# Patient Record
Sex: MALE | Race: WHITE | NOT HISPANIC OR LATINO | Employment: OTHER | URBAN - METROPOLITAN AREA
[De-identification: names, ages, dates, MRNs, and addresses within clinical notes are randomized per-mention and may not be internally consistent; named-entity substitution may affect disease eponyms.]

---

## 2017-01-05 DIAGNOSIS — D75.1 ERYTHROCYTOSIS: Primary | ICD-10-CM

## 2017-01-09 ENCOUNTER — LAB (OUTPATIENT)
Dept: LAB | Facility: HOSPITAL | Age: 72
End: 2017-01-09

## 2017-01-09 ENCOUNTER — OFFICE VISIT (OUTPATIENT)
Dept: ONCOLOGY | Facility: CLINIC | Age: 72
End: 2017-01-09

## 2017-01-09 ENCOUNTER — APPOINTMENT (OUTPATIENT)
Dept: ONCOLOGY | Facility: HOSPITAL | Age: 72
End: 2017-01-09

## 2017-01-09 VITALS
BODY MASS INDEX: 24.61 KG/M2 | HEART RATE: 57 BPM | TEMPERATURE: 97.9 F | WEIGHT: 162.4 LBS | HEIGHT: 68 IN | OXYGEN SATURATION: 99 % | DIASTOLIC BLOOD PRESSURE: 78 MMHG | RESPIRATION RATE: 16 BRPM | SYSTOLIC BLOOD PRESSURE: 118 MMHG

## 2017-01-09 DIAGNOSIS — D75.1 ERYTHROCYTOSIS: Primary | ICD-10-CM

## 2017-01-09 DIAGNOSIS — D75.1 ERYTHROCYTOSIS: ICD-10-CM

## 2017-01-09 DIAGNOSIS — C64.1 CANCER OF RIGHT KIDNEY (HCC): ICD-10-CM

## 2017-01-09 LAB
BASOPHILS # BLD AUTO: 0.07 10*3/MM3 (ref 0–0.1)
BASOPHILS NFR BLD AUTO: 0.8 % (ref 0–1.1)
DEPRECATED RDW RBC AUTO: 45.4 FL (ref 37–49)
EOSINOPHIL # BLD AUTO: 0.29 10*3/MM3 (ref 0–0.36)
EOSINOPHIL NFR BLD AUTO: 3.2 % (ref 1–5)
ERYTHROCYTE [DISTWIDTH] IN BLOOD BY AUTOMATED COUNT: 13.8 % (ref 11.7–14.5)
HCT VFR BLD AUTO: 48.2 % (ref 40–49)
HGB BLD-MCNC: 16 G/DL (ref 13.5–16.5)
IMM GRANULOCYTES # BLD: 0.04 10*3/MM3 (ref 0–0.03)
IMM GRANULOCYTES NFR BLD: 0.4 % (ref 0–0.5)
LYMPHOCYTES # BLD AUTO: 1.57 10*3/MM3 (ref 1–3.5)
LYMPHOCYTES NFR BLD AUTO: 17.3 % (ref 20–49)
MCH RBC QN AUTO: 29.9 PG (ref 27–33)
MCHC RBC AUTO-ENTMCNC: 33.2 G/DL (ref 32–35)
MCV RBC AUTO: 89.9 FL (ref 83–97)
MONOCYTES # BLD AUTO: 0.96 10*3/MM3 (ref 0.25–0.8)
MONOCYTES NFR BLD AUTO: 10.6 % (ref 4–12)
NEUTROPHILS # BLD AUTO: 6.15 10*3/MM3 (ref 1.5–7)
NEUTROPHILS NFR BLD AUTO: 67.7 % (ref 39–75)
NRBC BLD MANUAL-RTO: 0 /100 WBC (ref 0–0)
PLATELET # BLD AUTO: 224 10*3/MM3 (ref 150–375)
PMV BLD AUTO: 9.3 FL (ref 8.9–12.1)
RBC # BLD AUTO: 5.36 10*6/MM3 (ref 4.3–5.5)
WBC NRBC COR # BLD: 9.08 10*3/MM3 (ref 4–10)

## 2017-01-09 PROCEDURE — 36416 COLLJ CAPILLARY BLOOD SPEC: CPT | Performed by: INTERNAL MEDICINE

## 2017-01-09 PROCEDURE — 85025 COMPLETE CBC W/AUTO DIFF WBC: CPT | Performed by: INTERNAL MEDICINE

## 2017-01-09 PROCEDURE — 99213 OFFICE O/P EST LOW 20 MIN: CPT | Performed by: INTERNAL MEDICINE

## 2017-01-09 NOTE — PROGRESS NOTES
REASONS FOR FOLLOWUP:       1. Myeloproliferative disorder with polycythemia vera, MAI-2 mutation positive requiring periodic phlebotomies whenever hematocrit is above 47.      2. His  tory of stage I clear cell carcinoma of the right kidney status post partial nephrectomy, nephron sparing surgery by Dr. Ganesh Lopez with no issues or consequences.  The patient has already an appointment to repeat CT scan of the abdomen in March 2016.                3. Patient has history of polymyalgia rheumatica.  He IS OFF 5 mg of prednisone a day.            HISTORY OF PRESENT ILLNESS:  The patient returns today to the office for followup.       He is here today with no specific complaints of anything in particular. He has no clinical bleeding, no thrombosis, normal appetite, stable weight, normal bowel function, normal urination and completely controlled polymyalgia rheumatica after stopping prednisone. He has been very effective in regard physical activity, playing tennis and having no limitations in life quality.     He is due to have another CT scan of the abdomen in April of 2017 by Dr. Ganesh Lopez in regard his nephron-sparing nephrectomy.              His polymyalgia rheumatica is quiet.           PAST MEDICAL HISTORY:    1.;  Hypertension.    2.;   History of polyps in the colon.  Colonoscopy in 2010 documented a tubulovillous adenoma that was removed completely.  Further colonoscopy in 2014 was unremarkable.     3.; History of prostate biopsy in the past that showed features consistent with prostate cancer  .  He has not required any specific therapy for this and he has been monitored through his urologist.    4.; History of multiple hernia repairs.     5.; History of polymyalgia rheumatica that was diagnosed 3 years by Dr. Karan Garrett.  He has been on a tapering dose   of prednisone, even though he admits that his polymyalgia was atypical because his sedimentation was never elevated.  He has been  seen by other rheumatologists in the past as well and has been told he has Raynaud’  s disease.  He has never had diagnosis of rheumatoid arthritis, lupus or something of this nature.     6.; Partial thyroidectomy in 1983 for a malignant tumor of the thyroid gland that never required any other intervention.      7.; He used to have radiation treatment for tonsillar inflammation and infection when   he was a child and this led him to develop thyroid cancer.       HEMATOLOGIC/ONCOLOGIC HISTORY:  History of previous dates can be found in a separate document.         On 02/22/2016, he had no symptoms or signs that would indicate any kidney cancer recurrence.  Given   the mild achiness in his muscles, we went ahead and checked a CPK and an aldolase and also checked a sedimentation rate and vitamin D level.  TSH was also performed.  We asked him to remain on his dose of prednisone of 5 mg a day.  We asked him to initia  te a program of physical activity including going to the gym.  He did not need phlebotomy on this occasion, given hematocrit of 45.  His white count and platelet count remain stable.  He had no palpable splenomegaly.         MEDICATIONS:  The current medication list was reviewed with the patient and updated in the EMR this date per the Medical Assistant.  Medication dosages and frequencies were confirmed to be accurate.        It is important to mention the patient does not use any androgen or androgen replacement medication or anabolic steroids or any kind.        ALLERGIES:     1.  STATINS.    2. DIPHENHYDRAMINE.         SOCIAL HISTORY:  The patient is .  He lives with his wife in [place].  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  He plays violin in   an orchestra and also he is a very avid .  He does not smoke but he had a lot of second-hand smoke through his life.  He drinks 1-3 glasses of wine a week.  He has no use of any recreational drugs.           FAMILY HISTORY:  His father  of alcoh  ol and smoking disorders including heart attack.  His mother is 89 and in good health.  He has no brothers.  He has 1 adopted sister who is in good health with probable rheumatoid arthritis.  His wife is in good health.  He has 2 children, 1 who is in poo  r health due to drug abuse and the other son is in good health. He had another son who  as a consequence of Tylenol intoxication.   No family history of hematological disorder or myeloproliferative disorder.         REVIEW OF SYSTEMS:   General: no fever, chills, fatigue, weight changes, or lack of appetite.  Eyes: no epiphora, xerophthalmia,conjunctivitis, pain, glaucoma, blurred vision, blindness, secretion, photophobia, proptosis, diplopia.  Ears: no otorrhea, tinnitus, otorrhagia, deafness, pain, vertigo.  Nose: no rhinorrhea, epistaxis, alteration in perception of odors, sinuses pressure.  Mouth: no alteration in gums or teeth,  ulcers, no difficulty with mastication or deglut ion, no odynophagia.  Neck: no masses or pain, no thyroid alterations, no pain in muscles or arteries, no carotid odynia, no crepitation.  Respiratory: no cough, sputum production, dyspnea, trepopnea, pleuritic pain, hemoptysis.  Heart: no syncope, irregularity, palpitations, angina, orthopnea, paroxysmal nocturnal dyspnea.  Vascular Venous: no tenderness,edema, palpable cords, postphlebitic syndrome, skin changes or ulcerations.  Vascular Arterial: no distal ischemia, claudication, gangrene, neuropathic ischemic pain, skin ulcers, paleness or cyanosis.  GI: no dysphagia, odynophagia, no regurgitation, no heartburn,no indigestion,no nausea,no vomiting,no hematemesis ,no melena,no jaundice,no distention, no obstipation,no enterorrhagia,no proctalgia,no anal  lesions, nochanges in bowel habits.  : no frequency, hesitancy, hematuria, discharge, pain.  Musculoskeletal: no muscle or tendon pain or inflammation, joint pain, edema,  "functional limitation, fasciculations, mass.  Neurologic: no headache, seizures, alterations on Craneal nerves, no motor or senssory deficit, normal coordination, no alteration in memory, orientation, calculation,writting, verbal or written language.  Skin: no rashes, pruritus or localized lesions.  Psychiatric: no anxiety, depression, agitation, delusions, proper insight.        VITAL SIGNS:   Vitals:    01/09/17 1453   BP: 118/78   Pulse: 57   Resp: 16   Temp: 97.9 °F (36.6 °C)   TempSrc: Oral   SpO2: 99%   Weight: 162 lb 6.4 oz (73.7 kg)   Height: 68.11\" (173 cm)            PHYSICAL EXAMINATION:   GENERAL:  Well-developed, well-nourished  Patient  in no acute distress.   SKIN:  Warm, dry without rashes, purpura or petechiae.  HEENT:  Pupils were equal and reactive to light and accomodation, conjunctivas non injected, no pterigion, normal extraocular movements, normal visual acuity.   Mouth mucosa was moist, no exudates in oropharynx, normal gum line, normal roof of the mouth and pillars, normal papillations of the tongue.Ear canals were normal, as well tympanic membranes, normal hearing acuity.No pain in mastoid area or erythema.  NECK:  Supple with good range of motion; no thyromegaly or masses, no JVD or bruits, no cervical adenopathies.No carotid arteries pain, no carotid abnormal pulsation or arterial dance.  LYMPHATICS:  No cervical, supraclavicular, axillary, epitrochlear or inguinal adenopathy.  CHEST:  Normal excursion of both nelson thoraces, normal voice fremitus, no subcutaneous emphysema, normal axillas, no rashes or acanthosis nigricans. Lungs clear to percussion and auscultation, normal breath sounds bilaterally, no wheezing, crackles or ronchi, no stridor, no rubs.  CARDIAC AND VASCULAR: PMI not displaced,no thrills, normal rate and regular rhythm, without murmurs, rubs or S3 or S4 right or left sided gallops. Normal femoral, popliteal, pedis, brachial and carotid pulses.  ABDOMEN:  Soft, nontender " with no organomegaly or masses, no ascites, no collateral circulation,no distention,no Aniket sign, no abdominal pain, no inguinal hernias,no umbilical hernias, no abdominal bruits. Normal bowel sounds.  GENITAL: Not  Performed.  EXTREMITIES  AND SPINE:  No clubbing, cyanosis or edema, no deformities or pain .No kyphosis, scoliosis, deformities or pain in spine, ribs or pelvic bone.  NEUROLOGICAL:  Patient was awake, alert, oriented to time, person and place  LABORATORY DATA:    As per flow sheet, HCT 48      ASSESSMENT/PLAN:      1.    This patient has history of polycythemia vera, MAI-2 mutation positive, who requires periodic phlebotomy; hematocrit fine today, no requiring phlebotomy.  Stable white count, not requiring intervention.  Stable normal platele  t count; no palpable splenomegaly.        2.The patient has history of  Stage I right kidney cancer status post partial nephron sparing nephrectomy.  He has no symptoms related to this.  CT scan in March of2016 failed to document any local recurrence or systemic failure and the patient will remain in observation from this point of view.     3.The patient has been diagnosed by PSA 10.2 with prostate cancer.  No biopsy was obtained.  The previous PSA a year ago was 8.4.  He had an MRI scan of the prostate done at [facility] that according to the patient report was given to him and the information provided by Dr. Ganesh Lopez stated that he will die with the disease but he will not die from the disease.  I    RECOMMENDATIONS:  I will review him back in 4 months for a possible phlebotomy and continue to monitor his hematocrit. Otherwise, no other medications from my point of view besides the advice to take 2000 units a day of vitamin D and be sure that he has a diet rich in selenium and lycopene.

## 2017-05-01 ENCOUNTER — LAB (OUTPATIENT)
Dept: LAB | Facility: HOSPITAL | Age: 72
End: 2017-05-01

## 2017-05-01 ENCOUNTER — INFUSION (OUTPATIENT)
Dept: ONCOLOGY | Facility: HOSPITAL | Age: 72
End: 2017-05-01

## 2017-05-01 ENCOUNTER — OFFICE VISIT (OUTPATIENT)
Dept: ONCOLOGY | Facility: CLINIC | Age: 72
End: 2017-05-01

## 2017-05-01 VITALS
BODY MASS INDEX: 24.34 KG/M2 | HEART RATE: 58 BPM | SYSTOLIC BLOOD PRESSURE: 110 MMHG | RESPIRATION RATE: 12 BRPM | WEIGHT: 160.6 LBS | DIASTOLIC BLOOD PRESSURE: 66 MMHG | OXYGEN SATURATION: 99 % | HEIGHT: 68 IN | TEMPERATURE: 97.5 F

## 2017-05-01 DIAGNOSIS — C64.1 CANCER OF RIGHT KIDNEY (HCC): Primary | ICD-10-CM

## 2017-05-01 DIAGNOSIS — C64.1 CANCER OF RIGHT KIDNEY (HCC): ICD-10-CM

## 2017-05-01 DIAGNOSIS — D75.1 ERYTHROCYTOSIS: Primary | ICD-10-CM

## 2017-05-01 DIAGNOSIS — D75.1 ERYTHROCYTOSIS: ICD-10-CM

## 2017-05-01 LAB
BASOPHILS # BLD AUTO: 0.08 10*3/MM3 (ref 0–0.1)
BASOPHILS NFR BLD AUTO: 0.9 % (ref 0–1.1)
DEPRECATED RDW RBC AUTO: 46.9 FL (ref 37–49)
EOSINOPHIL # BLD AUTO: 0.42 10*3/MM3 (ref 0–0.36)
EOSINOPHIL NFR BLD AUTO: 4.8 % (ref 1–5)
ERYTHROCYTE [DISTWIDTH] IN BLOOD BY AUTOMATED COUNT: 14.1 % (ref 11.7–14.5)
HCT VFR BLD AUTO: 51.7 % (ref 40–49)
HGB BLD-MCNC: 17.8 G/DL (ref 13.5–16.5)
IMM GRANULOCYTES # BLD: 0.05 10*3/MM3 (ref 0–0.03)
IMM GRANULOCYTES NFR BLD: 0.6 % (ref 0–0.5)
LYMPHOCYTES # BLD AUTO: 1.64 10*3/MM3 (ref 1–3.5)
LYMPHOCYTES NFR BLD AUTO: 18.9 % (ref 20–49)
MCH RBC QN AUTO: 31.2 PG (ref 27–33)
MCHC RBC AUTO-ENTMCNC: 34.4 G/DL (ref 32–35)
MCV RBC AUTO: 90.5 FL (ref 83–97)
MONOCYTES # BLD AUTO: 0.85 10*3/MM3 (ref 0.25–0.8)
MONOCYTES NFR BLD AUTO: 9.8 % (ref 4–12)
NEUTROPHILS # BLD AUTO: 5.65 10*3/MM3 (ref 1.5–7)
NEUTROPHILS NFR BLD AUTO: 65 % (ref 39–75)
NRBC BLD MANUAL-RTO: 0 /100 WBC (ref 0–0)
PLATELET # BLD AUTO: 210 10*3/MM3 (ref 150–375)
PMV BLD AUTO: 9.9 FL (ref 8.9–12.1)
RBC # BLD AUTO: 5.71 10*6/MM3 (ref 4.3–5.5)
WBC NRBC COR # BLD: 8.69 10*3/MM3 (ref 4–10)

## 2017-05-01 PROCEDURE — 99213 OFFICE O/P EST LOW 20 MIN: CPT | Performed by: INTERNAL MEDICINE

## 2017-05-01 PROCEDURE — 36416 COLLJ CAPILLARY BLOOD SPEC: CPT | Performed by: INTERNAL MEDICINE

## 2017-05-01 PROCEDURE — 85025 COMPLETE CBC W/AUTO DIFF WBC: CPT | Performed by: INTERNAL MEDICINE

## 2017-05-01 PROCEDURE — 99195 PHLEBOTOMY: CPT | Performed by: INTERNAL MEDICINE

## 2017-05-01 RX ORDER — SODIUM CHLORIDE 9 MG/ML
250 INJECTION, SOLUTION INTRAVENOUS ONCE
Status: CANCELLED | OUTPATIENT
Start: 2017-05-01

## 2017-06-26 ENCOUNTER — LAB (OUTPATIENT)
Dept: LAB | Facility: HOSPITAL | Age: 72
End: 2017-06-26

## 2017-06-26 ENCOUNTER — INFUSION (OUTPATIENT)
Dept: ONCOLOGY | Facility: HOSPITAL | Age: 72
End: 2017-06-26

## 2017-06-26 VITALS
TEMPERATURE: 97.5 F | HEART RATE: 56 BPM | DIASTOLIC BLOOD PRESSURE: 80 MMHG | WEIGHT: 162.6 LBS | SYSTOLIC BLOOD PRESSURE: 132 MMHG | BODY MASS INDEX: 24.64 KG/M2

## 2017-06-26 DIAGNOSIS — D75.1 ERYTHROCYTOSIS: ICD-10-CM

## 2017-06-26 DIAGNOSIS — D75.1 ERYTHROCYTOSIS: Primary | ICD-10-CM

## 2017-06-26 LAB
BASOPHILS # BLD AUTO: 0.09 10*3/MM3 (ref 0–0.1)
BASOPHILS NFR BLD AUTO: 1 % (ref 0–1.1)
DEPRECATED RDW RBC AUTO: 45.7 FL (ref 37–49)
EOSINOPHIL # BLD AUTO: 0.45 10*3/MM3 (ref 0–0.36)
EOSINOPHIL NFR BLD AUTO: 4.9 % (ref 1–5)
ERYTHROCYTE [DISTWIDTH] IN BLOOD BY AUTOMATED COUNT: 13.5 % (ref 11.7–14.5)
HCT VFR BLD AUTO: 49.8 % (ref 40–49)
HGB BLD-MCNC: 16.7 G/DL (ref 13.5–16.5)
IMM GRANULOCYTES # BLD: 0.09 10*3/MM3 (ref 0–0.03)
IMM GRANULOCYTES NFR BLD: 1 % (ref 0–0.5)
LYMPHOCYTES # BLD AUTO: 1.92 10*3/MM3 (ref 1–3.5)
LYMPHOCYTES NFR BLD AUTO: 21 % (ref 20–49)
MCH RBC QN AUTO: 30.8 PG (ref 27–33)
MCHC RBC AUTO-ENTMCNC: 33.5 G/DL (ref 32–35)
MCV RBC AUTO: 91.9 FL (ref 83–97)
MONOCYTES # BLD AUTO: 0.85 10*3/MM3 (ref 0.25–0.8)
MONOCYTES NFR BLD AUTO: 9.3 % (ref 4–12)
NEUTROPHILS # BLD AUTO: 5.73 10*3/MM3 (ref 1.5–7)
NEUTROPHILS NFR BLD AUTO: 62.8 % (ref 39–75)
NRBC BLD MANUAL-RTO: 0 /100 WBC (ref 0–0)
PLATELET # BLD AUTO: 208 10*3/MM3 (ref 150–375)
PMV BLD AUTO: 9.4 FL (ref 8.9–12.1)
RBC # BLD AUTO: 5.42 10*6/MM3 (ref 4.3–5.5)
WBC NRBC COR # BLD: 9.13 10*3/MM3 (ref 4–10)

## 2017-06-26 PROCEDURE — 36416 COLLJ CAPILLARY BLOOD SPEC: CPT | Performed by: INTERNAL MEDICINE

## 2017-06-26 PROCEDURE — 85025 COMPLETE CBC W/AUTO DIFF WBC: CPT | Performed by: INTERNAL MEDICINE

## 2017-06-26 PROCEDURE — 99195 PHLEBOTOMY: CPT | Performed by: INTERNAL MEDICINE

## 2017-06-26 RX ORDER — SODIUM CHLORIDE 9 MG/ML
250 INJECTION, SOLUTION INTRAVENOUS ONCE
Status: CANCELLED | OUTPATIENT
Start: 2017-06-26

## 2017-08-21 ENCOUNTER — LAB (OUTPATIENT)
Dept: LAB | Facility: HOSPITAL | Age: 72
End: 2017-08-21

## 2017-08-21 ENCOUNTER — INFUSION (OUTPATIENT)
Dept: ONCOLOGY | Facility: HOSPITAL | Age: 72
End: 2017-08-21

## 2017-08-21 ENCOUNTER — OFFICE VISIT (OUTPATIENT)
Dept: ONCOLOGY | Facility: CLINIC | Age: 72
End: 2017-08-21

## 2017-08-21 VITALS
TEMPERATURE: 97.7 F | RESPIRATION RATE: 16 BRPM | HEIGHT: 68 IN | HEART RATE: 68 BPM | DIASTOLIC BLOOD PRESSURE: 70 MMHG | SYSTOLIC BLOOD PRESSURE: 102 MMHG | BODY MASS INDEX: 25.4 KG/M2 | WEIGHT: 167.6 LBS

## 2017-08-21 DIAGNOSIS — C64.1 CANCER OF RIGHT KIDNEY (HCC): ICD-10-CM

## 2017-08-21 DIAGNOSIS — D75.1 ERYTHROCYTOSIS: Primary | ICD-10-CM

## 2017-08-21 DIAGNOSIS — D75.1 ERYTHROCYTOSIS: ICD-10-CM

## 2017-08-21 DIAGNOSIS — M35.3 PMR (POLYMYALGIA RHEUMATICA) (HCC): ICD-10-CM

## 2017-08-21 LAB
BASOPHILS # BLD AUTO: 0.12 10*3/MM3 (ref 0–0.1)
BASOPHILS NFR BLD AUTO: 1.1 % (ref 0–1.1)
DEPRECATED RDW RBC AUTO: 44.2 FL (ref 37–49)
EOSINOPHIL # BLD AUTO: 0.53 10*3/MM3 (ref 0–0.36)
EOSINOPHIL NFR BLD AUTO: 4.9 % (ref 1–5)
ERYTHROCYTE [DISTWIDTH] IN BLOOD BY AUTOMATED COUNT: 13.2 % (ref 11.7–14.5)
HCT VFR BLD AUTO: 49.9 % (ref 40–49)
HGB BLD-MCNC: 16.6 G/DL (ref 13.5–16.5)
IMM GRANULOCYTES # BLD: 0.08 10*3/MM3 (ref 0–0.03)
IMM GRANULOCYTES NFR BLD: 0.7 % (ref 0–0.5)
LYMPHOCYTES # BLD AUTO: 2.34 10*3/MM3 (ref 1–3.5)
LYMPHOCYTES NFR BLD AUTO: 21.7 % (ref 20–49)
MCH RBC QN AUTO: 30.2 PG (ref 27–33)
MCHC RBC AUTO-ENTMCNC: 33.3 G/DL (ref 32–35)
MCV RBC AUTO: 90.7 FL (ref 83–97)
MONOCYTES # BLD AUTO: 1 10*3/MM3 (ref 0.25–0.8)
MONOCYTES NFR BLD AUTO: 9.3 % (ref 4–12)
NEUTROPHILS # BLD AUTO: 6.73 10*3/MM3 (ref 1.5–7)
NEUTROPHILS NFR BLD AUTO: 62.3 % (ref 39–75)
NRBC BLD MANUAL-RTO: 0 /100 WBC (ref 0–0)
PLATELET # BLD AUTO: 213 10*3/MM3 (ref 150–375)
PMV BLD AUTO: 9.2 FL (ref 8.9–12.1)
RBC # BLD AUTO: 5.5 10*6/MM3 (ref 4.3–5.5)
WBC NRBC COR # BLD: 10.8 10*3/MM3 (ref 4–10)

## 2017-08-21 PROCEDURE — 99214 OFFICE O/P EST MOD 30 MIN: CPT | Performed by: INTERNAL MEDICINE

## 2017-08-21 PROCEDURE — 36415 COLL VENOUS BLD VENIPUNCTURE: CPT | Performed by: INTERNAL MEDICINE

## 2017-08-21 PROCEDURE — 99195 PHLEBOTOMY: CPT | Performed by: INTERNAL MEDICINE

## 2017-08-21 PROCEDURE — 85025 COMPLETE CBC W/AUTO DIFF WBC: CPT | Performed by: INTERNAL MEDICINE

## 2017-08-21 RX ORDER — SODIUM CHLORIDE 9 MG/ML
250 INJECTION, SOLUTION INTRAVENOUS ONCE
Status: CANCELLED | OUTPATIENT
Start: 2017-08-21

## 2017-08-21 RX ORDER — HYDROXYUREA 500 MG/1
500 CAPSULE ORAL DAILY
Qty: 30 CAPSULE | Refills: 6 | Status: SHIPPED | OUTPATIENT
Start: 2017-08-21 | End: 2018-01-22

## 2017-09-06 ENCOUNTER — INFUSION (OUTPATIENT)
Dept: ONCOLOGY | Facility: HOSPITAL | Age: 72
End: 2017-09-06

## 2017-09-06 ENCOUNTER — LAB (OUTPATIENT)
Dept: LAB | Facility: HOSPITAL | Age: 72
End: 2017-09-06

## 2017-09-06 VITALS
HEART RATE: 55 BPM | BODY MASS INDEX: 25.7 KG/M2 | TEMPERATURE: 98 F | SYSTOLIC BLOOD PRESSURE: 126 MMHG | WEIGHT: 169.6 LBS | DIASTOLIC BLOOD PRESSURE: 81 MMHG

## 2017-09-06 DIAGNOSIS — D75.1 ERYTHROCYTOSIS: ICD-10-CM

## 2017-09-06 LAB
BASOPHILS # BLD AUTO: 0.06 10*3/MM3 (ref 0–0.1)
BASOPHILS NFR BLD AUTO: 0.7 % (ref 0–1.1)
DEPRECATED RDW RBC AUTO: 45.1 FL (ref 37–49)
EOSINOPHIL # BLD AUTO: 0.36 10*3/MM3 (ref 0–0.36)
EOSINOPHIL NFR BLD AUTO: 4.4 % (ref 1–5)
ERYTHROCYTE [DISTWIDTH] IN BLOOD BY AUTOMATED COUNT: 13.9 % (ref 11.7–14.5)
HCT VFR BLD AUTO: 44.1 % (ref 40–49)
HGB BLD-MCNC: 14.9 G/DL (ref 13.5–16.5)
IMM GRANULOCYTES # BLD: 0.05 10*3/MM3 (ref 0–0.03)
IMM GRANULOCYTES NFR BLD: 0.6 % (ref 0–0.5)
LYMPHOCYTES # BLD AUTO: 1.96 10*3/MM3 (ref 1–3.5)
LYMPHOCYTES NFR BLD AUTO: 24.2 % (ref 20–49)
MCH RBC QN AUTO: 30.7 PG (ref 27–33)
MCHC RBC AUTO-ENTMCNC: 33.8 G/DL (ref 32–35)
MCV RBC AUTO: 90.9 FL (ref 83–97)
MONOCYTES # BLD AUTO: 0.71 10*3/MM3 (ref 0.25–0.8)
MONOCYTES NFR BLD AUTO: 8.8 % (ref 4–12)
NEUTROPHILS # BLD AUTO: 4.97 10*3/MM3 (ref 1.5–7)
NEUTROPHILS NFR BLD AUTO: 61.3 % (ref 39–75)
NRBC BLD MANUAL-RTO: 0 /100 WBC (ref 0–0)
PLATELET # BLD AUTO: 200 10*3/MM3 (ref 150–375)
PMV BLD AUTO: 8.9 FL (ref 8.9–12.1)
RBC # BLD AUTO: 4.85 10*6/MM3 (ref 4.3–5.5)
WBC NRBC COR # BLD: 8.11 10*3/MM3 (ref 4–10)

## 2017-09-06 PROCEDURE — 36416 COLLJ CAPILLARY BLOOD SPEC: CPT | Performed by: INTERNAL MEDICINE

## 2017-09-06 PROCEDURE — 85025 COMPLETE CBC W/AUTO DIFF WBC: CPT | Performed by: INTERNAL MEDICINE

## 2017-09-08 ENCOUNTER — HOSPITAL ENCOUNTER (OUTPATIENT)
Dept: GENERAL RADIOLOGY | Facility: HOSPITAL | Age: 72
Discharge: HOME | End: 2017-09-08
Attending: INTERNAL MEDICINE | Admitting: INTERNAL MEDICINE

## 2017-09-08 ENCOUNTER — TRANSCRIBE ORDERS (OUTPATIENT)
Dept: ADMINISTRATIVE | Facility: HOSPITAL | Age: 72
End: 2017-09-08

## 2017-09-08 DIAGNOSIS — M25.511 RIGHT SHOULDER PAIN, UNSPECIFIED CHRONICITY: ICD-10-CM

## 2017-09-08 DIAGNOSIS — M25.511 RIGHT SHOULDER PAIN, UNSPECIFIED CHRONICITY: Primary | ICD-10-CM

## 2017-09-08 PROCEDURE — 73030 X-RAY EXAM OF SHOULDER: CPT

## 2017-09-20 ENCOUNTER — LAB (OUTPATIENT)
Dept: LAB | Facility: HOSPITAL | Age: 72
End: 2017-09-20

## 2017-09-20 ENCOUNTER — INFUSION (OUTPATIENT)
Dept: ONCOLOGY | Facility: HOSPITAL | Age: 72
End: 2017-09-20

## 2017-09-20 VITALS
SYSTOLIC BLOOD PRESSURE: 135 MMHG | WEIGHT: 169.8 LBS | BODY MASS INDEX: 25.73 KG/M2 | TEMPERATURE: 98.7 F | HEART RATE: 61 BPM | DIASTOLIC BLOOD PRESSURE: 82 MMHG

## 2017-09-20 DIAGNOSIS — D75.1 ERYTHROCYTOSIS: Primary | ICD-10-CM

## 2017-09-20 DIAGNOSIS — D75.1 ERYTHROCYTOSIS: ICD-10-CM

## 2017-09-20 LAB
BASOPHILS # BLD AUTO: 0.1 10*3/MM3 (ref 0–0.1)
BASOPHILS NFR BLD AUTO: 1.2 % (ref 0–1.1)
DEPRECATED RDW RBC AUTO: 50.4 FL (ref 37–49)
EOSINOPHIL # BLD AUTO: 0.46 10*3/MM3 (ref 0–0.36)
EOSINOPHIL NFR BLD AUTO: 5.7 % (ref 1–5)
ERYTHROCYTE [DISTWIDTH] IN BLOOD BY AUTOMATED COUNT: 15.3 % (ref 11.7–14.5)
HCT VFR BLD AUTO: 47.9 % (ref 40–49)
HGB BLD-MCNC: 16.2 G/DL (ref 13.5–16.5)
IMM GRANULOCYTES # BLD: 0.13 10*3/MM3 (ref 0–0.03)
IMM GRANULOCYTES NFR BLD: 1.6 % (ref 0–0.5)
LYMPHOCYTES # BLD AUTO: 2.38 10*3/MM3 (ref 1–3.5)
LYMPHOCYTES NFR BLD AUTO: 29.4 % (ref 20–49)
MCH RBC QN AUTO: 31 PG (ref 27–33)
MCHC RBC AUTO-ENTMCNC: 33.8 G/DL (ref 32–35)
MCV RBC AUTO: 91.6 FL (ref 83–97)
MONOCYTES # BLD AUTO: 0.83 10*3/MM3 (ref 0.25–0.8)
MONOCYTES NFR BLD AUTO: 10.2 % (ref 4–12)
NEUTROPHILS # BLD AUTO: 4.2 10*3/MM3 (ref 1.5–7)
NEUTROPHILS NFR BLD AUTO: 51.9 % (ref 39–75)
NRBC BLD MANUAL-RTO: 0 /100 WBC (ref 0–0)
PLATELET # BLD AUTO: 155 10*3/MM3 (ref 150–375)
PMV BLD AUTO: 9.1 FL (ref 8.9–12.1)
RBC # BLD AUTO: 5.23 10*6/MM3 (ref 4.3–5.5)
WBC NRBC COR # BLD: 8.1 10*3/MM3 (ref 4–10)

## 2017-09-20 PROCEDURE — 36416 COLLJ CAPILLARY BLOOD SPEC: CPT | Performed by: INTERNAL MEDICINE

## 2017-09-20 PROCEDURE — 99195 PHLEBOTOMY: CPT | Performed by: INTERNAL MEDICINE

## 2017-09-20 PROCEDURE — 85025 COMPLETE CBC W/AUTO DIFF WBC: CPT | Performed by: INTERNAL MEDICINE

## 2017-09-20 RX ORDER — SODIUM CHLORIDE 9 MG/ML
250 INJECTION, SOLUTION INTRAVENOUS ONCE
Status: CANCELLED | OUTPATIENT
Start: 2017-09-20

## 2017-09-20 RX ORDER — SODIUM CHLORIDE 9 MG/ML
250 INJECTION, SOLUTION INTRAVENOUS ONCE
Status: DISCONTINUED | OUTPATIENT
Start: 2017-09-20 | End: 2017-09-20 | Stop reason: HOSPADM

## 2017-10-02 DIAGNOSIS — C61 PROSTATE CANCER (HCC): ICD-10-CM

## 2017-10-02 DIAGNOSIS — D75.1 ERYTHROCYTOSIS: Primary | ICD-10-CM

## 2017-10-04 ENCOUNTER — INFUSION (OUTPATIENT)
Dept: ONCOLOGY | Facility: HOSPITAL | Age: 72
End: 2017-10-04

## 2017-10-04 ENCOUNTER — LAB (OUTPATIENT)
Dept: LAB | Facility: HOSPITAL | Age: 72
End: 2017-10-04

## 2017-10-04 DIAGNOSIS — D75.1 ERYTHROCYTOSIS: ICD-10-CM

## 2017-10-04 LAB
BASOPHILS # BLD AUTO: 0.09 10*3/MM3 (ref 0–0.1)
BASOPHILS NFR BLD AUTO: 1.2 % (ref 0–1.1)
DEPRECATED RDW RBC AUTO: 54.2 FL (ref 37–49)
EOSINOPHIL # BLD AUTO: 0.45 10*3/MM3 (ref 0–0.36)
EOSINOPHIL NFR BLD AUTO: 6.1 % (ref 1–5)
ERYTHROCYTE [DISTWIDTH] IN BLOOD BY AUTOMATED COUNT: 16.2 % (ref 11.7–14.5)
HCT VFR BLD AUTO: 42.9 % (ref 40–49)
HGB BLD-MCNC: 14.3 G/DL (ref 13.5–16.5)
IMM GRANULOCYTES # BLD: 0.03 10*3/MM3 (ref 0–0.03)
IMM GRANULOCYTES NFR BLD: 0.4 % (ref 0–0.5)
LYMPHOCYTES # BLD AUTO: 1.94 10*3/MM3 (ref 1–3.5)
LYMPHOCYTES NFR BLD AUTO: 26.3 % (ref 20–49)
MCH RBC QN AUTO: 30.8 PG (ref 27–33)
MCHC RBC AUTO-ENTMCNC: 33.3 G/DL (ref 32–35)
MCV RBC AUTO: 92.3 FL (ref 83–97)
MONOCYTES # BLD AUTO: 0.9 10*3/MM3 (ref 0.25–0.8)
MONOCYTES NFR BLD AUTO: 12.2 % (ref 4–12)
NEUTROPHILS # BLD AUTO: 3.97 10*3/MM3 (ref 1.5–7)
NEUTROPHILS NFR BLD AUTO: 53.8 % (ref 39–75)
NRBC BLD MANUAL-RTO: 0 /100 WBC (ref 0–0)
PLATELET # BLD AUTO: 187 10*3/MM3 (ref 150–375)
PMV BLD AUTO: 9.2 FL (ref 8.9–12.1)
RBC # BLD AUTO: 4.65 10*6/MM3 (ref 4.3–5.5)
WBC NRBC COR # BLD: 7.38 10*3/MM3 (ref 4–10)

## 2017-10-04 PROCEDURE — 36416 COLLJ CAPILLARY BLOOD SPEC: CPT | Performed by: INTERNAL MEDICINE

## 2017-10-04 PROCEDURE — 85025 COMPLETE CBC W/AUTO DIFF WBC: CPT | Performed by: INTERNAL MEDICINE

## 2017-10-13 ENCOUNTER — APPOINTMENT (OUTPATIENT)
Dept: ONCOLOGY | Facility: HOSPITAL | Age: 72
End: 2017-10-13

## 2017-10-13 ENCOUNTER — LAB (OUTPATIENT)
Dept: LAB | Facility: HOSPITAL | Age: 72
End: 2017-10-13

## 2017-10-13 ENCOUNTER — OFFICE VISIT (OUTPATIENT)
Dept: ONCOLOGY | Facility: CLINIC | Age: 72
End: 2017-10-13

## 2017-10-13 VITALS
WEIGHT: 170.4 LBS | HEART RATE: 56 BPM | SYSTOLIC BLOOD PRESSURE: 132 MMHG | BODY MASS INDEX: 25.82 KG/M2 | HEIGHT: 68 IN | DIASTOLIC BLOOD PRESSURE: 84 MMHG | TEMPERATURE: 97.7 F | OXYGEN SATURATION: 100 % | RESPIRATION RATE: 12 BRPM

## 2017-10-13 DIAGNOSIS — C64.1 CANCER OF RIGHT KIDNEY (HCC): Primary | ICD-10-CM

## 2017-10-13 DIAGNOSIS — C61 PROSTATE CANCER (HCC): ICD-10-CM

## 2017-10-13 DIAGNOSIS — D75.1 ERYTHROCYTOSIS: ICD-10-CM

## 2017-10-13 LAB
BASOPHILS # BLD AUTO: 0.08 10*3/MM3 (ref 0–0.1)
BASOPHILS NFR BLD AUTO: 1.2 % (ref 0–1.1)
DEPRECATED RDW RBC AUTO: 54.1 FL (ref 37–49)
EOSINOPHIL # BLD AUTO: 0.37 10*3/MM3 (ref 0–0.36)
EOSINOPHIL NFR BLD AUTO: 5.4 % (ref 1–5)
ERYTHROCYTE [DISTWIDTH] IN BLOOD BY AUTOMATED COUNT: 16.2 % (ref 11.7–14.5)
HCT VFR BLD AUTO: 41.7 % (ref 40–49)
HGB BLD-MCNC: 14.3 G/DL (ref 13.5–16.5)
IMM GRANULOCYTES # BLD: 0.02 10*3/MM3 (ref 0–0.03)
IMM GRANULOCYTES NFR BLD: 0.3 % (ref 0–0.5)
LYMPHOCYTES # BLD AUTO: 1.83 10*3/MM3 (ref 1–3.5)
LYMPHOCYTES NFR BLD AUTO: 26.8 % (ref 20–49)
MCH RBC QN AUTO: 31.4 PG (ref 27–33)
MCHC RBC AUTO-ENTMCNC: 34.3 G/DL (ref 32–35)
MCV RBC AUTO: 91.6 FL (ref 83–97)
MONOCYTES # BLD AUTO: 0.68 10*3/MM3 (ref 0.25–0.8)
MONOCYTES NFR BLD AUTO: 9.9 % (ref 4–12)
NEUTROPHILS # BLD AUTO: 3.86 10*3/MM3 (ref 1.5–7)
NEUTROPHILS NFR BLD AUTO: 56.4 % (ref 39–75)
NRBC BLD MANUAL-RTO: 0 /100 WBC (ref 0–0)
PLATELET # BLD AUTO: 128 10*3/MM3 (ref 150–375)
PMV BLD AUTO: 9.4 FL (ref 8.9–12.1)
RBC # BLD AUTO: 4.55 10*6/MM3 (ref 4.3–5.5)
WBC NRBC COR # BLD: 6.84 10*3/MM3 (ref 4–10)

## 2017-10-13 PROCEDURE — 85025 COMPLETE CBC W/AUTO DIFF WBC: CPT | Performed by: INTERNAL MEDICINE

## 2017-10-13 PROCEDURE — 36415 COLL VENOUS BLD VENIPUNCTURE: CPT | Performed by: INTERNAL MEDICINE

## 2017-10-13 PROCEDURE — 99213 OFFICE O/P EST LOW 20 MIN: CPT | Performed by: INTERNAL MEDICINE

## 2017-10-13 NOTE — PROGRESS NOTES
REASONS FOR FOLLOWUP:       1. Myeloproliferative disorder with polycythemia vera, MAI-2 mutation positive requiring periodic phlebotomies whenever hematocrit is above 47.      2. His  tory of stage I clear cell carcinoma of the right kidney status post partial nephrectomy, nephron sparing surgery by Dr. Ganesh Lopez with no issues or consequences.  The patient has already an appointment to repeat CT scan of the abdomen in March 2016.                3. Patient has history of polymyalgia rheumatica.  He IS OFF 5 mg of prednisone a day.            HISTORY OF PRESENT ILLNESS:  The patient returns today to the office for followup.       He is here today with no specific complaints of anything in particular. He has no clinical bleeding, no thrombosis, normal appetite, stable weight, normal bowel function, normal urination. He has been very effective in regard physical activity, playing tennis and having no limitations in life quality.       He has had significant functional limitations, especially for range of motion in the right shoulder with significant stiffness and discomfort.  The rest of the joints, elbows, hands, wrists, knees, back and ankles, are not having any difficulty.  He denies any headache or visual changes, no jaw claudication, no swelling of the upper extremities.  He has been taking some leftover prednisone that he has had and he takes 1 here and 1 there in a disorganized fashion because Aleve does not produce a lot of relief for his pain.  Upon prison of Dr. Garrett, his former rheumatologist, I think the patient needs to be seen by rheumatology in regard to his history of polymyalgia rheumatica. He saw her Dr. Iesha Miramontes, Rheumatologist, , she thinks pt does not have PMR.Local injection r shoulder given.                       PAST MEDICAL HISTORY:    1.;  Hypertension.    2.;   History of polyps in the colon.  Colonoscopy in 2010 documented a tubulovillous adenoma that was  removed completely.  Further colonoscopy in 2014 was unremarkable.     3.; History of prostate biopsy in the past that showed features consistent with prostate cancer  .  He has not required any specific therapy for this and he has been monitored through his urologist.    4.; History of multiple hernia repairs.     5.; History of polymyalgia rheumatica that was diagnosed 3 years by Dr. Karan Garrett.  He has been on a tapering dose   of prednisone, even though he admits that his polymyalgia was atypical because his sedimentation was never elevated.  He has been seen by other rheumatologists in the past as well and has been told he has Raynaud’  s disease.  He has never had diagnosis of rheumatoid arthritis, lupus or something of this nature.     6.; Partial thyroidectomy in 1983 for a malignant tumor of the thyroid gland that never required any other intervention.      7.; He used to have radiation treatment for tonsillar inflammation and infection when   he was a child and this led him to develop thyroid cancer.       HEMATOLOGIC/ONCOLOGIC HISTORY:  History of previous dates can be found in a separate document.         On 02/22/2016, he had no symptoms or signs that would indicate any kidney cancer recurrence.  Given   the mild achiness in his muscles, we went ahead and checked a CPK and an aldolase and also checked a sedimentation rate and vitamin D level.  TSH was also performed.  We asked him to remain on his dose of prednisone of 5 mg a day.  We asked him to initia  te a program of physical activity including going to the gym.  He did not need phlebotomy on this occasion, given hematocrit of 45.  His white count and platelet count remain stable.  He had no palpable splenomegaly.         MEDICATIONS:  The current medication list was reviewed with the patient and updated in the EMR this date per the Medical Assistant.  Medication dosages and frequencies were confirmed to be accurate.        It is important to  mention the patient does not use any androgen or androgen replacement medication or anabolic steroids or any kind.        ALLERGIES:     1.  STATINS.    2. DIPHENHYDRAMINE.         SOCIAL HISTORY:  The patient is .  He lives with his wife in [place].  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  He plays violin in   an orchestra and also he is a very avid .  He does not smoke but he had a lot of second-hand smoke through his life.  He drinks 1-3 glasses of wine a week.  He has no use of any recreational drugs.          FAMILY HISTORY:  His father  of alcoh  ol and smoking disorders including heart attack.  His mother is 89 and in good health.  He has no brothers.  He has 1 adopted sister who is in good health with probable rheumatoid arthritis.  His wife is in good health.  He has 2 children, 1 who is in poo  r health due to drug abuse and the other son is in good health. He had another son who  as a consequence of Tylenol intoxication.   No family history of hematological disorder or myeloproliferative disorder.         REVIEW OF SYSTEMS:   General: no fever, chills, fatigue, weight changes, or lack of appetite.  Eyes: no epiphora, xerophthalmia,conjunctivitis, pain, glaucoma, blurred vision, blindness, secretion, photophobia, proptosis, diplopia.  Ears: no otorrhea, tinnitus, otorrhagia, deafness, pain, vertigo.  Nose: no rhinorrhea, epistaxis, alteration in perception of odors, sinuses pressure.  Mouth: no alteration in gums or teeth,  ulcers, no difficulty with mastication or deglut ion, no odynophagia.  Neck: no masses or pain, no thyroid alterations, no pain in muscles or arteries, no carotid odynia, no crepitation.  Respiratory: no cough, sputum production, dyspnea, trepopnea, pleuritic pain, hemoptysis.  Heart: no syncope, irregularity, palpitations, angina, orthopnea, paroxysmal nocturnal dyspnea.  Vascular Venous: no tenderness,edema, palpable  "cords, postphlebitic syndrome, skin changes or ulcerations.  Vascular Arterial: no distal ischemia, claudication, gangrene, neuropathic ischemic pain, skin ulcers, paleness or cyanosis.  GI: no dysphagia, odynophagia, no regurgitation, no heartburn,no indigestion,no nausea,no vomiting,no hematemesis ,no melena,no jaundice,no distention, no obstipation,no enterorrhagia,no proctalgia,no anal  lesions, nochanges in bowel habits.  : no frequency, hesitancy, hematuria, discharge, pain.  Musculoskeletal: stated muscle or tendon pain or inflammation, joint pain, edema, functional limitation, fasciculations, mass.  Neurologic: no headache, seizures, alterations on Craneal nerves, no motor or senssory deficit, normal coordination, no alteration in memory, orientation, calculation,writting, verbal or written language.  Skin: no rashes, pruritus or localized lesions.  Psychiatric: no anxiety, depression, agitation, delusions, proper insight.        VITAL SIGNS:   Vitals:    10/13/17 0745   BP: 132/84   Pulse: 56   Resp: 12   Temp: 97.7 °F (36.5 °C)   TempSrc: Oral   SpO2: 100%   Weight: 170 lb 6.4 oz (77.3 kg)   Height: 68.11\" (173 cm)            PHYSICAL EXAMINATION:   GENERAL:  Well-developed, well-nourished  Patient  in no acute distress.   SKIN:  Warm, dry without rashes, purpura or petechiae.  HEENT:  Pupils were equal and reactive to light and accomodation, conjunctivas non injected, no pterigion, normal extraocular movements, normal visual acuity.   Mouth mucosa was moist, no exudates in oropharynx, normal gum line, normal roof of the mouth and pillars, normal papillations of the tongue.Ear canals were normal, as well tympanic membranes, normal hearing acuity.No pain in mastoid area or erythema.  NECK:  Supple with good range of motion; no thyromegaly or masses, no JVD or bruits, no cervical adenopathies.No carotid arteries pain, no carotid abnormal pulsation or arterial dance.  LYMPHATICS:  No cervical, " supraclavicular, axillary, epitrochlear or inguinal adenopathy.  CHEST:  Normal excursion of both nelson thoraces, normal voice fremitus, no subcutaneous emphysema, normal axillas, no rashes or acanthosis nigricans. Lungs clear to percussion and auscultation, normal breath sounds bilaterally, no wheezing, crackles or ronchi, no stridor, no rubs.  CARDIAC AND VASCULAR: PMI not displaced,no thrills, normal rate and regular rhythm, without murmurs, rubs or S3 or S4 right or left sided gallops. Normal femoral, popliteal, pedis, brachial and carotid pulses.  ABDOMEN:  Soft, nontender with no organomegaly or masses, no ascites, no collateral circulation,no distention,no Shoshone sign, no abdominal pain, no inguinal hernias,no umbilical hernias, no abdominal bruits. Normal bowel sounds.  GENITAL: Not  Performed.  EXTREMITIES  AND SPINE:  No clubbing, cyanosis or edema, no deformities or pain .No kyphosis, scoliosis, deformities or pain in spine, ribs or pelvic bone.  NEUROLOGICAL:  Patient was awake, alert, oriented to time, person and place  LABORATORY DATA:  Component      Latest Ref Rng & Units 9/20/2017 10/4/2017 10/13/2017           7:35 AM  7:41 AM  7:29 AM   WBC      4.00 - 10.00 10*3/mm3 8.10 7.38 6.84   RBC      4.30 - 5.50 10*6/mm3 5.23 4.65 4.55   Hemoglobin      13.5 - 16.5 g/dL 16.2 14.3 14.3   Hematocrit      40.0 - 49.0 % 47.9 42.9 41.7   MCV      83.0 - 97.0 fL 91.6 92.3 91.6   MCH      27.0 - 33.0 pg 31.0 30.8 31.4   MCHC      32.0 - 35.0 g/dL 33.8 33.3 34.3   RDW      11.7 - 14.5 % 15.3 (H) 16.2 (H) 16.2 (H)   RDW-SD      37.0 - 49.0 fl 50.4 (H) 54.2 (H) 54.1 (H)   MPV      8.9 - 12.1 fL 9.1 9.2 9.4   Platelets      150 - 375 10*3/mm3 155 187 128 (L)               ASSESSMENT/PLAN:         This patient has history of polycythemia vera, MAI-2 mutation positive, who requires periodic phlebotomy; hematocrit high today at 41,  No requiring phlebotomy. On the other hand the patient has developed mild  thrombocytopenia in absence of any clinical bleeding. The only medicine to blame this for is Hydrea. The hematocrit is excellent today as stated again, and I do believe that we have to adjust the Hydrea dose to 500 mg every other day, continue monitoring his blood counts every couple of weeks and see if the balance between decreased hematocrit and proper platelet count happens to be at a dose of the Hydrea modification.     If the platelet count further declines, I think he will further adjust the Hydrea to maybe 2 pills a week.     He will come back to the office every couple of weeks for blood counts. I will see him back in 3 months.     Again he was seen by Dr. Iesha Miramontes. She does not think that the patient has polymyalgia rheumatica and an injection was given to him in the right shoulder. Otherwise, no other intervention from my point of view.

## 2017-11-01 ENCOUNTER — INFUSION (OUTPATIENT)
Dept: ONCOLOGY | Facility: HOSPITAL | Age: 72
End: 2017-11-01

## 2017-11-01 ENCOUNTER — LAB (OUTPATIENT)
Dept: LAB | Facility: HOSPITAL | Age: 72
End: 2017-11-01

## 2017-11-01 VITALS
HEART RATE: 68 BPM | BODY MASS INDEX: 25.86 KG/M2 | TEMPERATURE: 98.1 F | DIASTOLIC BLOOD PRESSURE: 81 MMHG | WEIGHT: 170.6 LBS | SYSTOLIC BLOOD PRESSURE: 134 MMHG

## 2017-11-01 DIAGNOSIS — D75.1 ERYTHROCYTOSIS: ICD-10-CM

## 2017-11-01 LAB
BASOPHILS # BLD AUTO: 0.13 10*3/MM3 (ref 0–0.1)
BASOPHILS NFR BLD AUTO: 1.3 % (ref 0–1.1)
DEPRECATED RDW RBC AUTO: 53.9 FL (ref 37–49)
EOSINOPHIL # BLD AUTO: 0.44 10*3/MM3 (ref 0–0.36)
EOSINOPHIL NFR BLD AUTO: 4.4 % (ref 1–5)
ERYTHROCYTE [DISTWIDTH] IN BLOOD BY AUTOMATED COUNT: 15.8 % (ref 11.7–14.5)
HCT VFR BLD AUTO: 44.6 % (ref 40–49)
HGB BLD-MCNC: 14.8 G/DL (ref 13.5–16.5)
IMM GRANULOCYTES # BLD: 0.06 10*3/MM3 (ref 0–0.03)
IMM GRANULOCYTES NFR BLD: 0.6 % (ref 0–0.5)
LYMPHOCYTES # BLD AUTO: 2.81 10*3/MM3 (ref 1–3.5)
LYMPHOCYTES NFR BLD AUTO: 27.9 % (ref 20–49)
MCH RBC QN AUTO: 30.9 PG (ref 27–33)
MCHC RBC AUTO-ENTMCNC: 33.2 G/DL (ref 32–35)
MCV RBC AUTO: 93.1 FL (ref 83–97)
MONOCYTES # BLD AUTO: 1 10*3/MM3 (ref 0.25–0.8)
MONOCYTES NFR BLD AUTO: 9.9 % (ref 4–12)
NEUTROPHILS # BLD AUTO: 5.62 10*3/MM3 (ref 1.5–7)
NEUTROPHILS NFR BLD AUTO: 55.9 % (ref 39–75)
NRBC BLD MANUAL-RTO: 0 /100 WBC (ref 0–0)
PLATELET # BLD AUTO: 215 10*3/MM3 (ref 150–375)
PMV BLD AUTO: 9.3 FL (ref 8.9–12.1)
RBC # BLD AUTO: 4.79 10*6/MM3 (ref 4.3–5.5)
WBC NRBC COR # BLD: 10.06 10*3/MM3 (ref 4–10)

## 2017-11-01 PROCEDURE — 85025 COMPLETE CBC W/AUTO DIFF WBC: CPT | Performed by: INTERNAL MEDICINE

## 2017-11-01 PROCEDURE — 36416 COLLJ CAPILLARY BLOOD SPEC: CPT | Performed by: INTERNAL MEDICINE

## 2017-11-01 NOTE — PROGRESS NOTES
Phlebo needed only for hct.> 45. hct today 44.6. Labs results given to pt and made aware that phlebo is not necessary.

## 2017-11-13 ENCOUNTER — INFUSION (OUTPATIENT)
Dept: ONCOLOGY | Facility: HOSPITAL | Age: 72
End: 2017-11-13

## 2017-11-13 ENCOUNTER — LAB (OUTPATIENT)
Dept: LAB | Facility: HOSPITAL | Age: 72
End: 2017-11-13

## 2017-11-13 VITALS
WEIGHT: 174.2 LBS | TEMPERATURE: 98.4 F | HEART RATE: 64 BPM | BODY MASS INDEX: 26.4 KG/M2 | SYSTOLIC BLOOD PRESSURE: 143 MMHG | DIASTOLIC BLOOD PRESSURE: 85 MMHG

## 2017-11-13 DIAGNOSIS — D75.1 ERYTHROCYTOSIS: ICD-10-CM

## 2017-11-13 DIAGNOSIS — D75.1 ERYTHROCYTOSIS: Primary | ICD-10-CM

## 2017-11-13 LAB
BASOPHILS # BLD AUTO: 0.09 10*3/MM3 (ref 0–0.1)
BASOPHILS NFR BLD AUTO: 1.1 % (ref 0–1.1)
DEPRECATED RDW RBC AUTO: 53.3 FL (ref 37–49)
EOSINOPHIL # BLD AUTO: 0.52 10*3/MM3 (ref 0–0.36)
EOSINOPHIL NFR BLD AUTO: 6.4 % (ref 1–5)
ERYTHROCYTE [DISTWIDTH] IN BLOOD BY AUTOMATED COUNT: 15.4 % (ref 11.7–14.5)
HCT VFR BLD AUTO: 45.9 % (ref 40–49)
HGB BLD-MCNC: 15.2 G/DL (ref 13.5–16.5)
IMM GRANULOCYTES # BLD: 0.06 10*3/MM3 (ref 0–0.03)
IMM GRANULOCYTES NFR BLD: 0.7 % (ref 0–0.5)
LYMPHOCYTES # BLD AUTO: 1.9 10*3/MM3 (ref 1–3.5)
LYMPHOCYTES NFR BLD AUTO: 23.3 % (ref 20–49)
MCH RBC QN AUTO: 30.9 PG (ref 27–33)
MCHC RBC AUTO-ENTMCNC: 33.1 G/DL (ref 32–35)
MCV RBC AUTO: 93.3 FL (ref 83–97)
MONOCYTES # BLD AUTO: 0.86 10*3/MM3 (ref 0.25–0.8)
MONOCYTES NFR BLD AUTO: 10.5 % (ref 4–12)
NEUTROPHILS # BLD AUTO: 4.74 10*3/MM3 (ref 1.5–7)
NEUTROPHILS NFR BLD AUTO: 58 % (ref 39–75)
NRBC BLD MANUAL-RTO: 0.2 /100 WBC (ref 0–0)
PLATELET # BLD AUTO: 168 10*3/MM3 (ref 150–375)
PMV BLD AUTO: 9.2 FL (ref 8.9–12.1)
RBC # BLD AUTO: 4.92 10*6/MM3 (ref 4.3–5.5)
WBC NRBC COR # BLD: 8.17 10*3/MM3 (ref 4–10)

## 2017-11-13 PROCEDURE — 85025 COMPLETE CBC W/AUTO DIFF WBC: CPT | Performed by: INTERNAL MEDICINE

## 2017-11-13 PROCEDURE — 36416 COLLJ CAPILLARY BLOOD SPEC: CPT | Performed by: INTERNAL MEDICINE

## 2017-11-13 RX ORDER — SODIUM CHLORIDE 9 MG/ML
250 INJECTION, SOLUTION INTRAVENOUS ONCE
Status: CANCELLED | OUTPATIENT
Start: 2017-11-13

## 2017-11-13 RX ORDER — SODIUM CHLORIDE 9 MG/ML
250 INJECTION, SOLUTION INTRAVENOUS ONCE
Status: DISCONTINUED | OUTPATIENT
Start: 2017-11-13 | End: 2017-11-13 | Stop reason: HOSPADM

## 2017-11-27 ENCOUNTER — INFUSION (OUTPATIENT)
Dept: ONCOLOGY | Facility: HOSPITAL | Age: 72
End: 2017-11-27

## 2017-11-27 ENCOUNTER — LAB (OUTPATIENT)
Dept: LAB | Facility: HOSPITAL | Age: 72
End: 2017-11-27

## 2017-11-27 VITALS
DIASTOLIC BLOOD PRESSURE: 87 MMHG | BODY MASS INDEX: 25.64 KG/M2 | HEART RATE: 81 BPM | SYSTOLIC BLOOD PRESSURE: 145 MMHG | WEIGHT: 169.2 LBS

## 2017-11-27 DIAGNOSIS — D75.1 ERYTHROCYTOSIS: ICD-10-CM

## 2017-11-27 LAB
BASOPHILS # BLD AUTO: 0.11 10*3/MM3 (ref 0–0.1)
BASOPHILS NFR BLD AUTO: 1.4 % (ref 0–1.1)
DEPRECATED RDW RBC AUTO: 52.2 FL (ref 37–49)
EOSINOPHIL # BLD AUTO: 0.46 10*3/MM3 (ref 0–0.36)
EOSINOPHIL NFR BLD AUTO: 5.9 % (ref 1–5)
ERYTHROCYTE [DISTWIDTH] IN BLOOD BY AUTOMATED COUNT: 15.2 % (ref 11.7–14.5)
HCT VFR BLD AUTO: 47.1 % (ref 40–49)
HGB BLD-MCNC: 15.9 G/DL (ref 13.5–16.5)
IMM GRANULOCYTES # BLD: 0.04 10*3/MM3 (ref 0–0.03)
IMM GRANULOCYTES NFR BLD: 0.5 % (ref 0–0.5)
LYMPHOCYTES # BLD AUTO: 1.91 10*3/MM3 (ref 1–3.5)
LYMPHOCYTES NFR BLD AUTO: 24.3 % (ref 20–49)
MCH RBC QN AUTO: 31.5 PG (ref 27–33)
MCHC RBC AUTO-ENTMCNC: 33.8 G/DL (ref 32–35)
MCV RBC AUTO: 93.3 FL (ref 83–97)
MONOCYTES # BLD AUTO: 0.95 10*3/MM3 (ref 0.25–0.8)
MONOCYTES NFR BLD AUTO: 12.1 % (ref 4–12)
NEUTROPHILS # BLD AUTO: 4.38 10*3/MM3 (ref 1.5–7)
NEUTROPHILS NFR BLD AUTO: 55.8 % (ref 39–75)
NRBC BLD MANUAL-RTO: 0 /100 WBC (ref 0–0)
PLATELET # BLD AUTO: 202 10*3/MM3 (ref 150–375)
PMV BLD AUTO: 9.7 FL (ref 8.9–12.1)
RBC # BLD AUTO: 5.05 10*6/MM3 (ref 4.3–5.5)
WBC NRBC COR # BLD: 7.85 10*3/MM3 (ref 4–10)

## 2017-11-27 PROCEDURE — 85025 COMPLETE CBC W/AUTO DIFF WBC: CPT | Performed by: INTERNAL MEDICINE

## 2017-11-27 PROCEDURE — 36415 COLL VENOUS BLD VENIPUNCTURE: CPT | Performed by: INTERNAL MEDICINE

## 2017-11-27 NOTE — PROGRESS NOTES
CBC reviewed with pt.  Per Dr Damon recent note pt to have phlebo for hct greater than 47.  Today pt is 47.1, he elected to not have phlebotomy today.  He reports he is taking hydrea 3 days on 1 day off, he states he will increase to 4 days on 1 day off.  Copy of labs given to pt and instructed to call for any problems.  He v/u.

## 2017-12-11 ENCOUNTER — LAB (OUTPATIENT)
Dept: LAB | Facility: HOSPITAL | Age: 72
End: 2017-12-11

## 2017-12-11 ENCOUNTER — INFUSION (OUTPATIENT)
Dept: ONCOLOGY | Facility: HOSPITAL | Age: 72
End: 2017-12-11

## 2017-12-11 VITALS
WEIGHT: 170.8 LBS | BODY MASS INDEX: 25.89 KG/M2 | HEART RATE: 81 BPM | TEMPERATURE: 98.2 F | DIASTOLIC BLOOD PRESSURE: 78 MMHG | SYSTOLIC BLOOD PRESSURE: 119 MMHG

## 2017-12-11 DIAGNOSIS — D75.1 ERYTHROCYTOSIS: ICD-10-CM

## 2017-12-11 LAB
BASOPHILS # BLD AUTO: 0.09 10*3/MM3 (ref 0–0.1)
BASOPHILS NFR BLD AUTO: 1.1 % (ref 0–1.1)
DEPRECATED RDW RBC AUTO: 52.1 FL (ref 37–49)
EOSINOPHIL # BLD AUTO: 0.5 10*3/MM3 (ref 0–0.36)
EOSINOPHIL NFR BLD AUTO: 6 % (ref 1–5)
ERYTHROCYTE [DISTWIDTH] IN BLOOD BY AUTOMATED COUNT: 14.9 % (ref 11.7–14.5)
HCT VFR BLD AUTO: 45.4 % (ref 40–49)
HGB BLD-MCNC: 15 G/DL (ref 13.5–16.5)
IMM GRANULOCYTES # BLD: 0.05 10*3/MM3 (ref 0–0.03)
IMM GRANULOCYTES NFR BLD: 0.6 % (ref 0–0.5)
LYMPHOCYTES # BLD AUTO: 1.95 10*3/MM3 (ref 1–3.5)
LYMPHOCYTES NFR BLD AUTO: 23.5 % (ref 20–49)
MCH RBC QN AUTO: 31.6 PG (ref 27–33)
MCHC RBC AUTO-ENTMCNC: 33 G/DL (ref 32–35)
MCV RBC AUTO: 95.6 FL (ref 83–97)
MONOCYTES # BLD AUTO: 1 10*3/MM3 (ref 0.25–0.8)
MONOCYTES NFR BLD AUTO: 12.1 % (ref 4–12)
NEUTROPHILS # BLD AUTO: 4.7 10*3/MM3 (ref 1.5–7)
NEUTROPHILS NFR BLD AUTO: 56.7 % (ref 39–75)
NRBC BLD MANUAL-RTO: 0 /100 WBC (ref 0–0)
PLATELET # BLD AUTO: 176 10*3/MM3 (ref 150–375)
PMV BLD AUTO: 9.4 FL (ref 8.9–12.1)
RBC # BLD AUTO: 4.75 10*6/MM3 (ref 4.3–5.5)
WBC NRBC COR # BLD: 8.29 10*3/MM3 (ref 4–10)

## 2017-12-11 PROCEDURE — 85025 COMPLETE CBC W/AUTO DIFF WBC: CPT | Performed by: INTERNAL MEDICINE

## 2017-12-11 PROCEDURE — 36416 COLLJ CAPILLARY BLOOD SPEC: CPT | Performed by: INTERNAL MEDICINE

## 2017-12-27 ENCOUNTER — INFUSION (OUTPATIENT)
Dept: ONCOLOGY | Facility: HOSPITAL | Age: 72
End: 2017-12-27

## 2017-12-27 ENCOUNTER — LAB (OUTPATIENT)
Dept: LAB | Facility: HOSPITAL | Age: 72
End: 2017-12-27

## 2017-12-27 VITALS
WEIGHT: 169.6 LBS | DIASTOLIC BLOOD PRESSURE: 84 MMHG | SYSTOLIC BLOOD PRESSURE: 136 MMHG | BODY MASS INDEX: 25.7 KG/M2 | TEMPERATURE: 98.6 F | HEART RATE: 76 BPM

## 2017-12-27 DIAGNOSIS — D75.1 ERYTHROCYTOSIS: ICD-10-CM

## 2017-12-27 LAB
BASOPHILS # BLD AUTO: 0.09 10*3/MM3 (ref 0–0.1)
BASOPHILS NFR BLD AUTO: 1.1 % (ref 0–1.1)
DEPRECATED RDW RBC AUTO: 49.7 FL (ref 37–49)
EOSINOPHIL # BLD AUTO: 0.54 10*3/MM3 (ref 0–0.36)
EOSINOPHIL NFR BLD AUTO: 6.8 % (ref 1–5)
ERYTHROCYTE [DISTWIDTH] IN BLOOD BY AUTOMATED COUNT: 14.3 % (ref 11.7–14.5)
HCT VFR BLD AUTO: 46.8 % (ref 40–49)
HGB BLD-MCNC: 15.5 G/DL (ref 13.5–16.5)
IMM GRANULOCYTES # BLD: 0.03 10*3/MM3 (ref 0–0.03)
IMM GRANULOCYTES NFR BLD: 0.4 % (ref 0–0.5)
LYMPHOCYTES # BLD AUTO: 1.9 10*3/MM3 (ref 1–3.5)
LYMPHOCYTES NFR BLD AUTO: 23.9 % (ref 20–49)
MCH RBC QN AUTO: 31.4 PG (ref 27–33)
MCHC RBC AUTO-ENTMCNC: 33.1 G/DL (ref 32–35)
MCV RBC AUTO: 94.7 FL (ref 83–97)
MONOCYTES # BLD AUTO: 0.86 10*3/MM3 (ref 0.25–0.8)
MONOCYTES NFR BLD AUTO: 10.8 % (ref 4–12)
NEUTROPHILS # BLD AUTO: 4.54 10*3/MM3 (ref 1.5–7)
NEUTROPHILS NFR BLD AUTO: 57 % (ref 39–75)
NRBC BLD MANUAL-RTO: 0 /100 WBC (ref 0–0)
PLATELET # BLD AUTO: 184 10*3/MM3 (ref 150–375)
PMV BLD AUTO: 9.7 FL (ref 8.9–12.1)
RBC # BLD AUTO: 4.94 10*6/MM3 (ref 4.3–5.5)
WBC NRBC COR # BLD: 7.96 10*3/MM3 (ref 4–10)

## 2017-12-27 PROCEDURE — 85025 COMPLETE CBC W/AUTO DIFF WBC: CPT | Performed by: INTERNAL MEDICINE

## 2017-12-27 PROCEDURE — 36416 COLLJ CAPILLARY BLOOD SPEC: CPT | Performed by: INTERNAL MEDICINE

## 2018-01-08 ENCOUNTER — APPOINTMENT (OUTPATIENT)
Dept: ONCOLOGY | Facility: HOSPITAL | Age: 73
End: 2018-01-08

## 2018-01-08 ENCOUNTER — APPOINTMENT (OUTPATIENT)
Dept: LAB | Facility: HOSPITAL | Age: 73
End: 2018-01-08

## 2018-01-22 ENCOUNTER — INFUSION (OUTPATIENT)
Dept: ONCOLOGY | Facility: HOSPITAL | Age: 73
End: 2018-01-22

## 2018-01-22 ENCOUNTER — LAB (OUTPATIENT)
Dept: LAB | Facility: HOSPITAL | Age: 73
End: 2018-01-22

## 2018-01-22 ENCOUNTER — OFFICE VISIT (OUTPATIENT)
Dept: ONCOLOGY | Facility: CLINIC | Age: 73
End: 2018-01-22

## 2018-01-22 VITALS
OXYGEN SATURATION: 98 % | WEIGHT: 168.2 LBS | BODY MASS INDEX: 25.49 KG/M2 | HEART RATE: 84 BPM | SYSTOLIC BLOOD PRESSURE: 138 MMHG | DIASTOLIC BLOOD PRESSURE: 84 MMHG | RESPIRATION RATE: 16 BRPM | TEMPERATURE: 97.7 F | HEIGHT: 68 IN

## 2018-01-22 VITALS — SYSTOLIC BLOOD PRESSURE: 109 MMHG | DIASTOLIC BLOOD PRESSURE: 75 MMHG

## 2018-01-22 DIAGNOSIS — D75.1 ERYTHROCYTOSIS: Primary | ICD-10-CM

## 2018-01-22 DIAGNOSIS — D75.1 ERYTHROCYTOSIS: ICD-10-CM

## 2018-01-22 DIAGNOSIS — C64.1 CANCER OF RIGHT KIDNEY (HCC): ICD-10-CM

## 2018-01-22 LAB
BASOPHILS # BLD AUTO: 0.13 10*3/MM3 (ref 0–0.1)
BASOPHILS NFR BLD AUTO: 1.5 % (ref 0–1.1)
DEPRECATED RDW RBC AUTO: 45.6 FL (ref 37–49)
EOSINOPHIL # BLD AUTO: 0.54 10*3/MM3 (ref 0–0.36)
EOSINOPHIL NFR BLD AUTO: 6.3 % (ref 1–5)
ERYTHROCYTE [DISTWIDTH] IN BLOOD BY AUTOMATED COUNT: 13.2 % (ref 11.7–14.5)
HCT VFR BLD AUTO: 48.6 % (ref 40–49)
HGB BLD-MCNC: 16.3 G/DL (ref 13.5–16.5)
IMM GRANULOCYTES # BLD: 0.09 10*3/MM3 (ref 0–0.03)
IMM GRANULOCYTES NFR BLD: 1 % (ref 0–0.5)
LYMPHOCYTES # BLD AUTO: 2.05 10*3/MM3 (ref 1–3.5)
LYMPHOCYTES NFR BLD AUTO: 23.8 % (ref 20–49)
MCH RBC QN AUTO: 31.5 PG (ref 27–33)
MCHC RBC AUTO-ENTMCNC: 33.5 G/DL (ref 32–35)
MCV RBC AUTO: 94 FL (ref 83–97)
MONOCYTES # BLD AUTO: 0.92 10*3/MM3 (ref 0.25–0.8)
MONOCYTES NFR BLD AUTO: 10.7 % (ref 4–12)
NEUTROPHILS # BLD AUTO: 4.89 10*3/MM3 (ref 1.5–7)
NEUTROPHILS NFR BLD AUTO: 56.7 % (ref 39–75)
NRBC BLD MANUAL-RTO: 0 /100 WBC (ref 0–0)
PLATELET # BLD AUTO: 240 10*3/MM3 (ref 150–375)
PMV BLD AUTO: 9.1 FL (ref 8.9–12.1)
RBC # BLD AUTO: 5.17 10*6/MM3 (ref 4.3–5.5)
WBC NRBC COR # BLD: 8.62 10*3/MM3 (ref 4–10)

## 2018-01-22 PROCEDURE — 99195 PHLEBOTOMY: CPT | Performed by: INTERNAL MEDICINE

## 2018-01-22 PROCEDURE — 99214 OFFICE O/P EST MOD 30 MIN: CPT | Performed by: INTERNAL MEDICINE

## 2018-01-22 PROCEDURE — 36415 COLL VENOUS BLD VENIPUNCTURE: CPT | Performed by: INTERNAL MEDICINE

## 2018-01-22 PROCEDURE — 85025 COMPLETE CBC W/AUTO DIFF WBC: CPT | Performed by: INTERNAL MEDICINE

## 2018-01-22 RX ORDER — SODIUM CHLORIDE 9 MG/ML
250 INJECTION, SOLUTION INTRAVENOUS ONCE
Status: CANCELLED | OUTPATIENT
Start: 2018-01-22

## 2018-01-22 RX ORDER — SODIUM CHLORIDE 9 MG/ML
250 INJECTION, SOLUTION INTRAVENOUS ONCE
Status: DISCONTINUED | OUTPATIENT
Start: 2018-01-22 | End: 2018-01-22 | Stop reason: HOSPADM

## 2018-01-22 NOTE — PROGRESS NOTES
REASONS FOR FOLLOWUP:       1. Myeloproliferative disorder with polycythemia vera, MAI-2 mutation positive requiring periodic phlebotomies whenever hematocrit is above 47.      2. His  tory of stage I clear cell carcinoma of the right kidney status post partial nephrectomy, nephron sparing surgery by Dr. Ganesh Lopez with no issues or consequences.  The patient has already an appointment to repeat CT scan of the abdomen in March 2016.                3. Patient has history of polymyalgia rheumatica.  He IS OFF 5 mg of prednisone a day.            HISTORY OF PRESENT ILLNESS:  The patient returns today to the office for followup.   He complains that during the last 4-5 weeks he has had intermittent episodes of lesions in the tips of his fingers especially on the right hand.  When these lesions appear they are very painful, red, punctiform and they disappear in a question of a few days.  He has had this before, 6 years ago, he presumed that it was related to aspirin but I doubt that is the case.  One lesion also appeared on one of his toes.  He has not had any Raynaud's, he has not had any lesions in his conjunctivas and he has not had any visual deficit.  He denies any headaches.  Because of all these lesions evolving he discontinued the Hydrea and today his hematocrit is 48.  He otherwise has not had any fevers, chills, cough, pruritus, peripheral adenopathy, rashes in the skin or alternations otherwise on his body.  His bowel function and urination are normal, his right shoulder has further improved and he has been able to play tennis and playing the violin with no difficulties.  He has not had any neurological symptomatology.  He has not taken any pictures of these lesions on his telephone and I have asked him if he develops any new ones please do such.  Please review below.                           PAST MEDICAL HISTORY:    1.;  Hypertension.    2.;   History of polyps in the colon.  Colonoscopy in  2010 documented a tubulovillous adenoma that was removed completely.  Further colonoscopy in 2014 was unremarkable.     3.; History of prostate biopsy in the past that showed features consistent with prostate cancer  .  He has not required any specific therapy for this and he has been monitored through his urologist.    4.; History of multiple hernia repairs.     5.; History of polymyalgia rheumatica that was diagnosed 3 years by Dr. Karan Garrett.  He has been on a tapering dose   of prednisone, even though he admits that his polymyalgia was atypical because his sedimentation was never elevated.  He has been seen by other rheumatologists in the past as well and has been told he has Raynaud’  s disease.  He has never had diagnosis of rheumatoid arthritis, lupus or something of this nature.     6.; Partial thyroidectomy in 1983 for a malignant tumor of the thyroid gland that never required any other intervention.      7.; He used to have radiation treatment for tonsillar inflammation and infection when   he was a child and this led him to develop thyroid cancer.       HEMATOLOGIC/ONCOLOGIC HISTORY:  History of previous dates can be found in a separate document.         On 02/22/2016, he had no symptoms or signs that would indicate any kidney cancer recurrence.  Given   the mild achiness in his muscles, we went ahead and checked a CPK and an aldolase and also checked a sedimentation rate and vitamin D level.  TSH was also performed.  We asked him to remain on his dose of prednisone of 5 mg a day.  We asked him to initia  te a program of physical activity including going to the gym.  He did not need phlebotomy on this occasion, given hematocrit of 45.  His white count and platelet count remain stable.  He had no palpable splenomegaly.         MEDICATIONS:  The current medication list was reviewed with the patient and updated in the EMR this date per the Medical Assistant.  Medication dosages and frequencies were  confirmed to be accurate.        It is important to mention the patient does not use any androgen or androgen replacement medication or anabolic steroids or any kind.        ALLERGIES:     1.  STATINS.    2. DIPHENHYDRAMINE.         SOCIAL HISTORY:  The patient is .  He lives with his wife in [place].  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  He plays violin in   an orchestra and also he is a very avid .  He does not smoke but he had a lot of second-hand smoke through his life.  He drinks 1-3 glasses of wine a week.  He has no use of any recreational drugs.          FAMILY HISTORY:  His father  of alcoh  ol and smoking disorders including heart attack.  His mother is 89 and in good health.  He has no brothers.  He has 1 adopted sister who is in good health with probable rheumatoid arthritis.  His wife is in good health.  He has 2 children, 1 who is in [facility] health due to drug abuse and the other son is in good health. He had another son who  as a consequence of Tylenol intoxication.   No family history of hematological disorder or myeloproliferative disorder.         REVIEW OF SYSTEMS:   General: no fever, chills, fatigue, weight changes, or lack of appetite.  Eyes: no epiphora, xerophthalmia,conjunctivitis, pain, glaucoma, blurred vision, blindness, secretion, photophobia, proptosis, diplopia.  Ears: no otorrhea, tinnitus, otorrhagia, deafness, pain, vertigo.  Nose: no rhinorrhea, epistaxis, alteration in perception of odors, sinuses pressure.  Mouth: no alteration in gums or teeth,  ulcers, no difficulty with mastication or deglut ion, no odynophagia.  Neck: no masses or pain, no thyroid alterations, no pain in muscles or arteries, no carotid odynia, no crepitation.  Respiratory: no cough, sputum production, dyspnea, trepopnea, pleuritic pain, hemoptysis.  Heart: no syncope, irregularity, palpitations, angina, orthopnea, paroxysmal nocturnal  "dyspnea.  Vascular Venous: no tenderness,edema, palpable cords, postphlebitic syndrome, skin changes or ulcerations.  Vascular Arterial: no distal ischemia, claudication, gangrene, neuropathic ischemic pain, skin ulcers, paleness or cyanosis.  GI: no dysphagia, odynophagia, no regurgitation, no heartburn,no indigestion,no nausea,no vomiting,no hematemesis ,no melena,no jaundice,no distention, no obstipation,no enterorrhagia,no proctalgia,no anal  lesions, nochanges in bowel habits.  : no frequency, hesitancy, hematuria, discharge, pain.  Musculoskeletal: stated muscle or tendon pain or inflammation, joint pain, edema, functional limitation, fasciculations, mass.  Neurologic: no headache, seizures, alterations on Craneal nerves, no motor or senssory deficit, normal coordination, no alteration in memory, orientation, calculation,writting, verbal or written language.  Skin: no rashes, pruritus ,STATED localized lesions IN FINGERS  Psychiatric: no anxiety, depression, agitation, delusions, proper insight.        VITAL SIGNS:   Vitals:    01/22/18 0752   BP: 138/84   Pulse: 84   Resp: 16   Temp: 97.7 °F (36.5 °C)   TempSrc: Oral   SpO2: 98%   Weight: 76.3 kg (168 lb 3.2 oz)   Height: 173 cm (68.11\")            PHYSICAL EXAMINATION:   GENERAL:  Well-developed, well-nourished  Patient  in no acute distress.   SKIN:  Warm, dry without rashes, purpura or petechiae.PAINFUL RED LESION IN DIGITS BOTH HANDS ISCHEMIC IN NATURE.  HEENT:  Pupils were equal and reactive to light and accomodation, conjunctivas non injected, no pterigion, normal extraocular movements, normal visual acuity. CONJUNTIVAS AND FUNDOSCOPIC EXAM WERE NORMAL.  Mouth mucosa was moist, no exudates in oropharynx, normal gum line, normal roof of the mouth and pillars, normal papillations of the tongue.Ear canals were normal, as well tympanic membranes, normal hearing acuity.No pain in mastoid area or erythema.  NECK:  Supple with good range of motion; no " thyromegaly or masses, no JVD or bruits, no cervical adenopathies.No carotid arteries pain, no carotid abnormal pulsation or arterial dance.  LYMPHATICS:  No cervical, supraclavicular, axillary, epitrochlear or inguinal adenopathy.  CHEST:  Normal excursion of both nelson thoraces, normal voice fremitus, no subcutaneous emphysema, normal axillas, no rashes or acanthosis nigricans. Lungs clear to percussion and auscultation, normal breath sounds bilaterally, no wheezing, crackles or ronchi, no stridor, no rubs.  CARDIAC AND VASCULAR: PMI not displaced,no thrills, normal rate and regular rhythm, without murmurs, rubs or S3 or S4 right or left sided gallops. Normal femoral, popliteal, pedis, brachial and carotid pulses.  ABDOMEN:  Soft, nontender with no organomegaly or masses, no ascites, no collateral circulation,no distention,no [place] sign, no abdominal pain, no inguinal hernias,no umbilical hernias, no abdominal bruits. Normal bowel sounds.  GENITAL: Not  Performed.  EXTREMITIES  AND SPINE:  No clubbing, cyanosis or edema, no deformities or pain .No kyphosis, scoliosis, deformities or pain in spine, ribs or pelvic bone.  NEUROLOGICAL:  Patient was awake, alert, oriented to time, person and place  LABORATORY DATA:  Component      Latest Ref Rng & Units 11/27/2017 12/11/2017 12/27/2017 1/22/2018           8:06 AM  7:58 AM  7:45 AM  7:44 AM   WBC      4.00 - 10.00 10*3/mm3 7.85 8.29 7.96 8.62   RBC      4.30 - 5.50 10*6/mm3 5.05 4.75 4.94 5.17   Hemoglobin      13.5 - 16.5 g/dL 15.9 15.0 15.5 16.3   Hematocrit      40.0 - 49.0 % 47.1 45.4 46.8 48.6   MCV      83.0 - 97.0 fL 93.3 95.6 94.7 94.0   MCH      27.0 - 33.0 pg 31.5 31.6 31.4 31.5   MCHC      32.0 - 35.0 g/dL 33.8 33.0 33.1 33.5   RDW      11.7 - 14.5 % 15.2 (H) 14.9 (H) 14.3 13.2   RDW-SD      37.0 - 49.0 fl 52.2 (H) 52.1 (H) 49.7 (H) 45.6   MPV      8.9 - 12.1 fL 9.7 9.4 9.7 9.1   Platelets      150 - 375 10*3/mm3 202 176 184 240   Neutrophil %      39.0 -  75.0 % 55.8 56.7 57.0 56.7   Lymphocyte %      20.0 - 49.0 % 24.3 23.5 23.9 23.8   Monocyte %      4.0 - 12.0 % 12.1 (H) 12.1 (H) 10.8 10.7   Eosinophil %      1.0 - 5.0 % 5.9 (H) 6.0 (H) 6.8 (H) 6.3 (H)   Basophil %      0.0 - 1.1 % 1.4 (H) 1.1 1.1 1.5 (H)   Immature Grans %      0.0 - 0.5 % 0.5 0.6 (H) 0.4 1.0 (H)           ASSESSMENT/PLAN:    1.  Patient has history of polycythemia vera MAI-2 mutation positive.  I do believe that the lesions the patient is experiencing in the tips of his fingers are almost similar to Osler nodules and in absence of anything that would suggest marantic endocarditis or infectious endocarditis.  The most likely etiology of this is vascular events related to polycythemia vera.  Today his hematocrit is 48.  The patient is aware that any hematocrit above 45 will favor vascular events.  Unfortunately, he discontinued Hydrea because it was making him very fatigued and he does not want to take this medicine anymore.  In the past he thought these lesions were related to aspirin but I doubt that is the case and if any aspirin is important on him at this point it is now.  For this reason I have advised him to initiate a tablet of baby aspirin 81 mg a day and if he has any further lesions in the fingers to notify me and take pictures of them.  I wanted to review him back in a month.  Obviously he needs to have a phlebotomy today to drop his hematocrit.    2.  In regard to Hydrea, he does not want to take the medicine anymore because it makes him very fatigued and it will be okay hold off on this medicine but I doubt the Hydrea had anything to do with what goes on in regard to his fingers.    3.  Patient has nothing to suggest cardiac arrhythmia to suggest an embolic phenomenon.  He is going to be seen by cardiology by the end of the month because now he has developed a right bundle branch block that in most of the cases does not have very significant meaning in regard to heart disease.  He  has had extensive health testing recently including lipid profile, DEXA scan, carotid artery Doppler's and abdominal aorta Doppler, all of them being negative or normal.    4.  Therefore, I do believe that the patient needs to proceed with the aspirin as stated, review him back in a month, have phlebotomy today.  He is aware of all this.  He will follow up with cardiology.  He is due for his CT scan of the abdomen for followup in regard to his kidney tumor at some point in April.  In regard to his prostate cancer this is not an issue.  His PSA has remained normal and he will followup with his urologist in this regard at some point again in the spring.

## 2018-01-30 ENCOUNTER — OFFICE VISIT (OUTPATIENT)
Dept: CARDIOLOGY | Facility: CLINIC | Age: 73
End: 2018-01-30

## 2018-01-30 VITALS
WEIGHT: 169 LBS | HEIGHT: 68 IN | DIASTOLIC BLOOD PRESSURE: 66 MMHG | SYSTOLIC BLOOD PRESSURE: 124 MMHG | HEART RATE: 62 BPM | BODY MASS INDEX: 25.61 KG/M2

## 2018-01-30 DIAGNOSIS — C64.1 CANCER OF RIGHT KIDNEY (HCC): ICD-10-CM

## 2018-01-30 DIAGNOSIS — R94.31 ABNORMAL ELECTROCARDIOGRAM: ICD-10-CM

## 2018-01-30 DIAGNOSIS — M35.3 PMR (POLYMYALGIA RHEUMATICA) (HCC): ICD-10-CM

## 2018-01-30 DIAGNOSIS — I45.10 RBBB: Primary | ICD-10-CM

## 2018-01-30 DIAGNOSIS — E03.8 OTHER SPECIFIED HYPOTHYROIDISM: ICD-10-CM

## 2018-01-30 DIAGNOSIS — D75.1 ERYTHROCYTOSIS: ICD-10-CM

## 2018-01-30 DIAGNOSIS — R42 EPISODIC LIGHTHEADEDNESS: ICD-10-CM

## 2018-01-30 DIAGNOSIS — I10 PRIMARY HYPERTENSION: ICD-10-CM

## 2018-01-30 PROCEDURE — 99204 OFFICE O/P NEW MOD 45 MIN: CPT | Performed by: INTERNAL MEDICINE

## 2018-01-30 PROCEDURE — 93000 ELECTROCARDIOGRAM COMPLETE: CPT | Performed by: INTERNAL MEDICINE

## 2018-01-30 NOTE — PROGRESS NOTES
Subjective:     Encounter Date:01/30/2018      Patient ID: Scott Murphy is a 72 y.o. male.    Chief Complaint:  History of Present Illness    This is a 72-year-old with a history of polycythemia vera, polymyalgia rheumatica, history of renal cell carcinoma status post partial nephrectomy, hypertension, hypothyroidism, who presents for evaluation of an abnormal EKG.      The patient underwent his yearly Medicare physical with Dr. Delgado at which time he had a routine EKG performed.  This showed a right bundle branch block that was felt to be new.  Based on this finding he was sent here for further evaluation.  He was seen in the past by Dr. Jarrett over 10 years ago for complaints of chest pain.  He reports that he underwent an echocardiogram, stress test, and a cardiac catheterization at that time which were all reportedly unremarkable (records are not available from that workup).  He has normal cardiac workup was discovered that acid refluxes was possible for his symptoms.  The patient reports that he is normally very active.  He plays tennis about 2 or 3 times a week.  Recently for a period of time he had backed off complaint because of a right shoulder injury.  This injury has recently improved and he's been able to get back to playing tennis the last couple weeks.  He denies any dyspnea on exercise and the ordinary.  He denies any chest pain, palpitations, PND or orthopnea, or lower extremity edema.  He does report that this past Sunday he was at [employer] shopping when he developed brief episodes of lightheadedness.  He does not feel that he was given a pass out but was afraid of the symptoms continued that he wasn't.  Each episode of lightheadedness lasted a few seconds at a time.  He had 2 episodes while at [employer] and then another after he got home.  After he got home he laid down and rested and the symptoms resolved.  In the past he's been treated with Hydrea for his polycythemia but  he has stopped this because of associated symptoms of significant fatigue.  Recently when he followed up with Dr. Chino is recommended that he started aspirin 81 mg daily for his diagnosis since he was off the Hydrea.  He reports in the past that he's had issues with his fingertips turning white and developing dark spots or lesions on his fingertips on aspirin therapy in the past.  He reports that last week on a cold day he walked outside with his hands and his pockets and noticed that his finger and fingertips turned white.  Soon after that episode he ended up stopping his aspirin.      Review of Systems   Constitution: Negative for weakness and malaise/fatigue.   HENT: Negative for hearing loss, hoarse voice, nosebleeds and sore throat.    Eyes: Negative for pain.   Cardiovascular: Negative for chest pain, claudication, cyanosis, dyspnea on exertion, irregular heartbeat, leg swelling, near-syncope, orthopnea, palpitations, paroxysmal nocturnal dyspnea and syncope.   Respiratory: Negative for shortness of breath and snoring.    Endocrine: Negative for cold intolerance, heat intolerance, polydipsia, polyphagia and polyuria.   Skin: Negative for itching and rash.   Musculoskeletal: Negative for arthritis, falls, joint pain, joint swelling, muscle cramps, muscle weakness and myalgias.   Gastrointestinal: Negative for constipation, diarrhea, dysphagia, heartburn, hematemesis, hematochezia, melena, nausea and vomiting.   Genitourinary: Negative for frequency, hematuria and hesitancy.   Neurological: Positive for light-headedness. Negative for excessive daytime sleepiness, dizziness, headaches and numbness.   Psychiatric/Behavioral: Negative for depression. The patient is not nervous/anxious.      Past Medical History:   Diagnosis Date   • Acquired hypothyroidism    • Anxiety    • Arthritis    • Disease of thyroid gland    • H/O Muscle pain     Right shoulder and neck area   • H/O Radiation treatment     For tonsillar  "inflammation and infection when he was a child and this led him to develop thyroid cancer.   • History of colon polyps    • Hypertension    • Hyperthyroidism    • Kidney carcinoma     H/O stage I clear cell carcinoma of the right kidney, status post partial nephrectomy, nephron-sparing surgery by Dr. Ganesh Lopez   • Left shoulder pain    • Myeloproliferative disorder     With polycythemia vera, JAK2 mutation positive requiring periodic phlebotomies (hematocrit above 47).   • Polymyalgia rheumatica    • Prostate cancer    • Shoulder injury, left, sequela      Past Surgical History:   Procedure Laterality Date   • COLONOSCOPY  2010    Documented a tubulovillous adenom that was removed completely. Colonoscopy in 2014 was unremarkable.   • HERNIA REPAIR      H/O multiple hernia repairs including right inguinal hernia repair in 1981, 2003, and double hernia repair in 2013   • KIDNEY SURGERY     • PROSTATE BIOPSY      Showed features consistent with prostate cancer   • SHOULDER SURGERY  1995    Shoulder repair   • THYROIDECTOMY, PARTIAL  1983    For malignant tumor     Family History   Problem Relation Age of Onset   • No Known Problems Mother    • Heart attack Father    • Heart disease Father    • Hypertension Father    • Diabetes Father    • Tuberculosis Maternal Grandmother    • No Known Problems Son    • Drug abuse Son      Social History   Substance Use Topics   • Smoking status: Never Smoker   • Smokeless tobacco: Never Used   • Alcohol use Yes      Comment: 1-3 glasses of wine a week           ECG 12 Lead  Date/Time: 1/30/2018 3:48 PM  Performed by: BALWINDER VICKERS  Authorized by: BALWINDER VICKERS   Comparison: compared with previous ECG   Similar to previous ECG  Rhythm: sinus rhythm  Conduction: right bundle branch block               Objective:         Visit Vitals   • /66   • Pulse 62   • Ht 172.7 cm (68\")   • Wt 76.7 kg (169 lb)   • BMI 25.7 kg/m2          Physical Exam   Constitutional: He is oriented " to person, place, and time. He appears well-developed and well-nourished.   HENT:   Head: Normocephalic and atraumatic.   Eyes: Conjunctivae, EOM and lids are normal. Pupils are equal, round, and reactive to light.   Neck: Normal range of motion and full passive range of motion without pain. Neck supple. No JVD present. Carotid bruit is not present.   Cardiovascular: Normal rate, regular rhythm, S1 normal and S2 normal.  Exam reveals no gallop.    No murmur heard.  Pulses:       Radial pulses are 2+ on the right side, and 2+ on the left side.   No bilateral lower extremity edema   Pulmonary/Chest: Effort normal and breath sounds normal.   Abdominal: Soft. Normal appearance.   Lymphadenopathy:     He has no cervical adenopathy.   Neurological: He is alert and oriented to person, place, and time.   Skin: Skin is warm, dry and intact.   Psychiatric: He has a normal mood and affect.       Lab Review:       Assessment:          Diagnosis Plan   1. RBBB  Adult Transthoracic Echo Complete W/ Cont if Necessary Per Protocol   2. Episodic lightheadedness  Adult Transthoracic Echo Complete W/ Cont if Necessary Per Protocol   3. Abnormal electrocardiogram   Adult Transthoracic Echo Complete W/ Cont if Necessary Per Protocol   4. Erythrocytosis     5. PMR (polymyalgia rheumatica)     6. Cancer of right kidney     7. Other specified hypothyroidism     8. Primary hypertension            Plan:       1.  Right bundle branch block.  His EKG today continues to show right bundle branch block which is stable in appearance compared to his EKG from Dr. Delgado office in December.  Additionally I have a copy of an EKG from 1/2014 that shows a similar appearing right bundle branch block.  Patient does not have anything on exam or by history that is suggestive of any cardiac or pulmonary issues that would lead to his right bundle branch block.  This is likely a normal variant but to be sure he hasn't developed any structural heart disease  with them set up for an echocardiogram.    2.  Lightheadedness.  He had episodes earlier this week but no further episodes since then.  Again we'll start with an echocardiogram to evaluate his LV function and for any valvular heart disease.  If this looks okay then I would just monitor his symptoms for the time being.  Unless he has recurrent symptoms I don't think a monitor would be useful at this point.  3.  Polycythemia vera.  Management as per Dr. Damon.  Encouraged patient to discuss aspirin therapy for Dr. Damon.  4.  Polymyalgia rheumatica    Will call and discuss the results of the echocardiogram and determine further workup, management, and follow-up based on its results.

## 2018-02-02 ENCOUNTER — HOSPITAL ENCOUNTER (OUTPATIENT)
Dept: CARDIOLOGY | Facility: HOSPITAL | Age: 73
Discharge: HOME | End: 2018-02-02
Attending: INTERNAL MEDICINE | Admitting: INTERNAL MEDICINE

## 2018-02-02 LAB
BH CV ECHO MEAS - ACS: 1.9 CM
BH CV ECHO MEAS - AO MAX PG (FULL): 2.3 MMHG
BH CV ECHO MEAS - AO MAX PG: 5.4 MMHG
BH CV ECHO MEAS - AO MEAN PG (FULL): 0.99 MMHG
BH CV ECHO MEAS - AO MEAN PG: 2.7 MMHG
BH CV ECHO MEAS - AO ROOT AREA (BSA CORRECTED): 2
BH CV ECHO MEAS - AO ROOT AREA: 11.6 CM^2
BH CV ECHO MEAS - AO ROOT DIAM: 3.8 CM
BH CV ECHO MEAS - AO V2 MAX: 116.3 CM/SEC
BH CV ECHO MEAS - AO V2 MEAN: 76.7 CM/SEC
BH CV ECHO MEAS - AO V2 VTI: 26.5 CM
BH CV ECHO MEAS - AVA(I,A): 2.9 CM^2
BH CV ECHO MEAS - AVA(I,D): 2.9 CM^2
BH CV ECHO MEAS - AVA(V,A): 3.1 CM^2
BH CV ECHO MEAS - AVA(V,D): 3.1 CM^2
BH CV ECHO MEAS - BSA(HAYCOCK): 1.9 M^2
BH CV ECHO MEAS - BSA: 1.9 M^2
BH CV ECHO MEAS - BZI_BMI: 25.1 KILOGRAMS/M^2
BH CV ECHO MEAS - BZI_METRIC_HEIGHT: 172.7 CM
BH CV ECHO MEAS - BZI_METRIC_WEIGHT: 74.8 KG
BH CV ECHO MEAS - CONTRAST EF (2CH): 57.9 ML/M^2
BH CV ECHO MEAS - CONTRAST EF 4CH: 65.6 ML/M^2
BH CV ECHO MEAS - EDV(MOD-SP2): 76 ML
BH CV ECHO MEAS - EDV(MOD-SP4): 61 ML
BH CV ECHO MEAS - EDV(TEICH): 140 ML
BH CV ECHO MEAS - EF(CUBED): 82.7 %
BH CV ECHO MEAS - EF(MOD-SP2): 57.9 %
BH CV ECHO MEAS - EF(MOD-SP4): 65.6 %
BH CV ECHO MEAS - EF(TEICH): 75.1 %
BH CV ECHO MEAS - ESV(MOD-SP2): 32 ML
BH CV ECHO MEAS - ESV(MOD-SP4): 21 ML
BH CV ECHO MEAS - ESV(TEICH): 34.9 ML
BH CV ECHO MEAS - FS: 44.3 %
BH CV ECHO MEAS - IVS/LVPW: 0.93
BH CV ECHO MEAS - IVSD: 1 CM
BH CV ECHO MEAS - LAT PEAK E' VEL: 10 CM/SEC
BH CV ECHO MEAS - LV DIASTOLIC VOL/BSA (35-75): 32.4 ML/M^2
BH CV ECHO MEAS - LV MASS(C)D: 215.5 GRAMS
BH CV ECHO MEAS - LV MASS(C)DI: 114.4 GRAMS/M^2
BH CV ECHO MEAS - LV MAX PG: 3.1 MMHG
BH CV ECHO MEAS - LV MEAN PG: 1.8 MMHG
BH CV ECHO MEAS - LV SYSTOLIC VOL/BSA (12-30): 11.1 ML/M^2
BH CV ECHO MEAS - LV V1 MAX: 88.3 CM/SEC
BH CV ECHO MEAS - LV V1 MEAN: 60.8 CM/SEC
BH CV ECHO MEAS - LV V1 VTI: 18.9 CM
BH CV ECHO MEAS - LVIDD: 5.4 CM
BH CV ECHO MEAS - LVIDS: 3 CM
BH CV ECHO MEAS - LVLD AP2: 7.1 CM
BH CV ECHO MEAS - LVLD AP4: 7.1 CM
BH CV ECHO MEAS - LVLS AP2: 5.8 CM
BH CV ECHO MEAS - LVLS AP4: 6.1 CM
BH CV ECHO MEAS - LVOT AREA (M): 4.2 CM^2
BH CV ECHO MEAS - LVOT AREA: 4.1 CM^2
BH CV ECHO MEAS - LVOT DIAM: 2.3 CM
BH CV ECHO MEAS - LVPWD: 1.1 CM
BH CV ECHO MEAS - MED PEAK E' VEL: 5 CM/SEC
BH CV ECHO MEAS - MV A DUR: 0.13 SEC
BH CV ECHO MEAS - MV A MAX VEL: 68.4 CM/SEC
BH CV ECHO MEAS - MV DEC SLOPE: 237.7 CM/SEC^2
BH CV ECHO MEAS - MV DEC TIME: 0.23 SEC
BH CV ECHO MEAS - MV E MAX VEL: 49.5 CM/SEC
BH CV ECHO MEAS - MV E/A: 0.72
BH CV ECHO MEAS - MV MAX PG: 1.7 MMHG
BH CV ECHO MEAS - MV MEAN PG: 0.81 MMHG
BH CV ECHO MEAS - MV P1/2T MAX VEL: 51.4 CM/SEC
BH CV ECHO MEAS - MV P1/2T: 63.3 MSEC
BH CV ECHO MEAS - MV V2 MAX: 64.4 CM/SEC
BH CV ECHO MEAS - MV V2 MEAN: 42.5 CM/SEC
BH CV ECHO MEAS - MV V2 VTI: 28.9 CM
BH CV ECHO MEAS - MVA P1/2T LCG: 4.3 CM^2
BH CV ECHO MEAS - MVA(P1/2T): 3.5 CM^2
BH CV ECHO MEAS - MVA(VTI): 2.7 CM^2
BH CV ECHO MEAS - PA MAX PG (FULL): 1.9 MMHG
BH CV ECHO MEAS - PA MAX PG: 3.1 MMHG
BH CV ECHO MEAS - PA V2 MAX: 88.4 CM/SEC
BH CV ECHO MEAS - PULM A REVS DUR: 0.13 SEC
BH CV ECHO MEAS - PULM A REVS VEL: 34 CM/SEC
BH CV ECHO MEAS - PULM DIAS VEL: 41.2 CM/SEC
BH CV ECHO MEAS - PULM S/D: 1.6
BH CV ECHO MEAS - PULM SYS VEL: 66 CM/SEC
BH CV ECHO MEAS - PVA(V,A): 2.6 CM^2
BH CV ECHO MEAS - PVA(V,D): 2.6 CM^2
BH CV ECHO MEAS - QP/QS: 0.65
BH CV ECHO MEAS - RAP SYSTOLE: 3 MMHG
BH CV ECHO MEAS - RV MAX PG: 1.2 MMHG
BH CV ECHO MEAS - RV MEAN PG: 0.73 MMHG
BH CV ECHO MEAS - RV V1 MAX: 54.8 CM/SEC
BH CV ECHO MEAS - RV V1 MEAN: 39.3 CM/SEC
BH CV ECHO MEAS - RV V1 VTI: 12 CM
BH CV ECHO MEAS - RVOT AREA: 4.1 CM^2
BH CV ECHO MEAS - RVOT DIAM: 2.3 CM
BH CV ECHO MEAS - RVSP: 30 MMHG
BH CV ECHO MEAS - SI(AO): 162.4 ML/M^2
BH CV ECHO MEAS - SI(CUBED): 68.3 ML/M^2
BH CV ECHO MEAS - SI(LVOT): 40.7 ML/M^2
BH CV ECHO MEAS - SI(MOD-SP2): 23.4 ML/M^2
BH CV ECHO MEAS - SI(MOD-SP4): 21.2 ML/M^2
BH CV ECHO MEAS - SI(TEICH): 55.8 ML/M^2
BH CV ECHO MEAS - SUP REN AO DIAM: 1.6 CM
BH CV ECHO MEAS - SV(AO): 305.8 ML
BH CV ECHO MEAS - SV(CUBED): 128.6 ML
BH CV ECHO MEAS - SV(LVOT): 76.7 ML
BH CV ECHO MEAS - SV(MOD-SP2): 44 ML
BH CV ECHO MEAS - SV(MOD-SP4): 40 ML
BH CV ECHO MEAS - SV(RVOT): 49.6 ML
BH CV ECHO MEAS - SV(TEICH): 105.1 ML
BH CV ECHO MEAS - TAPSE (>1.6): 1.8 CM2
BH CV ECHO MEAS - TR MAX VEL: 260.6 CM/SEC
BH CV XLRA - RV BASE: 2.8 CM
BH CV XLRA - TDI S': 14 CM/SEC
E/E' RATIO: 7
LEFT ATRIUM VOLUME INDEX: 22 ML/M2
MAXIMAL PREDICTED HEART RATE: 149 BPM
SINUS: 4.5 CM
STJ: 4.5 CM
STRESS TARGET HR: 127 BPM

## 2018-02-02 PROCEDURE — 93306 TTE W/DOPPLER COMPLETE: CPT | Performed by: INTERNAL MEDICINE

## 2018-02-02 PROCEDURE — 93306 TTE W/DOPPLER COMPLETE: CPT

## 2018-02-09 ENCOUNTER — TELEPHONE (OUTPATIENT)
Dept: CARDIOLOGY | Facility: CLINIC | Age: 73
End: 2018-02-09

## 2018-02-09 NOTE — TELEPHONE ENCOUNTER
I informed the patient of his normal echocardiogram.  He was actually in the emergency room after being sent by an urgent care center for supposedly hypotension.  The patient states that his blood pressure was 98 systolic when he was in the urgent care center, and they wanted him to go straight to the ER.  He also has a sinus infection.  I'm not going to make any changes to his medical regimen, further follow-up will be determined by Dr. Roberts.

## 2018-02-26 ENCOUNTER — LAB (OUTPATIENT)
Dept: LAB | Facility: HOSPITAL | Age: 73
End: 2018-02-26

## 2018-02-26 ENCOUNTER — APPOINTMENT (OUTPATIENT)
Dept: ONCOLOGY | Facility: HOSPITAL | Age: 73
End: 2018-02-26

## 2018-02-26 ENCOUNTER — OFFICE VISIT (OUTPATIENT)
Dept: ONCOLOGY | Facility: CLINIC | Age: 73
End: 2018-02-26

## 2018-02-26 VITALS
BODY MASS INDEX: 25.22 KG/M2 | HEART RATE: 92 BPM | SYSTOLIC BLOOD PRESSURE: 120 MMHG | OXYGEN SATURATION: 100 % | TEMPERATURE: 97.9 F | HEIGHT: 68 IN | WEIGHT: 166.4 LBS | RESPIRATION RATE: 16 BRPM | DIASTOLIC BLOOD PRESSURE: 82 MMHG

## 2018-02-26 DIAGNOSIS — C64.1 CANCER OF RIGHT KIDNEY (HCC): Primary | ICD-10-CM

## 2018-02-26 DIAGNOSIS — I75.013: ICD-10-CM

## 2018-02-26 DIAGNOSIS — D75.1 ERYTHROCYTOSIS: ICD-10-CM

## 2018-02-26 DIAGNOSIS — I73.00 RAYNAUD'S DISEASE WITHOUT GANGRENE: ICD-10-CM

## 2018-02-26 LAB
ALBUMIN SERPL-MCNC: 4 G/DL (ref 3.5–5.2)
ALBUMIN/GLOB SERPL: 1.5 G/DL (ref 1.1–2.4)
ALP SERPL-CCNC: 67 U/L (ref 38–116)
ALT SERPL W P-5'-P-CCNC: 14 U/L (ref 0–41)
ANION GAP SERPL CALCULATED.3IONS-SCNC: 10.8 MMOL/L
AST SERPL-CCNC: 16 U/L (ref 0–40)
BASOPHILS # BLD AUTO: 0.1 10*3/MM3 (ref 0–0.1)
BASOPHILS NFR BLD AUTO: 1.1 % (ref 0–1.1)
BILIRUB SERPL-MCNC: 1.3 MG/DL (ref 0.1–1.2)
BUN BLD-MCNC: 19 MG/DL (ref 6–20)
BUN/CREAT SERPL: 15.8 (ref 7.3–30)
CALCIUM SPEC-SCNC: 9.4 MG/DL (ref 8.5–10.2)
CHLORIDE SERPL-SCNC: 105 MMOL/L (ref 98–107)
CO2 SERPL-SCNC: 28.2 MMOL/L (ref 22–29)
CREAT BLD-MCNC: 1.2 MG/DL (ref 0.7–1.3)
CRP SERPL-MCNC: 0.05 MG/DL (ref 0–0.5)
DEPRECATED RDW RBC AUTO: 46.3 FL (ref 37–49)
EOSINOPHIL # BLD AUTO: 0.29 10*3/MM3 (ref 0–0.36)
EOSINOPHIL NFR BLD AUTO: 3.3 % (ref 1–5)
ERYTHROCYTE [DISTWIDTH] IN BLOOD BY AUTOMATED COUNT: 13.5 % (ref 11.7–14.5)
ERYTHROCYTE [SEDIMENTATION RATE] IN BLOOD: 3 MM/HR (ref 0–20)
FIBRINOGEN PPP-MCNC: 326 MG/DL (ref 219–464)
GFR SERPL CREATININE-BSD FRML MDRD: 60 ML/MIN/1.73
GLOBULIN UR ELPH-MCNC: 2.6 GM/DL (ref 1.8–3.5)
GLUCOSE BLD-MCNC: 86 MG/DL (ref 74–124)
HCT VFR BLD AUTO: 46.4 % (ref 40–49)
HGB BLD-MCNC: 15 G/DL (ref 13.5–16.5)
IMM GRANULOCYTES # BLD: 0.05 10*3/MM3 (ref 0–0.03)
IMM GRANULOCYTES NFR BLD: 0.6 % (ref 0–0.5)
LDH SERPL-CCNC: 145 U/L (ref 99–259)
LYMPHOCYTES # BLD AUTO: 2 10*3/MM3 (ref 1–3.5)
LYMPHOCYTES NFR BLD AUTO: 22.7 % (ref 20–49)
MCH RBC QN AUTO: 30.4 PG (ref 27–33)
MCHC RBC AUTO-ENTMCNC: 32.3 G/DL (ref 32–35)
MCV RBC AUTO: 94.1 FL (ref 83–97)
MONOCYTES # BLD AUTO: 1.01 10*3/MM3 (ref 0.25–0.8)
MONOCYTES NFR BLD AUTO: 11.5 % (ref 4–12)
NEUTROPHILS # BLD AUTO: 5.36 10*3/MM3 (ref 1.5–7)
NEUTROPHILS NFR BLD AUTO: 60.8 % (ref 39–75)
NRBC BLD MANUAL-RTO: 0 /100 WBC (ref 0–0)
PLATELET # BLD AUTO: 337 10*3/MM3 (ref 150–375)
PMV BLD AUTO: 9 FL (ref 8.9–12.1)
POTASSIUM BLD-SCNC: 4.1 MMOL/L (ref 3.5–4.7)
PROT SERPL-MCNC: 6.6 G/DL (ref 6.3–8)
RBC # BLD AUTO: 4.93 10*6/MM3 (ref 4.3–5.5)
SODIUM BLD-SCNC: 144 MMOL/L (ref 134–145)
WBC NRBC COR # BLD: 8.81 10*3/MM3 (ref 4–10)

## 2018-02-26 PROCEDURE — 83615 LACTATE (LD) (LDH) ENZYME: CPT | Performed by: INTERNAL MEDICINE

## 2018-02-26 PROCEDURE — 85652 RBC SED RATE AUTOMATED: CPT | Performed by: INTERNAL MEDICINE

## 2018-02-26 PROCEDURE — 99214 OFFICE O/P EST MOD 30 MIN: CPT | Performed by: INTERNAL MEDICINE

## 2018-02-26 PROCEDURE — 85384 FIBRINOGEN ACTIVITY: CPT | Performed by: INTERNAL MEDICINE

## 2018-02-26 PROCEDURE — 86140 C-REACTIVE PROTEIN: CPT | Performed by: INTERNAL MEDICINE

## 2018-02-26 PROCEDURE — 85025 COMPLETE CBC W/AUTO DIFF WBC: CPT | Performed by: INTERNAL MEDICINE

## 2018-02-26 PROCEDURE — 80053 COMPREHEN METABOLIC PANEL: CPT

## 2018-02-26 PROCEDURE — 36415 COLL VENOUS BLD VENIPUNCTURE: CPT | Performed by: INTERNAL MEDICINE

## 2018-02-26 RX ORDER — DOXYCYCLINE HYCLATE 100 MG
100 TABLET ORAL 2 TIMES DAILY
Qty: 14 TABLET | Refills: 0 | Status: SHIPPED | OUTPATIENT
Start: 2018-02-26 | End: 2018-03-05

## 2018-02-26 RX ORDER — BENZONATATE 100 MG/1
100 CAPSULE ORAL 3 TIMES DAILY PRN
Qty: 30 CAPSULE | Refills: 0 | Status: SHIPPED | OUTPATIENT
Start: 2018-02-26 | End: 2018-03-29

## 2018-02-26 NOTE — PROGRESS NOTES
REASONS FOR FOLLOWUP:       1. Myeloproliferative disorder with polycythemia vera, MAI-2 mutation positive requiring periodic phlebotomies whenever hematocrit is above 47.      2. His  tory of stage I clear cell carcinoma of the right kidney status post partial nephrectomy, nephron sparing surgery by Dr. Ganesh Lopez with no issues or consequences.  The patient has already an appointment to repeat CT scan of the abdomen in March 2016.                3. Patient has history of polymyalgia rheumatica.  He IS OFF 5 mg of prednisone a day.            HISTORY OF PRESENT ILLNESS:  This patient is here today for review stating that during the previous visit given the changes in the vasculature of his digits with painful lesions that suggested areas of ischemia we advised him to take aspirin. Two days later on a day that was extremely cold the patient's right hand became very pale in most of the fingers and blotchy and actually I have reviewed the picture that the patient took that day and it was very dramatic. Besides this the patient had numbness in the fingers and it took at least 4-6 hours for symptoms to improve. He says that this is not the 1st time that this has happened and further requesting information the patient says that he was told a long time ago that he could have a component of Raynaud's disease. In any event this event has not happened, he discontinued the aspirin and again has not happened again. He has not had any alterations in his toes and he has had now cough and sputum production of purulent material now for almost 4 weeks. He had an urgent visit to the emergency room and a chest x-ray documented hilar adenopathy with calcification, an area of abnormality in the right base that could be consistent with pneumonia or scar tissue formation. The patient has not had any antibiotic therapy. 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 He denies any fevers or chills but he has fatigue. His appetite has remained normal. He has not had any nausea or vomiting. Bowel function and urination remain normal. He has not had any skeletal pain. He has not had any other skin lesions. He has not had any other changes in the vasculature.                            PAST MEDICAL HISTORY:    1.;  Hypertension.    2.;   History of polyps in the colon.  Colonoscopy in 2010 documented a tubulovillous adenoma that was removed completely.  Further colonoscopy in 2014 was unremarkable.     3.; History of prostate biopsy in the past that showed features consistent with prostate cancer  .  He has not required any specific therapy for this and he has been monitored through his urologist.    4.; History of multiple hernia repairs.     5.; History of polymyalgia rheumatica that was diagnosed 3 years by Dr. Karan Garrett.  He has been on a tapering dose   of prednisone, even though he admits that his polymyalgia was atypical because his sedimentation was never elevated.  He has been seen by other rheumatologists in the past as well and has been told he has Raynaud’  s disease.  He has never had diagnosis of rheumatoid arthritis, lupus or something of this nature.     6.; Partial thyroidectomy in 1983 for a malignant tumor of the thyroid gland that never required any other intervention.      7.; He used to have radiation treatment for tonsillar inflammation and infection when   he was a child and this led him to develop thyroid cancer.       HEMATOLOGIC/ONCOLOGIC HISTORY:  History of previous dates can be found in a separate document.         On 02/22/2016, he had no symptoms or signs that would indicate any kidney cancer recurrence.  Given   the mild achiness in his muscles, we went ahead and checked a CPK and an aldolase and  also checked a sedimentation rate and vitamin D level.  TSH was also performed.  We asked him to remain on his dose of prednisone of 5 mg a day.  We asked him to initia  te a program of physical activity including going to the gym.  He did not need phlebotomy on this occasion, given hematocrit of 45.  His white count and platelet count remain stable.  He had no palpable splenomegaly.         MEDICATIONS:  The current medication list was reviewed with the patient and updated in the EMR this date per the Medical Assistant.  Medication dosages and frequencies were confirmed to be accurate.        It is important to mention the patient does not use any androgen or androgen replacement medication or anabolic steroids or any kind.        ALLERGIES:     1.  STATINS.    2. DIPHENHYDRAMINE.         SOCIAL HISTORY:  The patient is .  He lives with his wife in [place].  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  He plays violin in   an orchestra and also he is a very avid .  He does not smoke but he had a lot of second-hand smoke through his life.  He drinks 1-3 glasses of wine a week.  He has no use of any recreational drugs.          FAMILY HISTORY:  His father  of alcoh  ol and smoking disorders including heart attack.  His mother is 89 and in good health.  He has no brothers.  He has 1 adopted sister who is in good health with probable rheumatoid arthritis.  His wife is in good health.  He has 2 children, 1 who is in poo  r health due to drug abuse and the other son is in good health. He had another son who  as a consequence of Tylenol intoxication.   No family history of hematological disorder or myeloproliferative disorder.         REVIEW OF SYSTEMS:   General: no fever, chills,stated  fatigue, weight changes, or lack of appetite.  Eyes: no epiphora, xerophthalmia,conjunctivitis, pain, glaucoma, blurred vision, blindness, secretion, photophobia, proptosis,  "diplopia.  Ears: no otorrhea, tinnitus, otorrhagia, deafness, pain, vertigo.  Nose: no rhinorrhea, epistaxis, alteration in perception of odors, sinuses pressure.  Mouth: no alteration in gums or teeth,  ulcers, no difficulty with mastication or deglut ion, no odynophagia.  Neck: no masses or pain, no thyroid alterations, no pain in muscles or arteries, no carotid odynia, no crepitation.  Respiratory: PERSISITENT cough, PURULENTsputum production,NO  dyspnea, trepopnea, pleuritic pain, hemoptysis.  Heart: no syncope, irregularity, palpitations, angina, orthopnea, paroxysmal nocturnal dyspnea.  Vascular Venous: no tenderness,edema, palpable cords, postphlebitic syndrome, skin changes or ulcerations.  Vascular Arterial: STATED distal ischemia, NO claudication, gangrene, neuropathic ischemic pain, skin ulcers, STATEDpaleness AND cyanosis R HAND, PICTURES SEEN: VERY DRAMATIC  GI: no dysphagia, odynophagia, no regurgitation, no heartburn,no indigestion,no nausea,no vomiting,no hematemesis ,no melena,no jaundice,no distention, no obstipation,no enterorrhagia,no proctalgia,no anal  lesions, nochanges in bowel habits.  : no frequency, hesitancy, hematuria, discharge, pain.  Musculoskeletal: no muscle or tendon pain or inflammation, joint pain, edema, functional limitation, fasciculations, mass.  Neurologic: no headache, seizures, alterations on Craneal nerves, no motor or senssory deficit, normal coordination, no alteration in memory, orientation, calculation,writting, verbal or written language.  Skin: no rashes, pruritus or localized lesions.  Psychiatric: no anxiety, depression, agitation, delusions, proper insight.         VITAL SIGNS:   Vitals:    02/26/18 0911   BP: 120/82   Pulse: 92   Resp: 16   Temp: 97.9 °F (36.6 °C)   TempSrc: Oral   SpO2: 100%   Weight: 75.5 kg (166 lb 6.4 oz)   Height: 173 cm (68.11\")            PHYSICAL EXAMINATION:   GENERAL:  Well-developed, well-nourished  Patient  in no acute distress. "   SKIN:  Warm, dry without rashes, purpura or petechiae.  HEENT:  Pupils were equal and reactive to light and accomodation, conjunctivas non injected, no pterigion, normal extraocular movements, normal visual acuity.   Mouth mucosa was moist, no exudates in oropharynx, normal gum line, normal roof of the mouth and pillars, normal papillations of the tongue  NECK:  Supple with good range of motion; no thyromegaly or masses, no JVD or bruits, no cervical adenopathies.No carotid arteries pain, no carotid abnormal pulsation or arterial dance.  LYMPHATICS:  No cervical, supraclavicular, axillary, epitrochlear or inguinal adenopathy.  CHEST:  Normal excursion of both nelson thoraces, normal voice fremitus, no subcutaneous emphysema, normal axillas, no rashes or acanthosis nigricans. Lungs clear to percussion and auscultation, normal breath sounds bilaterally, no wheezing, crackles or ronchi, no stridor, no rubs.  CARDIAC AND VASCULAR: PMI not displaced,no thrills, normal rate and regular rhythm, without murmurs, rubs or S3 or S4 right or left sided gallops. Normal femoral, popliteal, pedis, brachial and carotid pulses.  ABDOMEN:  Soft, nontender with no organomegaly or masses, no ascites, no collateral circulation,no distention,no Aniket sign, no abdominal pain, no inguinal hernias,no umbilical hernias, no abdominal bruits. Normal bowel sounds.  GENITAL: Not  Performed.  EXTREMITIES  AND SPINE:  No clubbing, cyanosis or edema, no deformities or pain .No kyphosis, scoliosis, deformities or pain in spine, ribs or pelvic bone.JOHNATHAN TEST: ABNORMAL CAPILLARY REFILL R HAND, COLD HANDS SLOW CAPILLARY REFILL, SYMMETRIC BOTH UPPER EXTREMITIES BRACHIAL WRIST PULSES AND SIMILAR BP READINGS BOTH UPPER EXTREMITIES.NO BRUITS IN NECK, CHEST AXILLAS OR BRACHIAL AREAS.COLD WATER IMMERSION: PALENESS R HAND AT 35 SECS.  NEUROLOGICAL:  Patient was awake, alert, oriented to time, person and place  LABORATORY DATA:Auto Differential   Order:  [id number] - Part of Panel Order [id number]   Status:  Final result   Visible to patient:  No (Not Released) Dx:  Erythrocytosis      Ref Range & Units 9:02 AM     WBC 4.00 - 10.00 10*3/mm3 8.81   RBC 4.30 - 5.50 10*6/mm3 4.93   Hemoglobin 13.5 - 16.5 g/dL 15.0   Hematocrit 40.0 - 49.0 % 46.4   MCV 83.0 - 97.0 fL 94.1   MCH 27.0 - 33.0 pg 30.4   MCHC 32.0 - 35.0 g/dL 32.3   RDW 11.7 - 14.5 % 13.5   RDW-SD 37.0 - 49.0 fl 46.3   MPV 8.9 - 12.1 fL 9.0   Platelets 150 - 375 10*3/mm3 337   Neutrophil % 39.0 - 75.0 % 60.8   Lymphocyte % 20.0 - 49.0 % 22.7   Monocyte % 4.0 - 12.0 % 11.5   Eosinophil % 1.0 - 5.0 % 3.3   Basophil % 0.0 - 1.1 % 1.1   Immature Grans % 0.0 - 0.5 % 0.6 (H)   Neutrophils, Absolute 1.50 - 7.00 10*3/mm3 5.36   Lymphocytes, Absolute 1.00 - 3.50 10*3/mm3 2.00   Monocytes, Absolute 0.25 - 0.80 10*3/mm3 1.01 (H)   Eosinophils, Absolute 0.00 - 0.36 10*3/mm3 0.29           FACILITY:  [facility]  UNIT/AGE/GENDER: J.ED  ER      AGE:72 Y          SEX:M  PATIENT NAME/:  FOX NERI    1945  UNIT NUMBER:  [id number]  ACCOUNT NUMBER:  [id number]  ACCESSION NUMBER:  [id number]    EXAMINATION: Chest, 2 views, PA and lateral.     DATE: 2018    COMPARISON: None available    INDICATION: cough.        FINDINGS:  Area of linear atelectasis versus scarring at the left lung base. There is a nodular opacity at the right lung base, which is indeterminate. No consolidation to suggest pneumonia. No appreciable pneumothorax. Probable calcified mediastinal   lymph nodes. Heart size is normal. Thoracic aorta is normal in caliber.    IMPRESSION:   1. Right lower lobe nodular opacity, possibly reflecting a calcified granuloma. If no prior imaging is available to document stability, consider further characterization with CT.  2. Linear atelectasis versus scarring at the left lung base.    Dictated by: Lizett Villa M.D.    Images and Report reviewed and interpreted by: Lizett  ISABELLE Villa.    <PS><Electronically signed by: Lizett Villa M.D.>  02/09/2018 3851        ASSESSMENT/PLAN:   1. Polycythemia vera JAK2 mutation positive. This patient has stopped the Hydrea and he stopped the aspirin because these medicines were producing problems. Actually 2 days after he started aspirin a month ago he developed very significant right hand ischemia. The photography that he took of his hand is very dramatic and this phenomenon lasted for almost 6 hours. Therefore this leads me to believe that in the background of previous history of Raynaud's disease there is indeed a very significant vascular component in this patient and for this reason I do believe that we need to do further testing. He is not receiving any Hydrea or aspirin and at this point I am going to advise him to hold off on this. His hematocrit is 46 today and he knows that eventually he needs to have a phlebotomy to keep hematocrit below 45.  2. The patient has digital ischemia as stated above in the physical examination and suggests to me that he has a Raynaud's syndrome or he has some very significant vasculature deficiency especially in the right upper extremity. Blood pressure readings, pulse feelings and JOHNATHAN test show abnormalities consistent with the digital ischemia especially in the right hand. He has cold hands. He requires further testing for this with arterial dopplers and stressors.   3. The patient needs to have further blood work for the above. This will include an CHRISSY test, a lupus anticoagulant, anticardiolipin antibody, C-reactive protein, cryoglobulins as well cold agglutinins as well as cryofibrinogen. A serum protein electrophoresis will be done as well. A sedimentation rate will be done as well.  4. I want to review the patient back in a week.   5. In regard to his respiratory symptomatology and with the findings in the chest x-ray from [facility] that he had something that suggested an infiltrate in  the right base and calcified hilar nodes I am going to go ahead and proceed with therapy with doxycycline 100 mg twice a day for 7 days. He will take Tessalon for cough 3 times a day on prn basis.     I want to proceed with a CT scan of the chest for further review the anatomy of the chest, hilar lymph nodes and the infiltration that he has in the right base.    With all of these issues in mind he is ready to proceed.     I personally reviewed with the patient the report of the chest x-ray that he had done at [facility]. Pictures of these are not visible on the computer in Epic

## 2018-02-27 LAB
ALBUMIN SERPL-MCNC: 3.9 G/DL (ref 2.9–4.4)
ALBUMIN/GLOB SERPL: 1.5 {RATIO} (ref 0.7–1.7)
ALPHA1 GLOB FLD ELPH-MCNC: 0.2 G/DL (ref 0–0.4)
ALPHA2 GLOB SERPL ELPH-MCNC: 0.7 G/DL (ref 0.4–1)
B-GLOBULIN SERPL ELPH-MCNC: 1 G/DL (ref 0.7–1.3)
CARDIOLIPIN IGG SER IA-ACNC: <9 GPL U/ML (ref 0–14)
CARDIOLIPIN IGM SER IA-ACNC: 18 MPL U/ML (ref 0–12)
CENTROMERE B AB SER-ACNC: 2.4 AI (ref 0–0.9)
CHROMATIN AB SERPL-ACNC: <0.2 AI (ref 0–0.9)
DSDNA AB SER-ACNC: 2 IU/ML (ref 0–9)
ENA JO1 AB SER-ACNC: <0.2 AI (ref 0–0.9)
ENA RNP AB SER-ACNC: <0.2 AI (ref 0–0.9)
ENA SCL70 AB SER-ACNC: <0.2 AI (ref 0–0.9)
ENA SM AB SER-ACNC: <0.2 AI (ref 0–0.9)
ENA SS-A AB SER-ACNC: <0.2 AI (ref 0–0.9)
ENA SS-B AB SER-ACNC: <0.2 AI (ref 0–0.9)
GAMMA GLOB SERPL ELPH-MCNC: 0.9 G/DL (ref 0.4–1.8)
GLOBULIN SER CALC-MCNC: 2.7 G/DL (ref 2.2–3.9)
IGA SERPL-MCNC: 81 MG/DL (ref 61–437)
IGG SERPL-MCNC: 731 MG/DL (ref 700–1600)
IGM SERPL-MCNC: 131 MG/DL (ref 15–143)
INTERPRETATION SERPL IEP-IMP: NORMAL
KAPPA LC SERPL-MCNC: 9.2 MG/L (ref 3.3–19.4)
KAPPA LC/LAMBDA SER: 0.81 {RATIO} (ref 0.26–1.65)
LA NT PLATELET PPP: 33.4 SEC (ref 0–51.9)
LAMBDA LC FREE SERPL-MCNC: 11.4 MG/L (ref 5.7–26.3)
LUPUS ANTICOAGULANT REFLEX: NORMAL
Lab: ABNORMAL
Lab: NORMAL
M-SPIKE: NORMAL G/DL
PROT SERPL-MCNC: 6.6 G/DL (ref 6–8.5)
SCREEN DRVVT: 37.5 SEC (ref 0–47)

## 2018-02-28 ENCOUNTER — HOSPITAL ENCOUNTER (OUTPATIENT)
Dept: CT IMAGING | Facility: HOSPITAL | Age: 73
Discharge: HOME | End: 2018-02-28
Attending: INTERNAL MEDICINE | Admitting: INTERNAL MEDICINE

## 2018-02-28 ENCOUNTER — LAB (OUTPATIENT)
Dept: LAB | Facility: HOSPITAL | Age: 73
End: 2018-02-28

## 2018-02-28 ENCOUNTER — HOSPITAL ENCOUNTER (OUTPATIENT)
Dept: CARDIOLOGY | Facility: HOSPITAL | Age: 73
Discharge: HOME | End: 2018-02-28
Attending: INTERNAL MEDICINE

## 2018-02-28 DIAGNOSIS — I75.013: ICD-10-CM

## 2018-02-28 DIAGNOSIS — D75.1 ERYTHROCYTOSIS: ICD-10-CM

## 2018-02-28 LAB
B2 GLYCOPROT1 IGA SER-ACNC: <9 GPI IGA UNITS (ref 0–25)
B2 GLYCOPROT1 IGG SER-ACNC: <9 GPI IGG UNITS (ref 0–20)
B2 GLYCOPROT1 IGM SER-ACNC: <9 GPI IGM UNITS (ref 0–32)
BASOPHILS # BLD AUTO: 0.08 10*3/MM3 (ref 0–0.1)
BASOPHILS NFR BLD AUTO: 0.9 % (ref 0–1.1)
BH CV UPPER ARTERIAL LEFT 2ND DIGIT SYS MAX: 115 MMHG
BH CV UPPER ARTERIAL LEFT FBI 2ND DIGIT RATIO: 0.91
BH CV UPPER ARTERIAL LEFT WBI RATIO: 1.2
BH CV UPPER ARTERIAL RIGHT 2ND DIGIT SYS MAX: 119 MMHG
BH CV UPPER ARTERIAL RIGHT 3RD DIGIT SYS MAX: 111 MMHG
BH CV UPPER ARTERIAL RIGHT 4TH DIGIT SYS MAX: 105 MMHG
BH CV UPPER ARTERIAL RIGHT 5TH DIGIT SYS MAX: 120 MMHG
BH CV UPPER ARTERIAL RIGHT FBI 2ND DIGIT RATIO: 0.94
BH CV UPPER ARTERIAL RIGHT FBI 3RD DIGIT RATIO: 0.87
BH CV UPPER ARTERIAL RIGHT FBI 4TH DIGIT RATIO: 0.83
BH CV UPPER ARTERIAL RIGHT FBI 5TH DIGIT RATIO: 0.94
BH CV UPPER ARTERIAL RIGHT WBI RATIO: 1.35
CA TITR SERPL AGGL: NEGATIVE {TITER}
CREAT BLDA-MCNC: 1.1 MG/DL (ref 0.6–1.3)
DEPRECATED RDW RBC AUTO: 46.5 FL (ref 37–49)
EOSINOPHIL # BLD AUTO: 0.37 10*3/MM3 (ref 0–0.36)
EOSINOPHIL NFR BLD AUTO: 4.1 % (ref 1–5)
ERYTHROCYTE [DISTWIDTH] IN BLOOD BY AUTOMATED COUNT: 13.4 % (ref 11.7–14.5)
HCT VFR BLD AUTO: 45.9 % (ref 40–49)
HGB BLD-MCNC: 14.6 G/DL (ref 13.5–16.5)
IMM GRANULOCYTES # BLD: 0.04 10*3/MM3 (ref 0–0.03)
IMM GRANULOCYTES NFR BLD: 0.4 % (ref 0–0.5)
LYMPHOCYTES # BLD AUTO: 2 10*3/MM3 (ref 1–3.5)
LYMPHOCYTES NFR BLD AUTO: 22.1 % (ref 20–49)
MCH RBC QN AUTO: 29.9 PG (ref 27–33)
MCHC RBC AUTO-ENTMCNC: 31.8 G/DL (ref 32–35)
MCV RBC AUTO: 94.1 FL (ref 83–97)
MONOCYTES # BLD AUTO: 0.99 10*3/MM3 (ref 0.25–0.8)
MONOCYTES NFR BLD AUTO: 10.9 % (ref 4–12)
NEUTROPHILS # BLD AUTO: 5.58 10*3/MM3 (ref 1.5–7)
NEUTROPHILS NFR BLD AUTO: 61.6 % (ref 39–75)
NRBC BLD MANUAL-RTO: 0 /100 WBC (ref 0–0)
PLATELET # BLD AUTO: 290 10*3/MM3 (ref 150–375)
PMV BLD AUTO: 9 FL (ref 8.9–12.1)
RBC # BLD AUTO: 4.88 10*6/MM3 (ref 4.3–5.5)
UPPER ARTERIAL LEFT ARM BRACHIAL SYS MAX: 127 MMHG
UPPER ARTERIAL LEFT ARM RADIAL SYS MAX: 153 MMHG
UPPER ARTERIAL LEFT ARM ULNAR SYS MAX: 150 MMHG
UPPER ARTERIAL RIGHT ARM BRACHIAL SYS MAX: 116 MMHG
UPPER ARTERIAL RIGHT ARM RADIAL SYS MAX: 172 MMHG
UPPER ARTERIAL RIGHT ARM ULNAR SYS MAX: 168 MMHG
WBC NRBC COR # BLD: 9.06 10*3/MM3 (ref 4–10)

## 2018-02-28 PROCEDURE — 85025 COMPLETE CBC W/AUTO DIFF WBC: CPT

## 2018-02-28 PROCEDURE — 93923 UPR/LXTR ART STDY 3+ LVLS: CPT

## 2018-02-28 PROCEDURE — 36415 COLL VENOUS BLD VENIPUNCTURE: CPT | Performed by: INTERNAL MEDICINE

## 2018-02-28 PROCEDURE — 82565 ASSAY OF CREATININE: CPT

## 2018-02-28 PROCEDURE — 0 IOPAMIDOL 61 % SOLUTION: Performed by: INTERNAL MEDICINE

## 2018-02-28 PROCEDURE — 71260 CT THORAX DX C+: CPT

## 2018-02-28 RX ADMIN — IOPAMIDOL 85 ML: 612 INJECTION, SOLUTION INTRAVENOUS at 16:11

## 2018-03-05 ENCOUNTER — LAB (OUTPATIENT)
Dept: LAB | Facility: HOSPITAL | Age: 73
End: 2018-03-05

## 2018-03-05 ENCOUNTER — TELEPHONE (OUTPATIENT)
Dept: ONCOLOGY | Facility: HOSPITAL | Age: 73
End: 2018-03-05

## 2018-03-05 ENCOUNTER — OFFICE VISIT (OUTPATIENT)
Dept: ONCOLOGY | Facility: CLINIC | Age: 73
End: 2018-03-05

## 2018-03-05 VITALS
OXYGEN SATURATION: 99 % | WEIGHT: 168.6 LBS | SYSTOLIC BLOOD PRESSURE: 122 MMHG | TEMPERATURE: 97.7 F | RESPIRATION RATE: 12 BRPM | DIASTOLIC BLOOD PRESSURE: 76 MMHG | HEIGHT: 68 IN | BODY MASS INDEX: 25.55 KG/M2 | HEART RATE: 85 BPM

## 2018-03-05 DIAGNOSIS — D75.1 ERYTHROCYTOSIS: ICD-10-CM

## 2018-03-05 DIAGNOSIS — D75.1 ERYTHROCYTOSIS: Primary | ICD-10-CM

## 2018-03-05 DIAGNOSIS — Z79.01 ANTICOAGULATION MONITORING, INR RANGE 2-3: ICD-10-CM

## 2018-03-05 DIAGNOSIS — Z71.89 ENCOUNTER FOR ANTICOAGULATION DISCUSSION AND COUNSELING: ICD-10-CM

## 2018-03-05 DIAGNOSIS — C64.1 CANCER OF RIGHT KIDNEY (HCC): Primary | ICD-10-CM

## 2018-03-05 DIAGNOSIS — I73.00 RAYNAUD'S DISEASE WITHOUT GANGRENE: ICD-10-CM

## 2018-03-05 LAB
BASOPHILS # BLD AUTO: 0.09 10*3/MM3 (ref 0–0.1)
BASOPHILS NFR BLD AUTO: 1 % (ref 0–1.1)
CRYOFIB PLAS QL: NORMAL
DEPRECATED RDW RBC AUTO: 45.1 FL (ref 37–49)
EOSINOPHIL # BLD AUTO: 0.65 10*3/MM3 (ref 0–0.36)
EOSINOPHIL NFR BLD AUTO: 7.4 % (ref 1–5)
ERYTHROCYTE [DISTWIDTH] IN BLOOD BY AUTOMATED COUNT: 13.3 % (ref 11.7–14.5)
HCT VFR BLD AUTO: 46.9 % (ref 40–49)
HGB BLD-MCNC: 15.3 G/DL (ref 13.5–16.5)
IMM GRANULOCYTES # BLD: 0.1 10*3/MM3 (ref 0–0.03)
IMM GRANULOCYTES NFR BLD: 1.1 % (ref 0–0.5)
LYMPHOCYTES # BLD AUTO: 2.17 10*3/MM3 (ref 1–3.5)
LYMPHOCYTES NFR BLD AUTO: 24.6 % (ref 20–49)
MCH RBC QN AUTO: 29.8 PG (ref 27–33)
MCHC RBC AUTO-ENTMCNC: 32.6 G/DL (ref 32–35)
MCV RBC AUTO: 91.4 FL (ref 83–97)
MONOCYTES # BLD AUTO: 1.04 10*3/MM3 (ref 0.25–0.8)
MONOCYTES NFR BLD AUTO: 11.8 % (ref 4–12)
NEUTROPHILS # BLD AUTO: 4.76 10*3/MM3 (ref 1.5–7)
NEUTROPHILS NFR BLD AUTO: 54.1 % (ref 39–75)
NRBC BLD MANUAL-RTO: 0 /100 WBC (ref 0–0)
PLATELET # BLD AUTO: 228 10*3/MM3 (ref 150–375)
PMV BLD AUTO: 9.6 FL (ref 8.9–12.1)
RBC # BLD AUTO: 5.13 10*6/MM3 (ref 4.3–5.5)
WBC NRBC COR # BLD: 8.81 10*3/MM3 (ref 4–10)

## 2018-03-05 PROCEDURE — 85025 COMPLETE CBC W/AUTO DIFF WBC: CPT | Performed by: INTERNAL MEDICINE

## 2018-03-05 PROCEDURE — 36415 COLL VENOUS BLD VENIPUNCTURE: CPT | Performed by: INTERNAL MEDICINE

## 2018-03-05 PROCEDURE — 99214 OFFICE O/P EST MOD 30 MIN: CPT | Performed by: INTERNAL MEDICINE

## 2018-03-05 NOTE — PROGRESS NOTES
REASONS FOR FOLLOWUP:       1. Myeloproliferative disorder with polycythemia vera, MAI-2 mutation positive requiring periodic phlebotomies whenever hematocrit is above 45.      2. His  tory of stage I clear cell carcinoma of the right kidney status post partial nephrectomy, nephron sparing surgery by Dr. Ganesh Lopez with no issues or consequences.  The patient has already an appointment to repeat CT scan of the abdomen in March 2016.                3. Patient has history of polymyalgia rheumatica.  He IS OFF 5 mg of prednisone a day.            HISTORY OF PRESENT ILLNESS: This patient is here today to followup after he had extensive assessment during the previous visit a few days ago with respiratory symptomatology and also a clinical picture that suggested Raynaud's.  In the interim, the patient has undergone cardiac assessment with Doppler studies of the arterial system of the upper extremities and a multitude of laboratory testing that will be described below.  The good news is that most of his lower respiratory tract symptomatology is very much resolved, having minimal cough, no sputum production, no shortness of breath and no pleuritic pain.  He still has very significant nasal discharge of clear material with no pressure in the sinuses, no epistaxis and occasional sneezing.  He has been allergic to cats in the past.  He has cats in the house and he does not have any other exposure to possible allergens at this time.  The patient denies any fevers or chills.  He has not had any other episodes of Raynaud's since the previous visit.  Otherwise, his appetite is good, his weight is stable, his bowel function and urination are normal and he has no other new symptomatology.  No joint pain or bone pain and no neurological symptomatology.  No fevers, chills or sweats.                            PAST MEDICAL HISTORY:    1.;  Hypertension.    2.;   History of polyps in the colon.  Colonoscopy in 2010  documented a tubulovillous adenoma that was removed completely.  Further colonoscopy in 2014 was unremarkable.     3.; History of prostate biopsy in the past that showed features consistent with prostate cancer  .  He has not required any specific therapy for this and he has been monitored through his urologist.    4.; History of multiple hernia repairs.     5.; History of polymyalgia rheumatica that was diagnosed 3 years by Dr. Karan Garrett.  He has been on a tapering dose   of prednisone, even though he admits that his polymyalgia was atypical because his sedimentation was never elevated.  He has been seen by other rheumatologists in the past as well and has been told he has Raynaud’  s disease.  He has never had diagnosis of rheumatoid arthritis, lupus or something of this nature.     6.; Partial thyroidectomy in 1983 for a malignant tumor of the thyroid gland that never required any other intervention.      7.; He used to have radiation treatment for tonsillar inflammation and infection when   he was a child and this led him to develop thyroid cancer.       HEMATOLOGIC/ONCOLOGIC HISTORY:  History of previous dates can be found in a separate document.         On 02/22/2016, he had no symptoms or signs that would indicate any kidney cancer recurrence.  Given   the mild achiness in his muscles, we went ahead and checked a CPK and an aldolase and also checked a sedimentation rate and vitamin D level.  TSH was also performed.  We asked him to remain on his dose of prednisone of 5 mg a day.  We asked him to initia  te a program of physical activity including going to the gym.  He did not need phlebotomy on this occasion, given hematocrit of 45.  His white count and platelet count remain stable.  He had no palpable splenomegaly.         MEDICATIONS:  The current medication list was reviewed with the patient and updated in the EMR this date per the Medical Assistant.  Medication dosages and frequencies were confirmed  to be accurate.        It is important to mention the patient does not use any androgen or androgen replacement medication or anabolic steroids or any kind.        ALLERGIES:     1.  STATINS.    2. DIPHENHYDRAMINE.         SOCIAL HISTORY:  The patient is .  He lives with his wife in [place].  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  He plays violin in   an orchestra and also he is a very avid .  He does not smoke but he had a lot of second-hand smoke through his life.  He drinks 1-3 glasses of wine a week.  He has no use of any recreational drugs.          FAMILY HISTORY:  His father  of alcoh  ol and smoking disorders including heart attack.  His mother is 89 and in good health.  He has no brothers.  He has 1 adopted sister who is in good health with probable rheumatoid arthritis.  His wife is in good health.  He has 2 children, 1 who is in [facility] health due to drug abuse and the other son is in good health. He had another son who  as a consequence of Tylenol intoxication.   No family history of hematological disorder or myeloproliferative disorder.         REVIEW OF SYSTEMS:     General: no fever, chills, fatigue, weight changes, or lack of appetite.  Eyes: no epiphora, xerophthalmia,conjunctivitis, pain, glaucoma, blurred vision, blindness, secretion, photophobia, proptosis, diplopia.  Ears: no otorrhea, tinnitus, otorrhagia, deafness, pain, vertigo.  Nose: clear rhinorrhea, no epistaxis, alteration in perception of odors, sinuses pressure.  Mouth: no alteration in gums or teeth,  ulcers, no difficulty with mastication or deglut ion, no odynophagia.  Neck: no masses or pain, no thyroid alterations, no pain in muscles or arteries, no carotid odynia, no crepitation.  Respiratory: minimal cough, no sputum production, dyspnea, trepopnea, pleuritic pain, hemoptysis.  Heart: no syncope, irregularity, palpitations, angina, orthopnea, paroxysmal nocturnal  "dyspnea.  Vascular Venous: no tenderness,edema, palpable cords, postphlebitic syndrome, skin changes or ulcerations.  Vascular Arterial: no distal ischemia, claudication, gangrene, neuropathic ischemic pain, skin ulcers, paleness or cyanosis.  GI: no dysphagia, odynophagia, no regurgitation, no heartburn,no indigestion,no nausea,no vomiting,no hematemesis ,no melena,no jaundice,no distention, no obstipation,no enterorrhagia,no proctalgia,no anal  lesions, nochanges in bowel habits.  : no frequency, hesitancy, hematuria, discharge, pain.  Musculoskeletal: no muscle or tendon pain or inflammation, joint pain, edema, functional limitation, fasciculations, mass.  Neurologic: no headache, seizures, alterations on Craneal nerves, no motor or senssory deficit, normal coordination, no alteration in memory, orientation, calculation,writting, verbal or written language.  Skin: no rashes, pruritus or localized lesions.  Psychiatric: no anxiety, depression, agitation, delusions, proper insight.       VITAL SIGNS:   Vitals:    03/05/18 0804   BP: 122/76   Pulse: 85   Resp: 12   Temp: 97.7 °F (36.5 °C)   TempSrc: Oral   SpO2: 99%   Weight: 76.5 kg (168 lb 9.6 oz)   Height: 173 cm (68.11\")            PHYSICAL EXAMINATION:   GENERAL:  Well-developed, well-nourished  Patient  in no acute distress.   SKIN:  Warm, dry without rashes, purpura or petechiae.  HEENT:  Pupils were equal and reactive to light and accomodation, conjunctivas non injected, no pterigion, normal extraocular movements, normal visual acuity.   Mouth mucosa was moist, no exudates in oropharynx, normal gum line, normal roof of the mouth and pillars, normal papillations of the tongue.Ear canals were normal, as well tympanic membranes, normal hearing acuity.No pain in mastoid area or erythema.  NECK:  Supple with good range of motion; no thyromegaly or masses, no JVD or bruits, no cervical adenopathies.No carotid arteries pain, no carotid abnormal pulsation or " arterial dance.  LYMPHATICS:  No cervical, supraclavicular, axillary, epitrochlear or inguinal adenopathy.  CHEST:  Normal excursion of both nelson thoraces, normal voice fremitus, no subcutaneous emphysema, normal axillas, no rashes or acanthosis nigricans. Lungs clear to percussion and auscultation, normal breath sounds bilaterally, no wheezing, crackles or ronchi, no stridor, no rubs.  CARDIAC AND VASCULAR: PMI not displaced,no thrills, normal rate and regular rhythm, without murmurs, rubs or S3 or S4 right or left sided gallops. Normal femoral, popliteal, pedis, brachial and carotid pulses.  ABDOMEN:  Soft, nontender with no organomegaly or masses, no ascites, no collateral circulation,no distention,no Orlando sign, no abdominal pain, no inguinal hernias,no umbilical hernias, no abdominal bruits. Normal bowel sounds.  GENITAL: Not  Performed.  EXTREMITIES  AND SPINE:  No clubbing, cyanosis or edema, no deformities or pain .No kyphosis, scoliosis, deformities or pain in spine, ribs or pelvic bone.  NEUROLOGICAL:  Patient was awake, alert, oriented to time, person and place,normal gait and coordination    LABORATORY DATA:CBC Auto Differential   Order: [id number] - Part of Panel Order [id number]   Status:  Final result   Visible to patient:  No (Not Released) Dx:  Erythrocytosis      Ref Range & Units 7:47 AM     WBC 4.00 - 10.00 10*3/mm3 8.81   RBC 4.30 - 5.50 10*6/mm3 5.13   Hemoglobin 13.5 - 16.5 g/dL 15.3   Hematocrit 40.0 - 49.0 % 46.9   MCV 83.0 - 97.0 fL 91.4   MCH 27.0 - 33.0 pg 29.8   MCHC 32.0 - 35.0 g/dL 32.6   RDW 11.7 - 14.5 % 13.3   RDW-SD 37.0 - 49.0 fl 45.1   MPV 8.9 - 12.1 fL 9.6   Platelets 150 - 375 10*3/mm3 228   Neutrophil % 39.0 - 75.0 % 54.1   Lymphocyte % 20.0 - 49.0 % 24.6   Monocyte % 4.0 - 12.0 % 11.8   Eosinophil % 1.0 - 5.0 % 7.4 (H)   Basophil % 0.0 - 1.1 % 1.0   Immature Grans % 0.0 - 0.5 % 1.1 (H)   Neutrophils, Absolute 1.50 - 7.00 10*3/mm3 4.76   Lymphocytes, Absolute 1.00 - 3.50  10*3/mm3 2.17                 Interpretation Summary echocardiogram  · Calculated right ventricular systolic pressure from tricuspid regurgitation is 30 mmHg.  · Left ventricular diastolic dysfunction (grade I) consistent with impaired relaxation.  · Mild mitral valve regurgitation is present  · Mild tricuspid valve regurgitation is present.  · Calculated EF = 65.6%. Estimated EF was in agreement with the calculated EF. Normal left ventricular cavity size and wall thickness noted. All left ventricular wall segments contract normally. Septal wall motion is normal.  · Left ventricular diastolic dysfunction is noted (grade I) consistent with impaired relaxation.  · The aortic valve is abnormal in structure. The valve exhibits sclerosis.  · Mild mitral valve regurgitation is present.  · Mild tricuspid valve regurgitation is present. Estimated right ventricular systolic pressure from tricuspid regurgitation is normal (<35 mmHg).  · Moderate dilation of the sinuses of Valsalva is present. No dilation of the proximal aorta is present. The ascending aorta was not well visualized and not assessed. Mild dilation of the ascending aorta is present. Mild dilation of the aortic arch is present.       CT CHEST WITH CONTRAST-      CLINICAL: Follow-up pneumonia.      COMPARISON: None.      CT CHEST FINDINGS: No consolidation within either lung. No pleural  effusion. There is a calcified benign granuloma within the right lower  lobe with small associated calcified benign right hilar lymph nodes. No  suspicious pulmonary mass.      Demonstrated along the costophrenic sulci of each lower lobe there is  symmetric vague opacity which probably represents dependent atelectasis.      There is mild cardiac enlargement. No pericardial effusion. Esophagus is  satisfactory in course and caliber. There are calcified benign  mediastinal lymph nodes. Limited images through the upper abdomen are  unremarkable.      CONCLUSION: There appears to be  subtle dependent atelectasis in each  lower lobe along the costophrenic sulci, no consolidation. Old  granulomatous disease right lower lobe.      Radiation dose reduction techniques were utilized, including automated  exposure control and exposure modulation based on body size.      This report was finalized on 3/2/2018 9:44 AM by Dr. Earl Abel MD.    Anticardiolipin Antibody, IgG / M, Qn   Order: [id number]   Status:  Final result   Visible to patient:  No (Not Released) Dx:  Atheroembolism of both upper extremit...      Ref Range & Units 7d ago     Anticardiolipin IgG 0 - 14 GPL U/mL <9   Comments:                           Negative:              <15                             Indeterminate:     15 - 20                             Low-Med Positive: >20 - 80                             High Positive:         >80   Anticardiolipin IgM 0 - 12 MPL U/mL 18 (H)   Comments:                           Negative:              <13                             Indeterminate:     13 - 20                             Low-Med Positive: >20 - 80                             High Positive:         >80   Resulting Agency  [facility]   Narrative   Performed at:  [address] [place] [address]  [id number]  : Mode Paz PhD, Phone:  [phone]      Specimen Collected: 02/26/18 10:29 AM Last Resulted: 02/27/18               Lupus Anticoagulant Reflex   Order: [id number]   Status:  Final result   Visible to patient:  No (Not Released) Dx:  Atheroembolism of both upper extremit...      Ref Range & Units 7d ago     PTT Lupus Anticoagulant 0.0 - 51.9 sec 33.4   Dilute Viper Venom Time 0.0 - 47.0 sec 37.5   Lupus Anticoagulant Reflex  Comment:   Comments: No lupus anticoagulant was detected.   Resulting Agency  [facility]   Narrative   Performed at:  [address] [place] [address]  [id number]  : Og Stephen MD, Phone:  [phone]      Specimen Collected: 02/26/18  10:29 AM Last Resulted: 02/27/18  4:23 PM                         CHRISSY Comprehensive Panel   Order: [id number]   Status:  Final result   Visible to patient:  No (Not Released) Dx:  Atheroembolism of both upper extremit...      Ref Range & Units 7d ago     Anti-DNA (DS) Ab Qn 0 - 9 IU/mL 2   Comments:                                    Negative      <5                                      Equivocal  5 - 9                                      Positive      >9   RNP Antibodies 0.0 - 0.9 AI <0.2   Mixon Antibodies 0.0 - 0.9 AI <0.2   Antiscleroderma-70 Antibodies 0.0 - 0.9 AI <0.2   SENG SSA (RO) Ab 0.0 - 0.9 AI <0.2   SENG SSB (LA) Ab 0.0 - 0.9 AI <0.2   Antichromatin Antibodies 0.0 - 0.9 AI <0.2   CHINYERE-1 IgG 0.0 - 0.9 AI <0.2   Anti-Centromere B Antibodies 0.0 - 0.9 AI 2.4 (H)   See below:  Comment   Comments: Autoantibody                       Disease Association           C-reactive Protein   Order: [id number]   Status:  Final result   Visible to patient:  No (Not Released) Dx:  Atheroembolism of both upper extremit...      Ref Range & Units 7d ago     C-Reactive Protein 0.00 - 0.50 mg/dL 0.05   Resulting Agency  [facility]      Specimen Collected: 02/26/18 10:12 AM Last Resulted: 02/26/18 12:35 PM              Cold Agglutinin Titer   Order: [id number]   Status:  Final result   Visible to patient:  No (Not Released) Dx:  Atheroembolism of both upper extremit...      Ref Range & Units 7d ago     Cold Agglutinin Titer Neg <1:32 Negative   Resulting Agency  [facility]   Narrative   Performed at:   - [facility]  [id number]  : Og Stephen MD, Phone:  [phone]      Specimen Collected: 02/26/18 10:29 AM Last Resulted: 02/28/18 11:14 AM              Sedimentation Rate   Order: [id number]   Status:  Final result   Visible to patient:  No (Not Released) Dx:  Erythrocytosis      Ref Range & Units 7d ago     Sed Rate 0 - 20 mm/hr 3   Resulting Agency   [facility]      Specimen  Collected: 02/26/18  9:02 AM Last Resulted: 02/26/18               Interpretation Summary arterial doppler  · Normal arterial pressures on the right. Normal digital pressures noted on the right. A positive with immersion result for Raynaud's disease was documented for the right.  · Normal arterial pressures on the left. Normal digital pressures noted on the left. A positive with immersion result for Raynaud's disease was documented for the left.       Study Findings   Right-Sided Findings:   • Right Brachial: The pulse is detected w/ doppler.   • Right Radial: The pulse is detected w/ doppler.   • Right Ulnar: The pulse is detected w/ doppler.       Left-Sided Findings:  • Left Brachial: The pulse is detected w/ doppler.   • Left Radial: The pulse is detected w/ doppler.   • Left Ulnar: The pulse is detected w/ doppler.       Patient c/o right hand being symptomatic, not left and not affecting 1st digit.   Study Impression   Normal arterial pressures on the right. Normal digital pressures noted on the right. A positive with immersion result for Raynaud's disease was documented for the right.    Normal arterial pressures on the left. Normal digital pressures noted on the left. A positive with immersion result for Raynaud's disease was documented          ASSESSMENT/PLAN:   1.  Patient has polycythemia vera, MAI-2 mutation positive.  He has been hovering around a hematocrit of 46 or 47 and I have suggested to him that he needs to have a hematocrit below 45 as the new predicament of the [employer] procures for patients like him. The reason is because this hematocrit of 45 or less will decrease the chance for vascular events.  The patient visited today, but he will return at some point this week to pursue the phlebotomy.  I am going to schedule him to have another phlebotomy and visit with me in 1 month and see how he is doing.  Again, the goal is to keep the hematocrit below 45.  His white count and  platelet count remain stable.    2.  Patient has had an episode of Raynaud's syndrome recently.  I have done a lot of assessment in this regard including Doppler studies of the arterial system and upper extremities showing normal vasculature, but no question that cold-water test triggered the Raynaud's.  This goes along with what I saw when I saw him at the visit a few days ago.  Also, I have documented that the patient has an anticardiolipin antibody IgM positive at 18, anti-glycoprotein antibodies are negative, sedimentation rate is normal at 3 mm/hr, C-reactive protein is 0.05 and fibrinogen level is normal.  Cryofibrinogen is still pending.  A serum protein electrophoresis disclosed no evidence of monoclonal protein and the patient also had an CHRISSY profile that produces a positive anticentromere antibody.  This could be consistent in the long run or lead into the diagnosis of scleroderma.  The patient has no symptoms related to this whatsoever.    3.  Patient also has been seen by cardiology and an echocardiogram was done showing minimal diastolic dysfunction of the left ventricle, minimal sclerosis of the aortic valve and tricuspid regurgitation.    4.  Patient also had a CT scan of the chest that I personally review and as stated above shows no evidence of pneumonia and minimal dependency atelectasis in both basis.  No obvious tumors from metastases, mediastinal adenopathy, pericardial effusions or pleural effusions.    5.  Therefore in summary, related to this, I do believe that the patient has Raynaud's syndrome, I do believe that he has probably anticardiolipin antibody positivity that makes him more prone to develop thrombophilia.  With this in mind and with the anticentromere antibody I do believe that the patient needs to be anticoagulated.  For this reason, I have advised him to proceed with Xarelto at a dose of 15 mg p.o. daily.  I made him aware that he needs to takes the medicine always at the same  time and avoid trauma while taking this medicine.  I want to review him back in a month to see how he is doing.  The patient probably needs to be anticoagulated for life.    6.  I made him an appointment to see his rheumatologist in order to review all the information available above and in order to put it altogether given his questionable previous history of polymyalgia rheumatica.    7.  Patient is willing to proceed.

## 2018-03-05 NOTE — TELEPHONE ENCOUNTER
Patient saw Dr. Damon this am and he started him on xarelto. Patient is not going to get it filled due to cost. He wants Dr. Damon to order a medication that does not cost as much. Discussed with Dr. aDmon. He wants patient to start xarelto and will discuss with him on Thursday when he is here for [facility]. Patient stated he is not going to start until Thursday after he discusses with Dr. Damon.

## 2018-03-08 ENCOUNTER — INFUSION (OUTPATIENT)
Dept: ONCOLOGY | Facility: HOSPITAL | Age: 73
End: 2018-03-08

## 2018-03-08 ENCOUNTER — APPOINTMENT (OUTPATIENT)
Dept: ONCOLOGY | Facility: HOSPITAL | Age: 73
End: 2018-03-08

## 2018-03-08 VITALS
SYSTOLIC BLOOD PRESSURE: 113 MMHG | TEMPERATURE: 98.9 F | BODY MASS INDEX: 25.89 KG/M2 | DIASTOLIC BLOOD PRESSURE: 79 MMHG | HEART RATE: 88 BPM | WEIGHT: 170.8 LBS

## 2018-03-08 DIAGNOSIS — D75.1 ERYTHROCYTOSIS: Primary | ICD-10-CM

## 2018-03-08 PROCEDURE — 99195 PHLEBOTOMY: CPT | Performed by: INTERNAL MEDICINE

## 2018-03-08 RX ORDER — SODIUM CHLORIDE 9 MG/ML
250 INJECTION, SOLUTION INTRAVENOUS ONCE
Status: DISCONTINUED | OUTPATIENT
Start: 2018-03-08 | End: 2018-03-08 | Stop reason: HOSPADM

## 2018-03-08 RX ORDER — SODIUM CHLORIDE 9 MG/ML
250 INJECTION, SOLUTION INTRAVENOUS ONCE
Status: CANCELLED | OUTPATIENT
Start: 2018-03-08

## 2018-03-27 DIAGNOSIS — C64.1 CANCER OF RIGHT KIDNEY (HCC): Primary | ICD-10-CM

## 2018-03-29 ENCOUNTER — LAB (OUTPATIENT)
Dept: LAB | Facility: HOSPITAL | Age: 73
End: 2018-03-29

## 2018-03-29 ENCOUNTER — OFFICE VISIT (OUTPATIENT)
Dept: ONCOLOGY | Facility: CLINIC | Age: 73
End: 2018-03-29

## 2018-03-29 ENCOUNTER — APPOINTMENT (OUTPATIENT)
Dept: ONCOLOGY | Facility: HOSPITAL | Age: 73
End: 2018-03-29

## 2018-03-29 VITALS
BODY MASS INDEX: 25.73 KG/M2 | RESPIRATION RATE: 16 BRPM | WEIGHT: 169.8 LBS | SYSTOLIC BLOOD PRESSURE: 118 MMHG | OXYGEN SATURATION: 98 % | HEIGHT: 68 IN | TEMPERATURE: 98 F | HEART RATE: 82 BPM | DIASTOLIC BLOOD PRESSURE: 78 MMHG

## 2018-03-29 DIAGNOSIS — I73.00 RAYNAUD'S DISEASE WITHOUT GANGRENE: ICD-10-CM

## 2018-03-29 DIAGNOSIS — C64.1 CANCER OF RIGHT KIDNEY (HCC): Primary | ICD-10-CM

## 2018-03-29 DIAGNOSIS — D75.1 ERYTHROCYTOSIS: ICD-10-CM

## 2018-03-29 DIAGNOSIS — C64.1 CANCER OF RIGHT KIDNEY (HCC): ICD-10-CM

## 2018-03-29 LAB
BASOPHILS # BLD AUTO: 0.09 10*3/MM3 (ref 0–0.1)
BASOPHILS NFR BLD AUTO: 0.9 % (ref 0–1.1)
DEPRECATED RDW RBC AUTO: 43.2 FL (ref 37–49)
EOSINOPHIL # BLD AUTO: 0.37 10*3/MM3 (ref 0–0.36)
EOSINOPHIL NFR BLD AUTO: 3.6 % (ref 1–5)
ERYTHROCYTE [DISTWIDTH] IN BLOOD BY AUTOMATED COUNT: 13.2 % (ref 11.7–14.5)
HCT VFR BLD AUTO: 41.4 % (ref 40–49)
HGB BLD-MCNC: 13.6 G/DL (ref 13.5–16.5)
IMM GRANULOCYTES # BLD: 0.04 10*3/MM3 (ref 0–0.03)
IMM GRANULOCYTES NFR BLD: 0.4 % (ref 0–0.5)
LYMPHOCYTES # BLD AUTO: 1.56 10*3/MM3 (ref 1–3.5)
LYMPHOCYTES NFR BLD AUTO: 15.1 % (ref 20–49)
MCH RBC QN AUTO: 29.4 PG (ref 27–33)
MCHC RBC AUTO-ENTMCNC: 32.9 G/DL (ref 32–35)
MCV RBC AUTO: 89.4 FL (ref 83–97)
MONOCYTES # BLD AUTO: 0.96 10*3/MM3 (ref 0.25–0.8)
MONOCYTES NFR BLD AUTO: 9.3 % (ref 4–12)
NEUTROPHILS # BLD AUTO: 7.34 10*3/MM3 (ref 1.5–7)
NEUTROPHILS NFR BLD AUTO: 70.7 % (ref 39–75)
NRBC BLD MANUAL-RTO: 0 /100 WBC (ref 0–0)
PLATELET # BLD AUTO: 262 10*3/MM3 (ref 150–375)
PMV BLD AUTO: 9.3 FL (ref 8.9–12.1)
RBC # BLD AUTO: 4.63 10*6/MM3 (ref 4.3–5.5)
WBC NRBC COR # BLD: 10.36 10*3/MM3 (ref 4–10)

## 2018-03-29 PROCEDURE — 36415 COLL VENOUS BLD VENIPUNCTURE: CPT | Performed by: INTERNAL MEDICINE

## 2018-03-29 PROCEDURE — 99214 OFFICE O/P EST MOD 30 MIN: CPT | Performed by: INTERNAL MEDICINE

## 2018-03-29 PROCEDURE — 85025 COMPLETE CBC W/AUTO DIFF WBC: CPT | Performed by: INTERNAL MEDICINE

## 2018-03-29 NOTE — PROGRESS NOTES
REASONS FOR FOLLOWUP:       1. Myeloproliferative disorder with polycythemia vera, MAI-2 mutation positive requiring periodic phlebotomies whenever hematocrit is above 45.      2. His  tory of stage I clear cell carcinoma of the right kidney status post partial nephrectomy, nephron sparing surgery by Dr. Ganesh Lopez with no issues or consequences.  The patient has already an appointment to repeat CT scan of the abdomen in March 2016.                3. Patient has history of polymyalgia rheumatica.  He IS OFF 5 mg of prednisone a day.            HISTORY OF PRESENT ILLNESS: This patient is here today after I have reviewed all his clinical issues recently when he developed very significant ischemia in the left upper extremity and we concluded that he had a typical case of Raynaud. In the interim, given his serological abnormalities, I have referred him back to see the rheumatologist in order to define if there is any other rheumatological condition that could be associated with what we see in the laboratory assessment. It was suggested that given his CHRISSY pattern that he could have something that suggested scleroderma. Obviously on clinical grounds there was nothing to suggest this diagnosis. In the meantime, the patient has discontinued all the medicines that he was taking for cold and he is no longer coughing. His energy level is coming back. His appetite is better and he has no GI upset. He also decided against any anticoagulation with Xarelto after the previous visit and he has not taken any. He has not had any other episodes of Raynaud. He has had normal bowel activity, normal urination. He had phlebotomy a few days ago and obviously, for this reason his hematocrit has normalized below 45. He has not had any other digital ischemia or vascular event. He feels substantially better.                           PAST MEDICAL HISTORY:    1.;  Hypertension.    2.;   History of polyps in the colon.   Colonoscopy in 2010 documented a tubulovillous adenoma that was removed completely.  Further colonoscopy in 2014 was unremarkable.     3.; History of prostate biopsy in the past that showed features consistent with prostate cancer  .  He has not required any specific therapy for this and he has been monitored through his urologist.    4.; History of multiple hernia repairs.     5.; History of polymyalgia rheumatica that was diagnosed 3 years by Dr. Karan Garrett.  He has been on a tapering dose   of prednisone, even though he admits that his polymyalgia was atypical because his sedimentation was never elevated.  He has been seen by other rheumatologists in the past as well and has been told he has Raynaud’  s disease.  He has never had diagnosis of rheumatoid arthritis, lupus or something of this nature.     6.; Partial thyroidectomy in 1983 for a malignant tumor of the thyroid gland that never required any other intervention.      7.; He used to have radiation treatment for tonsillar inflammation and infection when   he was a child and this led him to develop thyroid cancer.       HEMATOLOGIC/ONCOLOGIC HISTORY:  History of previous dates can be found in a separate document.         On 02/22/2016, he had no symptoms or signs that would indicate any kidney cancer recurrence.  Given   the mild achiness in his muscles, we went ahead and checked a CPK and an aldolase and also checked a sedimentation rate and vitamin D level.  TSH was also performed.  We asked him to remain on his dose of prednisone of 5 mg a day.  We asked him to initia  te a program of physical activity including going to the gym.  He did not need phlebotomy on this occasion, given hematocrit of 45.  His white count and platelet count remain stable.  He had no palpable splenomegaly.         MEDICATIONS:  The current medication list was reviewed with the patient and updated in the EMR this date per the Medical Assistant.  Medication dosages and  frequencies were confirmed to be accurate.        It is important to mention the patient does not use any androgen or androgen replacement medication or anabolic steroids or any kind.        ALLERGIES:     1.  STATINS.    2. DIPHENHYDRAMINE.         SOCIAL HISTORY:  The patient is .  He lives with his wife in [place].  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  He plays violin in   an orchestra and also he is a very avid .  He does not smoke but he had a lot of second-hand smoke through his life.  He drinks 1-3 glasses of wine a week.  He has no use of any recreational drugs.          FAMILY HISTORY:  His father  of alcoh  ol and smoking disorders including heart attack.  His mother is 89 and in good health.  He has no brothers.  He has 1 adopted sister who is in good health with probable rheumatoid arthritis.  His wife is in good health.  He has 2 children, 1 who is in o   health due to drug abuse and the other son is in good health. He had another son who  as a consequence of Tylenol intoxication.   No family history of hematological disorder or myeloproliferative disorder.         REVIEW OF SYSTEMS:     General: no fever, chills,LESSER fatigue,NO weight changes, or lack of appetite.  Eyes: no epiphora, xerophthalmia,conjunctivitis, pain, glaucoma, blurred vision, blindness, secretion, photophobia, proptosis, diplopia.  Ears: no otorrhea, tinnitus, otorrhagia, deafness, pain, vertigo.  Nose: clear rhinorrhea, no epistaxis, alteration in perception of odors, sinuses pressure.  Mouth: no alteration in gums or teeth,  ulcers, no difficulty with mastication or deglut ion, no odynophagia.  Neck: no masses or pain, no thyroid alterations, no pain in muscles or arteries, no carotid odynia, no crepitation.  Respiratory: minimal cough, no sputum production, dyspnea, trepopnea, pleuritic pain, hemoptysis.  Heart: no syncope, irregularity, palpitations,  "angina, orthopnea, paroxysmal nocturnal dyspnea.  Vascular Venous: no tenderness,edema, palpable cords, postphlebitic syndrome, skin changes or ulcerations.  Vascular Arterial: no distal ischemia, claudication, gangrene, neuropathic ischemic pain, skin ulcers, paleness or cyanosis.  GI: no dysphagia, odynophagia, no regurgitation, no heartburn,no indigestion,no nausea,no vomiting,no hematemesis ,no melena,no jaundice,no distention, no obstipation,no enterorrhagia,no proctalgia,no anal  lesions, nochanges in bowel habits.  : no frequency, hesitancy, hematuria, discharge, pain.  Musculoskeletal: no muscle or tendon pain or inflammation, joint pain, edema, functional limitation, fasciculations, mass.  Neurologic: no headache, seizures, alterations on Craneal nerves, no motor or senssory deficit, normal coordination, no alteration in memory, orientation, calculation,writting, verbal or written language.  Skin: no rashes, pruritus or localized lesions.  Psychiatric: no anxiety, depression, agitation, delusions, proper insight.       VITAL SIGNS:   Vitals:    03/29/18 1234   BP: 118/78   Pulse: 82   Resp: 16   Temp: 98 °F (36.7 °C)   TempSrc: Oral   SpO2: 98%   Weight: 77 kg (169 lb 12.8 oz)   Height: 173 cm (68.11\")            PHYSICAL EXAMINATION:   GENERAL:  Well-developed, well-nourished  Patient  in no acute distress.   SKIN:  Warm, dry without rashes, purpura or petechiae.  HEENT:  Pupils were equal and reactive to light and accomodation, conjunctivas non injected, no pterigion, normal extraocular movements, normal visual acuity.   Mouth mucosa was moist, no exudates in oropharynx, normal gum line, normal roof of the mouth and pillars, normal papillations of the tongueNECK:  Supple with good range of motion; no thyromegaly or masses, no JVD or bruits, no cervical adenopathies.No carotid arteries pain, no carotid abnormal pulsation or arterial dance.  LYMPHATICS:  No cervical, supraclavicular, axillary, " epitrochlear or inguinal adenopathy.  CHEST:  Normal excursion of both nelson thoraces, normal voice fremitus, no subcutaneous emphysema, normal axillas, no rashes or acanthosis nigricans. Lungs clear to percussion and auscultation, normal breath sounds bilaterally, no wheezing, crackles or ronchi, no stridor, no rubs.  CARDIAC AND VASCULAR: normal rate and regular rhythm, without murmurs, rubs or S3 or S4 right or left sided gallops. Normal femoral, popliteal, pedis, brachial and carotid pulses.  ABDOMEN:  Soft, nontender with no organomegaly or masses, no ascites, no collateral circulation,no distention,no Jesup sign, no abdominal pain, no inguinal hernias,no umbilical hernias, no abdominal bruits. Normal bowel sounds.  GENITAL: Not  Performed.  EXTREMITIES  AND SPINE:  No clubbing, cyanosis or edema, no deformities or pain .No kyphosis, scoliosis, deformities or pain in spine, ribs or pelvic bone.  NEUROLOGICAL:  Patient was awake, alert, oriented to time, person and place,normal gait and coordination    LABORATORY DATA:CBC Auto Differential   Order: [id number] - Part of Panel Order [id number]   Status:  Final result   Visible to patient:  No (Not Released) Dx:  Erythrocytosis    Ref Range & Units 12:14   WBC 4.00 - 10.00 10*3/mm3 10.36     RBC 4.30 - 5.50 10*6/mm3 4.63    Hemoglobin 13.5 - 16.5 g/dL 13.6    Hematocrit 40.0 - 49.0 % 41.4    MCV 83.0 - 97.0 fL 89.4    MCH 27.0 - 33.0 pg 29.4    MCHC 32.0 - 35.0 g/dL 32.9    RDW 11.7 - 14.5 % 13.2    RDW-SD 37.0 - 49.0 fl 43.2    MPV 8.9 - 12.1 fL 9.3    Platelets 150 - 375 10*3/mm3 262    Neutrophil % 39.0 - 75.0 % 70.7    Lymphocyte % 20.0 - 49.0 % 15.1     Monocyte % 4.0 - 12.0 % 9.3    Eosinophil % 1.0 - 5.0 % 3.6    Basophil % 0.0 - 1.1 % 0.             ASSESSMENT/PLAN:   1. This patient has polycythemia vera, JAK2 mutation positive, and has taken in the past Hydrea. He discontinued this medicine because of feeling afraid of side effects even though I never  noticed anything in particular related to this medicine. He had required now a phlebotomy a few days ago and today his hematocrit is 41. His white count is 12,000 and his platelet count is appropriate at 262,000. Obviously, under these premises and given the fact that his hematocrit has improved, he will have less possibility for developing any other vascular event.   2. The patient has had a recent episode of Raynaud in the left hand; not a new process. Happened a long time ago. Vascular studies failed to document any occlusive process and further thrombophilia labs documented a minimal anticardiolipin antibody positivity, IgM with negative cryofibrinogen, negative cryoglobulin and normal CRP and serological analysis that could be suggestive of maybe scleroderma. It happens to be that the patient has no clinical characteristics of scleroderma whatsoever. For this reason, I doubt that this is the diagnosis. In any event, the patient has been seen by Rheumatology and blood work has been done. He is pending to review this with her in a few days.   3. The patient has decided against any form of anticoagulation like I suggested with a low-dose Xarelto. He feels afraid that this is going to modify his life in a negative way including the risk of bleeding. He agrees today to take though a baby aspirin 81 mg a day. That is fine with me.   4. The patient has history of stage I kidney cancer, status post nephron-sparing nephrectomy. No issues at this time from this point of view. Nothing to suggest recurrence and this is not associated with the other clinical picture that the patient has as far as I can tell.     RECOMMENDATIONS:   1. After all these discussions today, I have recommended him the following: He will need to have a CBC every 6 weeks with phlebotomy if hematocrit is above 45.   2. He will be reviewed by physician in 3 months.   3. It is okay for him to take a baby aspirin 81 mg a day.   4. I expect that his  cough and the rest of the respiratory issues that he had will be resolving as they are right now without doing anything and actually stopping most of the medicines that he was taking.     Otherwise, no other intervention from my point of view.

## 2018-05-14 ENCOUNTER — LAB (OUTPATIENT)
Dept: LAB | Facility: HOSPITAL | Age: 73
End: 2018-05-14

## 2018-05-14 ENCOUNTER — INFUSION (OUTPATIENT)
Dept: ONCOLOGY | Facility: HOSPITAL | Age: 73
End: 2018-05-14

## 2018-05-14 VITALS
WEIGHT: 171 LBS | DIASTOLIC BLOOD PRESSURE: 81 MMHG | SYSTOLIC BLOOD PRESSURE: 124 MMHG | BODY MASS INDEX: 25.92 KG/M2 | HEART RATE: 55 BPM

## 2018-05-14 DIAGNOSIS — I73.00 RAYNAUD'S DISEASE WITHOUT GANGRENE: ICD-10-CM

## 2018-05-14 DIAGNOSIS — D75.1 ERYTHROCYTOSIS: ICD-10-CM

## 2018-05-14 LAB
BASOPHILS # BLD AUTO: 0.1 10*3/MM3 (ref 0–0.1)
BASOPHILS NFR BLD AUTO: 1.4 % (ref 0–1.1)
DEPRECATED RDW RBC AUTO: 43.9 FL (ref 37–49)
EOSINOPHIL # BLD AUTO: 0.64 10*3/MM3 (ref 0–0.36)
EOSINOPHIL NFR BLD AUTO: 8.8 % (ref 1–5)
ERYTHROCYTE [DISTWIDTH] IN BLOOD BY AUTOMATED COUNT: 14.2 % (ref 11.7–14.5)
HCT VFR BLD AUTO: 44 % (ref 40–49)
HGB BLD-MCNC: 13.9 G/DL (ref 13.5–16.5)
IMM GRANULOCYTES # BLD: 0.05 10*3/MM3 (ref 0–0.03)
IMM GRANULOCYTES NFR BLD: 0.7 % (ref 0–0.5)
LYMPHOCYTES # BLD AUTO: 1.85 10*3/MM3 (ref 1–3.5)
LYMPHOCYTES NFR BLD AUTO: 25.4 % (ref 20–49)
MCH RBC QN AUTO: 27 PG (ref 27–33)
MCHC RBC AUTO-ENTMCNC: 31.6 G/DL (ref 32–35)
MCV RBC AUTO: 85.4 FL (ref 83–97)
MONOCYTES # BLD AUTO: 0.93 10*3/MM3 (ref 0.25–0.8)
MONOCYTES NFR BLD AUTO: 12.8 % (ref 4–12)
NEUTROPHILS # BLD AUTO: 3.71 10*3/MM3 (ref 1.5–7)
NEUTROPHILS NFR BLD AUTO: 50.9 % (ref 39–75)
NRBC BLD MANUAL-RTO: 0 /100 WBC (ref 0–0)
PLATELET # BLD AUTO: 248 10*3/MM3 (ref 150–375)
PMV BLD AUTO: 9.4 FL (ref 8.9–12.1)
RBC # BLD AUTO: 5.15 10*6/MM3 (ref 4.3–5.5)
WBC NRBC COR # BLD: 7.28 10*3/MM3 (ref 4–10)

## 2018-05-14 PROCEDURE — 36415 COLL VENOUS BLD VENIPUNCTURE: CPT | Performed by: INTERNAL MEDICINE

## 2018-05-14 PROCEDURE — 85025 COMPLETE CBC W/AUTO DIFF WBC: CPT

## 2018-05-14 NOTE — PROGRESS NOTES
phlebo held today for hct 44%, per office note from Dr Damon, phlebo to be completed for hct >45%.  Labs printed and provided to pt

## 2018-06-22 ENCOUNTER — OFFICE VISIT (OUTPATIENT)
Dept: ONCOLOGY | Facility: CLINIC | Age: 73
End: 2018-06-22

## 2018-06-22 ENCOUNTER — LAB (OUTPATIENT)
Dept: LAB | Facility: HOSPITAL | Age: 73
End: 2018-06-22

## 2018-06-22 ENCOUNTER — APPOINTMENT (OUTPATIENT)
Dept: ONCOLOGY | Facility: HOSPITAL | Age: 73
End: 2018-06-22

## 2018-06-22 VITALS
HEART RATE: 64 BPM | WEIGHT: 170 LBS | RESPIRATION RATE: 16 BRPM | HEIGHT: 68 IN | OXYGEN SATURATION: 98 % | BODY MASS INDEX: 25.76 KG/M2 | SYSTOLIC BLOOD PRESSURE: 112 MMHG | DIASTOLIC BLOOD PRESSURE: 70 MMHG | TEMPERATURE: 97.9 F

## 2018-06-22 DIAGNOSIS — I73.00 RAYNAUD'S DISEASE WITHOUT GANGRENE: ICD-10-CM

## 2018-06-22 DIAGNOSIS — D75.1 ERYTHROCYTOSIS: ICD-10-CM

## 2018-06-22 DIAGNOSIS — C64.1 CANCER OF RIGHT KIDNEY (HCC): Primary | ICD-10-CM

## 2018-06-22 LAB
BASOPHILS # BLD AUTO: 0.09 10*3/MM3 (ref 0–0.1)
BASOPHILS NFR BLD AUTO: 1.2 % (ref 0–1.1)
DEPRECATED RDW RBC AUTO: 45.2 FL (ref 37–49)
EOSINOPHIL # BLD AUTO: 0.4 10*3/MM3 (ref 0–0.36)
EOSINOPHIL NFR BLD AUTO: 5.3 % (ref 1–5)
ERYTHROCYTE [DISTWIDTH] IN BLOOD BY AUTOMATED COUNT: 15 % (ref 11.7–14.5)
HCT VFR BLD AUTO: 46.4 % (ref 40–49)
HGB BLD-MCNC: 15 G/DL (ref 13.5–16.5)
IMM GRANULOCYTES # BLD: 0.04 10*3/MM3 (ref 0–0.03)
IMM GRANULOCYTES NFR BLD: 0.5 % (ref 0–0.5)
LYMPHOCYTES # BLD AUTO: 1.51 10*3/MM3 (ref 1–3.5)
LYMPHOCYTES NFR BLD AUTO: 20 % (ref 20–49)
MCH RBC QN AUTO: 26.9 PG (ref 27–33)
MCHC RBC AUTO-ENTMCNC: 32.3 G/DL (ref 32–35)
MCV RBC AUTO: 83.2 FL (ref 83–97)
MONOCYTES # BLD AUTO: 0.68 10*3/MM3 (ref 0.25–0.8)
MONOCYTES NFR BLD AUTO: 9 % (ref 4–12)
NEUTROPHILS # BLD AUTO: 4.82 10*3/MM3 (ref 1.5–7)
NEUTROPHILS NFR BLD AUTO: 64 % (ref 39–75)
NRBC BLD MANUAL-RTO: 0 /100 WBC (ref 0–0)
PLATELET # BLD AUTO: 178 10*3/MM3 (ref 150–375)
PMV BLD AUTO: 10.3 FL (ref 8.9–12.1)
RBC # BLD AUTO: 5.58 10*6/MM3 (ref 4.3–5.5)
WBC NRBC COR # BLD: 7.54 10*3/MM3 (ref 4–10)

## 2018-06-22 PROCEDURE — 85025 COMPLETE CBC W/AUTO DIFF WBC: CPT

## 2018-06-22 PROCEDURE — 99213 OFFICE O/P EST LOW 20 MIN: CPT | Performed by: INTERNAL MEDICINE

## 2018-06-22 PROCEDURE — 36415 COLL VENOUS BLD VENIPUNCTURE: CPT | Performed by: INTERNAL MEDICINE

## 2018-06-22 RX ORDER — ZOLPIDEM TARTRATE 10 MG/1
5-10 TABLET ORAL
COMMUNITY
Start: 2018-06-20 | End: 2018-07-20

## 2018-06-22 RX ORDER — ALPRAZOLAM 0.5 MG/1
0.5 TABLET ORAL
COMMUNITY
Start: 2018-06-20 | End: 2019-06-13

## 2018-07-31 RX ORDER — SODIUM CHLORIDE 9 MG/ML
250 INJECTION, SOLUTION INTRAVENOUS ONCE
Status: CANCELLED | OUTPATIENT
Start: 2018-08-01

## 2018-08-01 ENCOUNTER — LAB (OUTPATIENT)
Dept: LAB | Facility: HOSPITAL | Age: 73
End: 2018-08-01

## 2018-08-01 ENCOUNTER — INFUSION (OUTPATIENT)
Dept: ONCOLOGY | Facility: HOSPITAL | Age: 73
End: 2018-08-01

## 2018-08-01 VITALS
BODY MASS INDEX: 25.92 KG/M2 | DIASTOLIC BLOOD PRESSURE: 88 MMHG | SYSTOLIC BLOOD PRESSURE: 125 MMHG | HEART RATE: 71 BPM | WEIGHT: 171 LBS

## 2018-08-01 DIAGNOSIS — C64.1 CANCER OF RIGHT KIDNEY (HCC): ICD-10-CM

## 2018-08-01 DIAGNOSIS — D75.1 ERYTHROCYTOSIS: Primary | ICD-10-CM

## 2018-08-01 DIAGNOSIS — I73.00 RAYNAUD'S DISEASE WITHOUT GANGRENE: ICD-10-CM

## 2018-08-01 DIAGNOSIS — D75.1 ERYTHROCYTOSIS: ICD-10-CM

## 2018-08-01 LAB
BASOPHILS # BLD AUTO: 0.1 10*3/MM3 (ref 0–0.1)
BASOPHILS NFR BLD AUTO: 1.3 % (ref 0–1.1)
DEPRECATED RDW RBC AUTO: 49.8 FL (ref 37–49)
EOSINOPHIL # BLD AUTO: 0.45 10*3/MM3 (ref 0–0.36)
EOSINOPHIL NFR BLD AUTO: 5.7 % (ref 1–5)
ERYTHROCYTE [DISTWIDTH] IN BLOOD BY AUTOMATED COUNT: 16.8 % (ref 11.7–14.5)
HCT VFR BLD AUTO: 49.7 % (ref 40–49)
HGB BLD-MCNC: 16 G/DL (ref 13.5–16.5)
IMM GRANULOCYTES # BLD: 0.04 10*3/MM3 (ref 0–0.03)
IMM GRANULOCYTES NFR BLD: 0.5 % (ref 0–0.5)
LYMPHOCYTES # BLD AUTO: 1.88 10*3/MM3 (ref 1–3.5)
LYMPHOCYTES NFR BLD AUTO: 23.7 % (ref 20–49)
MCH RBC QN AUTO: 26.9 PG (ref 27–33)
MCHC RBC AUTO-ENTMCNC: 32.2 G/DL (ref 32–35)
MCV RBC AUTO: 83.7 FL (ref 83–97)
MONOCYTES # BLD AUTO: 0.83 10*3/MM3 (ref 0.25–0.8)
MONOCYTES NFR BLD AUTO: 10.5 % (ref 4–12)
NEUTROPHILS # BLD AUTO: 4.64 10*3/MM3 (ref 1.5–7)
NEUTROPHILS NFR BLD AUTO: 58.3 % (ref 39–75)
NRBC BLD MANUAL-RTO: 0 /100 WBC (ref 0–0)
PLATELET # BLD AUTO: 211 10*3/MM3 (ref 150–375)
PMV BLD AUTO: 9.3 FL (ref 8.9–12.1)
RBC # BLD AUTO: 5.94 10*6/MM3 (ref 4.3–5.5)
WBC NRBC COR # BLD: 7.94 10*3/MM3 (ref 4–10)

## 2018-08-01 PROCEDURE — 36416 COLLJ CAPILLARY BLOOD SPEC: CPT | Performed by: INTERNAL MEDICINE

## 2018-08-01 PROCEDURE — 99195 PHLEBOTOMY: CPT | Performed by: INTERNAL MEDICINE

## 2018-08-01 PROCEDURE — 85025 COMPLETE CBC W/AUTO DIFF WBC: CPT | Performed by: INTERNAL MEDICINE

## 2018-08-01 RX ORDER — SODIUM CHLORIDE 9 MG/ML
250 INJECTION, SOLUTION INTRAVENOUS ONCE
Status: CANCELLED | OUTPATIENT
Start: 2018-08-01

## 2018-08-01 RX ORDER — SODIUM CHLORIDE 9 MG/ML
250 INJECTION, SOLUTION INTRAVENOUS ONCE
Status: DISCONTINUED | OUTPATIENT
Start: 2018-08-01 | End: 2018-08-01 | Stop reason: HOSPADM

## 2018-09-12 ENCOUNTER — APPOINTMENT (OUTPATIENT)
Dept: ONCOLOGY | Facility: HOSPITAL | Age: 73
End: 2018-09-12

## 2018-09-12 ENCOUNTER — OFFICE VISIT (OUTPATIENT)
Dept: ONCOLOGY | Facility: CLINIC | Age: 73
End: 2018-09-12

## 2018-09-12 ENCOUNTER — LAB (OUTPATIENT)
Dept: LAB | Facility: HOSPITAL | Age: 73
End: 2018-09-12

## 2018-09-12 VITALS
RESPIRATION RATE: 14 BRPM | HEIGHT: 68 IN | WEIGHT: 172.2 LBS | SYSTOLIC BLOOD PRESSURE: 110 MMHG | DIASTOLIC BLOOD PRESSURE: 70 MMHG | TEMPERATURE: 98.3 F | HEART RATE: 54 BPM | BODY MASS INDEX: 26.1 KG/M2 | OXYGEN SATURATION: 98 %

## 2018-09-12 DIAGNOSIS — C61 PROSTATE CANCER (HCC): ICD-10-CM

## 2018-09-12 DIAGNOSIS — I73.00 RAYNAUD'S DISEASE WITHOUT GANGRENE: ICD-10-CM

## 2018-09-12 DIAGNOSIS — C64.1 CANCER OF RIGHT KIDNEY (HCC): ICD-10-CM

## 2018-09-12 DIAGNOSIS — D75.1 ERYTHROCYTOSIS: ICD-10-CM

## 2018-09-12 DIAGNOSIS — C64.1 CANCER OF RIGHT KIDNEY (HCC): Primary | ICD-10-CM

## 2018-09-12 LAB
BASOPHILS # BLD AUTO: 0.1 10*3/MM3 (ref 0–0.1)
BASOPHILS NFR BLD AUTO: 1.1 % (ref 0–1.1)
DEPRECATED RDW RBC AUTO: 47 FL (ref 37–49)
EOSINOPHIL # BLD AUTO: 0.58 10*3/MM3 (ref 0–0.36)
EOSINOPHIL NFR BLD AUTO: 6.2 % (ref 1–5)
ERYTHROCYTE [DISTWIDTH] IN BLOOD BY AUTOMATED COUNT: 15.2 % (ref 11.7–14.5)
HCT VFR BLD AUTO: 46.4 % (ref 40–49)
HGB BLD-MCNC: 15.1 G/DL (ref 13.5–16.5)
IMM GRANULOCYTES # BLD: 0.02 10*3/MM3 (ref 0–0.03)
IMM GRANULOCYTES NFR BLD: 0.2 % (ref 0–0.5)
LYMPHOCYTES # BLD AUTO: 1.88 10*3/MM3 (ref 1–3.5)
LYMPHOCYTES NFR BLD AUTO: 20 % (ref 20–49)
MCH RBC QN AUTO: 27.7 PG (ref 27–33)
MCHC RBC AUTO-ENTMCNC: 32.5 G/DL (ref 32–35)
MCV RBC AUTO: 85.1 FL (ref 83–97)
MONOCYTES # BLD AUTO: 1.08 10*3/MM3 (ref 0.25–0.8)
MONOCYTES NFR BLD AUTO: 11.5 % (ref 4–12)
NEUTROPHILS # BLD AUTO: 5.72 10*3/MM3 (ref 1.5–7)
NEUTROPHILS NFR BLD AUTO: 61 % (ref 39–75)
NRBC BLD MANUAL-RTO: 0 /100 WBC (ref 0–0)
PLATELET # BLD AUTO: 214 10*3/MM3 (ref 150–375)
PMV BLD AUTO: 9.5 FL (ref 8.9–12.1)
RBC # BLD AUTO: 5.45 10*6/MM3 (ref 4.3–5.5)
WBC NRBC COR # BLD: 9.38 10*3/MM3 (ref 4–10)

## 2018-09-12 PROCEDURE — 99213 OFFICE O/P EST LOW 20 MIN: CPT | Performed by: INTERNAL MEDICINE

## 2018-09-12 PROCEDURE — 85025 COMPLETE CBC W/AUTO DIFF WBC: CPT | Performed by: INTERNAL MEDICINE

## 2018-09-12 PROCEDURE — 36415 COLL VENOUS BLD VENIPUNCTURE: CPT | Performed by: INTERNAL MEDICINE

## 2018-09-12 NOTE — PROGRESS NOTES
REASONS FOR FOLLOWUP:       1. Myeloproliferative disorder with polycythemia vera, MAI-2 mutation positive requiring periodic phlebotomies whenever hematocrit is above 45.      2. His  tory of stage I clear cell carcinoma of the right kidney status post partial nephrectomy, nephron sparing surgery by Dr. Ganesh Lopez with no issues or consequences.  The patient has already an appointment to repeat CT scan of the abdomen in March 2016.                3. Patient has history of polymyalgia rheumatica.  He IS OFF 5 mg of prednisone a day.            HISTORY OF PRESENT ILLNESS:This patient returns today to the office for followup in regard to multiple issues. He is here today with no complaints of anything in particular besides discomfort in the right knee associated with osteoarthritis. No recent trauma or local inflammation.  His appetite is excellent and his weight is stable.  He has not required phlebotomy for erythrocytosis for quite amount of time.  He was seen by his urologist in April in regard to his prostate cancer issues and his kidney cancer issue with no new issues related to each one of these problems. He has not had any new vascular events. He has not had any other new problems and his Raynaud's is not active at this time.                                 PAST MEDICAL HISTORY:    1.;  Hypertension.    2.;   History of polyps in the colon.  Colonoscopy in 2010 documented a tubulovillous adenoma that was removed completely.  Further colonoscopy in 2014 was unremarkable.     3.; History of prostate biopsy in the past that showed features consistent with prostate cancer  .  He has not required any specific therapy for this and he has been monitored through his urologist.    4.; History of multiple hernia repairs.     5.; History of polymyalgia rheumatica that was diagnosed 3 years by Dr. Karan Garrett.  He has been on a tapering dose   of prednisone, even though he admits that his polymyalgia  was atypical because his sedimentation was never elevated.  He has been seen by other rheumatologists in the past as well and has been told he has Raynaud’  s disease.  He has never had diagnosis of rheumatoid arthritis, lupus or something of this nature.     6.; Partial thyroidectomy in 1983 for a malignant tumor of the thyroid gland that never required any other intervention.      7.; He used to have radiation treatment for tonsillar inflammation and infection when   he was a child and this led him to develop thyroid cancer.       HEMATOLOGIC/ONCOLOGIC HISTORY:  History of previous dates can be found in a separate document.         On 02/22/2016, he had no symptoms or signs that would indicate any kidney cancer recurrence.  Given   the mild achiness in his muscles, we went ahead and checked a CPK and an aldolase and also checked a sedimentation rate and vitamin D level.  TSH was also performed.  We asked him to remain on his dose of prednisone of 5 mg a day.  We asked him to initia  te a program of physical activity including going to the gym.  He did not need phlebotomy on this occasion, given hematocrit of 45.  His white count and platelet count remain stable.  He had no palpable splenomegaly.         MEDICATIONS:  The current medication list was reviewed with the patient and updated in the EMR this date per the Medical Assistant.  Medication dosages and frequencies were confirmed to be accurate.        It is important to mention the patient does not use any androgen or androgen replacement medication or anabolic steroids or any kind.        ALLERGIES:     1.  STATINS.    2. DIPHENHYDRAMINE.         SOCIAL HISTORY:  The patient is .  He lives with his wife in [place].  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  He plays violin in   an orchestra and also he is a very avid .  He does not smoke but he had a lot of second-hand smoke through his life.  He drinks  1-3 glasses of wine a week.  He has no use of any recreational drugs.          FAMILY HISTORY:  His father  of alcoh  ol and smoking disorders including heart attack.  His mother is 89 and in good health.  He has no brothers.  He has 1 adopted sister who is in good health with probable rheumatoid arthritis.  His wife is in good health.  He has 2 children, 1 who is in poo  r health due to drug abuse and the other son is in good health. He had another son who  as a consequence of Tylenol intoxication.   No family history of hematological disorder or myeloproliferative disorder.         REVIEW OF SYSTEMS:       General: no fever, no chills, no fatigue,no weight changes, no lack of appetite.  Eyes: no epiphora, xerophthalmia,conjunctivitis, pain, glaucoma, blurred vision, blindness, secretion, photophobia, proptosis, diplopia.  Ears: no otorrhea, tinnitus, otorrhagia, deafness, pain, vertigo.  Nose: no rhinorrhea, no epistaxis, no alteration in perception of odors, no sinuses pressure.  Mouth: no alteration in gums or teeth,  No ulcers, no difficulty with mastication or deglut ion, no odynophagia.  Neck: no masses or pain, no thyroid alterations, no pain in muscles or arteries, no carotid odynia, no crepitation.  Respiratory: no cough, no sputum production,no dyspnea,no trepopnea, no pleuritic pain,no hemoptysis.  Heart: no syncope, no irregularity, no palpitations, no angina,no orthopnea,no paroxysmal nocturnal dyspnea.  Vascular Venous: no tenderness,no edema,no palpable cords,no postphlebitic syndrome, no skin changes no ulcerations.  Vascular Arterial: no distal ischemia, noclaudication, no gangrene, no neuropathic ischemic pain, no skin ulcers, no paleness no cyanosis.  GI: no dysphagia, no odynophagia, no regurgitation, no heartburn,no indigestion,no nausea,no vomiting,no hematemesis ,no melena,no jaundice,no distention, no obstipation,no enterorrhagia,no proctalgia,no anal  lesions, no changes in bowel  "habits.  : no frequency, no hesitancy, no hematuria, no discharge,no  pain.  Musculoskeletal: no muscle or tendon pain or inflammation,no  joint pain, no edema, no functional limitation,no fasciculations, no mass.  Neurologic: no headache, no seizures, noalterations on Craneal nerves, no motor deficit, no sensory deficit, normal coordination, no alteration in memory,normal orientation, calculation,normal writting, verbal and written language.  Skin: no rashes,no pruritus no localized lesions.  Psychiatric: no anxiety, no depression,no agitation, no delusions, proper insight.          VITAL SIGNS:   Vitals:    09/12/18 1144   BP: 110/70   Pulse: 54   Resp: 14   Temp: 98.3 °F (36.8 °C)   SpO2: 98%   Weight: 78.1 kg (172 lb 3.2 oz)   Height: 173 cm (68.11\")            PHYSICAL EXAMINATION:       GENERAL:  Well-developed, well-nourished  Patient  in no acute distress.   SKIN:  Warm, dry ,NO rashes,NO purpura ,NO petechiae.  HEENT:  Pupils were equal and reactive to light and accomodation, conjunctivas non injected, no pterigion, normal extraocular movements, normal visual acuity.   Mouth mucosa was moist, no exudates in oropharynx, normal gum line, normal roof of the mouth and pillars, normal papillations of the tongue.  NECK:  Supple with good range of motion; no thyromegaly or masses, no JVD or bruits, no cervical adenopathies.No carotid arteries pain, no carotid abnormal pulsation , NO arterial dance.  LYMPHATICS:  No cervical, NO supraclavicular, NO axillary,NO epitrochlear , NO inguinal adenopathy.  CHEST:  Normal excursion of both nelson thoraces, normal voice fremitus, no subcutaneous emphysema, normal axillas, no rashes or acanthosis nigricans. Lungs clear to percussion and auscultation, normal breath sounds bilaterally, no wheezing,NO crackles NO ronchi, NO stridor, NO rubs.  CARDIAC AND VASCULAR:  normal rate and regular rhythm, without murmurs,NO rubs NO S3 NO S4 right or left . Normal femoral, popliteal, " pedis, brachial and carotid pulses.  ABDOMEN:  Soft, nontender with no organomegaly or masses, no ascites, no collateral circulation,no distention,no Aniket sign, no abdominal pain, no inguinal hernias,no umbilical hernia, no abdominal bruits. Normal bowel sounds.  GENITAL: Not  Performed.  EXTREMITIES  AND SPINE:  No clubbing, cyanosis or edema, no deformities or pain .No kyphosis, scoliosis, deformities or pain in spine, ribs or pelvic bone.  NEUROLOGICAL:  Patient was awake, alert, oriented to time, person and place.        LABORATORY DATA:Differential   Order: [id number] - Part of Panel Order [id number]   Status:  Final result   Visible to patient:  No (Not Released) Dx:  Erythrocytosis; Cancer of right kidne...    Ref Range & Units 11:32   WBC 4.00 - 10.00 10*3/mm3 9.38    RBC 4.30 - 5.50 10*6/mm3 5.45    Hemoglobin 13.5 - 16.5 g/dL 15.1    Hematocrit 40.0 - 49.0 % 46.4    MCV 83.0 - 97.0 fL 85.1    MCH 27.0 - 33.0 pg 27.7    MCHC 32.0 - 35.0 g/dL 32.5    RDW 11.7 - 14.5 % 15.2     RDW-SD 37.0 - 49.0 fl 47.0    MPV 8.9 - 12.1 fL 9.5    Platelets 150 - 375 10*3/mm3 214    Neutrophil % 39.0 - 75.0 % 61.0    Lymphocyte % 20.0 - 49.0 % 20.0                        ASSESSMENT/PLAN:   1. This patient has polycythemia vera, JAK2 mutation positive, and has taken in the past Hydrea. He discontinued this medicine because of feeling afraid of side effects even though I never noticed anything in particular related to this medicine  Patient’s creatinine has remained stable and he has not required a phlebotomy in quite amount of time.  His white count and platelet count remain normal and white count differential remains unchanged.  2.  Patient has history of Raynaud's disease.  He has not had any recent vascular events, he is not anticoagulated and remains on a baby aspirin.  3.  Patient has history of kidney cancer stage I disease and prostate cancer with stable PSA and followup by the urologist. The above issues remain very  quiet with no other issues related to this.     RECOMMENDATIONS:  I have advised him to return to the office in 6 weeks for a CBC, phlebotomy and hematocrit and phlebotomy if hematocrit is above 50.  Otherwise I will review him back in 3 months.     The patient does not want to pursue any therapy with Hydrea.  He is afraid of the side effects in the long run. He has not had any new vascular events. He has not had any clinical bleeding.

## 2018-10-24 ENCOUNTER — INFUSION (OUTPATIENT)
Dept: ONCOLOGY | Facility: HOSPITAL | Age: 73
End: 2018-10-24

## 2018-10-24 ENCOUNTER — LAB (OUTPATIENT)
Dept: LAB | Facility: HOSPITAL | Age: 73
End: 2018-10-24

## 2018-10-24 VITALS
BODY MASS INDEX: 26.28 KG/M2 | SYSTOLIC BLOOD PRESSURE: 138 MMHG | DIASTOLIC BLOOD PRESSURE: 77 MMHG | HEART RATE: 62 BPM | WEIGHT: 173.4 LBS

## 2018-10-24 DIAGNOSIS — C64.1 CANCER OF RIGHT KIDNEY (HCC): ICD-10-CM

## 2018-10-24 DIAGNOSIS — D75.1 ERYTHROCYTOSIS: ICD-10-CM

## 2018-10-24 DIAGNOSIS — I73.00 RAYNAUD'S DISEASE WITHOUT GANGRENE: ICD-10-CM

## 2018-10-24 DIAGNOSIS — C61 PROSTATE CANCER (HCC): ICD-10-CM

## 2018-10-24 LAB
BASOPHILS # BLD AUTO: 0.1 10*3/MM3 (ref 0–0.1)
BASOPHILS NFR BLD AUTO: 1.2 % (ref 0–1.1)
DEPRECATED RDW RBC AUTO: 46.1 FL (ref 37–49)
EOSINOPHIL # BLD AUTO: 0.54 10*3/MM3 (ref 0–0.36)
EOSINOPHIL NFR BLD AUTO: 6.3 % (ref 1–5)
ERYTHROCYTE [DISTWIDTH] IN BLOOD BY AUTOMATED COUNT: 14.5 % (ref 11.7–14.5)
HCT VFR BLD AUTO: 47.7 % (ref 40–49)
HGB BLD-MCNC: 15.6 G/DL (ref 13.5–16.5)
IMM GRANULOCYTES # BLD: 0.06 10*3/MM3 (ref 0–0.03)
IMM GRANULOCYTES NFR BLD: 0.7 % (ref 0–0.5)
LYMPHOCYTES # BLD AUTO: 1.86 10*3/MM3 (ref 1–3.5)
LYMPHOCYTES NFR BLD AUTO: 21.5 % (ref 20–49)
MCH RBC QN AUTO: 28.5 PG (ref 27–33)
MCHC RBC AUTO-ENTMCNC: 32.7 G/DL (ref 32–35)
MCV RBC AUTO: 87.2 FL (ref 83–97)
MONOCYTES # BLD AUTO: 0.88 10*3/MM3 (ref 0.25–0.8)
MONOCYTES NFR BLD AUTO: 10.2 % (ref 4–12)
NEUTROPHILS # BLD AUTO: 5.2 10*3/MM3 (ref 1.5–7)
NEUTROPHILS NFR BLD AUTO: 60.1 % (ref 39–75)
NRBC BLD MANUAL-RTO: 0 /100 WBC (ref 0–0)
PLATELET # BLD AUTO: 203 10*3/MM3 (ref 150–375)
PMV BLD AUTO: 9.4 FL (ref 8.9–12.1)
RBC # BLD AUTO: 5.47 10*6/MM3 (ref 4.3–5.5)
WBC NRBC COR # BLD: 8.64 10*3/MM3 (ref 4–10)

## 2018-10-24 PROCEDURE — 85025 COMPLETE CBC W/AUTO DIFF WBC: CPT | Performed by: INTERNAL MEDICINE

## 2018-10-24 PROCEDURE — 36416 COLLJ CAPILLARY BLOOD SPEC: CPT | Performed by: INTERNAL MEDICINE

## 2018-10-24 NOTE — PROGRESS NOTES
Cbc reviewed with pt. All counts stable. No c/o.     Lab Results   Component Value Date    WBC 8.64 10/24/2018    HGB 15.6 10/24/2018    HCT 47.7 10/24/2018    MCV 87.2 10/24/2018     10/24/2018

## 2018-12-05 ENCOUNTER — INFUSION (OUTPATIENT)
Dept: ONCOLOGY | Facility: HOSPITAL | Age: 73
End: 2018-12-05

## 2018-12-05 ENCOUNTER — LAB (OUTPATIENT)
Dept: LAB | Facility: HOSPITAL | Age: 73
End: 2018-12-05

## 2018-12-05 ENCOUNTER — OFFICE VISIT (OUTPATIENT)
Dept: ONCOLOGY | Facility: CLINIC | Age: 73
End: 2018-12-05

## 2018-12-05 VITALS
TEMPERATURE: 97.7 F | HEIGHT: 68 IN | RESPIRATION RATE: 14 BRPM | SYSTOLIC BLOOD PRESSURE: 137 MMHG | HEART RATE: 69 BPM | WEIGHT: 171.3 LBS | DIASTOLIC BLOOD PRESSURE: 87 MMHG | BODY MASS INDEX: 25.96 KG/M2 | OXYGEN SATURATION: 99 %

## 2018-12-05 VITALS — SYSTOLIC BLOOD PRESSURE: 120 MMHG | DIASTOLIC BLOOD PRESSURE: 82 MMHG | HEART RATE: 84 BPM

## 2018-12-05 DIAGNOSIS — D75.1 ERYTHROCYTOSIS: ICD-10-CM

## 2018-12-05 DIAGNOSIS — I73.00 RAYNAUD'S DISEASE WITHOUT GANGRENE: ICD-10-CM

## 2018-12-05 DIAGNOSIS — C64.1 CANCER OF RIGHT KIDNEY (HCC): ICD-10-CM

## 2018-12-05 DIAGNOSIS — C61 PROSTATE CANCER (HCC): ICD-10-CM

## 2018-12-05 DIAGNOSIS — C64.1 CANCER OF RIGHT KIDNEY (HCC): Primary | ICD-10-CM

## 2018-12-05 DIAGNOSIS — D47.1 MYELOPROLIFERATIVE DISORDER (HCC): ICD-10-CM

## 2018-12-05 DIAGNOSIS — D75.1 ERYTHROCYTOSIS: Primary | ICD-10-CM

## 2018-12-05 LAB
BASOPHILS # BLD AUTO: 0.12 10*3/MM3 (ref 0–0.1)
BASOPHILS NFR BLD AUTO: 1.3 % (ref 0–1.1)
DEPRECATED RDW RBC AUTO: 45.9 FL (ref 37–49)
EOSINOPHIL # BLD AUTO: 0.64 10*3/MM3 (ref 0–0.36)
EOSINOPHIL NFR BLD AUTO: 6.9 % (ref 1–5)
ERYTHROCYTE [DISTWIDTH] IN BLOOD BY AUTOMATED COUNT: 14.6 % (ref 11.7–14.5)
HCT VFR BLD AUTO: 49.9 % (ref 40–49)
HGB BLD-MCNC: 16.2 G/DL (ref 13.5–16.5)
IMM GRANULOCYTES # BLD: 0.05 10*3/MM3 (ref 0–0.03)
IMM GRANULOCYTES NFR BLD: 0.5 % (ref 0–0.5)
LYMPHOCYTES # BLD AUTO: 2.02 10*3/MM3 (ref 1–3.5)
LYMPHOCYTES NFR BLD AUTO: 21.9 % (ref 20–49)
MCH RBC QN AUTO: 28 PG (ref 27–33)
MCHC RBC AUTO-ENTMCNC: 32.5 G/DL (ref 32–35)
MCV RBC AUTO: 86.2 FL (ref 83–97)
MONOCYTES # BLD AUTO: 0.87 10*3/MM3 (ref 0.25–0.8)
MONOCYTES NFR BLD AUTO: 9.4 % (ref 4–12)
NEUTROPHILS # BLD AUTO: 5.54 10*3/MM3 (ref 1.5–7)
NEUTROPHILS NFR BLD AUTO: 60 % (ref 39–75)
NRBC BLD MANUAL-RTO: 0 /100 WBC (ref 0–0)
PLATELET # BLD AUTO: 218 10*3/MM3 (ref 150–375)
PMV BLD AUTO: 9.9 FL (ref 8.9–12.1)
RBC # BLD AUTO: 5.79 10*6/MM3 (ref 4.3–5.5)
WBC NRBC COR # BLD: 9.24 10*3/MM3 (ref 4–10)

## 2018-12-05 PROCEDURE — 36416 COLLJ CAPILLARY BLOOD SPEC: CPT | Performed by: INTERNAL MEDICINE

## 2018-12-05 PROCEDURE — 99214 OFFICE O/P EST MOD 30 MIN: CPT | Performed by: INTERNAL MEDICINE

## 2018-12-05 PROCEDURE — 99195 PHLEBOTOMY: CPT | Performed by: INTERNAL MEDICINE

## 2018-12-05 PROCEDURE — 85025 COMPLETE CBC W/AUTO DIFF WBC: CPT | Performed by: INTERNAL MEDICINE

## 2018-12-05 RX ORDER — SODIUM CHLORIDE 9 MG/ML
250 INJECTION, SOLUTION INTRAVENOUS ONCE
Status: CANCELLED | OUTPATIENT
Start: 2018-12-05

## 2018-12-05 NOTE — PROGRESS NOTES
REASONS FOR FOLLOWUP:       1. Myeloproliferative disorder with polycythemia vera, MAI-2 mutation positive requiring periodic phlebotomies whenever hematocrit is above 45.      2. His  tory of stage I clear cell carcinoma of the right kidney status post partial nephrectomy, nephron sparing surgery by Dr. Ganesh Lopez with no issues or consequences.  The patient has already an appointment to repeat CT scan of the abdomen in March 2016.                3. Patient has history of polymyalgia rheumatica.  He IS OFF 5 mg of prednisone a day.            HISTORY OF PRESENT ILLNESS:This patient returns today to the office stating that he has had more and more difficulty with his propulsion and he is going to undergo surgery for this by his urologist in the next week or so.  He is having difficulty urinating because the area is so tight and fibrotic he will require intervention.  He has had multiple episodes of balanoposthitis during the last 3 months.  Other than that, he has not had any new neurological symptomatology.  His appetite and bowel activity are normal, no clinical bleeding, no thrombosis.  He has had pain in the right knee with discomfort posteriorly.  No swelling in the right lower extremity.  Minimal crepitation of the knee upon movement with no local inflammation.  He has not had any trauma.                                   PAST MEDICAL HISTORY:    1.;  Hypertension.    2.;   History of polyps in the colon.  Colonoscopy in 2010 documented a tubulovillous adenoma that was removed completely.  Further colonoscopy in 2014 was unremarkable.     3.; History of prostate biopsy in the past that showed features consistent with prostate cancer  .  He has not required any specific therapy for this and he has been monitored through his urologist.    4.; History of multiple hernia repairs.     5.; History of polymyalgia rheumatica that was diagnosed 3 years by Dr. Karan Garrett.  He has been on a  tapering dose   of prednisone, even though he admits that his polymyalgia was atypical because his sedimentation was never elevated.  He has been seen by other rheumatologists in the past as well and has been told he has Raynaud’  s disease.  He has never had diagnosis of rheumatoid arthritis, lupus or something of this nature.     6.; Partial thyroidectomy in 1983 for a malignant tumor of the thyroid gland that never required any other intervention.      7.; He used to have radiation treatment for tonsillar inflammation and infection when   he was a child and this led him to develop thyroid cancer.       HEMATOLOGIC/ONCOLOGIC HISTORY:  History of previous dates can be found in a separate document.         On 02/22/2016, he had no symptoms or signs that would indicate any kidney cancer recurrence.  Given   the mild achiness in his muscles, we went ahead and checked a CPK and an aldolase and also checked a sedimentation rate and vitamin D level.  TSH was also performed.  We asked him to remain on his dose of prednisone of 5 mg a day.  We asked him to initia  te a program of physical activity including going to the gym.  He did not need phlebotomy on this occasion, given hematocrit of 45.  His white count and platelet count remain stable.  He had no palpable splenomegaly.         MEDICATIONS:  The current medication list was reviewed with the patient and updated in the EMR this date per the Medical Assistant.  Medication dosages and frequencies were confirmed to be accurate.        It is important to mention the patient does not use any androgen or androgen replacement medication or anabolic steroids or any kind.        ALLERGIES:     1.  STATINS.    2. DIPHENHYDRAMINE.         SOCIAL HISTORY:  The patient is .  He lives with his wife in [place].  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  He plays violin in   an orchestra and also he is a very avid .  He does  not smoke but he had a lot of second-hand smoke through his life.  He drinks 1-3 glasses of wine a week.  He has no use of any recreational drugs.          FAMILY HISTORY:  His father  of alcoh  ol and smoking disorders including heart attack.  His mother is 89 and in good health.  He has no brothers.  He has 1 adopted sister who is in good health with probable rheumatoid arthritis.  His wife is in good health.  He has 2 children, 1 who is in poo  r health due to drug abuse and the other son is in good health. He had another son who  as a consequence of Tylenol intoxication.   No family history of hematological disorder or myeloproliferative disorder.         REVIEW OF SYSTEMS:           General: no fever, no chills, no fatigue,no weight changes, no lack of appetite.  Eyes: no epiphora, xerophthalmia,conjunctivitis, pain, glaucoma, blurred vision, blindness, secretion, photophobia, proptosis, diplopia.  Ears: no otorrhea, tinnitus, otorrhagia, deafness, pain, vertigo.  Nose: no rhinorrhea, no epistaxis, no alteration in perception of odors, no sinuses pressure.  Mouth: no alteration in gums or teeth,  No ulcers, no difficulty with mastication or deglut ion, no odynophagia.  Neck: no masses or pain, no thyroid alterations, no pain in muscles or arteries, no carotid odynia, no crepitation.  Respiratory: no cough, no sputum production,no dyspnea,no trepopnea, no pleuritic pain,no hemoptysis.  Heart: no syncope, no irregularity, no palpitations, no angina,no orthopnea,no paroxysmal nocturnal dyspnea.  Vascular Venous: no tenderness,no edema,no palpable cords,no postphlebitic syndrome, no skin changes no ulcerations.  Vascular Arterial: no distal ischemia, noclaudication, no gangrene, no neuropathic ischemic pain, no skin ulcers, no paleness no cyanosis.  GI: no dysphagia, no odynophagia, no regurgitation, no heartburn,no indigestion,no nausea,no vomiting,no hematemesis ,no melena,no jaundice,no distention, no  "obstipation,no enterorrhagia,no proctalgia,no anal  lesions, no changes in bowel habits.  : no frequency, no hesitancy, no hematuria, no discharge,no  Pain.balanopostitis in many occasions  Musculoskeletal: stated muscle or tendon pain or inflammation,no  joint pain, no edema, no functional limitation,no fasciculations, no mass.  Neurologic: no headache, no seizures, noalterations on Craneal nerves, no motor deficit, no sensory deficit, normal coordination, no alteration in memory,normal orientation, calculation,normal writting, verbal and written language.  Skin: no rashes,no pruritus no localized lesions.  Psychiatric: no anxiety, no depression,no agitation, no delusions, proper insight.            VITAL SIGNS:   Vitals:    12/05/18 0759   BP: 137/87   Pulse: 69   Resp: 14   Temp: 97.7 °F (36.5 °C)   SpO2: 99%   Weight: 77.7 kg (171 lb 4.8 oz)   Height: 173 cm (68.11\")            PHYSICAL EXAMINATION:           GENERAL:  Well-developed, well-nourished  Patient  in no acute distress.   SKIN:  Warm, dry ,NO rashes,NO purpura ,NO petechiae.  HEENT:  Pupils were equal and reactive to light and accomodation, conjunctivas non injected, no pterigion, normal extraocular movements, normal visual acuity.   Mouth mucosa was moist, no exudates in oropharynx, normal gum line, normal roof of the mouth and pillars, normal papillations of the tongue.  NECK:  Supple with good range of motion; no thyromegaly or masses, no JVD or bruits, no cervical adenopathies.No carotid arteries pain, no carotid abnormal pulsation , NO arterial dance.  LYMPHATICS:  No cervical, NO supraclavicular, NO axillary,NO epitrochlear , NO inguinal adenopathy.  CHEST:  Normal excursion of both nelson thoraces, normal voice fremitus, no subcutaneous emphysema, normal axillas, no rashes or acanthosis nigricans. Lungs clear to percussion and auscultation, normal breath sounds bilaterally, no wheezing,NO crackles NO ronchi, NO stridor, NO rubs.  CARDIAC AND " VASCULAR:  normal rate and regular rhythm, without murmurs,NO rubs NO S3 NO S4 right or left . Normal femoral, popliteal, pedis, brachial and carotid pulses.  ABDOMEN:  Soft, nontender with no organomegaly or masses, no ascites, no collateral circulation,no distention,no Aniket sign, no abdominal pain, no inguinal hernias,no umbilical hernia, no abdominal bruits. Normal bowel sounds.  GENITAL: Not  Performed.  EXTREMITIES  AND SPINE:  No clubbing, cyanosis or edema, no deformities or pain .No kyphosis, scoliosis, deformities or pain in spine, ribs or pelvic bone.baker cyst r popliteal fossa, crepitation movement r knee no inflamation  NEUROLOGICAL:  Patient was awake, alert, oriented to time, person and place.          LABORATORY DATA:Auto Differential   Order: [id number] - Part of Panel Order [id number]   Status:  Final result   Visible to patient:  No (Not Released) Dx:  Erythrocytosis; Prostate cancer (CMS/...    Ref Range & Units 07:49   WBC 4.00 - 10.00 10*3/mm3 9.24    RBC 4.30 - 5.50 10*6/mm3 5.79 Abnormally high     Hemoglobin 13.5 - 16.5 g/dL 16.2    Hematocrit 40.0 - 49.0 % 49.9 Abnormally high     MCV 83.0 - 97.0 fL 86.2    MCH 27.0 - 33.0 pg 28.0    MCHC 32.0 - 35.0 g/dL 32.5    RDW 11.7 - 14.5 % 14.6 Abnormally high     RDW-SD 37.0 - 49.0 fl 45.9    MPV 8.9 - 12.1 fL 9.9    Platelets 150 - 375 10*3/mm3 218    Neutrophil % 39.0 - 75.0 % 60.0    Lymphocyte % 20.0 - 49.0 % 21.9    Monocyte % 4.0 - 12.0 % 9.4    Eosinophil % 1.0 - 5.0 % 6.9 Abnormally high     Basophil % 0.0 - 1.1 % 1.3 Abnormally high     Immature Grans % 0.0 - 0.5 % 0.5    Neutrophils, Absolute 1.50 - 7.00 10*3/mm3 5.54    Lymphocytes, Absolute 1.00 - 3.50 10*3/mm3 2.02    Monocytes, Absolute 0.25 - 0.80 10*3/mm3 0.87 Abnormally high                               ASSESSMENT/PLAN:   1. This patient has polycythemia vera, JAK2 mutation positive, and has taken in the past Hydrea. He discontinued this medicine because of feeling afraid of side  effects even though I never noticed anything in particular related to this medicine.Today, the patient’s hematocrit is 50 and the patient will require phlebotomy.  He has not had any episodes of bleeding or thrombosis, does not want to take any aspirin and is again with anti-inflammatory diet with purple foods in abundance and actually he feels terrific from the point of view of joint pain and general sense of wellbeing.  2.  Patient raised the question about circumcision.  If anything else needs to be done this is going to happen next week and from my point of view I have no difficulties with this.  He should be perfectly fine. I asked him to have ice handy to minimize swelling, inflammation and pain.   3.  Baker’s cyst in right popliteal fossa and this could part of the pain.  He probably has some component of osteoarthritis and if he continues having a problem he will require being seen by Orthopedic Medicine.  He has no local inflammation.    I will review him back in 3 months with a CBC.  He will proceed with a phlebotomy today.  Otherwise, no other intervention.

## 2018-12-13 ENCOUNTER — APPOINTMENT (OUTPATIENT)
Dept: PREADMISSION TESTING | Facility: HOSPITAL | Age: 73
End: 2018-12-13

## 2018-12-13 VITALS
HEIGHT: 68 IN | HEART RATE: 70 BPM | BODY MASS INDEX: 25.61 KG/M2 | WEIGHT: 169 LBS | SYSTOLIC BLOOD PRESSURE: 96 MMHG | OXYGEN SATURATION: 100 % | TEMPERATURE: 96.9 F | RESPIRATION RATE: 20 BRPM | DIASTOLIC BLOOD PRESSURE: 70 MMHG

## 2018-12-13 LAB
ANION GAP SERPL CALCULATED.3IONS-SCNC: 12.6 MMOL/L
BUN BLD-MCNC: 18 MG/DL (ref 8–23)
BUN/CREAT SERPL: 14.1 (ref 7–25)
CALCIUM SPEC-SCNC: 9.3 MG/DL (ref 8.6–10.5)
CHLORIDE SERPL-SCNC: 105 MMOL/L (ref 98–107)
CO2 SERPL-SCNC: 24.4 MMOL/L (ref 22–29)
CREAT BLD-MCNC: 1.28 MG/DL (ref 0.76–1.27)
DEPRECATED RDW RBC AUTO: 48 FL (ref 37–54)
ERYTHROCYTE [DISTWIDTH] IN BLOOD BY AUTOMATED COUNT: 15 % (ref 11.5–14.5)
GFR SERPL CREATININE-BSD FRML MDRD: 55 ML/MIN/1.73
GLUCOSE BLD-MCNC: 85 MG/DL (ref 65–99)
HCT VFR BLD AUTO: 45 % (ref 40.4–52.2)
HGB BLD-MCNC: 14.8 G/DL (ref 13.7–17.6)
MCH RBC QN AUTO: 28.6 PG (ref 27–32.7)
MCHC RBC AUTO-ENTMCNC: 32.9 G/DL (ref 32.6–36.4)
MCV RBC AUTO: 86.9 FL (ref 79.8–96.2)
PLATELET # BLD AUTO: 219 10*3/MM3 (ref 140–500)
PMV BLD AUTO: 9.8 FL (ref 6–12)
POTASSIUM BLD-SCNC: 4.7 MMOL/L (ref 3.5–5.2)
RBC # BLD AUTO: 5.18 10*6/MM3 (ref 4.6–6)
SODIUM BLD-SCNC: 142 MMOL/L (ref 136–145)
WBC NRBC COR # BLD: 7.46 10*3/MM3 (ref 4.5–10.7)

## 2018-12-13 PROCEDURE — 93010 ELECTROCARDIOGRAM REPORT: CPT | Performed by: INTERNAL MEDICINE

## 2018-12-13 PROCEDURE — 36415 COLL VENOUS BLD VENIPUNCTURE: CPT

## 2018-12-13 PROCEDURE — 80048 BASIC METABOLIC PNL TOTAL CA: CPT | Performed by: UROLOGY

## 2018-12-13 PROCEDURE — 93005 ELECTROCARDIOGRAM TRACING: CPT

## 2018-12-13 PROCEDURE — 85027 COMPLETE CBC AUTOMATED: CPT | Performed by: UROLOGY

## 2018-12-13 RX ORDER — LOSARTAN POTASSIUM 50 MG/1
50 TABLET ORAL DAILY
COMMUNITY
End: 2019-02-28

## 2018-12-13 NOTE — DISCHARGE INSTRUCTIONS
Take the following medications the morning of surgery with a small sip of water:        General Instructions:  • Do not eat solid food after midnight the night before surgery.  • You may drink clear liquids day of surgery but must stop at least one hour before your hospital arrival time.  • It is beneficial for you to have a clear drink that contains carbohydrates the day of surgery.  We suggest a 12 to 20 ounce bottle of Gatorade or Powerade for non-diabetic patients or a 12 to 20 ounce bottle of G2 or Powerade Zero for diabetic patients. (Pediatric patients, are not advised to drink a 12 to 20 ounce carbohydrate drink)    Clear liquids are liquids you can see through.  Nothing red in color.     Plain water                               Sports drinks  Sodas                                   Gelatin (Jell-O)  Fruit juices without pulp such as white grape juice and apple juice  Popsicles that contain no fruit or yogurt  Tea or coffee (no cream or milk added)  Gatorade / Powerade  G2 / Powerade Zero    • Infants may have breast milk up to four hours before surgery.  • Infants drinking formula may drink formula up to six hours before surgery.   • Patients who avoid smoking, chewing tobacco and alcohol for 4 weeks prior to surgery have a reduced risk of post-operative complications.  Quit smoking as many days before surgery as you can.  • Do not smoke, use chewing tobacco or drink alcohol the day of surgery.   • If applicable bring your C-PAP/ BI-PAP machine.  • Bring any papers given to you in the doctor’s office.  • Wear clean comfortable clothes and socks.  • Do not wear contact lenses or make-up.  Bring a case for your glasses.   • Bring crutches or walker if applicable.  • Remove all piercings.  Leave jewelry and any other valuables at home.  • Hair extensions with metal clips must be removed prior to surgery.  • The Pre-Admission Testing nurse will instruct you to bring medications if unable to obtain an accurate  list in Pre-Admission Testing.        If you were given a blood bank ID arm band remember to bring it with you the day of surgery.    Preventing a Surgical Site Infection:  • For 2 to 3 days before surgery, avoid shaving with a razor because the razor can irritate skin and make it easier to develop an infection.    • Any areas of open skin can increase the risk of a post-operative wound infection by allowing bacteria to enter and travel throughout the body.  Notify your surgeon if you have any skin wounds / rashes even if it is not near the expected surgical site.  The area will need assessed to determine if surgery should be delayed until it is healed.  • The night prior to surgery sleep in a clean bed with clean clothing.  Do not allow pets to sleep with you.  • Shower on the morning of surgery using a fresh bar of anti-bacterial soap (such as Dial) and clean washcloth.  Dry with a clean towel and dress in clean clothing.  • Ask your surgeon if you will be receiving antibiotics prior to surgery.  • Make sure you, your family, and all healthcare providers clean their hands with soap and water or an alcohol based hand  before caring for you or your wound.    Day of surgery:  Upon arrival, a Pre-op nurse and Anesthesiologist will review your health history, obtain vital signs, and answer questions you may have.  The only belongings needed at this time will be your home medications and if applicable your C-PAP/BI-PAP machine.  If you are staying overnight your family can leave the rest of your belongings in the car and bring them to your room later.  A Pre-op nurse will start an IV and you may receive medication in preparation for surgery, including something to help you relax.  Your family will be able to see you in the Pre-op area.  While you are in surgery your family should notify the waiting room  if they leave the waiting room area and provide a contact phone number.    Please be aware that  surgery does come with discomfort.  We want to make every effort to control your discomfort so please discuss any uncontrolled symptoms with your nurse.   Your doctor will most likely have prescribed pain medications.      If you are going home after surgery you will receive individualized written care instructions before being discharged.  A responsible adult must drive you to and from the hospital on the day of your surgery and stay with you for 24 hours.    If you are staying overnight following surgery, you will be transported to your hospital room following the recovery period.  [facility] has all private rooms.    You have received a list of surgical assistants for your reference.  If you have any questions please call Pre-Admission Testing at [phone].  Deductibles and co-payments are collected on the day of service. Please be prepared to pay the required co-pay, deductible or deposit on the day of service as defined by your plan.

## 2018-12-17 ENCOUNTER — ANESTHESIA (OUTPATIENT)
Dept: PERIOP | Facility: HOSPITAL | Age: 73
End: 2018-12-17

## 2018-12-17 ENCOUNTER — HOSPITAL ENCOUNTER (OUTPATIENT)
Facility: HOSPITAL | Age: 73
Setting detail: HOSPITAL OUTPATIENT SURGERY
Discharge: HOME | End: 2018-12-17
Attending: UROLOGY | Admitting: UROLOGY

## 2018-12-17 ENCOUNTER — ANESTHESIA EVENT (OUTPATIENT)
Dept: PERIOP | Facility: HOSPITAL | Age: 73
End: 2018-12-17

## 2018-12-17 VITALS
OXYGEN SATURATION: 99 % | TEMPERATURE: 98 F | DIASTOLIC BLOOD PRESSURE: 76 MMHG | SYSTOLIC BLOOD PRESSURE: 140 MMHG | HEART RATE: 59 BPM | RESPIRATION RATE: 18 BRPM

## 2018-12-17 PROCEDURE — 25010000002 ONDANSETRON PER 1 MG: Performed by: ANESTHESIOLOGY

## 2018-12-17 PROCEDURE — 25010000002 MIDAZOLAM PER 1 MG: Performed by: ANESTHESIOLOGY

## 2018-12-17 PROCEDURE — 25010000002 PROPOFOL 10 MG/ML EMULSION: Performed by: ANESTHESIOLOGY

## 2018-12-17 PROCEDURE — 25010000002 DEXAMETHASONE PER 1 MG: Performed by: ANESTHESIOLOGY

## 2018-12-17 RX ORDER — MIDAZOLAM HYDROCHLORIDE 1 MG/ML
1 INJECTION INTRAMUSCULAR; INTRAVENOUS
Status: DISCONTINUED | OUTPATIENT
Start: 2018-12-17 | End: 2018-12-17 | Stop reason: HOSPADM

## 2018-12-17 RX ORDER — DEXAMETHASONE SODIUM PHOSPHATE 10 MG/ML
INJECTION INTRAMUSCULAR; INTRAVENOUS AS NEEDED
Status: DISCONTINUED | OUTPATIENT
Start: 2018-12-17 | End: 2018-12-17 | Stop reason: SURG

## 2018-12-17 RX ORDER — PROMETHAZINE HYDROCHLORIDE 25 MG/1
25 SUPPOSITORY RECTAL ONCE AS NEEDED
Status: DISCONTINUED | OUTPATIENT
Start: 2018-12-17 | End: 2018-12-17 | Stop reason: HOSPADM

## 2018-12-17 RX ORDER — FAMOTIDINE 10 MG/ML
20 INJECTION, SOLUTION INTRAVENOUS ONCE
Status: COMPLETED | OUTPATIENT
Start: 2018-12-17 | End: 2018-12-17

## 2018-12-17 RX ORDER — FLUMAZENIL 0.1 MG/ML
0.2 INJECTION INTRAVENOUS AS NEEDED
Status: DISCONTINUED | OUTPATIENT
Start: 2018-12-17 | End: 2018-12-17 | Stop reason: HOSPADM

## 2018-12-17 RX ORDER — EPHEDRINE SULFATE 50 MG/ML
5 INJECTION, SOLUTION INTRAVENOUS ONCE AS NEEDED
Status: DISCONTINUED | OUTPATIENT
Start: 2018-12-17 | End: 2018-12-17 | Stop reason: HOSPADM

## 2018-12-17 RX ORDER — LIDOCAINE HYDROCHLORIDE 10 MG/ML
INJECTION, SOLUTION INFILTRATION; PERINEURAL AS NEEDED
Status: DISCONTINUED | OUTPATIENT
Start: 2018-12-17 | End: 2018-12-17 | Stop reason: HOSPADM

## 2018-12-17 RX ORDER — PROMETHAZINE HYDROCHLORIDE 25 MG/ML
12.5 INJECTION, SOLUTION INTRAMUSCULAR; INTRAVENOUS ONCE AS NEEDED
Status: DISCONTINUED | OUTPATIENT
Start: 2018-12-17 | End: 2018-12-17 | Stop reason: HOSPADM

## 2018-12-17 RX ORDER — HYDROCODONE BITARTRATE AND ACETAMINOPHEN 7.5; 325 MG/1; MG/1
1 TABLET ORAL ONCE AS NEEDED
Status: DISCONTINUED | OUTPATIENT
Start: 2018-12-17 | End: 2018-12-17 | Stop reason: HOSPADM

## 2018-12-17 RX ORDER — MAGNESIUM HYDROXIDE 1200 MG/15ML
LIQUID ORAL AS NEEDED
Status: DISCONTINUED | OUTPATIENT
Start: 2018-12-17 | End: 2018-12-17 | Stop reason: HOSPADM

## 2018-12-17 RX ORDER — ONDANSETRON 2 MG/ML
4 INJECTION INTRAMUSCULAR; INTRAVENOUS ONCE AS NEEDED
Status: DISCONTINUED | OUTPATIENT
Start: 2018-12-17 | End: 2018-12-17 | Stop reason: HOSPADM

## 2018-12-17 RX ORDER — HYDRALAZINE HYDROCHLORIDE 20 MG/ML
5 INJECTION INTRAMUSCULAR; INTRAVENOUS
Status: DISCONTINUED | OUTPATIENT
Start: 2018-12-17 | End: 2018-12-17 | Stop reason: HOSPADM

## 2018-12-17 RX ORDER — SODIUM CHLORIDE, SODIUM LACTATE, POTASSIUM CHLORIDE, CALCIUM CHLORIDE 600; 310; 30; 20 MG/100ML; MG/100ML; MG/100ML; MG/100ML
9 INJECTION, SOLUTION INTRAVENOUS CONTINUOUS
Status: DISCONTINUED | OUTPATIENT
Start: 2018-12-17 | End: 2018-12-17 | Stop reason: HOSPADM

## 2018-12-17 RX ORDER — BUPIVACAINE HYDROCHLORIDE 2.5 MG/ML
INJECTION, SOLUTION INFILTRATION; PERINEURAL AS NEEDED
Status: DISCONTINUED | OUTPATIENT
Start: 2018-12-17 | End: 2018-12-17 | Stop reason: HOSPADM

## 2018-12-17 RX ORDER — SODIUM CHLORIDE 0.9 % (FLUSH) 0.9 %
1-10 SYRINGE (ML) INJECTION AS NEEDED
Status: DISCONTINUED | OUTPATIENT
Start: 2018-12-17 | End: 2018-12-17 | Stop reason: HOSPADM

## 2018-12-17 RX ORDER — FENTANYL CITRATE 50 UG/ML
50 INJECTION, SOLUTION INTRAMUSCULAR; INTRAVENOUS
Status: DISCONTINUED | OUTPATIENT
Start: 2018-12-17 | End: 2018-12-17 | Stop reason: HOSPADM

## 2018-12-17 RX ORDER — PROMETHAZINE HYDROCHLORIDE 25 MG/1
25 TABLET ORAL ONCE AS NEEDED
Status: DISCONTINUED | OUTPATIENT
Start: 2018-12-17 | End: 2018-12-17 | Stop reason: HOSPADM

## 2018-12-17 RX ORDER — HYDROMORPHONE HYDROCHLORIDE 1 MG/ML
0.5 INJECTION, SOLUTION INTRAMUSCULAR; INTRAVENOUS; SUBCUTANEOUS
Status: DISCONTINUED | OUTPATIENT
Start: 2018-12-17 | End: 2018-12-17 | Stop reason: HOSPADM

## 2018-12-17 RX ORDER — ONDANSETRON 2 MG/ML
INJECTION INTRAMUSCULAR; INTRAVENOUS AS NEEDED
Status: DISCONTINUED | OUTPATIENT
Start: 2018-12-17 | End: 2018-12-17 | Stop reason: SURG

## 2018-12-17 RX ORDER — NALOXONE HCL 0.4 MG/ML
0.2 VIAL (ML) INJECTION AS NEEDED
Status: DISCONTINUED | OUTPATIENT
Start: 2018-12-17 | End: 2018-12-17 | Stop reason: HOSPADM

## 2018-12-17 RX ORDER — MIDAZOLAM HYDROCHLORIDE 1 MG/ML
2 INJECTION INTRAMUSCULAR; INTRAVENOUS
Status: DISCONTINUED | OUTPATIENT
Start: 2018-12-17 | End: 2018-12-17 | Stop reason: HOSPADM

## 2018-12-17 RX ORDER — DIPHENHYDRAMINE HCL 25 MG
25 CAPSULE ORAL
Status: DISCONTINUED | OUTPATIENT
Start: 2018-12-17 | End: 2018-12-17 | Stop reason: HOSPADM

## 2018-12-17 RX ORDER — DIPHENHYDRAMINE HYDROCHLORIDE 50 MG/ML
12.5 INJECTION INTRAMUSCULAR; INTRAVENOUS
Status: DISCONTINUED | OUTPATIENT
Start: 2018-12-17 | End: 2018-12-17 | Stop reason: HOSPADM

## 2018-12-17 RX ORDER — ONDANSETRON 4 MG/1
4 TABLET, FILM COATED ORAL ONCE AS NEEDED
Status: DISCONTINUED | OUTPATIENT
Start: 2018-12-17 | End: 2018-12-17 | Stop reason: HOSPADM

## 2018-12-17 RX ORDER — LIDOCAINE HYDROCHLORIDE 10 MG/ML
0.5 INJECTION, SOLUTION EPIDURAL; INFILTRATION; INTRACAUDAL; PERINEURAL ONCE AS NEEDED
Status: DISCONTINUED | OUTPATIENT
Start: 2018-12-17 | End: 2018-12-17 | Stop reason: HOSPADM

## 2018-12-17 RX ORDER — ACETAMINOPHEN 325 MG/1
650 TABLET ORAL ONCE AS NEEDED
Status: DISCONTINUED | OUTPATIENT
Start: 2018-12-17 | End: 2018-12-17 | Stop reason: HOSPADM

## 2018-12-17 RX ORDER — PROPOFOL 10 MG/ML
VIAL (ML) INTRAVENOUS AS NEEDED
Status: DISCONTINUED | OUTPATIENT
Start: 2018-12-17 | End: 2018-12-17 | Stop reason: SURG

## 2018-12-17 RX ORDER — LIDOCAINE HYDROCHLORIDE 20 MG/ML
INJECTION, SOLUTION INFILTRATION; PERINEURAL AS NEEDED
Status: DISCONTINUED | OUTPATIENT
Start: 2018-12-17 | End: 2018-12-17 | Stop reason: SURG

## 2018-12-17 RX ORDER — HYDROCODONE BITARTRATE AND ACETAMINOPHEN 7.5; 325 MG/1; MG/1
1 TABLET ORAL ONCE AS NEEDED
Status: COMPLETED | OUTPATIENT
Start: 2018-12-17 | End: 2018-12-17

## 2018-12-17 RX ORDER — OXYCODONE AND ACETAMINOPHEN 7.5; 325 MG/1; MG/1
1 TABLET ORAL ONCE AS NEEDED
Status: DISCONTINUED | OUTPATIENT
Start: 2018-12-17 | End: 2018-12-17 | Stop reason: HOSPADM

## 2018-12-17 RX ORDER — LABETALOL HYDROCHLORIDE 5 MG/ML
5 INJECTION, SOLUTION INTRAVENOUS
Status: DISCONTINUED | OUTPATIENT
Start: 2018-12-17 | End: 2018-12-17 | Stop reason: HOSPADM

## 2018-12-17 RX ADMIN — MIDAZOLAM HYDROCHLORIDE 2 MG: 2 INJECTION, SOLUTION INTRAMUSCULAR; INTRAVENOUS at 15:16

## 2018-12-17 RX ADMIN — LIDOCAINE HYDROCHLORIDE 100 MG: 20 INJECTION, SOLUTION INFILTRATION; PERINEURAL at 16:31

## 2018-12-17 RX ADMIN — HYDROCODONE BITARTRATE AND ACETAMINOPHEN 1 TABLET: 7.5; 325 TABLET ORAL at 18:05

## 2018-12-17 RX ADMIN — PROPOFOL 200 MG: 10 INJECTION, EMULSION INTRAVENOUS at 16:31

## 2018-12-17 RX ADMIN — ONDANSETRON 4 MG: 2 INJECTION INTRAMUSCULAR; INTRAVENOUS at 16:31

## 2018-12-17 RX ADMIN — SODIUM CHLORIDE, POTASSIUM CHLORIDE, SODIUM LACTATE AND CALCIUM CHLORIDE: 600; 310; 30; 20 INJECTION, SOLUTION INTRAVENOUS at 16:31

## 2018-12-17 RX ADMIN — SODIUM CHLORIDE, POTASSIUM CHLORIDE, SODIUM LACTATE AND CALCIUM CHLORIDE 9 ML/HR: 600; 310; 30; 20 INJECTION, SOLUTION INTRAVENOUS at 15:16

## 2018-12-17 RX ADMIN — FAMOTIDINE 20 MG: 10 INJECTION, SOLUTION INTRAVENOUS at 15:16

## 2018-12-17 RX ADMIN — DEXAMETHASONE SODIUM PHOSPHATE 6 MG: 10 INJECTION INTRAMUSCULAR; INTRAVENOUS at 16:31

## 2018-12-17 NOTE — ANESTHESIA PREPROCEDURE EVALUATION
Anesthesia Evaluation     Patient summary reviewed and Nursing notes reviewed   NPO Solid Status: > 8 hours  NPO Liquid Status: > 8 hours           Airway   Mallampati: II  TM distance: >3 FB  Neck ROM: full  no difficulty expected  Dental - normal exam     Pulmonary - normal exam   Cardiovascular - normal exam    (+) hypertension, dysrhythmias, PVD,       Neuro/Psych  (+) psychiatric history Anxiety,     GI/Hepatic/Renal/Endo    (+)   hypothyroidism, hyperthyroidism    Musculoskeletal     Abdominal  - normal exam   Substance History      OB/GYN          Other   (+) blood dyscrasia, arthritis   history of cancer                    Anesthesia Plan    ASA 3     general     Anesthetic plan, all risks, benefits, and alternatives have been provided, discussed and informed consent has been obtained with: patient.

## 2018-12-17 NOTE — ANESTHESIA PROCEDURE NOTES
ANESTHESIA INTUBATION  Urgency: elective    Airway not difficult    General Information and Staff    Patient location during procedure: OR  Anesthesiologist: Eladio Pinedo MD    Indications and Patient Condition  Indications for airway management: airway protection    Preoxygenated: yes  Mask difficulty assessment: 1 - vent by mask    Final Airway Details  Final airway type: supraglottic airway      Successful airway: classic  Size 5    Number of attempts at approach: 1

## 2018-12-17 NOTE — OP NOTE
Preoperative diagnosis recurrent refractory painful cracking of foreskin inflammation or graft postoperative diagnosis same    Operative procedure circumcision    Anesthesia general plus quarter percent plain Marcaine 2% lidocaine mixture penile block    Surgeon John    Procedure note 73-year-old above-mentioned findings options discussed with him to undergo the above-mentioned procedure timeout was taken no antibiotics were given after adequate general anesthesia the latex free environment his prepped and draped sterile fashion genitalia infiltration of quarter percent plain Marcaine and the 2% lidocaine were given at the base his penis a very tight frenulum was released and a small areas of bleeding were cauterized    Redundant foreskin was excised the circumcoronal manner with a 1 cm margin bleeders were controlled by pinpoint Bovie cautery once stable Arches were reapproximated with 4-0 chromic application of bacitracin ointment and Vaseline gauze cleaning Covan to the dressed the wounds patient procedure well findings discussed with family members and friends at the patient's approval    He'll be discharged continuation of his preoperative medications diet with progressive activities and dressing changes as outlined on the instruction is given today dressings will be applied the until for what 1 week with restricted activity for 1 month as mentioned in the discharge Norco and Zofran on discharge follow-up my office in about 2-3 weeks with phone numbers given to both the patient and to my answering service and follow-up line

## 2018-12-17 NOTE — BRIEF OP NOTE
CIRCUMCISION  Progress Note    Scott Murphy  12/17/2018    Pre-op Diagnosis:   posthitis pain cracking        Post-Op Diagnosis Codes:  same    Procedure/CPT® Codes:      Procedure(s):  CIRCUMCISION    Surgeon(s):  Gallo Lopez MD    Anesthesia: General    Staff:   Circulator: Ekaterina Mina RN  Scrub Person: Scarlet Stevenson    Estimated Blood Loss: none    Urine Voided: * No values recorded between 12/17/2018  4:25 PM and 12/17/2018  5:01 PM *    Specimens:                None      Drains:      Findings:     Complications:       Gallo Lopez MD     Date: 12/17/2018  Time: 5:04 PM

## 2018-12-17 NOTE — ANESTHESIA POSTPROCEDURE EVALUATION
Patient: Scott Murphy    Procedure Summary     Date:  12/17/18 Room / Location:  [facility] OR [address] [place] [address] MAIN OR    Anesthesia Start:  1627 Anesthesia Stop:  1715    Procedure:  CIRCUMCISION (N/A Penis) Diagnosis:      Surgeon:  Gallo Lopez MD Provider:  Eladio Pinedo MD    Anesthesia Type:  general ASA Status:  3          Anesthesia Type: general  Last vitals  BP   134/84 (12/17/18 1749)   Temp   36.7 °C (98 °F) (12/17/18 1730)   Pulse   57 (12/17/18 1749)   Resp   18 (12/17/18 1749)     SpO2   99 % (12/17/18 1749)     Post Anesthesia Care and Evaluation    Patient location during evaluation: bedside  Patient participation: complete - patient participated  Level of consciousness: sleepy but conscious  Pain score: 0  Pain management: adequate  Airway patency: patent  Anesthetic complications: No anesthetic complications    Cardiovascular status: acceptable  Respiratory status: acceptable  Hydration status: acceptable    Comments: /84 (BP Location: Right arm, Patient Position: Lying)   Pulse 57   Temp 36.7 °C (98 °F) (Oral)   Resp 18   SpO2 99%

## 2019-02-28 ENCOUNTER — OFFICE VISIT (OUTPATIENT)
Dept: ONCOLOGY | Facility: CLINIC | Age: 74
End: 2019-02-28

## 2019-02-28 ENCOUNTER — APPOINTMENT (OUTPATIENT)
Dept: ONCOLOGY | Facility: HOSPITAL | Age: 74
End: 2019-02-28

## 2019-02-28 ENCOUNTER — LAB (OUTPATIENT)
Dept: LAB | Facility: HOSPITAL | Age: 74
End: 2019-02-28

## 2019-02-28 VITALS
TEMPERATURE: 97.5 F | WEIGHT: 164.7 LBS | DIASTOLIC BLOOD PRESSURE: 89 MMHG | BODY MASS INDEX: 24.96 KG/M2 | RESPIRATION RATE: 12 BRPM | OXYGEN SATURATION: 99 % | SYSTOLIC BLOOD PRESSURE: 135 MMHG | HEIGHT: 68 IN | HEART RATE: 81 BPM

## 2019-02-28 DIAGNOSIS — D75.1 ERYTHROCYTOSIS: ICD-10-CM

## 2019-02-28 DIAGNOSIS — C64.1 CANCER OF RIGHT KIDNEY (HCC): Primary | ICD-10-CM

## 2019-02-28 DIAGNOSIS — D47.1 MYELOPROLIFERATIVE DISORDER (HCC): ICD-10-CM

## 2019-02-28 LAB
BASOPHILS # BLD AUTO: 0.06 10*3/MM3 (ref 0–0.2)
BASOPHILS NFR BLD AUTO: 0.7 % (ref 0–1.5)
DEPRECATED RDW RBC AUTO: 45.7 FL (ref 37–54)
EOSINOPHIL # BLD AUTO: 0.29 10*3/MM3 (ref 0–0.4)
EOSINOPHIL NFR BLD AUTO: 3.3 % (ref 0.3–6.2)
ERYTHROCYTE [DISTWIDTH] IN BLOOD BY AUTOMATED COUNT: 14.4 % (ref 12.3–15.4)
HCT VFR BLD AUTO: 48.1 % (ref 37.5–51)
HGB BLD-MCNC: 15.7 G/DL (ref 13–17.7)
IMM GRANULOCYTES # BLD AUTO: 0.02 10*3/MM3 (ref 0–0.05)
IMM GRANULOCYTES NFR BLD AUTO: 0.2 % (ref 0–0.5)
LYMPHOCYTES # BLD AUTO: 1.98 10*3/MM3 (ref 0.7–3.1)
LYMPHOCYTES NFR BLD AUTO: 22.5 % (ref 19.6–45.3)
MCH RBC QN AUTO: 28.5 PG (ref 26.6–33)
MCHC RBC AUTO-ENTMCNC: 32.6 G/DL (ref 31.5–35.7)
MCV RBC AUTO: 87.5 FL (ref 79–97)
MONOCYTES # BLD AUTO: 1.02 10*3/MM3 (ref 0.1–0.9)
MONOCYTES NFR BLD AUTO: 11.6 % (ref 5–12)
NEUTROPHILS # BLD AUTO: 5.44 10*3/MM3 (ref 1.4–7)
NEUTROPHILS NFR BLD AUTO: 61.7 % (ref 42.7–76)
NRBC BLD AUTO-RTO: 0 /100 WBC (ref 0–0)
PLATELET # BLD AUTO: 227 10*3/MM3 (ref 140–450)
PMV BLD AUTO: 9.5 FL (ref 6–12)
RBC # BLD AUTO: 5.5 10*6/MM3 (ref 4.14–5.8)
WBC NRBC COR # BLD: 8.81 10*3/MM3 (ref 3.4–10.8)

## 2019-02-28 PROCEDURE — 99214 OFFICE O/P EST MOD 30 MIN: CPT | Performed by: INTERNAL MEDICINE

## 2019-02-28 PROCEDURE — 85025 COMPLETE CBC W/AUTO DIFF WBC: CPT | Performed by: INTERNAL MEDICINE

## 2019-02-28 PROCEDURE — 36415 COLL VENOUS BLD VENIPUNCTURE: CPT | Performed by: INTERNAL MEDICINE

## 2019-02-28 RX ORDER — LISINOPRIL 5 MG/1
5 TABLET ORAL DAILY
Qty: 30 TABLET | Refills: 6 | OUTPATIENT
Start: 2019-02-28 | End: 2019-06-13

## 2019-02-28 NOTE — PROGRESS NOTES
REASONS FOR FOLLOWUP:       1. Myeloproliferative disorder with polycythemia vera, MAI-2 mutation positive requiring periodic phlebotomies whenever hematocrit is above 45.      2. His  tory of stage I clear cell carcinoma of the right kidney status post partial nephrectomy, nephron sparing surgery by Dr. Ganesh Lopez with no issues or consequences.  The patient has already an appointment to repeat CT scan of the abdomen in March 2016.                3. Patient has history of polymyalgia rheumatica.  He IS OFF 5 mg of prednisone a day.            HISTORY OF PRESENT ILLNESSThis patient returns today to the office for followup. In the interim he has had a premolar extracted because of decay. This produced significant difficulty with pain and discomfort that is finally now healing. He also had circumcision. This triggered tremendous degree of pain for obvious reasons and he is finally healing after 2 months from the surgery. The patient otherwise has not had any infections. His bowel activity and urination are normal. No cardiovascular issues, no respiratory issues. No acute vascular events on any level. He is very worried about taking his blood pressure medication, about the concept of possible malignancy associated with it and he asked the question in regard to changing the medicine for something simpler that has a good track record.                                        PAST MEDICAL HISTORY:    1.;  Hypertension.    2.;   History of polyps in the colon.  Colonoscopy in 2010 documented a tubulovillous adenoma that was removed completely.  Further colonoscopy in 2014 was unremarkable.     3.; History of prostate biopsy in the past that showed features consistent with prostate cancer  .  He has not required any specific therapy for this and he has been monitored through his urologist.    4.; History of multiple hernia repairs.     5.; History of polymyalgia rheumatica that was diagnosed 3 years by   Karan Garrett.  He has been on a tapering dose   of prednisone, even though he admits that his polymyalgia was atypical because his sedimentation was never elevated.  He has been seen by other rheumatologists in the past as well and has been told he has Raynaud’  s disease.  He has never had diagnosis of rheumatoid arthritis, lupus or something of this nature.     6.; Partial thyroidectomy in 1983 for a malignant tumor of the thyroid gland that never required any other intervention.      7.; He used to have radiation treatment for tonsillar inflammation and infection when   he was a child and this led him to develop thyroid cancer.       HEMATOLOGIC/ONCOLOGIC HISTORY:  History of previous dates can be found in a separate document.         On 02/22/2016, he had no symptoms or signs that would indicate any kidney cancer recurrence.  Given   the mild achiness in his muscles, we went ahead and checked a CPK and an aldolase and also checked a sedimentation rate and vitamin D level.  TSH was also performed.  We asked him to remain on his dose of prednisone of 5 mg a day.  We asked him to initia  te a program of physical activity including going to the gym.  He did not need phlebotomy on this occasion, given hematocrit of 45.  His white count and platelet count remain stable.  He had no palpable splenomegaly.         MEDICATIONS:  The current medication list was reviewed with the patient and updated in the EMR this date per the Medical Assistant.  Medication dosages and frequencies were confirmed to be accurate.        It is important to mention the patient does not use any androgen or androgen replacement medication or anabolic steroids or any kind.        ALLERGIES:     1.  STATINS.    2. DIPHENHYDRAMINE.         SOCIAL HISTORY:  The patient is .  He lives with his wife in [place].  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  He plays violin in   an orchestra and also he is a  very avid .  He does not smoke but he had a lot of second-hand smoke through his life.  He drinks 1-3 glasses of wine a week.  He has no use of any recreational drugs.          FAMILY HISTORY:  His father  of alcoh  ol and smoking disorders including heart attack.  His mother is 89 and in good health.  He has no brothers.  He has 1 adopted sister who is in good health with probable rheumatoid arthritis.  His wife is in good health.  He has 2 children, 1 who is in poo  r health due to drug abuse and the other son is in good health. He had another son who  as a consequence of Tylenol intoxication.   No family history of hematological disorder or myeloproliferative disorder.         REVIEW OF SYSTEMS:             General: no fever, no chills, no fatigue,no weight changes, no lack of appetite.  Eyes: no epiphora, xerophthalmia,conjunctivitis, pain, glaucoma, blurred vision, blindness, secretion, photophobia, proptosis, diplopia.  Ears: no otorrhea, tinnitus, otorrhagia, deafness, pain, vertigo.  Nose: no rhinorrhea, no epistaxis, no alteration in perception of odors, no sinuses pressure.  Mouth: no alteration in gums or teeth,  No ulcers, no difficulty with mastication or deglut ion, no odynophagia.  Neck: no masses or pain, no thyroid alterations, no pain in muscles or arteries, no carotid odynia, no crepitation.  Respiratory: no cough, no sputum production,no dyspnea,no trepopnea, no pleuritic pain,no hemoptysis.  Heart: no syncope, no irregularity, no palpitations, no angina,no orthopnea,no paroxysmal nocturnal dyspnea.  Vascular Venous: no tenderness,no edema,no palpable cords,no postphlebitic syndrome, no skin changes no ulcerations.  Vascular Arterial: no distal ischemia, noclaudication, no gangrene, no neuropathic ischemic pain, no skin ulcers, no paleness no cyanosis.  GI: no dysphagia, no odynophagia, no regurgitation, no heartburn,no indigestion,no nausea,no vomiting,no hematemesis ,no  "melena,no jaundice,no distention, no obstipation,no enterorrhagia,no proctalgia,no anal  lesions, no changes in bowel habits.  : no frequency, no hesitancy, no hematuria, no discharge,no  pain.  Musculoskeletal: no muscle or tendon pain or inflammation,no  joint pain, no edema, no functional limitation,no fasciculations, no mass.  Neurologic: no headache, no seizures, noalterations on Craneal nerves, no motor deficit, no sensory deficit, normal coordination, no alteration in memory,normal orientation, calculation,normal writting, verbal and written language.  Skin: no rashes,no pruritus no localized lesions.  Psychiatric: no anxiety, no depression,no agitation, no delusions, proper insight.            VITAL SIGNS:   Vitals:    02/28/19 0832   BP: 135/89   Pulse: 81   Resp: 12   Temp: 97.5 °F (36.4 °C)   TempSrc: Oral   SpO2: 99%   Weight: 74.7 kg (164 lb 11.2 oz)   Height: 173 cm (68.11\")            PHYSICAL EXAMINATION:   GENERAL:  Well-developed, well-nourished  Patient  in no acute distress.   SKIN:  Warm, dry ,NO rashes,NO purpura ,NO petechiae.  HEENT:  Pupils were equal and reactive to light and accomodation, conjunctivas non injected, no pterigion, normal extraocular movements, normal visual acuity.   Mouth mucosa was moist, no exudates in oropharynx, normal gum line, normal roof of the mouth and pillars, normal papillations of the tongue.area of premolar extraction healing  NECK:  Supple with good range of motion; no thyromegaly or masses, no JVD or bruits, no cervical adenopathies.No carotid arteries pain, no carotid abnormal pulsation , NO arterial dance.  LYMPHATICS:  No cervical, NO supraclavicular, NO axillary,NO epitrochlear , NO inguinal adenopathy.  CHEST:  Normal excursion of both nelson thoraces, normal voice fremitus, no subcutaneous emphysema, normal axillas, no rashes or acanthosis nigricans. Lungs clear to percussion and auscultation, normal breath sounds bilaterally, no wheezing,NO crackles NO " ronchi, NO stridor, NO rubs.  CARDIAC AND VASCULAR:  normal rate and regular rhythm, without murmurs,NO rubs NO S3 NO S4 right or left . Normal femoral, popliteal, pedis, brachial and carotid pulses.  ABDOMEN:  Soft, nontender with no organomegaly or masses, no ascites, no collateral circulation,no distention,no [place] sign, no abdominal pain, no inguinal hernias,no umbilical hernia, no abdominal bruits. Normal bowel sounds.  GENITAL: Not  Performed.  EXTREMITIES  AND SPINE:  No clubbing, cyanosis or edema, no deformities or pain .No kyphosis, scoliosis, deformities or pain in spine, ribs or pelvic bone.  NEUROLOGICAL:  Patient was awake, alert, oriented to time, person and place.                        LABORATORY DATA:Auto Differential   Order: [id number] - Part of Panel Order [id number]   Status:  Final result   Visible to patient:  No (Not Released)   Dx:  Erythrocytosis; Myeloproliferative di...    Ref Range & Units 08:23   WBC 3.40 - 10.80 10*3/mm3 8.81    RBC 4.14 - 5.80 10*6/mm3 5.50    Hemoglobin 13.0 - 17.7 g/dL 15.7    Hematocrit 37.5 - 51.0 % 48.1    MCV 79.0 - 97.0 fL 87.5    MCH 26.6 - 33.0 pg 28.5    MCHC 31.5 - 35.7 g/dL 32.6    RDW 12.3 - 15.4 % 14.4    RDW-SD 37.0 - 54.0 fl 45.7    MPV 6.0 - 12.0 fL 9.5    Platelets 140 - 450 10*3/mm3 227    Neutrophil % 42.7 - 76.0 % 61.7    Lymphocyte % 19.6 - 45.3 % 22.5    Monocyte % 5.0 - 12.0 % 11.6    Eosinophil % 0.3 - 6.2 % 3.3    Basophil % 0.0 - 1.5 % 0.7    Immature Grans % 0.0 - 0.5 % 0.2    Neutrophils, Absolute 1.40 - 7.00 10*3/mm3 5.44    Lymphocytes, Absolute 0.70 - 3.10 10*3/mm3 1.98    Monocytes, Absolute 0.10 - 0.90 10*3/mm3 1.02 Abnormally high     Eosinophils, Absolute 0.00 - 0.40 10*3/mm3 0.29    Basophils, Absolute 0.00 - 0.20 10*3/mm3 0.06    Immature Grans, Absolute 0.00 - 0.05 10*3/mm3 0.02    nRBC 0.0 - 0.0 /100 WBC 0.0    Resulting Agency   [facility]         Specimen Collected: 02/28/19 08:23 Last Resulted: 02/28/19 08:30        Lab  Flowsheet      Order Details      View Encounter      Lab and Collection Details      Routing      Result History                             ASSESSMENT/PLAN:   1. This patient has polycythemia vera, JAK2 mutation positive, and has taken in the past Hydrea. He discontinued this medicine because of feeling afraid of side effects even though I never noticed anything in particular related to this medicine.  1. Today his hematocrit is 47 and the patient wants to forego any further phlebotomy at this time unless the hematocrit goes above 50. I pointed out to him the data suggesting that vascular events are more likely to happen in patient's who have hematocrit above 45. The good news is that his white count and platelet count are stable. Therefore I think this will be okay as long as he keeps this in mind and consideration.   2. The patient has history of kidney cancer. He had nephrectomy. He is doing fine from this point of view. He will followup with his urologist in this regard.   3. Delayed healing in the socket of a premolar inferiorly on the right side. It does not look to me like he has osteonecrosis. He required bone meal to be spread into the area to speed up the healing. So far I think he is doing okay but again the healing is taking a longer time.    The patient raised the question about his blood pressure medication. I discussed with him taking lisinopril instead of losartan and he will take 5 mg a day. He is aware of the side effect of this including cough. If he has any he will stop the lisinopril and he will go back onto the losartan.     Otherwise I will review him back in 4 months. He will have monthly visit and if hematocrit is above 48 my opinion is that he must have a phlebotomy 500 cc at that point.

## 2019-03-28 ENCOUNTER — APPOINTMENT (OUTPATIENT)
Dept: LAB | Facility: HOSPITAL | Age: 74
End: 2019-03-28

## 2019-03-28 ENCOUNTER — APPOINTMENT (OUTPATIENT)
Dept: ONCOLOGY | Facility: HOSPITAL | Age: 74
End: 2019-03-28

## 2019-03-29 ENCOUNTER — INFUSION (OUTPATIENT)
Dept: ONCOLOGY | Facility: HOSPITAL | Age: 74
End: 2019-03-29

## 2019-03-29 ENCOUNTER — APPOINTMENT (OUTPATIENT)
Dept: ONCOLOGY | Facility: HOSPITAL | Age: 74
End: 2019-03-29

## 2019-03-29 ENCOUNTER — APPOINTMENT (OUTPATIENT)
Dept: LAB | Facility: HOSPITAL | Age: 74
End: 2019-03-29

## 2019-03-29 ENCOUNTER — LAB (OUTPATIENT)
Dept: LAB | Facility: HOSPITAL | Age: 74
End: 2019-03-29

## 2019-03-29 VITALS
HEART RATE: 61 BPM | WEIGHT: 165 LBS | SYSTOLIC BLOOD PRESSURE: 145 MMHG | DIASTOLIC BLOOD PRESSURE: 88 MMHG | BODY MASS INDEX: 25.01 KG/M2

## 2019-03-29 DIAGNOSIS — M35.3 PMR (POLYMYALGIA RHEUMATICA) (HCC): ICD-10-CM

## 2019-03-29 DIAGNOSIS — Z71.89 ENCOUNTER FOR ANTICOAGULATION DISCUSSION AND COUNSELING: ICD-10-CM

## 2019-03-29 DIAGNOSIS — R42 EPISODIC LIGHTHEADEDNESS: ICD-10-CM

## 2019-03-29 DIAGNOSIS — D75.1 ERYTHROCYTOSIS: ICD-10-CM

## 2019-03-29 DIAGNOSIS — D47.1 MYELOPROLIFERATIVE DISORDER (HCC): ICD-10-CM

## 2019-03-29 DIAGNOSIS — C64.1 CANCER OF RIGHT KIDNEY (HCC): ICD-10-CM

## 2019-03-29 DIAGNOSIS — E03.8 OTHER SPECIFIED HYPOTHYROIDISM: ICD-10-CM

## 2019-03-29 DIAGNOSIS — I73.00 RAYNAUD'S DISEASE WITHOUT GANGRENE: ICD-10-CM

## 2019-03-29 DIAGNOSIS — C61 PROSTATE CANCER (HCC): ICD-10-CM

## 2019-03-29 DIAGNOSIS — I45.10 RBBB: ICD-10-CM

## 2019-03-29 DIAGNOSIS — I10 PRIMARY HYPERTENSION: ICD-10-CM

## 2019-03-29 LAB
BASOPHILS # BLD AUTO: 0.08 10*3/MM3 (ref 0–0.2)
BASOPHILS NFR BLD AUTO: 0.9 % (ref 0–1.5)
DEPRECATED RDW RBC AUTO: 47 FL (ref 37–54)
EOSINOPHIL # BLD AUTO: 0.28 10*3/MM3 (ref 0–0.4)
EOSINOPHIL NFR BLD AUTO: 3 % (ref 0.3–6.2)
ERYTHROCYTE [DISTWIDTH] IN BLOOD BY AUTOMATED COUNT: 15.3 % (ref 12.3–15.4)
HCT VFR BLD AUTO: 50.1 % (ref 37.5–51)
HGB BLD-MCNC: 16.7 G/DL (ref 13–17.7)
IMM GRANULOCYTES # BLD AUTO: 0.07 10*3/MM3 (ref 0–0.05)
IMM GRANULOCYTES NFR BLD AUTO: 0.7 % (ref 0–0.5)
LYMPHOCYTES # BLD AUTO: 1.72 10*3/MM3 (ref 0.7–3.1)
LYMPHOCYTES NFR BLD AUTO: 18.4 % (ref 19.6–45.3)
MCH RBC QN AUTO: 28.8 PG (ref 26.6–33)
MCHC RBC AUTO-ENTMCNC: 33.3 G/DL (ref 31.5–35.7)
MCV RBC AUTO: 86.4 FL (ref 79–97)
MONOCYTES # BLD AUTO: 1.12 10*3/MM3 (ref 0.1–0.9)
MONOCYTES NFR BLD AUTO: 12 % (ref 5–12)
NEUTROPHILS # BLD AUTO: 6.1 10*3/MM3 (ref 1.4–7)
NEUTROPHILS NFR BLD AUTO: 65 % (ref 42.7–76)
NRBC BLD AUTO-RTO: 0 /100 WBC (ref 0–0)
PLATELET # BLD AUTO: 209 10*3/MM3 (ref 140–450)
PMV BLD AUTO: 9.7 FL (ref 6–12)
RBC # BLD AUTO: 5.8 10*6/MM3 (ref 4.14–5.8)
WBC NRBC COR # BLD: 9.37 10*3/MM3 (ref 3.4–10.8)

## 2019-03-29 PROCEDURE — 36416 COLLJ CAPILLARY BLOOD SPEC: CPT | Performed by: INTERNAL MEDICINE

## 2019-03-29 PROCEDURE — 85025 COMPLETE CBC W/AUTO DIFF WBC: CPT | Performed by: INTERNAL MEDICINE

## 2019-04-01 ENCOUNTER — APPOINTMENT (OUTPATIENT)
Dept: LAB | Facility: HOSPITAL | Age: 74
End: 2019-04-01

## 2019-04-01 ENCOUNTER — APPOINTMENT (OUTPATIENT)
Dept: ONCOLOGY | Facility: HOSPITAL | Age: 74
End: 2019-04-01

## 2019-04-25 ENCOUNTER — APPOINTMENT (OUTPATIENT)
Dept: ONCOLOGY | Facility: HOSPITAL | Age: 74
End: 2019-04-25

## 2019-04-25 ENCOUNTER — APPOINTMENT (OUTPATIENT)
Dept: LAB | Facility: HOSPITAL | Age: 74
End: 2019-04-25

## 2019-05-23 ENCOUNTER — INFUSION (OUTPATIENT)
Dept: ONCOLOGY | Facility: HOSPITAL | Age: 74
End: 2019-05-23

## 2019-05-23 ENCOUNTER — LAB (OUTPATIENT)
Dept: LAB | Facility: HOSPITAL | Age: 74
End: 2019-05-23

## 2019-05-23 VITALS
HEART RATE: 66 BPM | TEMPERATURE: 97.7 F | DIASTOLIC BLOOD PRESSURE: 90 MMHG | WEIGHT: 166 LBS | OXYGEN SATURATION: 98 % | SYSTOLIC BLOOD PRESSURE: 142 MMHG | HEIGHT: 68 IN | RESPIRATION RATE: 16 BRPM | BODY MASS INDEX: 25.16 KG/M2

## 2019-05-23 DIAGNOSIS — D47.1 MYELOPROLIFERATIVE DISORDER (HCC): ICD-10-CM

## 2019-05-23 DIAGNOSIS — C64.1 CANCER OF RIGHT KIDNEY (HCC): ICD-10-CM

## 2019-05-23 DIAGNOSIS — D75.1 ERYTHROCYTOSIS: ICD-10-CM

## 2019-05-23 LAB
BASOPHILS # BLD AUTO: 0.09 10*3/MM3 (ref 0–0.2)
BASOPHILS NFR BLD AUTO: 1.1 % (ref 0–1.5)
DEPRECATED RDW RBC AUTO: 44 FL (ref 37–54)
EOSINOPHIL # BLD AUTO: 0.36 10*3/MM3 (ref 0–0.4)
EOSINOPHIL NFR BLD AUTO: 4.4 % (ref 0.3–6.2)
ERYTHROCYTE [DISTWIDTH] IN BLOOD BY AUTOMATED COUNT: 13.7 % (ref 12.3–15.4)
HCT VFR BLD AUTO: 46.5 % (ref 37.5–51)
HGB BLD-MCNC: 15.2 G/DL (ref 13–17.7)
IMM GRANULOCYTES # BLD AUTO: 0.05 10*3/MM3 (ref 0–0.05)
IMM GRANULOCYTES NFR BLD AUTO: 0.6 % (ref 0–0.5)
LYMPHOCYTES # BLD AUTO: 1.83 10*3/MM3 (ref 0.7–3.1)
LYMPHOCYTES NFR BLD AUTO: 22.4 % (ref 19.6–45.3)
MCH RBC QN AUTO: 28.7 PG (ref 26.6–33)
MCHC RBC AUTO-ENTMCNC: 32.7 G/DL (ref 31.5–35.7)
MCV RBC AUTO: 87.9 FL (ref 79–97)
MONOCYTES # BLD AUTO: 0.81 10*3/MM3 (ref 0.1–0.9)
MONOCYTES NFR BLD AUTO: 9.9 % (ref 5–12)
NEUTROPHILS # BLD AUTO: 5.02 10*3/MM3 (ref 1.7–7)
NEUTROPHILS NFR BLD AUTO: 61.6 % (ref 42.7–76)
NRBC BLD AUTO-RTO: 0 /100 WBC (ref 0–0.2)
PLATELET # BLD AUTO: 191 10*3/MM3 (ref 140–450)
PMV BLD AUTO: 9.9 FL (ref 6–12)
RBC # BLD AUTO: 5.29 10*6/MM3 (ref 4.14–5.8)
WBC NRBC COR # BLD: 8.16 10*3/MM3 (ref 3.4–10.8)

## 2019-05-23 PROCEDURE — 36415 COLL VENOUS BLD VENIPUNCTURE: CPT | Performed by: INTERNAL MEDICINE

## 2019-05-23 PROCEDURE — 85025 COMPLETE CBC W/AUTO DIFF WBC: CPT | Performed by: INTERNAL MEDICINE

## 2019-06-17 ENCOUNTER — TELEPHONE (OUTPATIENT)
Dept: URGENT CARE | Facility: CLINIC | Age: 74
End: 2019-06-17

## 2019-06-25 ENCOUNTER — APPOINTMENT (OUTPATIENT)
Dept: ONCOLOGY | Facility: HOSPITAL | Age: 74
End: 2019-06-25

## 2019-06-25 ENCOUNTER — LAB (OUTPATIENT)
Dept: LAB | Facility: HOSPITAL | Age: 74
End: 2019-06-25

## 2019-06-25 ENCOUNTER — OFFICE VISIT (OUTPATIENT)
Dept: ONCOLOGY | Facility: CLINIC | Age: 74
End: 2019-06-25

## 2019-06-25 VITALS
TEMPERATURE: 97.8 F | OXYGEN SATURATION: 99 % | HEART RATE: 91 BPM | HEIGHT: 68 IN | BODY MASS INDEX: 24.9 KG/M2 | DIASTOLIC BLOOD PRESSURE: 94 MMHG | RESPIRATION RATE: 12 BRPM | WEIGHT: 164.3 LBS | SYSTOLIC BLOOD PRESSURE: 143 MMHG

## 2019-06-25 DIAGNOSIS — D47.1 MYELOPROLIFERATIVE DISORDER (HCC): ICD-10-CM

## 2019-06-25 DIAGNOSIS — D75.1 ERYTHROCYTOSIS: ICD-10-CM

## 2019-06-25 DIAGNOSIS — C64.1 CANCER OF RIGHT KIDNEY (HCC): Primary | ICD-10-CM

## 2019-06-25 DIAGNOSIS — C64.1 CANCER OF RIGHT KIDNEY (HCC): ICD-10-CM

## 2019-06-25 LAB
BASOPHILS # BLD AUTO: 0.1 10*3/MM3 (ref 0–0.2)
BASOPHILS NFR BLD AUTO: 0.8 % (ref 0–1.5)
DEPRECATED RDW RBC AUTO: 42.5 FL (ref 37–54)
EOSINOPHIL # BLD AUTO: 0.31 10*3/MM3 (ref 0–0.4)
EOSINOPHIL NFR BLD AUTO: 2.4 % (ref 0.3–6.2)
ERYTHROCYTE [DISTWIDTH] IN BLOOD BY AUTOMATED COUNT: 13.5 % (ref 12.3–15.4)
HCT VFR BLD AUTO: 53.1 % (ref 37.5–51)
HGB BLD-MCNC: 17.3 G/DL (ref 13–17.7)
IMM GRANULOCYTES # BLD AUTO: 0.12 10*3/MM3 (ref 0–0.05)
IMM GRANULOCYTES NFR BLD AUTO: 0.9 % (ref 0–0.5)
LYMPHOCYTES # BLD AUTO: 2.85 10*3/MM3 (ref 0.7–3.1)
LYMPHOCYTES NFR BLD AUTO: 21.9 % (ref 19.6–45.3)
MCH RBC QN AUTO: 28.3 PG (ref 26.6–33)
MCHC RBC AUTO-ENTMCNC: 32.6 G/DL (ref 31.5–35.7)
MCV RBC AUTO: 86.9 FL (ref 79–97)
MONOCYTES # BLD AUTO: 1.1 10*3/MM3 (ref 0.1–0.9)
MONOCYTES NFR BLD AUTO: 8.5 % (ref 5–12)
NEUTROPHILS # BLD AUTO: 8.52 10*3/MM3 (ref 1.7–7)
NEUTROPHILS NFR BLD AUTO: 65.5 % (ref 42.7–76)
NRBC BLD AUTO-RTO: 0 /100 WBC (ref 0–0.2)
PLATELET # BLD AUTO: 247 10*3/MM3 (ref 140–450)
PMV BLD AUTO: 9.4 FL (ref 6–12)
RBC # BLD AUTO: 6.11 10*6/MM3 (ref 4.14–5.8)
WBC NRBC COR # BLD: 13 10*3/MM3 (ref 3.4–10.8)

## 2019-06-25 PROCEDURE — 99214 OFFICE O/P EST MOD 30 MIN: CPT | Performed by: INTERNAL MEDICINE

## 2019-06-25 PROCEDURE — 85025 COMPLETE CBC W/AUTO DIFF WBC: CPT | Performed by: INTERNAL MEDICINE

## 2019-06-25 PROCEDURE — 36416 COLLJ CAPILLARY BLOOD SPEC: CPT | Performed by: INTERNAL MEDICINE

## 2019-06-25 NOTE — PROGRESS NOTES
REASONS FOR FOLLOWUP:       1. Myeloproliferative disorder with polycythemia vera, MAI-2 mutation positive requiring periodic phlebotomies whenever hematocrit is above 45.      2. His  tory of stage I clear cell carcinoma of the right kidney status post partial nephrectomy, nephron sparing surgery by Dr. Ganesh Lopez with no issues or consequences.  The patient has already an appointment to repeat CT scan of the abdomen in March 2016.                3. Patient has history of polymyalgia rheumatica.  He IS OFF 5 mg of prednisone a day.            HISTORY OF PRESENT ILLNESS                                     PAST MEDICAL HISTORY:    1.;  Hypertension.    2.;   History of polyps in the colon.  Colonoscopy in 2010 documented a tubulovillous adenoma that was removed completely.  Further colonoscopy in 2014 was unremarkable.     3.; History of prostate biopsy in the past that showed features consistent with prostate cancer  .  He has not required any specific therapy for this and he has been monitored through his urologist.    4.; History of multiple hernia repairs.     5.; History of polymyalgia rheumatica that was diagnosed 3 years by Dr. Karan Garrett.  He has been on a tapering dose   of prednisone, even though he admits that his polymyalgia was atypical because his sedimentation was never elevated.  He has been seen by other rheumatologists in the past as well and has been told he has Raynaud’  s disease.  He has never had diagnosis of rheumatoid arthritis, lupus or something of this nature.     6.; Partial thyroidectomy in 1983 for a malignant tumor of the thyroid gland that never required any other intervention.      7.; He used to have radiation treatment for tonsillar inflammation and infection when   he was a child and this led him to develop thyroid cancer.       HEMATOLOGIC/ONCOLOGIC HISTORY:  History of previous dates can be found in a separate document.         On 02/22/2016, he had no  symptoms or signs that would indicate any kidney cancer recurrence.  Given   the mild achiness in his muscles, we went ahead and checked a CPK and an aldolase and also checked a sedimentation rate and vitamin D level.  TSH was also performed.  We asked him to remain on his dose of prednisone of 5 mg a day.  We asked him to initia  te a program of physical activity including going to the gym.  He did not need phlebotomy on this occasion, given hematocrit of 45.  His white count and platelet count remain stable.  He had no palpable splenomegaly.         MEDICATIONS:  The current medication list was reviewed with the patient and updated in the EMR this date per the Medical Assistant.  Medication dosages and frequencies were confirmed to be accurate.        It is important to mention the patient does not use any androgen or androgen replacement medication or anabolic steroids or any kind.        ALLERGIES:     1.  STATINS.    2. DIPHENHYDRAMINE.         SOCIAL HISTORY:  The patient is .  He lives with his wife in [place].  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  He plays violin in   an orchestra and also he is a very avid .  He does not smoke but he had a lot of second-hand smoke through his life.  He drinks 1-3 glasses of wine a week.  He has no use of any recreational drugs.          FAMILY HISTORY:  His father  of alcoh  ol and smoking disorders including heart attack.  His mother is 89 and in good health.  He has no brothers.  He has 1 adopted sister who is in good health with probable rheumatoid arthritis.  His wife is in good health.  He has 2 children, 1 who is in [facility] health due to drug abuse and the other son is in good health. He had another son who  as a consequence of Tylenol intoxication.   No family history of hematological disorder or myeloproliferative disorder.         REVIEW OF SYSTEMS:       General: no fever, no chills, no  fatigue,no weight changes, no lack of appetite.  Eyes: no epiphora, xerophthalmia,conjunctivitis, pain, glaucoma, blurred vision, blindness, secretion, photophobia, proptosis, diplopia.  Ears: no otorrhea, tinnitus, otorrhagia, deafness, pain, vertigo.  Nose: no rhinorrhea, no epistaxis, no alteration in perception of odors, no sinuses pressure.  Mouth: no alteration in gums or teeth,  No ulcers, no difficulty with mastication or deglut ion, no odynophagia.  Neck: no masses or pain, no thyroid alterations, no pain in muscles or arteries, no carotid odynia, no crepitation.  Respiratory: no cough, no sputum production,no dyspnea,no trepopnea, no pleuritic pain,no hemoptysis.  Heart: no syncope, no irregularity, no palpitations, no angina,no orthopnea,no paroxysmal nocturnal dyspnea.  Vascular Venous: no tenderness,no edema,no palpable cords,no postphlebitic syndrome, no skin changes no ulcerations.  Vascular Arterial: no distal ischemia, noclaudication, no gangrene, no neuropathic ischemic pain, no skin ulcers, no paleness no cyanosis.  GI: no dysphagia, no odynophagia, no regurgitation, no heartburn,no indigestion,no nausea,no vomiting,no hematemesis ,no melena,no jaundice,no distention, no obstipation,no enterorrhagia,no proctalgia,no anal  lesions, no changes in bowel habits.  : no frequency, no hesitancy, no hematuria, no discharge,no  pain.  Musculoskeletal: no muscle or tendon pain or inflammation,no  joint pain, no edema, no functional limitation,no fasciculations, no mass.  Neurologic: no headache, no seizures, noalterations on Craneal nerves, no motor deficit, no sensory deficit, normal coordination, no alteration in memory,normal orientation, calculation,normal writting, verbal and written language.  Skin: no rashes,no pruritus no localized lesions.  Psychiatric: no anxiety, no depression,no agitation, no delusions, proper insight.            VITAL SIGNS:   There were no vitals filed for this visit.          PHYSICAL EXAMINATION:                           LABORATORY DATA:Auto Differential   Order: [id number] - Part of Panel Order [id number]   Status:  Final result   Visible to patient:  No (Not Released)   Dx:  Erythrocytosis; Myeloproliferative di...    Ref Range & Units 08:23   WBC 3.40 - 10.80 10*3/mm3 8.81    RBC 4.14 - 5.80 10*6/mm3 5.50    Hemoglobin 13.0 - 17.7 g/dL 15.7    Hematocrit 37.5 - 51.0 % 48.1    MCV 79.0 - 97.0 fL 87.5    MCH 26.6 - 33.0 pg 28.5    MCHC 31.5 - 35.7 g/dL 32.6    RDW 12.3 - 15.4 % 14.4    RDW-SD 37.0 - 54.0 fl 45.7    MPV 6.0 - 12.0 fL 9.5    Platelets 140 - 450 10*3/mm3 227    Neutrophil % 42.7 - 76.0 % 61.7    Lymphocyte % 19.6 - 45.3 % 22.5    Monocyte % 5.0 - 12.0 % 11.6    Eosinophil % 0.3 - 6.2 % 3.3    Basophil % 0.0 - 1.5 % 0.7    Immature Grans % 0.0 - 0.5 % 0.2    Neutrophils, Absolute 1.40 - 7.00 10*3/mm3 5.44    Lymphocytes, Absolute 0.70 - 3.10 10*3/mm3 1.98    Monocytes, Absolute 0.10 - 0.90 10*3/mm3 1.02 Abnormally high     Eosinophils, Absolute 0.00 - 0.40 10*3/mm3 0.29    Basophils, Absolute 0.00 - 0.20 10*3/mm3 0.06    Immature Grans, Absolute 0.00 - 0.05 10*3/mm3 0.02    nRBC 0.0 - 0.0 /100 WBC 0.0    Resulting Agency   [facility]         Specimen Collected: 02/28/19 08:23 Last Resulted: 02/28/19 08:30        Lab Flowsheet      Order Details      View Encounter      Lab and Collection Details      Routing      Result History                             ASSESSMENT/PLAN:   1. This patient has polycythemia vera, JAK2 mutation positive, and has taken in the past Hydrea. He discontinued this medicine because of feeling afraid of side effects even though I never noticed anything in particular related to this medicine.  1. Today his hematocrit is 47 and the patient wants to forego any further phlebotomy at this time unless the hematocrit goes above 50. I pointed out to him the data suggesting that vascular events are more likely to happen in patient's who have hematocrit above  45. The good news is that his white count and platelet count are stable. Therefore I think this will be okay as long as he keeps this in mind and consideration.   2. The patient has history of kidney cancer. He had nephrectomy. He is doing fine from this point of view. He will followup with his urologist in this regard.   3. Delayed healing in the socket of a premolar inferiorly on the right side. It does not look to me like he has osteonecrosis. He required bone meal to be spread into the area to speed up the healing. So far I think he is doing okay but again the healing is taking a longer time.    The patient raised the question about his blood pressure medication. I discussed with him taking lisinopril instead of losartan and he will take 5 mg a day. He is aware of the side effect of this including cough. If he has any he will stop the lisinopril and he will go back onto the losartan.     Otherwise I will review him back in 4 months. He will have monthly visit and if hematocrit is above 48 my opinion is that he must have a phlebotomy 500 cc at that point.

## 2019-06-26 ENCOUNTER — INFUSION (OUTPATIENT)
Dept: ONCOLOGY | Facility: HOSPITAL | Age: 74
End: 2019-06-26

## 2019-06-26 VITALS
OXYGEN SATURATION: 96 % | TEMPERATURE: 97.7 F | DIASTOLIC BLOOD PRESSURE: 87 MMHG | BODY MASS INDEX: 25.01 KG/M2 | SYSTOLIC BLOOD PRESSURE: 136 MMHG | HEART RATE: 59 BPM | WEIGHT: 165 LBS

## 2019-06-26 DIAGNOSIS — D75.1 ERYTHROCYTOSIS: ICD-10-CM

## 2019-06-26 DIAGNOSIS — E03.8 OTHER SPECIFIED HYPOTHYROIDISM: ICD-10-CM

## 2019-06-26 DIAGNOSIS — M35.3 PMR (POLYMYALGIA RHEUMATICA) (HCC): ICD-10-CM

## 2019-06-26 DIAGNOSIS — R42 EPISODIC LIGHTHEADEDNESS: ICD-10-CM

## 2019-06-26 DIAGNOSIS — C64.1 CANCER OF RIGHT KIDNEY (HCC): ICD-10-CM

## 2019-06-26 DIAGNOSIS — D47.1 MYELOPROLIFERATIVE DISORDER (HCC): ICD-10-CM

## 2019-06-26 DIAGNOSIS — Z71.89 ENCOUNTER FOR ANTICOAGULATION DISCUSSION AND COUNSELING: ICD-10-CM

## 2019-06-26 DIAGNOSIS — I10 PRIMARY HYPERTENSION: ICD-10-CM

## 2019-07-17 ENCOUNTER — LAB (OUTPATIENT)
Dept: LAB | Facility: HOSPITAL | Age: 74
End: 2019-07-17

## 2019-07-17 ENCOUNTER — INFUSION (OUTPATIENT)
Dept: ONCOLOGY | Facility: HOSPITAL | Age: 74
End: 2019-07-17

## 2019-07-17 VITALS
TEMPERATURE: 97.8 F | BODY MASS INDEX: 25.13 KG/M2 | OXYGEN SATURATION: 98 % | DIASTOLIC BLOOD PRESSURE: 81 MMHG | SYSTOLIC BLOOD PRESSURE: 130 MMHG | WEIGHT: 165.8 LBS | RESPIRATION RATE: 14 BRPM | HEART RATE: 89 BPM

## 2019-07-17 DIAGNOSIS — D75.1 ERYTHROCYTOSIS: ICD-10-CM

## 2019-07-17 DIAGNOSIS — C64.1 CANCER OF RIGHT KIDNEY (HCC): ICD-10-CM

## 2019-07-17 DIAGNOSIS — D75.1 ERYTHROCYTOSIS: Primary | ICD-10-CM

## 2019-07-17 DIAGNOSIS — D47.1 MYELOPROLIFERATIVE DISORDER (HCC): ICD-10-CM

## 2019-07-17 LAB
BASOPHILS # BLD AUTO: 0.07 10*3/MM3 (ref 0–0.2)
BASOPHILS NFR BLD AUTO: 0.8 % (ref 0–1.5)
DEPRECATED RDW RBC AUTO: 43.4 FL (ref 37–54)
EOSINOPHIL # BLD AUTO: 0.34 10*3/MM3 (ref 0–0.4)
EOSINOPHIL NFR BLD AUTO: 3.9 % (ref 0.3–6.2)
ERYTHROCYTE [DISTWIDTH] IN BLOOD BY AUTOMATED COUNT: 13.5 % (ref 12.3–15.4)
HCT VFR BLD AUTO: 49 % (ref 37.5–51)
HGB BLD-MCNC: 15.5 G/DL (ref 13–17.7)
IMM GRANULOCYTES # BLD AUTO: 0.05 10*3/MM3 (ref 0–0.05)
IMM GRANULOCYTES NFR BLD AUTO: 0.6 % (ref 0–0.5)
LYMPHOCYTES # BLD AUTO: 2 10*3/MM3 (ref 0.7–3.1)
LYMPHOCYTES NFR BLD AUTO: 23 % (ref 19.6–45.3)
MCH RBC QN AUTO: 27.7 PG (ref 26.6–33)
MCHC RBC AUTO-ENTMCNC: 31.6 G/DL (ref 31.5–35.7)
MCV RBC AUTO: 87.5 FL (ref 79–97)
MONOCYTES # BLD AUTO: 0.86 10*3/MM3 (ref 0.1–0.9)
MONOCYTES NFR BLD AUTO: 9.9 % (ref 5–12)
NEUTROPHILS # BLD AUTO: 5.37 10*3/MM3 (ref 1.7–7)
NEUTROPHILS NFR BLD AUTO: 61.8 % (ref 42.7–76)
NRBC BLD AUTO-RTO: 0.2 /100 WBC (ref 0–0.2)
PLATELET # BLD AUTO: 202 10*3/MM3 (ref 140–450)
PMV BLD AUTO: 9.1 FL (ref 6–12)
RBC # BLD AUTO: 5.6 10*6/MM3 (ref 4.14–5.8)
WBC NRBC COR # BLD: 8.69 10*3/MM3 (ref 3.4–10.8)

## 2019-07-17 PROCEDURE — 36415 COLL VENOUS BLD VENIPUNCTURE: CPT | Performed by: INTERNAL MEDICINE

## 2019-07-17 PROCEDURE — 99195 PHLEBOTOMY: CPT | Performed by: INTERNAL MEDICINE

## 2019-07-17 PROCEDURE — 85025 COMPLETE CBC W/AUTO DIFF WBC: CPT | Performed by: INTERNAL MEDICINE

## 2019-07-17 RX ORDER — SODIUM CHLORIDE 9 MG/ML
250 INJECTION, SOLUTION INTRAVENOUS ONCE
Status: CANCELLED | OUTPATIENT
Start: 2019-07-17

## 2019-08-15 RX ORDER — SODIUM CHLORIDE 9 MG/ML
250 INJECTION, SOLUTION INTRAVENOUS ONCE
Status: CANCELLED | OUTPATIENT
Start: 2019-08-15

## 2019-08-21 ENCOUNTER — INFUSION (OUTPATIENT)
Dept: ONCOLOGY | Facility: HOSPITAL | Age: 74
End: 2019-08-21

## 2019-08-21 ENCOUNTER — OFFICE VISIT (OUTPATIENT)
Dept: ONCOLOGY | Facility: CLINIC | Age: 74
End: 2019-08-21

## 2019-08-21 ENCOUNTER — LAB (OUTPATIENT)
Dept: LAB | Facility: HOSPITAL | Age: 74
End: 2019-08-21

## 2019-08-21 VITALS
HEIGHT: 68 IN | RESPIRATION RATE: 12 BRPM | DIASTOLIC BLOOD PRESSURE: 85 MMHG | HEART RATE: 58 BPM | SYSTOLIC BLOOD PRESSURE: 158 MMHG | WEIGHT: 168.9 LBS | BODY MASS INDEX: 25.6 KG/M2 | OXYGEN SATURATION: 100 % | TEMPERATURE: 97.5 F

## 2019-08-21 DIAGNOSIS — D47.1 MYELOPROLIFERATIVE DISORDER (HCC): ICD-10-CM

## 2019-08-21 DIAGNOSIS — D75.1 ERYTHROCYTOSIS: ICD-10-CM

## 2019-08-21 DIAGNOSIS — C64.1 CANCER OF RIGHT KIDNEY (HCC): ICD-10-CM

## 2019-08-21 DIAGNOSIS — C64.1 CANCER OF RIGHT KIDNEY (HCC): Primary | ICD-10-CM

## 2019-08-21 LAB
BASOPHILS # BLD AUTO: 0.12 10*3/MM3 (ref 0–0.2)
BASOPHILS NFR BLD AUTO: 1.4 % (ref 0–1.5)
DEPRECATED RDW RBC AUTO: 42.9 FL (ref 37–54)
EOSINOPHIL # BLD AUTO: 0.43 10*3/MM3 (ref 0–0.4)
EOSINOPHIL NFR BLD AUTO: 4.9 % (ref 0.3–6.2)
ERYTHROCYTE [DISTWIDTH] IN BLOOD BY AUTOMATED COUNT: 13.8 % (ref 12.3–15.4)
HCT VFR BLD AUTO: 46.4 % (ref 37.5–51)
HGB BLD-MCNC: 14.3 G/DL (ref 13–17.7)
IMM GRANULOCYTES # BLD AUTO: 0.08 10*3/MM3 (ref 0–0.05)
IMM GRANULOCYTES NFR BLD AUTO: 0.9 % (ref 0–0.5)
LYMPHOCYTES # BLD AUTO: 1.82 10*3/MM3 (ref 0.7–3.1)
LYMPHOCYTES NFR BLD AUTO: 20.9 % (ref 19.6–45.3)
MCH RBC QN AUTO: 26.4 PG (ref 26.6–33)
MCHC RBC AUTO-ENTMCNC: 30.8 G/DL (ref 31.5–35.7)
MCV RBC AUTO: 85.8 FL (ref 79–97)
MONOCYTES # BLD AUTO: 1.15 10*3/MM3 (ref 0.1–0.9)
MONOCYTES NFR BLD AUTO: 13.2 % (ref 5–12)
NEUTROPHILS # BLD AUTO: 5.1 10*3/MM3 (ref 1.7–7)
NEUTROPHILS NFR BLD AUTO: 58.7 % (ref 42.7–76)
NRBC BLD AUTO-RTO: 0 /100 WBC (ref 0–0.2)
PLATELET # BLD AUTO: 245 10*3/MM3 (ref 140–450)
PMV BLD AUTO: 9.6 FL (ref 6–12)
RBC # BLD AUTO: 5.41 10*6/MM3 (ref 4.14–5.8)
WBC NRBC COR # BLD: 8.7 10*3/MM3 (ref 3.4–10.8)

## 2019-08-21 PROCEDURE — 36415 COLL VENOUS BLD VENIPUNCTURE: CPT | Performed by: INTERNAL MEDICINE

## 2019-08-21 PROCEDURE — 99215 OFFICE O/P EST HI 40 MIN: CPT | Performed by: INTERNAL MEDICINE

## 2019-08-21 PROCEDURE — 85025 COMPLETE CBC W/AUTO DIFF WBC: CPT | Performed by: INTERNAL MEDICINE

## 2019-08-21 RX ORDER — FLUNISOLIDE 0.25 MG/ML
SOLUTION NASAL
COMMUNITY
Start: 2019-08-17 | End: 2019-09-06

## 2019-08-21 RX ORDER — LOSARTAN POTASSIUM 50 MG/1
25 TABLET ORAL DAILY
COMMUNITY
End: 2019-12-18

## 2019-08-21 RX ORDER — MONTELUKAST SODIUM 10 MG/1
10 TABLET ORAL NIGHTLY
COMMUNITY
Start: 2019-08-18

## 2019-08-21 NOTE — PROGRESS NOTES
REASONS FOR FOLLOWUP:       1. Myeloproliferative disorder with polycythemia vera, MAI-2 mutation positive requiring periodic phlebotomies whenever hematocrit is above 45.      2. His  tory of stage I clear cell carcinoma of the right kidney status post partial nephrectomy, nephron sparing surgery by Dr. Ganesh Lopez with no issues or consequences.  The patient has already an appointment to repeat CT scan of the abdomen in March 2016.                3. Patient has history of polymyalgia rheumatica.  He IS OFF 5 mg of prednisone a day.            HISTORY OF PRESENT ILLNESSThis patient returns today to the office for followup after he had an upper respiratory infection.  He was seen by his primary physician and diagnosed with asthma.  He has seen his allergist, who is his friend, and indeed he has been diagnosed with asthma.  According to him, he is allergic to multiple environmental exposures.  The patient has been receiving inhaled steroids and also inhaled nasal medication with resolution of the symptoms and near-complete resolution of the cough.  He is not wheezing.  He denies any chest pain.  His appetite has remained good.  No reflux.  His bowel activity and urination remain normal.  He has not had any other issues in bone or joints.  He remains extremely active.                         PAST MEDICAL HISTORY:    1.;  Hypertension.    2.;   History of polyps in the colon.  Colonoscopy in 2010 documented a tubulovillous adenoma that was removed completely.  Further colonoscopy in 2014 was unremarkable.     3.; History of prostate biopsy in the past that showed features consistent with prostate cancer  .  He has not required any specific therapy for this and he has been monitored through his urologist.    4.; History of multiple hernia repairs.     5.; History of polymyalgia rheumatica that was diagnosed 3 years by Dr. Karan Garrett.  He has been on a tapering dose   of prednisone, even though he  admits that his polymyalgia was atypical because his sedimentation was never elevated.  He has been seen by other rheumatologists in the past as well and has been told he has Raynaud’  s disease.  He has never had diagnosis of rheumatoid arthritis, lupus or something of this nature.     6.; Partial thyroidectomy in 1983 for a malignant tumor of the thyroid gland that never required any other intervention.      7.; He used to have radiation treatment for tonsillar inflammation and infection when   he was a child and this led him to develop thyroid cancer.       HEMATOLOGIC/ONCOLOGIC HISTORY:  History of previous dates can be found in a separate document.         On 02/22/2016, he had no symptoms or signs that would indicate any kidney cancer recurrence.  Given   the mild achiness in his muscles, we went ahead and checked a CPK and an aldolase and also checked a sedimentation rate and vitamin D level.  TSH was also performed.  We asked him to remain on his dose of prednisone of 5 mg a day.  We asked him to initia  te a program of physical activity including going to the gym.  He did not need phlebotomy on this occasion, given hematocrit of 45.  His white count and platelet count remain stable.  He had no palpable splenomegaly.         MEDICATIONS:  The current medication list was reviewed with the patient and updated in the EMR this date per the Medical Assistant.  Medication dosages and frequencies were confirmed to be accurate.        It is important to mention the patient does not use any androgen or androgen replacement medication or anabolic steroids or any kind.        ALLERGIES:     1.  STATINS.    2. DIPHENHYDRAMINE.         SOCIAL HISTORY:  The patient is .  He lives with his wife in [place].  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  He plays violin in   an orchestra and also he is a very avid .  He does not smoke but he had a lot of second-hand smoke  through his life.  He drinks 1-3 glasses of wine a week.  He has no use of any recreational drugs.          FAMILY HISTORY:  His father  of alcoh  ol and smoking disorders including heart attack.  His mother is 89 and in good health.  He has no brothers.  He has 1 adopted sister who is in good health with probable rheumatoid arthritis.  His wife is in good health.  He has 2 children, 1 who is in poo  r health due to drug abuse and the other son is in good health. He had another son who  as a consequence of Tylenol intoxication.   No family history of hematological disorder or myeloproliferative disorder.         REVIEW OF SYSTEMS:       General: no fever, no chills, no fatigue,no weight changes, no lack of appetite.  Eyes: no epiphora, xerophthalmia,conjunctivitis, pain, glaucoma, blurred vision, blindness, secretion, photophobia, proptosis, diplopia.  Ears: no otorrhea, tinnitus, otorrhagia, deafness, pain, vertigo.  Nose: no rhinorrhea, no epistaxis, no alteration in perception of odors, no sinuses pressure.  Mouth: no alteration in gums or teeth,  No ulcers, no difficulty with mastication or deglut ion, no odynophagia.  Neck: no masses or pain, no thyroid alterations, no pain in muscles or arteries, no carotid odynia, no crepitation.  Respiratory:  cough,  sputum production, dyspnea,no trepopnea, no pleuritic pain,no hemoptysis.  Heart: no syncope, no irregularity, no palpitations, no angina,no orthopnea,no paroxysmal nocturnal dyspnea.  Vascular Venous: no tenderness,no edema,no palpable cords,no postphlebitic syndrome, no skin changes no ulcerations.  Vascular Arterial: no distal ischemia, noclaudication, no gangrene, no neuropathic ischemic pain, no skin ulcers, no paleness no cyanosis.  GI: no dysphagia, no odynophagia, no regurgitation, no heartburn,no indigestion,no nausea,no vomiting,no hematemesis ,no melena,no jaundice,no distention, no obstipation,no enterorrhagia,no proctalgia,no anal  lesions,  "no changes in bowel habits.  : no frequency, no hesitancy, no hematuria, no discharge,no  pain.  Musculoskeletal: no muscle or tendon pain or inflammation,no  joint pain, no edema, no functional limitation,no fasciculations, no mass.  Neurologic: no headache, no seizures, noalterations on Craneal nerves, no motor deficit, no sensory deficit, normal coordination, no alteration in memory,normal orientation, calculation,normal writting, verbal and written language.  Skin: no rashes,no pruritus no localized lesions.  Psychiatric: no anxiety, no depression,no agitation, no delusions, proper insight.              VITAL SIGNS:   Vitals:    08/21/19 1006   BP: 158/85   Pulse: 58   Resp: 12   Temp: 97.5 °F (36.4 °C)   TempSrc: Oral   SpO2: 100%   Weight: 76.6 kg (168 lb 14.4 oz)   Height: 173 cm (68.11\")            PHYSICAL EXAMINATION:   GENERAL:  Well-developed, well-nourished  Patient  in no acute distress.   SKIN:  Warm, dry ,NO rashes,NO purpura ,NO petechiae.  HEENT:  Pupils were equal and reactive to light and accomodation, conjunctivas non injected, no pterigion, normal extraocular movements, normal visual acuity.   Mouth mucosa was moist, no exudates in oropharynx, normal gum line, normal roof of the mouth and pillars, normal papillations of the tongue.  NECK:  Supple with good range of motion; no thyromegaly or masses, no JVD or bruits, no cervical adenopathies.No carotid arteries pain, no carotid abnormal pulsation , NO arterial dance.  LYMPHATICS:  No cervical, NO supraclavicular, NO axillary,NO epitrochlear , NO inguinal adenopathy.  CHEST:  Normal excursion of both nelson thoraces, normal voice fremitus, no subcutaneous emphysema, normal axillas, no rashes or acanthosis nigricans. Lungs clear to percussion and auscultation, normal breath sounds bilaterally, no wheezing,NO crackles NO ronchi, NO stridor, NO rubs.  CARDIAC AND VASCULAR:  normal rate and regular rhythm, without murmurs,NO rubs NO S3 NO S4 right or " left . Normal femoral, popliteal, pedis, brachial and carotid pulses.  ABDOMEN:  Soft, nontender with no organomegaly or masses, no ascites, no collateral circulation,no distention,no Vina sign, no abdominal pain, no inguinal hernias,no umbilical hernia, no abdominal bruits. Normal bowel sounds.  GENITAL: Not  Performed.  EXTREMITIES  AND SPINE:  No clubbing, cyanosis or edema, no deformities or pain .No kyphosis, scoliosis, deformities or pain in spine, ribs or pelvic bone.  NEUROLOGICAL:  Patient was awake, alert, oriented to time, person and place.                  LABORATORY DATA:Contains abnormal data CBC Auto Differential   Order: [id number] - Part of Panel Order [id number]   Status:  Final result   Visible to patient:  No (Not Released)   Dx:  Erythrocytosis; Myeloproliferative di...   Component  Ref Range & Units 09:44   WBC  3.40 - 10.80 10*3/mm3 8.70    RBC  4.14 - 5.80 10*6/mm3 5.41    Hemoglobin  13.0 - 17.7 g/dL 14.3    Hematocrit  37.5 - 51.0 % 46.4    MCV  79.0 - 97.0 fL 85.8    MCH  26.6 - 33.0 pg 26.4 Abnormally low     MCHC  31.5 - 35.7 g/dL 30.8 Abnormally low     RDW  12.3 - 15.4 % 13.8    RDW-SD  37.0 - 54.0 fl 42.9    MPV  6.0 - 12.0 fL 9.6    Platelets  140 - 450 10*3/mm3 245    Neutrophil %  42.7 - 76.0 % 58.7    Lymphocyte %  19.6 - 45.3 % 20.9    Monocyte %  5.0 - 12.0 % 13.2 Abnormally high     Eosinophil %  0.3 - 6.2 % 4.9    Basophil %  0.0 - 1.5 % 1.4    Immature Grans %  0.0 - 0.5 % 0.9 Abnormally high     Neutrophils, Absolute  1.70 - 7.00 10*3/mm3 5.10    Lymphocytes, Absolute  0.70 - 3.10 10*3/mm3 1.82    Monocytes, Absolute  0.10 - 0.90 10*3/mm3 1.15 Abnormally high     Eosinophils, Absolute  0.00 - 0.40 10*3/mm3 0.43 Abnormally high     Basophils, Absolute  0.00 - 0.20 10*3/mm3 0.12    Immature Grans, Absolute  0.00 - 0.05 10*3/mm3 0.08 Abnormally high     nRBC  0.0 - 0.2 /100 WBC 0.0    Resulting Agency  [facility]         Specimen Collected: 08/21/19 09:44 Last Resulted: 08/21/19  09:58        Lab Flowsheet      Order Details      View Encounter      Lab and Collection Details      Routing      Result History                             ASSESSMENT/PLAN:   1. This patient has polycythemia vera, JAK2 mutation positive, and has taken in the past Hydrea. He discontinued this medicine because of feeling afraid of side effects even though I never noticed anything in particular related to this medicine.   Today his hematocrit is 46 and the patient wants to forego any further phlebotomy at this time unless the hematocrit goes above 50. I pointed out to him the data suggesting that vascular events are more likely to happen in patient's who have hematocrit above 45. The good news is that his white count and platelet count are stable. Therefore I think this will be okay as long as he keeps this in mind and consideration.   2. The patient has history of kidney cancer. He had nephrectomy. He is doing fine from this point of view. He will followup with his urologist in this regard.     3.The patient, after another episode of respiratory infection, was seen by his primary physician and chest x-ray was done that showed atelectasis in the bases suggestive of arteriosclerosis in the aorta.  He is worried about more issues.  He wants me to pursue more radiological analysis and I do believe it is appropriate.  Now that we know he has asthma; it is kind of unusual to develop asthma late in life, but it seems to me that is the case.  He has no reflux at this time.     We advised him to proceed with a CT scan of the chest with contrast and we will review this with him on the telephone in a few days.    He will return to the office on a monthly basis for nurse check, CBC and phlebotomy if hematocrit is above 46.  The patient otherwise will be reviewed by me in 4 months.    I also went ahead and discussed issues regarding vasculature.  I checked his popliteal, femoral, pedis and carotid pulses with all of them normal  with no bruits at any level.  He is worried about peripheral circulation. I do not believe that with this kind of clinical examination the patient needs to have Doppler studies of the arterial system.  Again, all these facts will be reviewed with him on the telephone.

## 2019-08-23 ENCOUNTER — HOSPITAL ENCOUNTER (OUTPATIENT)
Dept: PET IMAGING | Facility: HOSPITAL | Age: 74
Discharge: HOME | End: 2019-08-23
Admitting: INTERNAL MEDICINE

## 2019-08-23 DIAGNOSIS — D75.1 ERYTHROCYTOSIS: ICD-10-CM

## 2019-08-23 DIAGNOSIS — C64.1 CANCER OF RIGHT KIDNEY (HCC): ICD-10-CM

## 2019-08-23 DIAGNOSIS — D47.1 MYELOPROLIFERATIVE DISORDER (HCC): ICD-10-CM

## 2019-08-23 LAB — CREAT BLDA-MCNC: 1.5 MG/DL (ref 0.6–1.3)

## 2019-08-23 PROCEDURE — 25010000002 IOPAMIDOL 61 % SOLUTION: Performed by: INTERNAL MEDICINE

## 2019-08-23 PROCEDURE — 82565 ASSAY OF CREATININE: CPT

## 2019-08-23 PROCEDURE — 71260 CT THORAX DX C+: CPT

## 2019-08-23 RX ADMIN — IOPAMIDOL 75 ML: 612 INJECTION, SOLUTION INTRAVENOUS at 13:54

## 2019-08-28 ENCOUNTER — TELEPHONE (OUTPATIENT)
Dept: ONCOLOGY | Facility: CLINIC | Age: 74
End: 2019-08-28

## 2019-08-28 NOTE — TELEPHONE ENCOUNTER
THORACIC CT SCAN WITH CONTRAST     HISTORY: Cough SOB; C64.1-Malignant neoplasm of right kidney, except  renal pelvis; D47.1-Chronic myeloproliferative disease; D75.1-Secondary  polycythemia.      COMPARISON: 02/28/2018     TECHNIQUE:  Radiation dose reduction techniques were utilized, including  automated exposure control and exposure modulation based on body size.  Axial images of the thorax obtained with IV contrast.      FINDINGS: There are calcified residua of granulomatous disease present.  No active airspace disease, edema, or significant pleural fluid. The  ascending aorta is mildly aneurysmal at 4.2 x 4 cm diameter. It  previously measured 4 x 3.9 cm. Descending aorta is normal in caliber.     Images of the included upper abdomen shows a stable upper pole right  renal cyst.     IMPRESSION:  1. Mild aneurysmal dilatation of the ascending aorta with dimensions as  above.  2. Stable tiny right renal cyst.  3. No acute inflammatory process or abnormal enhancement.            I have called the patient today in regard to his CT scan of the chest done a few days ago that discloses no evidence of pulmonary infiltrates, no effusions, no hilar or mediastinal adenopathies, no cardiomegaly and no alterations in the bone anatomy. On the other hand the radiologist called the aorta, especially the ascending aorta dilated consistent with diagnosis of an aneurysm. The numbers are mildly changed from the previous CT scan by 1 mm. I would like the patient to be seen by his Cardiologist, Dr. Roberts, at some point and have an opinion. He agrees with that.

## 2019-08-29 ENCOUNTER — TELEPHONE (OUTPATIENT)
Dept: ONCOLOGY | Facility: HOSPITAL | Age: 74
End: 2019-08-29

## 2019-08-29 DIAGNOSIS — I71.21 ASCENDING AORTIC ANEURYSM (HCC): Primary | ICD-10-CM

## 2019-08-29 NOTE — TELEPHONE ENCOUNTER
"Pt called asking \"Is it ok for him to play tennis once a week until he see the cardiologist\"    Per Dr. Damon, it is ok.  Pt notified.  V/u.   "

## 2019-09-06 ENCOUNTER — OFFICE VISIT (OUTPATIENT)
Dept: CARDIOLOGY | Facility: CLINIC | Age: 74
End: 2019-09-06

## 2019-09-06 VITALS
DIASTOLIC BLOOD PRESSURE: 78 MMHG | WEIGHT: 166 LBS | SYSTOLIC BLOOD PRESSURE: 128 MMHG | BODY MASS INDEX: 25.16 KG/M2 | HEIGHT: 68 IN | HEART RATE: 58 BPM

## 2019-09-06 DIAGNOSIS — I73.00 RAYNAUD'S DISEASE WITHOUT GANGRENE: ICD-10-CM

## 2019-09-06 DIAGNOSIS — M35.3 PMR (POLYMYALGIA RHEUMATICA) (HCC): ICD-10-CM

## 2019-09-06 DIAGNOSIS — I45.10 RBBB: ICD-10-CM

## 2019-09-06 DIAGNOSIS — I71.20 THORACIC AORTIC ANEURYSM WITHOUT RUPTURE (HCC): Primary | ICD-10-CM

## 2019-09-06 DIAGNOSIS — E03.8 OTHER SPECIFIED HYPOTHYROIDISM: ICD-10-CM

## 2019-09-06 DIAGNOSIS — I10 PRIMARY HYPERTENSION: ICD-10-CM

## 2019-09-06 PROCEDURE — 93000 ELECTROCARDIOGRAM COMPLETE: CPT | Performed by: INTERNAL MEDICINE

## 2019-09-06 PROCEDURE — 99214 OFFICE O/P EST MOD 30 MIN: CPT | Performed by: INTERNAL MEDICINE

## 2019-09-06 NOTE — PROGRESS NOTES
Subjective:     Encounter Date:09/06/2019      Patient ID: Scott Murphy is a 74 y.o. male.    Chief Complaint:  History of Present Illness    This is a 74-year-old with a history of polycythemia vera, polymyalgia rheumatica, history of renal cell carcinoma status post partial nephrectomy, hypertension, hypothyroidism, Raynaud's disease, who presents for evaluation of an aortic aneurysm.       I saw the patient initially in 1/2018 when he presented for evaluation of an abnormal EKG.  Prior to that office visit the patient underwent his yearly Medicare physical with Dr. Delgado at which time he had a routine EKG performed that showed a right bundle branch block that was felt to be new.  Based on this finding he was sent here for further evaluation.  Ten years prior he was evaluated by Dr. Jarrett complaints of chest pain and underwent an echocardiogram, stress test, and a cardiac catheterization that were all reportedly unremarkable (records are not available from that workup).  At his initial office visit with me he denied any symptoms and was active playing tennis about 2 or 3 times a week.    At that office visit I noted that he had a similar appearing right bundle branch block on EKG from 1/2014.  Recommended proceeding with an echocardiogram to look for any structural heart disease with his right bundle branch block.  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  I was out of the office at the time that the echocardiogram was performed and resulted and these results were communicated to the patient at that time however it does not appear that he was told about the aortic dilatation.    More  recently the patient has been having issues with shortness of breath and cough.  He ended up undergoing a chest x-ray was ordered by Dr. Delgado that showed no acute pulmonary disease but did show evidence of aortic arterial sclerosis.  He saw Dr. Damon in routine follow-up and mention these findings to him and with his recent dyspnea symptoms and these findings he ordered a CT of the chest.  This was performed on 8/23/2019 and it showed mild aneurysmal dilatation of the ascending aorta measuring 4.2 x 4 cm compared to a measurement of 4 x 3.9 cm in 2/2018.  He was sent here for further evaluation.    The patient reports that he has been diagnosed with asthma after being evaluated by a pulmonologist.  With treatment he reports some improvement in his symptoms.  He continues to play tennis 2-3 times a week and plays golf on a regular basis.  He denies any significant symptoms with this and is able to do a little bit more now that his asthma is being treated.  He denies any significant chest pain, palpitations, PND or orthopnea, or lower extremity edema.    Review of Systems   Constitution: Positive for malaise/fatigue. Negative for weakness.   HENT: Negative for hearing loss, hoarse voice, nosebleeds and sore throat.    Eyes: Negative for pain.   Cardiovascular: Negative for chest pain, claudication, cyanosis, dyspnea on exertion, irregular heartbeat, leg swelling, near-syncope, orthopnea, palpitations, paroxysmal nocturnal dyspnea and syncope.   Respiratory: Positive for cough and shortness of breath. Negative for snoring.    Endocrine: Negative for cold intolerance, heat intolerance, polydipsia, polyphagia and polyuria.   Skin: Negative for itching and rash.   Musculoskeletal: Negative for arthritis, falls, joint pain, joint swelling, muscle cramps, muscle weakness and myalgias.   Gastrointestinal: Negative for constipation, diarrhea, dysphagia, heartburn, hematemesis, hematochezia, melena, nausea and vomiting.    Genitourinary: Negative for frequency, hematuria and hesitancy.   Neurological: Positive for light-headedness. Negative for excessive daytime sleepiness, dizziness, headaches and numbness.   Psychiatric/Behavioral: Negative for depression. The patient is not nervous/anxious.           Current Outpatient Medications:   •  B Complex Vitamins (VITAMIN B-COMPLEX PO), Take 1 tablet by mouth Daily., Disp: , Rfl:   •  guaifenesin-codeine (GUAIFENESIN AC) 100-10 MG/5ML liquid, 5 ML EVERY 8 HOURS AS NEEDED FOR COUGH, Disp: 120 mL, Rfl: 0  •  levothyroxine (SYNTHROID, LEVOTHROID) 100 MCG tablet, Take 1 tablet by mouth daily., Disp: 30 tablet, Rfl: 6  •  losartan (COZAAR) 50 MG tablet, Take 25 mg by mouth Daily., Disp: , Rfl:   •  montelukast (SINGULAIR) 10 MG tablet, Take 10 mg by mouth Every Night., Disp: , Rfl:     Past Medical History:   Diagnosis Date   • Acquired hypothyroidism    • Anxiety    • Arthritis    • Asthma    • Baker's cyst of knee, right    • Disease of thyroid gland    • Gout    • H/O Muscle pain     Right shoulder and neck area   • H/O Radiation treatment     For tonsillar inflammation and infection when he was a child and this led him to develop thyroid cancer.   • History of colon polyps    • Hypertension    • Hyperthyroidism    • Hypothyroidism    • Kidney carcinoma (CMS/HCC)     H/O stage I clear cell carcinoma of the right kidney, status post partial nephrectomy, nephron-sparing surgery by Dr. Ganesh Lopez   • Left shoulder pain    • Myeloproliferative disorder (CMS/HCC)     With polycythemia vera, JAK2 mutation positive requiring periodic phlebotomies (hematocrit above 47).   • Polymyalgia rheumatica (CMS/HCC)    • Prostate cancer (CMS/HCC)    • Raynaud disease    • Shoulder injury, left, sequela      Past Surgical History:   Procedure Laterality Date   • CIRCUMCISION N/A 12/17/2018    Procedure: CIRCUMCISION;  Surgeon: Gallo Lopez MD;  [facility];  Service: Urology   •  "COLONOSCOPY  2010    Documented a tubulovillous adenom that was removed completely. Colonoscopy in 2014 was unremarkable.   • HERNIA REPAIR      H/O multiple hernia repairs including right inguinal hernia repair in 1981, 2003, and double hernia repair in 2013   • KIDNEY SURGERY     • PROSTATE BIOPSY      Showed features consistent with prostate cancer   • SHOULDER SURGERY  1995    Shoulder repair   • THYROIDECTOMY, PARTIAL  1983    For malignant tumor   • TOOTH EXTRACTION       Family History   Problem Relation Age of Onset   • No Known Problems Mother    • Heart attack Father    • Heart disease Father    • Hypertension Father    • Diabetes Father    • Tuberculosis Maternal Grandmother    • No Known Problems Son    • Drug abuse Son    • Malig Hyperthermia Neg Hx      Social History     Tobacco Use   • Smoking status: Never Smoker   • Smokeless tobacco: Never Used   Substance Use Topics   • Alcohol use: Yes     Alcohol/week: 0.6 - 1.8 oz     Types: 1 - 3 Glasses of wine per week     Comment: 1-3 glasses of wine a week   • Drug use: No           ECG 12 Lead  Date/Time: 9/6/2019 2:08 PM  Performed by: Di Roberts MD  Authorized by: Di Roberts MD   Comparison: compared with previous ECG   Comparison to previous ECG: PAC is new  Rhythm: sinus rhythm  Ectopy: atrial premature contractions  Conduction: right bundle branch block               Objective:         Visit Vitals  /78   Pulse 58   Ht 173 cm (68.11\")   Wt 75.3 kg (166 lb)   BMI 25.16 kg/m²          Physical Exam   Constitutional: He is oriented to person, place, and time. He appears well-developed and well-nourished.   HENT:   Head: Normocephalic and atraumatic.   Neck: No JVD present. Carotid bruit is not present.   Cardiovascular: Normal rate, regular rhythm, S1 normal and S2 normal. Exam reveals no gallop.   No murmur heard.  Pulses:       Radial pulses are 2+ on the right side, and 2+ on the left side.   No bilateral lower extremity edema "   Pulmonary/Chest: Effort normal and breath sounds normal.   Abdominal: Soft. Normal appearance.   Neurological: He is alert and oriented to person, place, and time.   Skin: Skin is warm, dry and intact.   Psychiatric: He has a normal mood and affect.       Lab Review:       Assessment:          Diagnosis Plan   1. Thoracic aortic aneurysm without rupture (CMS/[facility])  Adult Transthoracic Echo Complete W/ Cont if Necessary Per Protocol   2. RBBB     3. Primary hypertension     4. Raynaud's disease without gangrene     5. Other specified hypothyroidism     6. PMR (polymyalgia rheumatica) (CMS/[facility])            Plan:       1.  Ascending aortic aneurysm.  Mildly dilated.  Since last year he has had minimal growth.  I went over the findings at length with the patient and answered all his questions.  I explained that the aerobic exercise was fine to continue and encouraged.  Cautioned him against any heavy weight lifting.  He has good blood pressure control on losartan.  Would not recommend beta-blockers with his well-controlled blood pressures, relatively low baseline heart rates and history of Raynauds.  We will plan on a repeat echocardiogram in 1 year as part of his follow-up.  Would like to avoid annual CTs since he gets annual abdominal CTs for his history of renal carcinoma.  2.  Chronic right bundle branch block.  Not associated with any structural heart disease.  3.  Hypertension.  Well controlled on his current medications.  4.  Raynauds disease  5.  Polymyalgia rheumatica  6.  Polycythemia vera  7.  Hypothyroidism    We will plan on seeing the patient back again in 1 year with a repeat echocardiogram.

## 2019-09-19 ENCOUNTER — INFUSION (OUTPATIENT)
Dept: ONCOLOGY | Facility: HOSPITAL | Age: 74
End: 2019-09-19

## 2019-09-19 ENCOUNTER — APPOINTMENT (OUTPATIENT)
Dept: ONCOLOGY | Facility: HOSPITAL | Age: 74
End: 2019-09-19

## 2019-09-19 ENCOUNTER — LAB (OUTPATIENT)
Dept: LAB | Facility: HOSPITAL | Age: 74
End: 2019-09-19

## 2019-09-19 ENCOUNTER — APPOINTMENT (OUTPATIENT)
Dept: LAB | Facility: HOSPITAL | Age: 74
End: 2019-09-19

## 2019-09-19 DIAGNOSIS — D75.1 ERYTHROCYTOSIS: ICD-10-CM

## 2019-09-19 DIAGNOSIS — D47.1 MYELOPROLIFERATIVE DISORDER (HCC): ICD-10-CM

## 2019-09-19 DIAGNOSIS — C64.1 CANCER OF RIGHT KIDNEY (HCC): ICD-10-CM

## 2019-09-19 LAB
BASOPHILS # BLD AUTO: 0.06 10*3/MM3 (ref 0–0.2)
BASOPHILS NFR BLD AUTO: 0.7 % (ref 0–1.5)
DEPRECATED RDW RBC AUTO: 43.8 FL (ref 37–54)
EOSINOPHIL # BLD AUTO: 0.37 10*3/MM3 (ref 0–0.4)
EOSINOPHIL NFR BLD AUTO: 4.4 % (ref 0.3–6.2)
ERYTHROCYTE [DISTWIDTH] IN BLOOD BY AUTOMATED COUNT: 14.6 % (ref 12.3–15.4)
HCT VFR BLD AUTO: 46.4 % (ref 37.5–51)
HGB BLD-MCNC: 14.5 G/DL (ref 13–17.7)
IMM GRANULOCYTES # BLD AUTO: 0.06 10*3/MM3 (ref 0–0.05)
IMM GRANULOCYTES NFR BLD AUTO: 0.7 % (ref 0–0.5)
LYMPHOCYTES # BLD AUTO: 1.53 10*3/MM3 (ref 0.7–3.1)
LYMPHOCYTES NFR BLD AUTO: 18.1 % (ref 19.6–45.3)
MCH RBC QN AUTO: 25.9 PG (ref 26.6–33)
MCHC RBC AUTO-ENTMCNC: 31.3 G/DL (ref 31.5–35.7)
MCV RBC AUTO: 83 FL (ref 79–97)
MONOCYTES # BLD AUTO: 0.7 10*3/MM3 (ref 0.1–0.9)
MONOCYTES NFR BLD AUTO: 8.3 % (ref 5–12)
NEUTROPHILS # BLD AUTO: 5.75 10*3/MM3 (ref 1.7–7)
NEUTROPHILS NFR BLD AUTO: 67.8 % (ref 42.7–76)
NRBC BLD AUTO-RTO: 0 /100 WBC (ref 0–0.2)
PLATELET # BLD AUTO: 237 10*3/MM3 (ref 140–450)
PMV BLD AUTO: 9.8 FL (ref 6–12)
RBC # BLD AUTO: 5.59 10*6/MM3 (ref 4.14–5.8)
WBC NRBC COR # BLD: 8.47 10*3/MM3 (ref 3.4–10.8)

## 2019-09-19 PROCEDURE — 85025 COMPLETE CBC W/AUTO DIFF WBC: CPT | Performed by: INTERNAL MEDICINE

## 2019-09-19 PROCEDURE — 36415 COLL VENOUS BLD VENIPUNCTURE: CPT | Performed by: INTERNAL MEDICINE

## 2019-09-19 NOTE — PROGRESS NOTES
phlebo held. hct 46.4. Pt states he does not get a phlebo at that level. Copy of labs sent with pt.

## 2019-10-17 ENCOUNTER — LAB (OUTPATIENT)
Dept: LAB | Facility: HOSPITAL | Age: 74
End: 2019-10-17

## 2019-10-17 ENCOUNTER — INFUSION (OUTPATIENT)
Dept: ONCOLOGY | Facility: HOSPITAL | Age: 74
End: 2019-10-17

## 2019-10-17 VITALS
TEMPERATURE: 97.5 F | RESPIRATION RATE: 16 BRPM | DIASTOLIC BLOOD PRESSURE: 67 MMHG | WEIGHT: 171.6 LBS | HEART RATE: 65 BPM | OXYGEN SATURATION: 97 % | BODY MASS INDEX: 26.01 KG/M2 | SYSTOLIC BLOOD PRESSURE: 101 MMHG

## 2019-10-17 DIAGNOSIS — C64.1 CANCER OF RIGHT KIDNEY (HCC): ICD-10-CM

## 2019-10-17 DIAGNOSIS — D75.1 ERYTHROCYTOSIS: Primary | ICD-10-CM

## 2019-10-17 DIAGNOSIS — D47.1 MYELOPROLIFERATIVE DISORDER (HCC): ICD-10-CM

## 2019-10-17 DIAGNOSIS — D75.1 ERYTHROCYTOSIS: ICD-10-CM

## 2019-10-17 LAB
BASOPHILS # BLD AUTO: 0.11 10*3/MM3 (ref 0–0.2)
BASOPHILS NFR BLD AUTO: 1 % (ref 0–1.5)
DEPRECATED RDW RBC AUTO: 45.8 FL (ref 37–54)
EOSINOPHIL # BLD AUTO: 0.28 10*3/MM3 (ref 0–0.4)
EOSINOPHIL NFR BLD AUTO: 2.7 % (ref 0.3–6.2)
ERYTHROCYTE [DISTWIDTH] IN BLOOD BY AUTOMATED COUNT: 15.5 % (ref 12.3–15.4)
HCT VFR BLD AUTO: 47.9 % (ref 37.5–51)
HGB BLD-MCNC: 15 G/DL (ref 13–17.7)
IMM GRANULOCYTES # BLD AUTO: 0.04 10*3/MM3 (ref 0–0.05)
IMM GRANULOCYTES NFR BLD AUTO: 0.4 % (ref 0–0.5)
LYMPHOCYTES # BLD AUTO: 2.05 10*3/MM3 (ref 0.7–3.1)
LYMPHOCYTES NFR BLD AUTO: 19.4 % (ref 19.6–45.3)
MCH RBC QN AUTO: 25.6 PG (ref 26.6–33)
MCHC RBC AUTO-ENTMCNC: 31.3 G/DL (ref 31.5–35.7)
MCV RBC AUTO: 81.9 FL (ref 79–97)
MONOCYTES # BLD AUTO: 1.25 10*3/MM3 (ref 0.1–0.9)
MONOCYTES NFR BLD AUTO: 11.8 % (ref 5–12)
NEUTROPHILS # BLD AUTO: 6.82 10*3/MM3 (ref 1.7–7)
NEUTROPHILS NFR BLD AUTO: 64.7 % (ref 42.7–76)
NRBC BLD AUTO-RTO: 0 /100 WBC (ref 0–0.2)
PLATELET # BLD AUTO: 239 10*3/MM3 (ref 140–450)
PMV BLD AUTO: 9 FL (ref 6–12)
RBC # BLD AUTO: 5.85 10*6/MM3 (ref 4.14–5.8)
WBC NRBC COR # BLD: 10.55 10*3/MM3 (ref 3.4–10.8)

## 2019-10-17 PROCEDURE — 85025 COMPLETE CBC W/AUTO DIFF WBC: CPT

## 2019-10-17 PROCEDURE — 36416 COLLJ CAPILLARY BLOOD SPEC: CPT

## 2019-10-17 RX ORDER — SODIUM CHLORIDE 9 MG/ML
250 INJECTION, SOLUTION INTRAVENOUS ONCE
Status: DISCONTINUED | OUTPATIENT
Start: 2019-10-17 | End: 2019-10-17 | Stop reason: HOSPADM

## 2019-10-17 RX ORDER — SODIUM CHLORIDE 9 MG/ML
250 INJECTION, SOLUTION INTRAVENOUS ONCE
Status: CANCELLED | OUTPATIENT
Start: 2019-10-17

## 2019-11-14 ENCOUNTER — APPOINTMENT (OUTPATIENT)
Dept: ONCOLOGY | Facility: HOSPITAL | Age: 74
End: 2019-11-14

## 2019-11-14 ENCOUNTER — APPOINTMENT (OUTPATIENT)
Dept: LAB | Facility: HOSPITAL | Age: 74
End: 2019-11-14

## 2019-12-18 ENCOUNTER — OFFICE VISIT (OUTPATIENT)
Dept: ONCOLOGY | Facility: CLINIC | Age: 74
End: 2019-12-18

## 2019-12-18 ENCOUNTER — LAB (OUTPATIENT)
Dept: LAB | Facility: HOSPITAL | Age: 74
End: 2019-12-18

## 2019-12-18 ENCOUNTER — APPOINTMENT (OUTPATIENT)
Dept: ONCOLOGY | Facility: HOSPITAL | Age: 74
End: 2019-12-18

## 2019-12-18 VITALS
WEIGHT: 170.5 LBS | TEMPERATURE: 97.7 F | HEIGHT: 68 IN | OXYGEN SATURATION: 99 % | HEART RATE: 57 BPM | SYSTOLIC BLOOD PRESSURE: 154 MMHG | BODY MASS INDEX: 25.84 KG/M2 | RESPIRATION RATE: 12 BRPM | DIASTOLIC BLOOD PRESSURE: 84 MMHG

## 2019-12-18 DIAGNOSIS — C64.1 CANCER OF RIGHT KIDNEY (HCC): ICD-10-CM

## 2019-12-18 DIAGNOSIS — D47.1 MYELOPROLIFERATIVE DISORDER (HCC): ICD-10-CM

## 2019-12-18 DIAGNOSIS — D75.1 ERYTHROCYTOSIS: ICD-10-CM

## 2019-12-18 DIAGNOSIS — C64.1 CANCER OF RIGHT KIDNEY (HCC): Primary | ICD-10-CM

## 2019-12-18 LAB
BASOPHILS # BLD AUTO: 0.09 10*3/MM3 (ref 0–0.2)
BASOPHILS NFR BLD AUTO: 0.9 % (ref 0–1.5)
DEPRECATED RDW RBC AUTO: 46.5 FL (ref 37–54)
EOSINOPHIL # BLD AUTO: 0.22 10*3/MM3 (ref 0–0.4)
EOSINOPHIL NFR BLD AUTO: 2.2 % (ref 0.3–6.2)
ERYTHROCYTE [DISTWIDTH] IN BLOOD BY AUTOMATED COUNT: 18.5 % (ref 12.3–15.4)
HCT VFR BLD AUTO: 45.9 % (ref 37.5–51)
HGB BLD-MCNC: 13.9 G/DL (ref 13–17.7)
IMM GRANULOCYTES # BLD AUTO: 0.06 10*3/MM3 (ref 0–0.05)
IMM GRANULOCYTES NFR BLD AUTO: 0.6 % (ref 0–0.5)
LYMPHOCYTES # BLD AUTO: 1.65 10*3/MM3 (ref 0.7–3.1)
LYMPHOCYTES NFR BLD AUTO: 16.4 % (ref 19.6–45.3)
MCH RBC QN AUTO: 25 PG (ref 26.6–33)
MCHC RBC AUTO-ENTMCNC: 30.3 G/DL (ref 31.5–35.7)
MCV RBC AUTO: 82.7 FL (ref 79–97)
MONOCYTES # BLD AUTO: 0.98 10*3/MM3 (ref 0.1–0.9)
MONOCYTES NFR BLD AUTO: 9.7 % (ref 5–12)
NEUTROPHILS # BLD AUTO: 7.07 10*3/MM3 (ref 1.7–7)
NEUTROPHILS NFR BLD AUTO: 70.2 % (ref 42.7–76)
NRBC BLD AUTO-RTO: 0 /100 WBC (ref 0–0.2)
PLATELET # BLD AUTO: 264 10*3/MM3 (ref 140–450)
PMV BLD AUTO: 9.4 FL (ref 6–12)
RBC # BLD AUTO: 5.55 10*6/MM3 (ref 4.14–5.8)
WBC NRBC COR # BLD: 10.07 10*3/MM3 (ref 3.4–10.8)

## 2019-12-18 PROCEDURE — 36415 COLL VENOUS BLD VENIPUNCTURE: CPT

## 2019-12-18 PROCEDURE — 85025 COMPLETE CBC W/AUTO DIFF WBC: CPT

## 2019-12-18 PROCEDURE — 99215 OFFICE O/P EST HI 40 MIN: CPT | Performed by: INTERNAL MEDICINE

## 2019-12-18 RX ORDER — ZOLPIDEM TARTRATE 10 MG/1
TABLET ORAL
COMMUNITY
Start: 2019-09-26

## 2019-12-18 RX ORDER — AMLODIPINE BESYLATE 5 MG/1
5 TABLET ORAL DAILY
COMMUNITY
Start: 2019-12-10 | End: 2020-12-09

## 2019-12-18 NOTE — PROGRESS NOTES
REASONS FOR FOLLOWUP:       1. Myeloproliferative disorder with polycythemia vera, MAI-2 mutation positive requiring periodic phlebotomies whenever hematocrit is above 45.      2. His  tory of stage I clear cell carcinoma of the right kidney status post partial nephrectomy, nephron sparing surgery by Dr. Ganesh Lopez with no issues or consequences.  The patient has already an appointment to repeat CT scan of the abdomen in March 2016.                3. Patient has history of polymyalgia rheumatica.  He IS OFF 5 mg of prednisone a day.            HISTORY OF PRESENT ILLNESS  This patient returns today to the office for followup. He is here today after discussing with him in 08/2019 or 09/2019, CT scan of the chest that was done because of respiratory symptomatology and the fact that he had a minimal dilatation of the ascending aorta. The radiologist called this an aortic aneurysm. Subsequently, he was seen by Cardiology. Recommendation was made for him to return back in a year. He is very worried that this is going to turn out to be a problem and he is asking for a referral for Cardiothoracic Surgery. The patient has occasional chest discomfort. He continues having his asthma-like symptoms with wheezing and cough time to time. He has no sputum production at this time. He has no pleuritic pain. He has no pulse sensation of palpitations even though he is aware that he has PACs. His appetite has been very good. His bowel activity is normal. Urination is normal. His PSA according to his report by Dr. Lopez has been fine. He has not had any issues in lower extremities. He has had Raynaud’s in his hands and he has been placed on Norvasc by the patient’s rheumatologist. This has had a lot of improvement in regard to the symptomatology pertinent to this.                         PAST MEDICAL HISTORY:    1.;  Hypertension.    2.;   History of polyps in the colon.  Colonoscopy in 2010 documented a tubulovillous  adenoma that was removed completely.  Further colonoscopy in 2014 was unremarkable.     3.; History of prostate biopsy in the past that showed features consistent with prostate cancer  .  He has not required any specific therapy for this and he has been monitored through his urologist.    4.; History of multiple hernia repairs.     5.; History of polymyalgia rheumatica that was diagnosed 3 years by Dr. Karan Garrett.  He has been on a tapering dose   of prednisone, even though he admits that his polymyalgia was atypical because his sedimentation was never elevated.  He has been seen by other rheumatologists in the past as well and has been told he has Raynaud’  s disease.  He has never had diagnosis of rheumatoid arthritis, lupus or something of this nature.     6.; Partial thyroidectomy in 1983 for a malignant tumor of the thyroid gland that never required any other intervention.      7.; He used to have radiation treatment for tonsillar inflammation and infection when   he was a child and this led him to develop thyroid cancer.       HEMATOLOGIC/ONCOLOGIC HISTORY:  History of previous dates can be found in a separate document.         On 02/22/2016, he had no symptoms or signs that would indicate any kidney cancer recurrence.  Given   the mild achiness in his muscles, we went ahead and checked a CPK and an aldolase and also checked a sedimentation rate and vitamin D level.  TSH was also performed.  We asked him to remain on his dose of prednisone of 5 mg a day.  We asked him to initia  te a program of physical activity including going to the gym.  He did not need phlebotomy on this occasion, given hematocrit of 45.  His white count and platelet count remain stable.  He had no palpable splenomegaly.         MEDICATIONS:  The current medication list was reviewed with the patient and updated in the EMR this date per the Medical Assistant.  Medication dosages and frequencies were confirmed to be accurate.        It  is important to mention the patient does not use any androgen or androgen replacement medication or anabolic steroids or any kind.        ALLERGIES:     1.  STATINS.    2. DIPHENHYDRAMINE.         SOCIAL HISTORY:  The patient is .  He lives with his wife in [place].  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  He plays violin in   an orchestra and also he is a very avid .  He does not smoke but he had a lot of second-hand smoke through his life.  He drinks 1-3 glasses of wine a week.  He has no use of any recreational drugs.          FAMILY HISTORY:  His father  of alcoh  ol and smoking disorders including heart attack.  His mother is 89 and in good health.  He has no brothers.  He has 1 adopted sister who is in good health with probable rheumatoid arthritis.  His wife is in good health.  He has 2 children, 1 who is in poo  r health due to drug abuse and the other son is in good health. He had another son who  as a consequence of Tylenol intoxication.   No family history of hematological disorder or myeloproliferative disorder.         REVIEW OF SYSTEMS:           General: no fever, no chills, no fatigue,no weight changes, no lack of appetite.  Eyes: no epiphora, xerophthalmia,conjunctivitis, pain, glaucoma, blurred vision, blindness, secretion, photophobia, proptosis, diplopia.  Ears: no otorrhea, tinnitus, otorrhagia, deafness, pain, vertigo.  Nose: no rhinorrhea, no epistaxis, no alteration in perception of odors, no sinuses pressure.  Mouth: no alteration in gums or teeth,  No ulcers, no difficulty with mastication or deglut ion, no odynophagia.  Neck: no masses or pain, no thyroid alterations, no pain in muscles or arteries, no carotid odynia, no crepitation.  Respiratory:  cough,  sputum production, dyspnea,no trepopnea, no pleuritic pain,no hemoptysis.  Heart: no syncope, no irregularity, no palpitations, no angina,no orthopnea,no paroxysmal  "nocturnal dyspnea.  Vascular Venous: no tenderness,no edema,no palpable cords,no postphlebitic syndrome, no skin changes no ulcerations.  Vascular Arterial: no distal ischemia, no claudication, no gangrene, no neuropathic ischemic pain, no skin ulcers, hands paleness no cyanosis.  GI: no dysphagia, no odynophagia, no regurgitation, no heartburn,no indigestion,no nausea,no vomiting,no hematemesis ,no melena,no jaundice,no distention, no obstipation,no enterorrhagia,no proctalgia,no anal  lesions, no changes in bowel habits.  : no frequency, no hesitancy, no hematuria, no discharge,no  pain.  Musculoskeletal: no muscle or tendon pain or inflammation,no  joint pain, no edema, no functional limitation,no fasciculations, no mass.  Neurologic: no headache, no seizures, noalterations on Craneal nerves, no motor deficit, no sensory deficit, normal coordination, no alteration in memory,normal orientation, calculation,normal writting, verbal and written language.  Skin: no rashes,no pruritus no localized lesions.  Psychiatric: no anxiety, no depression,no agitation, no delusions, proper insight.          VITAL SIGNS:   Vitals:    12/18/19 1449   BP: 154/84   Pulse: 57   Resp: 12   Temp: 97.7 °F (36.5 °C)   TempSrc: Oral   SpO2: 99%   Weight: 77.3 kg (170 lb 8 oz)   Height: 173 cm (68.11\")            PHYSICAL EXAMINATION:       GENERAL:  Well-developed, well-nourished  Patient  in no acute distress.   SKIN:  Warm, dry ,NO rashes,NO purpura ,NO petechiae.  HEENT:  Pupils were equal and reactive to light and accomodation, conjunctivas non injected, no pterigion, normal extraocular movements, normal visual acuity.   Mouth mucosa was moist, no exudates in oropharynx, normal gum line, normal roof of the mouth and pillars, normal papillations of the tongue.  NECK:  Supple with good range of motion; no thyromegaly or masses, no JVD or bruits, no cervical adenopathies.No carotid arteries pain, no carotid abnormal pulsation , NO " arterial dance.  LYMPHATICS:  No cervical, NO supraclavicular, NO axillary,NO epitrochlear , NO inguinal adenopathy.  CHEST:  Normal excursion of both nelson thoraces, normal voice fremitus, no subcutaneous emphysema, normal axillas, no rashes or acanthosis nigricans. Lungs clear to percussion and auscultation, normal breath sounds bilaterally, no wheezing,NO crackles NO ronchi, NO stridor, NO rubs.  CARDIAC AND VASCULAR:  normal rate and regular rhythm frequent pac, without murmurs,NO rubs NO S3 NO S4 right or left . Normal femoral, popliteal, pedis, brachial and carotid pulses.  ABDOMEN:  Soft, nontender with no organomegaly or masses, no ascites, no collateral circulation,no distention,no McGrath sign, no abdominal pain, no inguinal hernias,no umbilical hernia, no abdominal bruits. Normal bowel sounds.  GENITAL: Not  Performed.  EXTREMITIES  AND SPINE:  No clubbing, cyanosis or edema, no deformities or pain .No kyphosis, scoliosis, deformities or pain in spine, ribs or pelvic bone.  NEUROLOGICAL:  Patient was awake, alert, oriented to time, person and place.                  LABORATORY DATA:THORACIC CT SCAN WITH CONTRAST     HISTORY: Cough SOB; C64.1-Malignant neoplasm of right kidney, except  renal pelvis; D47.1-Chronic myeloproliferative disease; D75.1-Secondary  polycythemia.      COMPARISON: 02/28/2018     TECHNIQUE:  Radiation dose reduction techniques were utilized, including  automated exposure control and exposure modulation based on body size.  Axial images of the thorax obtained with IV contrast.      FINDINGS: There are calcified residua of granulomatous disease present.  No active airspace disease, edema, or significant pleural fluid. The  ascending aorta is mildly aneurysmal at 4.2 x 4 cm diameter. It  previously measured 4 x 3.9 cm. Descending aorta is normal in caliber.     Images of the included upper abdomen shows a stable upper pole right  renal cyst.     IMPRESSION:  1. Mild aneurysmal dilatation  of the ascending aorta with dimensions as  above.  2. Stable tiny right renal cyst.  3. No acute inflammatory process or abnormal enhancement.                        This report was finalized on 8/29/2019 11:06 PM by Atul Eden M.D.       Order: [id number] - Part of Panel Order [id number]   Status:  Final result   Visible to patient:  No (Not Released)   Dx:  Erythrocytosis; Myeloproliferative di...   Component  Ref Range & Units 14:33   WBC  3.40 - 10.80 10*3/mm3 10.07    RBC  4.14 - 5.80 10*6/mm3 5.55    Hemoglobin  13.0 - 17.7 g/dL 13.9    Hematocrit  37.5 - 51.0 % 45.9    MCV  79.0 - 97.0 fL 82.7    MCH  26.6 - 33.0 pg 25.0Low     MCHC  31.5 - 35.7 g/dL 30.3Low     RDW  12.3 - 15.4 % 18.5High     RDW-SD  37.0 - 54.0 fl 46.5    MPV  6.0 - 12.0 fL 9.4    Platelets  140 - 450 10*3/mm3 264    Neutrophil %  42.7 - 76.0 % 70.2    Lymphocyte %  19.6 - 45.3 % 16.4Low     Monocyte %  5.0 - 12.0 % 9.7    Eosinophil %  0.3 - 6.2 % 2.2    Basophil %  0.0 - 1.5 % 0.9    Immature Grans %  0.0 - 0.5 % 0.6High                            ASSESSMENT/PLAN:   1. This patient has polycythemia vera, JAK2 mutation positive, and has taken in the past Hydrea. He discontinued this medicine because of feeling afraid of side effects even though I never noticed anything in particular related to this medicine.   Today his hematocrit is 45 and the patient wants to forego any further phlebotomy at this time unless the hematocrit goes above 50. I pointed out to him the data suggesting that vascular events are more likely to happen in patient's who have hematocrit above 45. The good news is that his white count and platelet count are stable. Therefore I think this will be okay as long as he keeps this in mind and consideration.   2. The patient has history of kidney cancer. He had nephrectomy. He is doing fine from this point of view.      3.The patient, after another episode of respiratory infection, was seen by his primary physician and  chest x-ray was done that showed atelectasis in the bases suggestive of arteriosclerosis in the aorta.  He is worried about more issues.    During the previous visit we pursued a CT scan of the chest that failed to document any pulmonary infiltrates, mediastinal adenopathy, pleural effusions and the radiologist suggested dilatation of the ascending aorta calling this an aneurysm, 4 cm. I have reviewed this picture with the patient today in the PACS system at [facility]. I have pointed out to the patient I am not a radiologist but I was not born yesterday. I have been seeing images for 40 years. To my eyes, barely can tell the difference between a minimal dilatation of the aorta and an aneurysm that I do not personally find. Nevertheless, I think with the patient’s concerns, I think it is worth it to ask the question to Cardiothoracic Surgery in regard to assessment. I pointed out to the patient that very likely they would like to review this on yearly basis.     In any event, I need to monitor the patient’s kidney cancer. I need to monitor his spleen as well time to time given his polycythemia vera. Therefore my advice to the patient in regard to all these issues is as follows:   1. He will return on monthly basis for a CBC and phlebotomy if hematocrit is above 45.   2. The patient will proceed with a CT scan of the chest, abdomen and pelvis in 10 weeks and review him after that.   3. The patient will be referred to Cardiothoracic Surgery, Dr. Alcantara.     I pointed out to him that the most important issue in regard to an aortic aneurysm is proper control of blood pressure. His rheumatologist has placed him on Norvasc for Raynaud disease and this is making a big difference in regard to his symptoms pertinent to that. In the long run this could be beneficial to have further control of blood pressure.     I find no reason why this patient cannot do physical activity including playing tennis after the  holidays.

## 2019-12-19 DIAGNOSIS — I71.21 ASCENDING AORTIC ANEURYSM (HCC): Primary | ICD-10-CM

## 2020-01-14 ENCOUNTER — OFFICE VISIT (OUTPATIENT)
Dept: CARDIAC SURGERY | Facility: CLINIC | Age: 75
End: 2020-01-14

## 2020-01-14 VITALS
HEIGHT: 68 IN | TEMPERATURE: 97.6 F | HEART RATE: 79 BPM | OXYGEN SATURATION: 100 % | WEIGHT: 161 LBS | BODY MASS INDEX: 24.4 KG/M2 | SYSTOLIC BLOOD PRESSURE: 142 MMHG | RESPIRATION RATE: 20 BRPM | DIASTOLIC BLOOD PRESSURE: 88 MMHG

## 2020-01-14 DIAGNOSIS — D47.1 MYELOPROLIFERATIVE DISORDER (HCC): ICD-10-CM

## 2020-01-14 DIAGNOSIS — R42 EPISODIC LIGHTHEADEDNESS: ICD-10-CM

## 2020-01-14 DIAGNOSIS — M35.3 PMR (POLYMYALGIA RHEUMATICA) (HCC): ICD-10-CM

## 2020-01-14 DIAGNOSIS — C61 PROSTATE CANCER (HCC): ICD-10-CM

## 2020-01-14 DIAGNOSIS — Z71.89 ENCOUNTER FOR ANTICOAGULATION DISCUSSION AND COUNSELING: ICD-10-CM

## 2020-01-14 DIAGNOSIS — I71.20 THORACIC AORTIC ANEURYSM WITHOUT RUPTURE (HCC): ICD-10-CM

## 2020-01-14 DIAGNOSIS — E03.8 OTHER SPECIFIED HYPOTHYROIDISM: ICD-10-CM

## 2020-01-14 DIAGNOSIS — I73.00 RAYNAUD'S DISEASE WITHOUT GANGRENE: ICD-10-CM

## 2020-01-14 DIAGNOSIS — C64.1 CANCER OF RIGHT KIDNEY (HCC): ICD-10-CM

## 2020-01-14 DIAGNOSIS — I10 PRIMARY HYPERTENSION: Primary | ICD-10-CM

## 2020-01-14 PROCEDURE — 99204 OFFICE O/P NEW MOD 45 MIN: CPT | Performed by: THORACIC SURGERY (CARDIOTHORACIC VASCULAR SURGERY)

## 2020-01-15 ENCOUNTER — LAB (OUTPATIENT)
Dept: LAB | Facility: HOSPITAL | Age: 75
End: 2020-01-15

## 2020-01-15 ENCOUNTER — INFUSION (OUTPATIENT)
Dept: ONCOLOGY | Facility: HOSPITAL | Age: 75
End: 2020-01-15

## 2020-01-15 VITALS
WEIGHT: 166 LBS | HEART RATE: 68 BPM | TEMPERATURE: 97.6 F | BODY MASS INDEX: 25.24 KG/M2 | DIASTOLIC BLOOD PRESSURE: 75 MMHG | OXYGEN SATURATION: 98 % | SYSTOLIC BLOOD PRESSURE: 150 MMHG

## 2020-01-15 DIAGNOSIS — D75.1 ERYTHROCYTOSIS: ICD-10-CM

## 2020-01-15 DIAGNOSIS — C64.1 CANCER OF RIGHT KIDNEY (HCC): ICD-10-CM

## 2020-01-15 LAB
BASOPHILS # BLD AUTO: 0.07 10*3/MM3 (ref 0–0.2)
BASOPHILS NFR BLD AUTO: 0.8 % (ref 0–1.5)
DEPRECATED RDW RBC AUTO: 47.3 FL (ref 37–54)
EOSINOPHIL # BLD AUTO: 0.03 10*3/MM3 (ref 0–0.4)
EOSINOPHIL NFR BLD AUTO: 0.3 % (ref 0.3–6.2)
ERYTHROCYTE [DISTWIDTH] IN BLOOD BY AUTOMATED COUNT: 16.1 % (ref 12.3–15.4)
HCT VFR BLD AUTO: 44.3 % (ref 37.5–51)
HGB BLD-MCNC: 13.9 G/DL (ref 13–17.7)
IMM GRANULOCYTES # BLD AUTO: 0.05 10*3/MM3 (ref 0–0.05)
IMM GRANULOCYTES NFR BLD AUTO: 0.5 % (ref 0–0.5)
LYMPHOCYTES # BLD AUTO: 0.97 10*3/MM3 (ref 0.7–3.1)
LYMPHOCYTES NFR BLD AUTO: 10.5 % (ref 19.6–45.3)
MCH RBC QN AUTO: 25.4 PG (ref 26.6–33)
MCHC RBC AUTO-ENTMCNC: 31.4 G/DL (ref 31.5–35.7)
MCV RBC AUTO: 81 FL (ref 79–97)
MONOCYTES # BLD AUTO: 0.56 10*3/MM3 (ref 0.1–0.9)
MONOCYTES NFR BLD AUTO: 6 % (ref 5–12)
NEUTROPHILS # BLD AUTO: 7.6 10*3/MM3 (ref 1.7–7)
NEUTROPHILS NFR BLD AUTO: 81.9 % (ref 42.7–76)
NRBC BLD AUTO-RTO: 0 /100 WBC (ref 0–0.2)
PLATELET # BLD AUTO: 242 10*3/MM3 (ref 140–450)
PMV BLD AUTO: 9.3 FL (ref 6–12)
RBC # BLD AUTO: 5.47 10*6/MM3 (ref 4.14–5.8)
WBC NRBC COR # BLD: 9.28 10*3/MM3 (ref 3.4–10.8)

## 2020-01-15 PROCEDURE — 36415 COLL VENOUS BLD VENIPUNCTURE: CPT

## 2020-01-15 PROCEDURE — 85025 COMPLETE CBC W/AUTO DIFF WBC: CPT

## 2020-01-15 NOTE — PROGRESS NOTES
Lab Results   Component Value Date    WBC 9.28 01/15/2020    HGB 13.9 01/15/2020    HCT 44.3 01/15/2020    MCV 81.0 01/15/2020     01/15/2020     No phlebo today per MD parameter

## 2020-01-18 NOTE — PROGRESS NOTES
1/18/2020    Seen on 1/14/20    Reason for consultation:   Evaluation of ascending aortic dilatation     Chief Complaint   Same    History of Present Illness:       Dear John Enriquez MD and Colleagues,  It was nice to see Scott SALVADOR Katherine in consultation at your request. He is a 74 y.o. male with a history of a kidney tumor, prostatic cancer and myelodysplastic disease who has been followed by you in that regards.  He has polymyalgia rheumatica however he does not complain of chest pain.  He has had a cough on and off and he has mild shortness of breath.  He has polycythemia as well. He denies chest pain, back pain, syncope, TIA, orthopnea or lower extremity edema.. His chest CT showed a 4.2 cm ascending aortic dilatation without dissection or ulceration.  The descending aorta is normal in size.  He has no family history of aneurysms, dissections or connective tissue disorders.     Patient Active Problem List   Diagnosis   • Primary hypertension   • Erythrocytosis   • Cancer of right kidney (CMS/HCC)   • Prostate cancer (CMS/HCC)   • Other specified hypothyroidism   • RBBB   • PMR (polymyalgia rheumatica) (CMS/HCC)   • Episodic lightheadedness   • Raynaud's disease without gangrene   • Encounter for anticoagulation discussion and counseling   • Myeloproliferative disorder (CMS/HCC)   • Thoracic aortic aneurysm without rupture (CMS/HCC)       Past Medical History:   Diagnosis Date   • Acquired hypothyroidism    • Anxiety    • Aortic aneurysm (CMS/HCC)    • Arthritis    • Asthma    • Baker's cyst of knee, right    • Disease of thyroid gland    • Gout    • H/O Muscle pain     Right shoulder and neck area   • H/O Radiation treatment     For tonsillar inflammation and infection when he was a child and this led him to develop thyroid cancer.   • History of colon polyps    • Hypertension    • Hyperthyroidism    • Hypothyroidism    • Kidney carcinoma (CMS/HCC)     H/O stage I clear cell carcinoma of the right  kidney, status post partial nephrectomy, nephron-sparing surgery by Dr. Ganesh Lopez   • Left shoulder pain    • Myeloproliferative disorder (CMS/HCC)     With polycythemia vera, JAK2 mutation positive requiring periodic phlebotomies (hematocrit above 47).   • Polymyalgia rheumatica (CMS/HCC)    • Premature atrial contraction    • Prostate cancer (CMS/HCC)    • Raynaud disease    • Shoulder injury, left, sequela        Past Surgical History:   Procedure Laterality Date   • CIRCUMCISION N/A 12/17/2018    Procedure: CIRCUMCISION;  Surgeon: Gallo Lopez MD;  Location: [facility];  Service: Urology   • COLONOSCOPY  2010    Documented a tubulovillous adenom that was removed completely. Colonoscopy in 2014 was unremarkable.   • HERNIA REPAIR      H/O multiple hernia repairs including right inguinal hernia repair in 1981, 2003, and double hernia repair in 2013   • KIDNEY SURGERY     • PROSTATE BIOPSY      Showed features consistent with prostate cancer   • SHOULDER SURGERY  1995    Shoulder repair   • THYROIDECTOMY, PARTIAL  1983    For malignant tumor   • TOOTH EXTRACTION         Allergies   Allergen Reactions   • Diphenhydramine Other (See Comments)     HEART PALPITATIONS, RASH   • Statins Other (See Comments)     MUSCLE ACHES   • Amlodipine Other (See Comments)     PT CANNOT REMEMBER REACTION   • Aspirin Other (See Comments)     PT CANNOT REMEMBER   • Latex Rash   • Peanut Oil Other (See Comments)     PT CANNOT REMEMBER   • Shellfish-Derived Products Other (See Comments)     PT CANNOT REMEMBER         Current Outpatient Medications:   •  amLODIPine (NORVASC) 5 MG tablet, Take 5 mg by mouth Daily., Disp: , Rfl:   •  B Complex Vitamins (VITAMIN B-COMPLEX PO), Take 1 tablet by mouth Daily., Disp: , Rfl:   •  guaifenesin-codeine (GUAIFENESIN AC) 100-10 MG/5ML liquid, 5 ML EVERY 8 HOURS AS NEEDED FOR COUGH, Disp: 120 mL, Rfl: 0  •  levothyroxine (SYNTHROID, LEVOTHROID) 100 MCG tablet, Take 1 tablet by mouth  daily., Disp: 30 tablet, Rfl: 6  •  montelukast (SINGULAIR) 10 MG tablet, Take 10 mg by mouth Every Night., Disp: , Rfl:   •  VITAMIN D PO, Take  by mouth Daily., Disp: , Rfl:   •  zolpidem (AMBIEN) 10 MG tablet, TAKE ONE-HALF TO ONE TABLET BY MOUTH ONCE NIGHTLY AS NEEDED FOR SLEEP  **MUST LAST 30 DAYS, Disp: , Rfl:     Social History     Socioeconomic History   • Marital status:      Spouse name: Not on file   • Number of children: Not on file   • Years of education: College   • Highest education level: Not on file   Occupational History     Employer: RETIRED     Comment: Corporate financial work   Tobacco Use   • Smoking status: Never Smoker   • Smokeless tobacco: Never Used   Substance and Sexual Activity   • Alcohol use: Yes     Alcohol/week: 1.0 - 3.0 standard drinks     Types: 1 - 3 Glasses of wine per week     Comment: 1-3 glasses of wine a week   • Drug use: No   • Sexual activity: Defer   Social History Narrative    He is a very avid  and plays violin in an orchestra. He does not smoke but had a lot of second-hand smoke through his life.       Family History   Problem Relation Age of Onset   • No Known Problems Mother    • Heart attack Father    • Heart disease Father    • Hypertension Father    • Diabetes Father    • Tuberculosis Maternal Grandmother    • No Known Problems Son    • Drug abuse Son    • Malig Hyperthermia Neg Hx      Review of Systems   Constitutional: Positive for fatigue.   Respiratory: Positive for cough. Negative for shortness of breath and wheezing.    Cardiovascular: Negative for chest pain, palpitations and leg swelling.   Genitourinary: Negative for flank pain and hematuria.   Neurological: Positive for light-headedness. Negative for dizziness, syncope and weakness.   All other systems reviewed and are negative.      Physical Exam:    Vital Signs:  Weight: 73 kg (161 lb)   Body mass index is 24.48 kg/m².  Temp: 97.6 °F (36.4 °C)   Heart Rate: 79   BP: 142/88      Physical Exam   Constitutional: He is oriented to person, place, and time. He appears well-developed and well-nourished.   HENT:   Head: Normocephalic and atraumatic.   Nose: Nose normal.   Mouth/Throat: Oropharynx is clear and moist.   Eyes: Pupils are equal, round, and reactive to light. Conjunctivae are normal.   Neck: Normal range of motion. Neck supple. No JVD present. No thyromegaly present.   Cardiovascular: Normal rate, regular rhythm, normal heart sounds and intact distal pulses. Exam reveals no gallop, no friction rub and no decreased pulses.   No murmur heard.  Pulses:       Radial pulses are 2+ on the right side, and 2+ on the left side.        Posterior tibial pulses are 2+ on the right side, and 2+ on the left side.   Pulmonary/Chest: Effort normal and breath sounds normal. He has no rales.   Abdominal: Soft. Bowel sounds are normal. He exhibits no distension and no mass. There is no tenderness.   Musculoskeletal: Normal range of motion. He exhibits no tenderness or deformity.   Lymphadenopathy:     He has no cervical adenopathy.   Neurological: He is alert and oriented to person, place, and time. He has normal reflexes.   Skin: Skin is warm and dry. No rash noted. No erythema.   Psychiatric: He has a normal mood and affect. His behavior is normal.   No groin or neck adenopathy    Relevant studies (other than HPI)        Assessment:     Problem List Items Addressed This Visit        Cardiovascular and Mediastinum    Primary hypertension - Primary    Raynaud's disease without gangrene    Thoracic aortic aneurysm without rupture (CMS/HCC)       Endocrine    Other specified hypothyroidism       Nervous and Auditory    PMR (polymyalgia rheumatica) (CMS/HCC)       Genitourinary    Cancer of right kidney (CMS/HCC)    Prostate cancer (CMS/HCC)       Hematopoietic and Hemostatic    Myeloproliferative disorder (CMS/HCC)       Other    Episodic lightheadedness    Encounter for anticoagulation discussion and  counseling          1. Mild ascending aortic enlargement, thoracic aortic ectasia, asymptomatic  2. Right renal cancer and prostatic cancer with ongoing surveillance  3. Myeloproliferative disorder with polycythemia  4. Episodic lightheadedness of unknown etiology    Recommendation/Plan:     1. In terms of his ascending aortic dilatation I recommend longitudinal follow-up with chest CTs every 1 or 2 years.  I reassured the patient that his aorta is minimally enlarged and the risk of complications is very low.  He verbalized understanding  2. Blood pressure, I recommend strict blood pressure control to decrease the DP DT      Thank you for allowing me to participate in his care.    Regards,    Migel Alcantara M.D.  Answers for HPI/ROS submitted by the patient on 1/7/2020   How long have you been having these symptoms?: 1-4 weeks ago

## 2020-01-22 ENCOUNTER — PREP FOR SURGERY (OUTPATIENT)
Dept: OTHER | Facility: HOSPITAL | Age: 75
End: 2020-01-22

## 2020-01-22 ENCOUNTER — TELEPHONE (OUTPATIENT)
Dept: SURGERY | Facility: CLINIC | Age: 75
End: 2020-01-22

## 2020-01-22 DIAGNOSIS — Z12.11 COLON CANCER SCREENING: Primary | ICD-10-CM

## 2020-01-22 NOTE — TELEPHONE ENCOUNTER
Returned pt call to reschedule c scope.  Scheduled at [facility] on 2-20 at 8:30.  Mailed prep info to pt again.

## 2020-01-28 PROBLEM — Z12.11 COLON CANCER SCREENING: Status: ACTIVE | Noted: 2020-01-28

## 2020-02-12 ENCOUNTER — INFUSION (OUTPATIENT)
Dept: ONCOLOGY | Facility: HOSPITAL | Age: 75
End: 2020-02-12

## 2020-02-12 ENCOUNTER — LAB (OUTPATIENT)
Dept: LAB | Facility: HOSPITAL | Age: 75
End: 2020-02-12

## 2020-02-12 VITALS
OXYGEN SATURATION: 96 % | WEIGHT: 165.8 LBS | BODY MASS INDEX: 25.21 KG/M2 | SYSTOLIC BLOOD PRESSURE: 159 MMHG | HEART RATE: 66 BPM | DIASTOLIC BLOOD PRESSURE: 81 MMHG | TEMPERATURE: 97.6 F | RESPIRATION RATE: 16 BRPM

## 2020-02-12 DIAGNOSIS — D75.1 ERYTHROCYTOSIS: Primary | ICD-10-CM

## 2020-02-12 DIAGNOSIS — C64.1 CANCER OF RIGHT KIDNEY (HCC): ICD-10-CM

## 2020-02-12 DIAGNOSIS — D75.1 ERYTHROCYTOSIS: ICD-10-CM

## 2020-02-12 LAB
BASOPHILS # BLD AUTO: 0.05 10*3/MM3 (ref 0–0.2)
BASOPHILS NFR BLD AUTO: 0.5 % (ref 0–1.5)
DEPRECATED RDW RBC AUTO: 49.8 FL (ref 37–54)
EOSINOPHIL # BLD AUTO: 0.04 10*3/MM3 (ref 0–0.4)
EOSINOPHIL NFR BLD AUTO: 0.4 % (ref 0.3–6.2)
ERYTHROCYTE [DISTWIDTH] IN BLOOD BY AUTOMATED COUNT: 17.6 % (ref 12.3–15.4)
HCT VFR BLD AUTO: 46.9 % (ref 37.5–51)
HGB BLD-MCNC: 14.8 G/DL (ref 13–17.7)
IMM GRANULOCYTES # BLD AUTO: 0.04 10*3/MM3 (ref 0–0.05)
IMM GRANULOCYTES NFR BLD AUTO: 0.4 % (ref 0–0.5)
LYMPHOCYTES # BLD AUTO: 0.89 10*3/MM3 (ref 0.7–3.1)
LYMPHOCYTES NFR BLD AUTO: 9.3 % (ref 19.6–45.3)
MCH RBC QN AUTO: 25.7 PG (ref 26.6–33)
MCHC RBC AUTO-ENTMCNC: 31.6 G/DL (ref 31.5–35.7)
MCV RBC AUTO: 81.4 FL (ref 79–97)
MONOCYTES # BLD AUTO: 0.56 10*3/MM3 (ref 0.1–0.9)
MONOCYTES NFR BLD AUTO: 5.9 % (ref 5–12)
NEUTROPHILS # BLD AUTO: 7.96 10*3/MM3 (ref 1.7–7)
NEUTROPHILS NFR BLD AUTO: 83.5 % (ref 42.7–76)
NRBC BLD AUTO-RTO: 0 /100 WBC (ref 0–0.2)
PLATELET # BLD AUTO: 240 10*3/MM3 (ref 140–450)
PMV BLD AUTO: 9.4 FL (ref 6–12)
RBC # BLD AUTO: 5.76 10*6/MM3 (ref 4.14–5.8)
WBC NRBC COR # BLD: 9.54 10*3/MM3 (ref 3.4–10.8)

## 2020-02-12 PROCEDURE — 36415 COLL VENOUS BLD VENIPUNCTURE: CPT

## 2020-02-12 PROCEDURE — 85025 COMPLETE CBC W/AUTO DIFF WBC: CPT

## 2020-02-12 RX ORDER — SODIUM CHLORIDE 9 MG/ML
250 INJECTION, SOLUTION INTRAVENOUS ONCE
Status: DISCONTINUED | OUTPATIENT
Start: 2020-02-12 | End: 2020-02-12 | Stop reason: HOSPADM

## 2020-02-12 RX ORDER — SODIUM CHLORIDE 9 MG/ML
250 INJECTION, SOLUTION INTRAVENOUS ONCE
Status: CANCELLED | OUTPATIENT
Start: 2020-02-12

## 2020-02-12 NOTE — NURSING NOTE
Pt here for a phlebo to keep hct <45%. Today pt's hct was 46.9% but pt said he wasn't going to stay to have a phlebo. Pt had to get 3 fingersticks to get his CBC. He said he was a difficult stick and feels dehydrated therefore he isn't going to stay. Copy of labs given to pt and advised him to hydrate prior to his next visit. Pt v/u.

## 2020-02-17 ENCOUNTER — ANESTHESIA EVENT (OUTPATIENT)
Dept: PERIOP | Facility: HOSPITAL | Age: 75
End: 2020-02-17

## 2020-02-20 ENCOUNTER — ANESTHESIA (OUTPATIENT)
Dept: PERIOP | Facility: HOSPITAL | Age: 75
End: 2020-02-20

## 2020-02-20 ENCOUNTER — HOSPITAL ENCOUNTER (OUTPATIENT)
Facility: HOSPITAL | Age: 75
Setting detail: HOSPITAL OUTPATIENT SURGERY
Discharge: HOME | End: 2020-02-20
Attending: SURGERY | Admitting: SURGERY

## 2020-02-20 VITALS
TEMPERATURE: 97.9 F | BODY MASS INDEX: 24.18 KG/M2 | HEART RATE: 64 BPM | WEIGHT: 159 LBS | DIASTOLIC BLOOD PRESSURE: 79 MMHG | SYSTOLIC BLOOD PRESSURE: 121 MMHG | OXYGEN SATURATION: 97 % | RESPIRATION RATE: 12 BRPM

## 2020-02-20 PROCEDURE — G0105 COLORECTAL SCRN; HI RISK IND: HCPCS | Performed by: SURGERY

## 2020-02-20 PROCEDURE — S0260 H&P FOR SURGERY: HCPCS | Performed by: SURGERY

## 2020-02-20 PROCEDURE — 25010000002 PROPOFOL 10 MG/ML EMULSION: Performed by: ANESTHESIOLOGY

## 2020-02-20 RX ORDER — SODIUM CHLORIDE 0.9 % (FLUSH) 0.9 %
10 SYRINGE (ML) INJECTION AS NEEDED
Status: DISCONTINUED | OUTPATIENT
Start: 2020-02-20 | End: 2020-02-20 | Stop reason: HOSPADM

## 2020-02-20 RX ORDER — GLYCOPYRROLATE 0.2 MG/ML
INJECTION INTRAMUSCULAR; INTRAVENOUS AS NEEDED
Status: DISCONTINUED | OUTPATIENT
Start: 2020-02-20 | End: 2020-02-20 | Stop reason: SURG

## 2020-02-20 RX ORDER — SODIUM CHLORIDE 9 MG/ML
40 INJECTION, SOLUTION INTRAVENOUS AS NEEDED
Status: DISCONTINUED | OUTPATIENT
Start: 2020-02-20 | End: 2020-02-20 | Stop reason: HOSPADM

## 2020-02-20 RX ORDER — SODIUM CHLORIDE, SODIUM LACTATE, POTASSIUM CHLORIDE, CALCIUM CHLORIDE 600; 310; 30; 20 MG/100ML; MG/100ML; MG/100ML; MG/100ML
9 INJECTION, SOLUTION INTRAVENOUS CONTINUOUS
Status: DISCONTINUED | OUTPATIENT
Start: 2020-02-20 | End: 2020-02-20 | Stop reason: HOSPADM

## 2020-02-20 RX ORDER — LIDOCAINE HYDROCHLORIDE 10 MG/ML
0.5 INJECTION, SOLUTION EPIDURAL; INFILTRATION; INTRACAUDAL; PERINEURAL ONCE AS NEEDED
Status: DISCONTINUED | OUTPATIENT
Start: 2020-02-20 | End: 2020-02-20 | Stop reason: HOSPADM

## 2020-02-20 RX ORDER — EPHEDRINE SULFATE 50 MG/ML
INJECTION, SOLUTION INTRAVENOUS AS NEEDED
Status: DISCONTINUED | OUTPATIENT
Start: 2020-02-20 | End: 2020-02-20 | Stop reason: SURG

## 2020-02-20 RX ORDER — SODIUM CHLORIDE 0.9 % (FLUSH) 0.9 %
10 SYRINGE (ML) INJECTION EVERY 12 HOURS SCHEDULED
Status: DISCONTINUED | OUTPATIENT
Start: 2020-02-20 | End: 2020-02-20 | Stop reason: HOSPADM

## 2020-02-20 RX ORDER — PREDNISONE 2.5 MG
1 TABLET ORAL DAILY
COMMUNITY
End: 2022-04-19

## 2020-02-20 RX ORDER — PROPOFOL 10 MG/ML
VIAL (ML) INTRAVENOUS AS NEEDED
Status: DISCONTINUED | OUTPATIENT
Start: 2020-02-20 | End: 2020-02-20 | Stop reason: SURG

## 2020-02-20 RX ADMIN — EPHEDRINE SULFATE 10 MG: 50 INJECTION, SOLUTION INTRAVENOUS at 09:16

## 2020-02-20 RX ADMIN — PROPOFOL 280 MG: 10 INJECTION, EMULSION INTRAVENOUS at 09:05

## 2020-02-20 RX ADMIN — SODIUM CHLORIDE, POTASSIUM CHLORIDE, SODIUM LACTATE AND CALCIUM CHLORIDE 9 ML/HR: 600; 310; 30; 20 INJECTION, SOLUTION INTRAVENOUS at 07:51

## 2020-02-20 RX ADMIN — GLYCOPYRROLATE 0.2 MG: 0.2 INJECTION INTRAMUSCULAR; INTRAVENOUS at 09:01

## 2020-02-20 NOTE — ANESTHESIA PREPROCEDURE EVALUATION
Anesthesia Evaluation     Patient summary reviewed and Nursing notes reviewed   no history of anesthetic complications:  NPO Solid Status: > 8 hours  NPO Liquid Status: > 8 hours           Airway   Mallampati: II  TM distance: >3 FB  Neck ROM: full  No difficulty expected  Dental      Pulmonary     breath sounds clear to auscultation  (+) asthma (allergy related ),  Cardiovascular   Exercise tolerance: good (4-7 METS)    Rhythm: irregular  Rate: normal    (+) hypertension well controlled less than 2 medications, dysrhythmias (RBBB) PAC, PVD ( Thoracic aortic aneurysm without rupture (CMS/HCC)),       Neuro/Psych  (+) psychiatric history Anxiety,     GI/Hepatic/Renal/Endo    (+)   renal disease (cancer), thyroid problem hypothyroidism    Musculoskeletal     Abdominal    Substance History      OB/GYN          Other   arthritis, blood dyscrasia ( Myeloproliferative disorder (CMS/HCC)),chronic steroid use (prednisone one month ago)    history of cancer (prostate CA and right kidney CA) active    ROS/Med Hx Other: Raynaud's disease without gangrene  PMR (polymyalgia rheumatica) (CMS/HCC)  Erythrocytosis                  Anesthesia Plan    ASA 3     MAC     intravenous induction     Anesthetic plan, all risks, benefits, and alternatives have been provided, discussed and informed consent has been obtained with: patient.  Use of blood products discussed with patient  Consented to blood products.

## 2020-02-20 NOTE — ANESTHESIA POSTPROCEDURE EVALUATION
Patient: Scott Murphy    Procedure Summary     Date:  02/20/20 Room / Location:  [facility] ENDOSCOPY 2 /  [facility]    Anesthesia Start:  0900 Anesthesia Stop:  0929    Procedure:  COLONOSCOPY (N/A ) Diagnosis:       Colon cancer screening      (Colon cancer screening [Z12.11])    Surgeon:  Claire Galo MD Provider:  Beata De Anda MD    Anesthesia Type:  MAC ASA Status:  3          Anesthesia Type: MAC    Vitals  No vitals data found for the desired time range.          Post Anesthesia Care and Evaluation    Patient location during evaluation: PHASE II  Patient participation: complete - patient participated  Level of consciousness: awake  Pain management: adequate  Airway patency: patent  Anesthetic complications: No anesthetic complications  PONV Status: none  Cardiovascular status: acceptable  Respiratory status: acceptable  Hydration status: acceptable

## 2020-02-20 NOTE — OP NOTE
Operative Note:    Pre-op dx: Screening Colonoscopy, personal history of polyps     Post-op dx: diverticulosis     Procedure: Colonoscopy    Surgeon: Claire Galo MD    Anesthesia: MAC    EBL: none    Specimen:  * No orders in the log *    Complications: none    Procedure:    Colonoscopy:  A digital rectal examination was performed which revealed no rectal masses.  Scope was introduced into the rectum and advanced into the sigmoid, descending colon, splenic flexure, transverse colon, hepatic flexure, ascending colon and into the cecum.  Cecum was identified by the ileocecal valve and appendiceal orifice.  There was multiple large and small diverticula throughout the sigmoid colon without evidence of inflammation.  There was no evidence of any polyps, masses, AV malformation or inflammation.  The bowel preparation was excellent. The scope was slowly withdrawn and a second look was performed.  Upon the second look, there were no new findings.  The colon was desufflated and the procedure was terminated.   Patient was transferred to recovery room in stable condition.      Assessment/Plan:  Repeat colonoscopy in 5 years.  Educational pamphlet on diverticulosis    Claire Galo MD  General, Minimally Invasive and Endoscopic Surgery  [place] [id number] [facility] [address] [place] [address]   [address]    [address]  [place] [address]               [place] [address]    P: [phone]  F: [phone]    Cc:  Mariah Delgado MD

## 2020-02-24 ENCOUNTER — TELEPHONE (OUTPATIENT)
Dept: CARDIOLOGY | Facility: CLINIC | Age: 75
End: 2020-02-24

## 2020-02-24 ENCOUNTER — TELEPHONE (OUTPATIENT)
Dept: ONCOLOGY | Facility: CLINIC | Age: 75
End: 2020-02-24

## 2020-02-24 DIAGNOSIS — I10 ESSENTIAL HYPERTENSION: Primary | ICD-10-CM

## 2020-02-24 RX ORDER — LOSARTAN POTASSIUM 25 MG/1
25 TABLET ORAL DAILY
Qty: 30 TABLET | Refills: 5 | Status: SHIPPED | OUTPATIENT
Start: 2020-02-24 | End: 2020-03-03 | Stop reason: SDUPTHER

## 2020-02-24 NOTE — TELEPHONE ENCOUNTER
Called and spoke with patient.  Chest pain is mild does not occur with exertion or exercise and has been unchanged in frequency over the last 2-3 months.   He was taken off of losartan because his pharmacy stopped carrying it.  He was doing well on this medication.   I suggested that we try losartan again and see if his pharmacy is now carrying.  Will send in a prescription of 25 mg losartan.  Asked him to call back and let us know if he is able to get or not.

## 2020-02-24 NOTE — TELEPHONE ENCOUNTER
"02/24/20  10:09 AM  Scott Murphy  1945  Home Phone [phone]   Work Phone [phone]   Mobile [phone]       Scott Murphy is a patient of Dr Roberts.  He is calling in to let you know that he is having trouble getting his bp to come down and stated it is running 140-150s/80s hr 50s.    He is also experiencing some \"mild\" left sidded chest pain that is non radiating and he is not nauseated, SOA, or diaphoretic with this.  He also mentioned that he has a mild headache at times and he isn't sure if it is bp or allergie related.  He mentions that he plays tennis twice a week and tolerates well.    He mentioned that he had a colonoscopy last week and was told he had an \"irregular rhythm\"  Upon further investigation, he had some PACs which I explained to him are benign and were most  likely related to the prep for the colonoscopy.     Cardiac meds  Amlodipine 10mg daily- PCP adjusted the dose 12/18/19    Please let me know how you would like to proceed  Thanks  Mary Alice Sorto RN  Triage nurse    "

## 2020-02-26 ENCOUNTER — LAB (OUTPATIENT)
Dept: LAB | Facility: HOSPITAL | Age: 75
End: 2020-02-26

## 2020-02-26 ENCOUNTER — INFUSION (OUTPATIENT)
Dept: ONCOLOGY | Facility: HOSPITAL | Age: 75
End: 2020-02-26

## 2020-02-26 VITALS
SYSTOLIC BLOOD PRESSURE: 170 MMHG | WEIGHT: 167 LBS | BODY MASS INDEX: 25.39 KG/M2 | TEMPERATURE: 98.8 F | OXYGEN SATURATION: 99 % | HEART RATE: 76 BPM | DIASTOLIC BLOOD PRESSURE: 84 MMHG

## 2020-02-26 DIAGNOSIS — D75.1 ERYTHROCYTOSIS: ICD-10-CM

## 2020-02-26 DIAGNOSIS — C64.1 CANCER OF RIGHT KIDNEY (HCC): ICD-10-CM

## 2020-02-26 LAB
BASOPHILS # BLD AUTO: 0.09 10*3/MM3 (ref 0–0.2)
BASOPHILS NFR BLD AUTO: 1 % (ref 0–1.5)
DEPRECATED RDW RBC AUTO: 51.3 FL (ref 37–54)
EOSINOPHIL # BLD AUTO: 0.3 10*3/MM3 (ref 0–0.4)
EOSINOPHIL NFR BLD AUTO: 3.2 % (ref 0.3–6.2)
ERYTHROCYTE [DISTWIDTH] IN BLOOD BY AUTOMATED COUNT: 18.1 % (ref 12.3–15.4)
HCT VFR BLD AUTO: 45.6 % (ref 37.5–51)
HGB BLD-MCNC: 14.3 G/DL (ref 13–17.7)
IMM GRANULOCYTES # BLD AUTO: 0.05 10*3/MM3 (ref 0–0.05)
IMM GRANULOCYTES NFR BLD AUTO: 0.5 % (ref 0–0.5)
LYMPHOCYTES # BLD AUTO: 1.84 10*3/MM3 (ref 0.7–3.1)
LYMPHOCYTES NFR BLD AUTO: 19.8 % (ref 19.6–45.3)
MCH RBC QN AUTO: 25.7 PG (ref 26.6–33)
MCHC RBC AUTO-ENTMCNC: 31.4 G/DL (ref 31.5–35.7)
MCV RBC AUTO: 81.9 FL (ref 79–97)
MONOCYTES # BLD AUTO: 0.95 10*3/MM3 (ref 0.1–0.9)
MONOCYTES NFR BLD AUTO: 10.2 % (ref 5–12)
NEUTROPHILS # BLD AUTO: 6.06 10*3/MM3 (ref 1.7–7)
NEUTROPHILS NFR BLD AUTO: 65.3 % (ref 42.7–76)
NRBC BLD AUTO-RTO: 0 /100 WBC (ref 0–0.2)
PLATELET # BLD AUTO: 253 10*3/MM3 (ref 140–450)
PMV BLD AUTO: 9.2 FL (ref 6–12)
RBC # BLD AUTO: 5.57 10*6/MM3 (ref 4.14–5.8)
WBC NRBC COR # BLD: 9.29 10*3/MM3 (ref 3.4–10.8)

## 2020-02-26 PROCEDURE — 99195 PHLEBOTOMY: CPT

## 2020-02-26 PROCEDURE — 36415 COLL VENOUS BLD VENIPUNCTURE: CPT

## 2020-02-26 PROCEDURE — 85025 COMPLETE CBC W/AUTO DIFF WBC: CPT

## 2020-03-02 ENCOUNTER — TELEPHONE (OUTPATIENT)
Dept: CARDIOLOGY | Facility: CLINIC | Age: 75
End: 2020-03-02

## 2020-03-02 PROBLEM — M25.511 RIGHT SHOULDER PAIN: Status: ACTIVE | Noted: 2017-12-20

## 2020-03-02 PROBLEM — J45.909 ASTHMA: Status: ACTIVE | Noted: 2020-01-15

## 2020-03-02 PROBLEM — R07.9 CHEST PAIN: Status: ACTIVE | Noted: 2020-03-02

## 2020-03-02 PROBLEM — R05.9 COUGH: Status: ACTIVE | Noted: 2019-07-10

## 2020-03-02 PROBLEM — E03.8 OTHER SPECIFIED HYPOTHYROIDISM: Status: RESOLVED | Noted: 2018-01-30 | Resolved: 2020-03-02

## 2020-03-02 PROBLEM — S49.91XA RIGHT SHOULDER INJURY: Status: ACTIVE | Noted: 2017-12-20

## 2020-03-02 PROBLEM — I10 HYPERTENSION: Status: ACTIVE | Noted: 2020-03-02

## 2020-03-02 PROBLEM — J06.9 ACUTE URI: Status: ACTIVE | Noted: 2019-03-05

## 2020-03-02 PROBLEM — M25.561 ACUTE PAIN OF RIGHT KNEE: Status: ACTIVE | Noted: 2019-01-17

## 2020-03-02 PROBLEM — F41.9 ANXIETY: Status: ACTIVE | Noted: 2018-12-26

## 2020-03-02 PROBLEM — D75.1 ERYTHROCYTOSIS: Status: ACTIVE | Noted: 2020-03-02

## 2020-03-02 PROBLEM — E80.6 HYPERBILIRUBINEMIA: Status: ACTIVE | Noted: 2020-03-02

## 2020-03-02 PROBLEM — E78.5 HYPERLIPIDEMIA: Status: ACTIVE | Noted: 2020-03-02

## 2020-03-02 PROBLEM — I77.810 DILATED AORTIC ROOT (HCC): Status: ACTIVE | Noted: 2020-01-15

## 2020-03-02 PROBLEM — M54.2 NECK PAIN: Status: ACTIVE | Noted: 2020-03-02

## 2020-03-02 NOTE — TELEPHONE ENCOUNTER
"03/02/20  8:57 AM  Scott Murphy  1945  Home Phone [phone]   Work Phone [phone]   Mobile [phone]       Scott Murphy Is a pt of  last seen in office on 9/6/2019.    He has a history of polycythemia, HTN, hypothyroidism, Raynaud's disease, and aortic aneurysm.     He calls in today stating that his BP and heart rate are \"jumping around\". Today His blood pressure was 131/78. His heart rate dropped to 45. Last week (he can not remember what day), his blood pressure was 170/85.     He states that he is also having chest pressure when his blood pressure elevates or on exertion. He denies dizziness or SOA.     He is currently taking losartan 50 mg daily. He wants to know if this needs to be changed or if he should split the dose up to take 25 mg am and 25 mg pm.     Also he does not have an appointment scheduled until 9/10/2020. Does he need to be brought in sooner?     He can be reached at [phone]    Thanks  SW           "

## 2020-03-02 NOTE — PROGRESS NOTES
Date of Office Visit: 2020  Encounter Provider: ALETHA Bansal  Primary Cardiologist: Dr. Roberts  Place of Service: [facility] CARDIOLOGY  Patient Name: Scott Murphy  :1945      Subjective:     Chief Complaint:  Chest pain and hypertension    History of Present Illness:  Scott Murphy is a pleasant 74 y.o. male who is new to me .  Outside records have been obtained and reviewed by me.     This is a patient of Dr. Roberts with a history of polycythemia vera area, polymyalgia rheumatica, renal cell carcinoma status post partial nephrectomy, hypertension, hypothyroidism, Raynaud's disease, thoracic aortic aneurysm, asthma, premature atrial contractions.    Early  patient was evaluated by Dr. Jarrett for chest pain and underwent echocardiogram, stress test and cardiac catheterization were reportedly unremarkable as records were not available.    2018 Dr. Roberts saw him when he presented for evaluation of abnormal EKG G which was done during yearly Medicare physical.  It was showing a new right bundle branch block therefore he was sent to cardiology. It was noted that he apparently had an EKG from 2014 that is similar appearing right bundle branch block.  She set him up for an echo.  Is not having significant symptoms at that time.  2018 echo showed his EF was normal with normal wall contractility, grade 1 diastolic dysfunction, aortic valve sclerosis, mild MR, mild TR with normal RVSP, moderate dilation of the sinuses of Valsalva and mild dilation of the ascending aorta and aortic arch.      Some time in  patient began having issues with shortness of breath and cough.  X-ray ordered by his PCP that showed no acute pulmonary disease but did show evidence of aortic arterial sclerosis.  He saw Dr. Damon in routine follow-up and was sent for CT of his chest due to his dyspnea that showed mild aneurysmal dilation of the ascending aorta  4.2 x 4 cm compared to measurement of 4 x 3.9 cm in February 2018.  He was eventually diagnosed with asthma after being evaluated by pulmonologist and had improvement in symptoms with treatment.  He was playing tennis on a regular basis and was able to do a little bit more with treatment of his asthma.    September 2019 at his follow-up appointment Dr. Roberts recommended that we plan on repeating echocardiogram in 1 year as part of his follow-up and wanted to avoid annual CT scans of his chest as he was getting annual abdominal CTs for his history of renal cell carcinoma.  She wanted to avoid beta-blockers since his baseline heart rate was relatively low his blood pressures were under good control at that time and he had a history of ray nods.  She wanted to see him back in 1 year.    Patient had a recent CBC 2/26/2020 that was stable.  12/27/2020 patient had CMP that showed normal BUN/Creatinine and electrolytes and a slightly depressed EGFR 59, potassium was normal, total bilirubin 1.4 (1.3), ALT slightly low 10 (13) the rest of LFTs were normal, lipid panel showed an LDL of 102, few DL good 15, HDL good 50, total cholesterol 167, triglycerides good 77, TSH was low normal 0.398    He is presenting today for earlier follow-up for evaluation of chest pain and elevated blood pressures.Patient called earlier in the week reporting mild left-sided chest pain nonradiating that did not seem to be exertional.  He was having increase in blood pressures up into the 150s systolic with diastolic readings in the 80s.  Heart rate was in the 50s.  He also reported he was noted that he had some irregular rhythm which was found to be PACs during a colonoscopy the week prior.  Dr. Roberts found out that he was taken off losartan because his pharmacy stopped carrying it so she restarted it.  He called back yesterday reporting chest pain with exertion that his blood pressure and heart rate were jumping around heart rate as low as 45 and  blood pressure ranging from 130s/70s to the 170s/80s.  Dr. Roberts recommended taking 25 mg of losartan in the morning and 25 mg in the evening he was placed on my schedule.  At today's visit he has not been taking his amlodipine as he thought he was not supposed to continue it in addition to the losartan.  Over the last few weeks he reports some epsiodes mild dull chest pressure slightly radiating to the left side without associated diaphoresis or shortness of breath.  He does have some mild nausea associated with headaches.  He also has headaches and lightheadednes associated with the chest pressure.  He typically notices his symptoms in the afternoon and is pretty constant for a few hours they do typically resolve after he takes his evening blood pressure medication.  His symptoms are not exertionally driven.  In fact he walked up 6 flights of stairs to the office and had some shortness of breath that he did not feel was unusual and did not have the chest pressure or headache.  He also plays tennis 3 times a week and feels good with that.  He plays violin semi-professionally and training does cause him some stress.  He denies current chest pressure or headache in the ofice and his blood pressure is decent in our office today.  He has not actually checked his blood pressure when he is having his symptoms to correlate association with hypertension.  He denies any palpitations syncope, near syncope lower extremity edema, PND, orthopnea.  He denies symptoms suggestive of sleep apnea including significant snoring.          Past Medical History:   Diagnosis Date   • Acquired hypothyroidism    • Anxiety    • Aortic aneurysm (CMS/HCC)    • Arthritis    • Asthma    • Baker's cyst of knee, right    • Disease of thyroid gland    • Gout    • H/O Muscle pain     Right shoulder and neck area   • H/O Radiation treatment     For tonsillar inflammation and infection when he was a child and this led him to develop thyroid cancer.      • History of colon polyps    • Hypertension    • Hyperthyroidism    • Hypothyroidism    • Kidney carcinoma (CMS/HCC)     H/O stage I clear cell carcinoma of the right kidney, status post partial nephrectomy, nephron-sparing surgery by Dr. Ganesh Lopez   • Left shoulder pain    • Myeloproliferative disorder (CMS/HCC)     With polycythemia vera, JAK2 mutation positive requiring periodic phlebotomies (hematocrit above 47).   • Polymyalgia rheumatica (CMS/HCC)    • Premature atrial contraction    • Prostate cancer (CMS/HCC)    • Raynaud disease    • Shoulder injury, left, sequela      Past Surgical History:   Procedure Laterality Date   • CIRCUMCISION N/A 12/17/2018    Procedure: CIRCUMCISION;  Surgeon: Gallo Lopez MD;  Location: [facility];  Service: Urology   • COLONOSCOPY  2010    Documented a tubulovillous adenom that was removed completely. Colonoscopy in 2014 was unremarkable.   • COLONOSCOPY N/A 2/20/2020    Procedure: COLONOSCOPY;  Surgeon: Claire Galo MD;  Location: [facility] OR;  Service: Gastroenterology;  Laterality: N/A;  diverticulosis   • HERNIA REPAIR      H/O multiple hernia repairs including right inguinal hernia repair in 1981, 2003, and double hernia repair in 2013   • KIDNEY SURGERY     • PROSTATE BIOPSY      Showed features consistent with prostate cancer   • SHOULDER SURGERY  1995    Shoulder repair   • THYROIDECTOMY, PARTIAL  1983    For malignant tumor   • TOOTH EXTRACTION       Outpatient Medications Prior to Visit   Medication Sig Dispense Refill   • B Complex Vitamins (VITAMIN B-COMPLEX PO) Take 1 tablet by mouth Daily.     • levothyroxine (SYNTHROID, LEVOTHROID) 100 MCG tablet Take 1 tablet by mouth daily. 30 tablet 6   • montelukast (SINGULAIR) 10 MG tablet Take 10 mg by mouth Every Night.     • predniSONE (DELTASONE) 2.5 MG tablet Take 2.5 mg by mouth Daily.     • zolpidem (AMBIEN) 10 MG tablet TAKE ONE-HALF TO ONE TABLET BY MOUTH ONCE NIGHTLY AS NEEDED FOR SLEEP   **MUST LAST 30 DAYS     • losartan (COZAAR) 25 MG tablet Take 1 tablet by mouth Daily. (Patient taking differently: Take 25 mg by mouth 2 (Two) Times a Day.) 30 tablet 5   • amLODIPine (NORVASC) 5 MG tablet Take 5 mg by mouth Daily.     • guaifenesin-codeine (GUAIFENESIN AC) 100-10 MG/5ML liquid 5 ML EVERY 8 HOURS AS NEEDED FOR COUGH 120 mL 0     No facility-administered medications prior to visit.        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 He does not smoke but had a lot of second-hand smoke through his life.     Family History   Problem Relation Age of Onset   • No Known Problems Mother    • Heart attack Father    • Heart disease Father    • Hypertension Father    • Diabetes Father    • Tuberculosis Maternal Grandmother    • No Known Problems Son    • Drug abuse Son    • Malig Hyperthermia Neg Hx      Review of Systems   Constitution: Positive for malaise/fatigue. Negative for chills and fever.   HENT: Negative for ear  "pain, hearing loss, nosebleeds and sore throat.    Eyes: Negative for double vision, pain, vision loss in left eye and vision loss in right eye.   Cardiovascular: Positive for chest pain, dyspnea on exertion and palpitations. Negative for claudication, irregular heartbeat, leg swelling, near-syncope, orthopnea, paroxysmal nocturnal dyspnea and syncope.   Respiratory: Positive for shortness of breath. Negative for cough, snoring and wheezing.    Endocrine: Negative for cold intolerance and heat intolerance.   Hematologic/Lymphatic: Negative for bleeding problem.   Skin: Negative for color change, itching, rash and unusual hair distribution.   Musculoskeletal: Positive for myalgias. Negative for joint pain and joint swelling.   Gastrointestinal: Negative for abdominal pain, diarrhea, hematochezia, melena, nausea and vomiting.   Genitourinary: Negative for decreased libido, frequency, hematuria, hesitancy and incomplete emptying.   Neurological: Positive for dizziness, headaches and light-headedness. Negative for excessive daytime sleepiness, loss of balance, numbness, paresthesias and seizures.   Psychiatric/Behavioral: Negative for depression.          Objective:     Vitals:    03/03/20 1000 03/03/20 1034   BP: 140/80    BP Location: Left arm    Patient Position: Sitting    Cuff Size: Adult    Pulse: 66 55   SpO2:  98%   Weight: 75 kg (165 lb 6.4 oz)    Height: 172.7 cm (68\")      Body mass index is 25.15 kg/m².    PHYSICAL EXAM:  Physical Exam   Constitutional: He is oriented to person, place, and time. He appears well-developed and well-nourished. No distress.   HENT:   Head: Normocephalic and atraumatic.   Eyes: Pupils are equal, round, and reactive to light. Conjunctivae and EOM are normal.   Wearing glasses   Neck: No JVD present. Carotid bruit is not present.   Cardiovascular: Normal rate, regular rhythm, normal heart sounds and intact distal pulses.  Occasional extrasystoles are present.   No murmur " heard.  Pulses:       Radial pulses are 2+ on the right side, and 2+ on the left side.        Dorsalis pedis pulses are 2+ on the right side, and 2+ on the left side.        Posterior tibial pulses are 2+ on the right side, and 2+ on the left side.   No lower extremity edema, mild bradycardia   Pulmonary/Chest: Effort normal and breath sounds normal. No accessory muscle usage. No tachypnea. No respiratory distress. He has no decreased breath sounds. He has no wheezes. He has no rhonchi. He has no rales. He exhibits no tenderness.   Abdominal: Soft. Bowel sounds are normal. He exhibits no distension. There is no tenderness. There is no rebound and no guarding.   Musculoskeletal: Normal range of motion. He exhibits no edema.   Neurological: He is alert and oriented to person, place, and time.   Skin: Skin is warm, dry and intact. He is not diaphoretic. No erythema.   Psychiatric: He has a normal mood and affect. His speech is normal and behavior is normal. Judgment and thought content normal. Cognition and memory are normal.   Nursing note and vitals reviewed.        ECG 12 Lead  Date/Time: 3/3/2020 10:08 AM  Performed by: Vandana Lamas APRN  Authorized by: Vandana Lamas APRN   Comparison: compared with previous ECG from 9/6/2020  Similar to previous ECG  Rhythm: sinus tachycardia  Ectopy: atrial premature contractions  Rate: normal  BPM: 66  Conduction: right bundle branch block  QRS axis: normal    Clinical impression: abnormal EKG  Comments: Indication: Chest pain, hypertension            Assessment:       Diagnosis Plan   1. Hypertension, unspecified type     2. Chest pain, unspecified type     3. Thoracic aortic aneurysm without rupture (CMS/HCC)     4. RBBB     5. Hyperlipidemia, unspecified hyperlipidemia type     6. Dilated aortic root (CMS/HCC)     7. Essential hypertension  losartan (COZAAR) 25 MG tablet       Plan:     1. Chest pain: His symptoms are nonexertional I suspect are related to his blood  pressure elevation based on the timing of his symptoms and that it occurs while he is not doing anything in the afternoon.  2. Hypertension:   Blood pressures been higher recently.  He was on amlodipine and losartan has recently been re-added at 25 mg twice daily and he as been taking that for about a week.  He was confused and stopped his amlodipine and did not continue it in addition to the losartan.    3. Ascending aortic aneurysm:   Mildly dilated.  Since last year he has had minimal growth.  He has previously been educated on okay to continue aerobic exercise and has been cautioned against any heavy weight lifting.    Will continue to focus on good blood pressure control. Would not recommend beta-blockers with his well-controlled blood pressures, relatively low baseline heart rates and history of Raynauds. Dr. Roberts wanted to avoid annual CTs since he gets annual abdominal CTs for his history of renal carcinoma.  He has an echocardiogram scheduled for September 2020.  4.  Chronic right bundle branch block:  Not associated with any structural heart disease.    5.  Raynauds disease: Recommended to avoid beta blockers.  6.  Polymyalgia rheumatica  7.  Polycythemia vera: Follows with Dr. Damon  8.  Hypothyroidism: On medical therapy. Last TSH WNL 12/2019.  9.  Asthma:   10.  History of renal cell carcinoma with partial nephrectomy      The plan will be to have patient measure his blood pressure a few times a day before and after his medication.  I am going to have him resume amlodipine 5 mg around 1 PM in the afternoon which is a little before his symptoms typically start.  I will adjust meds further if he continues to have symptoms and his blood pressure is not well controlled over the next week or 2.  Side effects of the medication have been reviewed and he will educate me if he has any increase in lightheadedness dizziness with adding additional blood pressure medication.  I would like for him to have a repeat  BMP as in the past it looks like he has had some hyperkalemia.  He prefers to have this done next week on Wednesday when he sees Dr. Damon for his regular labs.  I think this is okay.  I asked him to call me within 2 days after having his labs done if he is not heard from me about his results of his kidney function and potassium levels.  Patient demonstrated understanding of the plan of care.    Follow up with Dr. Roberts on 9/10/2020 as scheduled, unless otherwise needed sooner based on continued symptoms.  I advised the patient to contact our office with any questions or concerns.       It has been a pleasure to participate in this patient's care. Please feel free to contact me with any questions or concerns.     ALETHA Bansal  03/03/2020             Your medication list           Accurate as of March 3, 2020 10:47 AM. If you have any questions, ask your nurse or doctor.               CONTINUE taking these medications      Instructions Last Dose Given Next Dose Due   amLODIPine 5 MG tablet  Commonly known as:  NORVASC      Take 5 mg by mouth Daily.       levothyroxine 100 MCG tablet  Commonly known as:  SYNTHROID, LEVOTHROID      Take 1 tablet by mouth daily.       losartan 25 MG tablet  Commonly known as:  COZAAR      Take 1 tablet by mouth 2 (Two) Times a Day.       montelukast 10 MG tablet  Commonly known as:  SINGULAIR      Take 10 mg by mouth Every Night.       predniSONE 2.5 MG tablet  Commonly known as:  DELTASONE      Take 2.5 mg by mouth Daily.       VITAMIN B-COMPLEX PO      Take 1 tablet by mouth Daily.       zolpidem 10 MG tablet  Commonly known as:  AMBIEN      TAKE ONE-HALF TO ONE TABLET BY MOUTH ONCE NIGHTLY AS NEEDED FOR SLEEP  **MUST LAST 30 DAYS          STOP taking these medications    guaiFENesin-codeine 100-10 MG/5ML liquid  Commonly known as:  GUAIFENESIN AC  Stopped by:  ALETHA Bansal              Where to Get Your Medications      These medications were sent to [facility] - [place], [place] - [address] [facility] AT [address] - [phone]  - [phone]   [address] [facility] [address]    Phone:  [phone]   · losartan 25 MG tablet         The above medication changes may not have been made by this provider.  Medication list was updated to reflect medications patient is currently taking including medication changes and discontinuations made by other healthcare providers.     Dictated utilizing Dragon Dictation System.

## 2020-03-02 NOTE — TELEPHONE ENCOUNTER
He can split the losartan to twice a day.  I would get him in for earlier follow up with myself or sharath.

## 2020-03-03 ENCOUNTER — OFFICE VISIT (OUTPATIENT)
Dept: CARDIOLOGY | Facility: CLINIC | Age: 75
End: 2020-03-03

## 2020-03-03 VITALS
HEART RATE: 55 BPM | BODY MASS INDEX: 25.07 KG/M2 | SYSTOLIC BLOOD PRESSURE: 140 MMHG | WEIGHT: 165.4 LBS | DIASTOLIC BLOOD PRESSURE: 80 MMHG | OXYGEN SATURATION: 98 % | HEIGHT: 68 IN

## 2020-03-03 DIAGNOSIS — I45.10 RBBB: ICD-10-CM

## 2020-03-03 DIAGNOSIS — I10 HYPERTENSION, UNSPECIFIED TYPE: Primary | ICD-10-CM

## 2020-03-03 DIAGNOSIS — I77.810 DILATED AORTIC ROOT (HCC): ICD-10-CM

## 2020-03-03 DIAGNOSIS — R07.9 CHEST PAIN, UNSPECIFIED TYPE: ICD-10-CM

## 2020-03-03 DIAGNOSIS — E78.5 HYPERLIPIDEMIA, UNSPECIFIED HYPERLIPIDEMIA TYPE: ICD-10-CM

## 2020-03-03 DIAGNOSIS — I71.20 THORACIC AORTIC ANEURYSM WITHOUT RUPTURE (HCC): ICD-10-CM

## 2020-03-03 DIAGNOSIS — I10 ESSENTIAL HYPERTENSION: ICD-10-CM

## 2020-03-03 PROCEDURE — 93000 ELECTROCARDIOGRAM COMPLETE: CPT | Performed by: NURSE PRACTITIONER

## 2020-03-03 PROCEDURE — 99214 OFFICE O/P EST MOD 30 MIN: CPT | Performed by: NURSE PRACTITIONER

## 2020-03-03 RX ORDER — LOSARTAN POTASSIUM 25 MG/1
25 TABLET ORAL 2 TIMES DAILY
Qty: 60 TABLET | Refills: 2 | Status: SHIPPED | OUTPATIENT
Start: 2020-03-03 | End: 2020-04-02 | Stop reason: SDUPTHER

## 2020-03-11 ENCOUNTER — OFFICE VISIT (OUTPATIENT)
Dept: ONCOLOGY | Facility: CLINIC | Age: 75
End: 2020-03-11

## 2020-03-11 ENCOUNTER — TELEPHONE (OUTPATIENT)
Dept: CARDIOLOGY | Facility: CLINIC | Age: 75
End: 2020-03-11

## 2020-03-11 ENCOUNTER — LAB (OUTPATIENT)
Dept: LAB | Facility: HOSPITAL | Age: 75
End: 2020-03-11

## 2020-03-11 VITALS
BODY MASS INDEX: 25.28 KG/M2 | RESPIRATION RATE: 14 BRPM | TEMPERATURE: 98.2 F | DIASTOLIC BLOOD PRESSURE: 74 MMHG | OXYGEN SATURATION: 100 % | HEART RATE: 56 BPM | SYSTOLIC BLOOD PRESSURE: 151 MMHG | HEIGHT: 68 IN | WEIGHT: 166.8 LBS

## 2020-03-11 DIAGNOSIS — D75.1 ERYTHROCYTOSIS: ICD-10-CM

## 2020-03-11 DIAGNOSIS — C64.1 CANCER OF RIGHT KIDNEY (HCC): Primary | ICD-10-CM

## 2020-03-11 DIAGNOSIS — E87.5 HYPERKALEMIA: Primary | ICD-10-CM

## 2020-03-11 DIAGNOSIS — C64.9 MALIGNANT NEOPLASM OF KIDNEY, UNSPECIFIED LATERALITY (HCC): Primary | ICD-10-CM

## 2020-03-11 DIAGNOSIS — D47.1 MYELOPROLIFERATIVE DISORDER (HCC): ICD-10-CM

## 2020-03-11 LAB
ALBUMIN SERPL-MCNC: 4.2 G/DL (ref 3.5–5.2)
ALBUMIN/GLOB SERPL: 1.6 G/DL (ref 1.1–2.4)
ALP SERPL-CCNC: 79 U/L (ref 38–116)
ALT SERPL W P-5'-P-CCNC: 12 U/L (ref 0–41)
ANION GAP SERPL CALCULATED.3IONS-SCNC: 11.7 MMOL/L (ref 5–15)
AST SERPL-CCNC: 20 U/L (ref 0–40)
BASOPHILS # BLD AUTO: 0.08 10*3/MM3 (ref 0–0.2)
BASOPHILS NFR BLD AUTO: 0.8 % (ref 0–1.5)
BILIRUB SERPL-MCNC: 0.9 MG/DL (ref 0.2–1.2)
BUN BLD-MCNC: 17 MG/DL (ref 6–20)
BUN/CREAT SERPL: 13.2 (ref 7.3–30)
CALCIUM SPEC-SCNC: 9.1 MG/DL (ref 8.5–10.2)
CHLORIDE SERPL-SCNC: 104 MMOL/L (ref 98–107)
CO2 SERPL-SCNC: 25.3 MMOL/L (ref 22–29)
CREAT BLD-MCNC: 1.29 MG/DL (ref 0.7–1.3)
DEPRECATED RDW RBC AUTO: 53.8 FL (ref 37–54)
EOSINOPHIL # BLD AUTO: 0.08 10*3/MM3 (ref 0–0.4)
EOSINOPHIL NFR BLD AUTO: 0.8 % (ref 0.3–6.2)
ERYTHROCYTE [DISTWIDTH] IN BLOOD BY AUTOMATED COUNT: 17.2 % (ref 12.3–15.4)
GFR SERPL CREATININE-BSD FRML MDRD: 55 ML/MIN/1.73
GLOBULIN UR ELPH-MCNC: 2.6 GM/DL (ref 1.8–3.5)
GLUCOSE BLD-MCNC: 112 MG/DL (ref 74–124)
HCT VFR BLD AUTO: 43.6 % (ref 37.5–51)
HGB BLD-MCNC: 13.2 G/DL (ref 13–17.7)
IMM GRANULOCYTES # BLD AUTO: 0.04 10*3/MM3 (ref 0–0.05)
IMM GRANULOCYTES NFR BLD AUTO: 0.4 % (ref 0–0.5)
LYMPHOCYTES # BLD AUTO: 1.26 10*3/MM3 (ref 0.7–3.1)
LYMPHOCYTES NFR BLD AUTO: 13 % (ref 19.6–45.3)
MCH RBC QN AUTO: 25.8 PG (ref 26.6–33)
MCHC RBC AUTO-ENTMCNC: 30.3 G/DL (ref 31.5–35.7)
MCV RBC AUTO: 85.2 FL (ref 79–97)
MONOCYTES # BLD AUTO: 0.71 10*3/MM3 (ref 0.1–0.9)
MONOCYTES NFR BLD AUTO: 7.3 % (ref 5–12)
NEUTROPHILS # BLD AUTO: 7.55 10*3/MM3 (ref 1.7–7)
NEUTROPHILS NFR BLD AUTO: 77.7 % (ref 42.7–76)
NRBC BLD AUTO-RTO: 0 /100 WBC (ref 0–0.2)
PLATELET # BLD AUTO: 274 10*3/MM3 (ref 140–450)
PMV BLD AUTO: 8.9 FL (ref 6–12)
POTASSIUM BLD-SCNC: 4.8 MMOL/L (ref 3.5–4.7)
PROT SERPL-MCNC: 6.8 G/DL (ref 6.3–8)
RBC # BLD AUTO: 5.12 10*6/MM3 (ref 4.14–5.8)
SODIUM BLD-SCNC: 141 MMOL/L (ref 134–145)
WBC NRBC COR # BLD: 9.72 10*3/MM3 (ref 3.4–10.8)

## 2020-03-11 PROCEDURE — 99213 OFFICE O/P EST LOW 20 MIN: CPT | Performed by: INTERNAL MEDICINE

## 2020-03-11 PROCEDURE — 36415 COLL VENOUS BLD VENIPUNCTURE: CPT

## 2020-03-11 PROCEDURE — 85025 COMPLETE CBC W/AUTO DIFF WBC: CPT

## 2020-03-11 PROCEDURE — 80053 COMPREHEN METABOLIC PANEL: CPT

## 2020-03-11 NOTE — PROGRESS NOTES
REASONS FOR FOLLOWUP:       1. Myeloproliferative disorder with polycythemia vera, MAI-2 mutation positive requiring periodic phlebotomies whenever hematocrit is above 45.      2. His  tory of stage I clear cell carcinoma of the right kidney status post partial nephrectomy, nephron sparing surgery by Dr. Ganesh Lopez with no issues or consequences.  The patient has already an appointment to repeat CT scan of the abdomen in March 2016.                3. Patient has history of polymyalgia rheumatica.  He IS OFF 5 mg of prednisone a day.            HISTORY OF PRESENT ILLNESS  This patient returns today to the office for followup. He is here today with no new issues besides his blood pressure medication that has been adjusted recently and is trying to find out when his blood pressure is going to come down a little bit more.  Other than that he will have his followup CT scan regarding his kidney cancer in May by his urologist.  Other than that the patient has a good appetite, he has lost some weight, is physically very active and has no difficulty with urination.  He has not had fever or infections.  He has a phlebotomy a few days ago for a high hematocrit given his erythrocytosis.  He has not had any new thrombosis.  He is now back on prednisone minimal dose for polymyalgia rheumatica.                         PAST MEDICAL HISTORY:    1.;  Hypertension.    2.;   History of polyps in the colon.  Colonoscopy in 2010 documented a tubulovillous adenoma that was removed completely.  Further colonoscopy in 2014 was unremarkable.     3.; History of prostate biopsy in the past that showed features consistent with prostate cancer  .  He has not required any specific therapy for this and he has been monitored through his urologist.    4.; History of multiple hernia repairs.     5.; History of polymyalgia rheumatica that was diagnosed 3 years by Dr. Karan Garrett.  He has been on a tapering dose   of prednisone,  even though he admits that his polymyalgia was atypical because his sedimentation was never elevated.  He has been seen by other rheumatologists in the past as well and has been told he has Raynaud’  s disease.  He has never had diagnosis of rheumatoid arthritis, lupus or something of this nature.     6.; Partial thyroidectomy in 1983 for a malignant tumor of the thyroid gland that never required any other intervention.      7.; He used to have radiation treatment for tonsillar inflammation and infection when   he was a child and this led him to develop thyroid cancer.       HEMATOLOGIC/ONCOLOGIC HISTORY:  History of previous dates can be found in a separate document.         On 02/22/2016, he had no symptoms or signs that would indicate any kidney cancer recurrence.  Given   the mild achiness in his muscles, we went ahead and checked a CPK and an aldolase and also checked a sedimentation rate and vitamin D level.  TSH was also performed.  We asked him to remain on his dose of prednisone of 5 mg a day.  We asked him to initia  te a program of physical activity including going to the gym.  He did not need phlebotomy on this occasion, given hematocrit of 45.  His white count and platelet count remain stable.  He had no palpable splenomegaly.         MEDICATIONS:  The current medication list was reviewed with the patient and updated in the EMR this date per the Medical Assistant.  Medication dosages and frequencies were confirmed to be accurate.        It is important to mention the patient does not use any androgen or androgen replacement medication or anabolic steroids or any kind.        ALLERGIES:     1.  STATINS.    2. DIPHENHYDRAMINE.         SOCIAL HISTORY:  The patient is .  He lives with his wife in [place].  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  He plays violin in   an orchestra and also he is a very avid .  He does not smoke but he had a lot of  second-hand smoke through his life.  He drinks 1-3 glasses of wine a week.  He has no use of any recreational drugs.          FAMILY HISTORY:  His father  of alcoh  ol and smoking disorders including heart attack.  His mother is 89 and in good health.  He has no brothers.  He has 1 adopted sister who is in good health with probable rheumatoid arthritis.  His wife is in good health.  He has 2 children, 1 who is in poo  r health due to drug abuse and the other son is in good health. He had another son who  as a consequence of Tylenol intoxication.   No family history of hematological disorder or myeloproliferative disorder.         REVIEW OF SYSTEMS:               General: no fever, no chills, no fatigue,no weight changes, no lack of appetite.  Eyes: no epiphora, xerophthalmia,conjunctivitis, pain, glaucoma, blurred vision, blindness, secretion, photophobia, proptosis, diplopia.  Ears: no otorrhea, tinnitus, otorrhagia, deafness, pain, vertigo.  Nose: no rhinorrhea, no epistaxis, no alteration in perception of odors, no sinuses pressure.  Mouth: no alteration in gums or teeth,  No ulcers, no difficulty with mastication or deglut ion, no odynophagia.  Neck: no masses or pain, no thyroid alterations, no pain in muscles or arteries, no carotid odynia, no crepitation.  Respiratory: no cough, no sputum production,no dyspnea,no trepopnea, no pleuritic pain,no hemoptysis.  Heart: no syncope, no irregularity, no palpitations, no angina,no orthopnea,no paroxysmal nocturnal dyspnea.  Vascular Venous: no tenderness,no edema,no palpable cords,no postphlebitic syndrome, no skin changes no ulcerations.  Vascular Arterial: no distal ischemia, noclaudication, no gangrene, no neuropathic ischemic pain, no skin ulcers, no paleness no cyanosis.  GI: no dysphagia, no odynophagia, no regurgitation, no heartburn,no indigestion,no nausea,no vomiting,no hematemesis ,no melena,no jaundice,no distention, no obstipation,no  "enterorrhagia,no proctalgia,no anal  lesions, no changes in bowel habits.  : no frequency, no hesitancy, no hematuria, no discharge,no  pain.  Musculoskeletal: STATED muscle or tendon pain or inflammation,no  joint pain, no edema, no functional limitation,no fasciculations, no mass.  Neurologic: no headache, no seizures, noalterations on Craneal nerves, no motor deficit, no sensory deficit, normal coordination, no alteration in memory,normal orientation, calculation,normal writting, verbal and written language.  Skin: no rashes,no pruritus no localized lesions.  Psychiatric: no anxiety, no depression,no agitation, no delusions, proper insight.        VITAL SIGNS:   Vitals:    03/11/20 1327   BP: 151/74   Pulse: 56   Resp: 14   Temp: 98.2 °F (36.8 °C)   TempSrc: Oral   SpO2: 100%   Weight: 75.7 kg (166 lb 12.8 oz)   Height: 172.7 cm (67.99\")            PHYSICAL EXAMINATION:           GENERAL:  Well-developed, well-nourished  Patient  in no acute distress.   SKIN:  Warm, dry ,NO rashes,NO purpura ,NO petechiae.  HEENT:  Pupils were equal and reactive to light and accomodation, conjunctivas non injected, no pterigion, normal extraocular movements, normal visual acuity.   Mouth mucosa was moist, no exudates in oropharynx, normal gum line, normal roof of the mouth and pillars, normal papillations of the tongue.  NECK:  Supple with good range of motion; no thyromegaly or masses, no JVD or bruits, no cervical adenopathies.No carotid arteries pain, no carotid abnormal pulsation , NO arterial dance.  LYMPHATICS:  No cervical, NO supraclavicular, NO axillary,NO epitrochlear , NO inguinal adenopathy.  CHEST:  Normal excursion of both nelson thoraces, normal voice fremitus, no subcutaneous emphysema, normal axillas, no rashes or acanthosis nigricans. Lungs clear to percussion and auscultation, normal breath sounds bilaterally, no wheezing,NO crackles NO ronchi, NO stridor, NO rubs.  CARDIAC AND VASCULAR:  normal rate and " regular rhythm, without murmurs,NO rubs NO S3 NO S4 right or left . Normal femoral, popliteal, pedis, brachial and carotid pulses.  ABDOMEN:  Soft, nontender with no organomegaly or masses, no ascites, no collateral circulation,no distention,no [place] sign, no abdominal pain, no inguinal hernias,no umbilical hernia, no abdominal bruits. Normal bowel sounds.  GENITAL: Not  Performed.  EXTREMITIES  AND SPINE:  No clubbing, cyanosis or edema, no deformities or pain .No kyphosis, scoliosis, deformities or pain in spine, ribs or pelvic bone.  NEUROLOGICAL:  Patient was awake, alert, oriented to time, person and place.                  LABORATORY DATA:  Component      Latest Ref Rng & Units 12/18/2019 12/27/2019 1/15/2020 2/12/2020   WBC      3.40 - 10.80 10*3/mm3 10.07 10.86 9.28 9.54   RBC      4.14 - 5.80 10*6/mm3 5.55 5.47 5.47 5.76   Hemoglobin      13.0 - 17.7 g/dL 13.9 13.6 13.9 14.8   Hematocrit      37.5 - 51.0 % 45.9 46.2 44.3 46.9   MCV      79.0 - 97.0 fL 82.7 84.5 81.0 81.4   MCH      26.6 - 33.0 pg 25.0 (L) 24.9 (L) 25.4 (L) 25.7 (L)   MCHC      31.5 - 35.7 g/dL 30.3 (L) 29.4 (L) 31.4 (L) 31.6   RDW      12.3 - 15.4 % 18.5 (H) 16.2 16.1 (H) 17.6 (H)   RDW-SD      37.0 - 54.0 fl 46.5  47.3 49.8   MPV      6.0 - 12.0 fL 9.4 10.4 9.3 9.4   Platelets      140 - 450 10*3/mm3 264 290 242 240   Neutrophil Rel %      42.7 - 76.0 % 70.2 69.3 81.9 (H) 83.5 (H)   Lymphocyte Rel %      19.6 - 45.3 % 16.4 (L) 14.7 (L) 10.5 (L) 9.3 (L)   Monocyte Rel %      5.0 - 12.0 % 9.7 12.5 6.0 5.9     Component      Latest Ref Rng & Units 2/26/2020   WBC      3.40 - 10.80 10*3/mm3 9.29   RBC      4.14 - 5.80 10*6/mm3 5.57   Hemoglobin      13.0 - 17.7 g/dL 14.3   Hematocrit      37.5 - 51.0 % 45.6   MCV      79.0 - 97.0 fL 81.9   MCH      26.6 - 33.0 pg 25.7 (L)   MCHC      31.5 - 35.7 g/dL 31.4 (L)   RDW      12.3 - 15.4 % 18.1 (H)   RDW-SD      37.0 - 54.0 fl 51.3   MPV      6.0 - 12.0 fL 9.2   Platelets      140 - 450 10*3/mm3  253   Neutrophil Rel %      42.7 - 76.0 % 65.3   Lymphocyte Rel %      19.6 - 45.3 % 19.8   Monocyte Rel %      5.0 - 12.0 % 10.2               ASSESSMENT/PLAN:   1. This patient has polycythemia vera, JAK2 mutation positive, and has taken in the past Hydrea. He discontinued this medicine because of feeling afraid of side effects even though I never noticed anything in particular related to this medicine.   Today his hematocrit is 45 and the patient wants to forego any further phlebotomy at this time unless the hematocrit goes above 50. I pointed out to him the data suggesting that vascular events are more likely to happen in patient's who have hematocrit above 45. The good news is that his white count and platelet count are stable. Therefore I think this will be okay as long as he keeps this in mind and consideration.   2. The patient has history of kidney cancer. He had nephrectomy. He is doing fine from this point of view.        I reviewed notes by Dr. 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  At this time there is no need for any intervention. He is planning to see him back on yearly basis and the patient was extremely satisfied with that visit.     Regarding his hematocrit it is very good today after phlebotomy a few days ago. We will continue monitoring his hematocrit every 6 weeks and phlebotomy if his hematocrit is equal or above 45.      I will review him back in 18 weeks.  Otherwise no other intervention from my point of view.     Finally, I had a lengthy discussion with him about coronavirus virus prevention and he understands the reasons behind it.

## 2020-03-12 PROBLEM — E87.5 HYPERKALEMIA: Status: ACTIVE | Noted: 2020-03-12

## 2020-03-12 NOTE — PROGRESS NOTES
Left patient message to call me back regarding his blood work.  I discussed his blood work with Dr. Roberts and we will continue to monitor his potassium level in a couple weeks on the losartan.

## 2020-03-12 NOTE — TELEPHONE ENCOUNTER
I was able to speak with the patient regarding his lab results.  I discussed his mildly elevated potassium with Dr. Roberts.  His potassium was 4.8 with a count of range of 4.7.  He denies that he eats a lot of potassium rich foods excessively and denies that he is taking any multivitamins or supplements with potassium.  We will repeat his potassium level in 2 weeks on the losartan to ensure its not trending upward.  I also talked with him about his blood pressure.  His symptoms are doing better he is feeling better.  At times he still has some elevated blood pressure readings.  He said his blood pressure was 151/74 at Dr. Damon' office yesterday.  He reports his blood pressure is averaging 130/70s and does not feel it is coming down is much as he expected to with his medications.  He is going to keep a log for another week and drop off his log to me at the end of the week and we will reevaluate his need for increased dosage or medication adjustment.  He was also reminded to go have his potassium level repeated in 2 weeks and verbalized understanding of the plan of care.

## 2020-04-02 DIAGNOSIS — I10 ESSENTIAL HYPERTENSION: ICD-10-CM

## 2020-04-02 RX ORDER — LOSARTAN POTASSIUM 25 MG/1
25 TABLET ORAL 2 TIMES DAILY
Qty: 180 TABLET | Refills: 2 | Status: SHIPPED | OUTPATIENT
Start: 2020-04-02 | End: 2020-04-02 | Stop reason: SDUPTHER

## 2020-04-02 RX ORDER — LOSARTAN POTASSIUM 25 MG/1
25 TABLET ORAL 2 TIMES DAILY
Qty: 180 TABLET | Refills: 2 | Status: SHIPPED | OUTPATIENT
Start: 2020-04-02 | End: 2020-05-21 | Stop reason: SDUPTHER

## 2020-04-21 ENCOUNTER — TELEPHONE (OUTPATIENT)
Dept: ONCOLOGY | Facility: HOSPITAL | Age: 75
End: 2020-04-21

## 2020-04-21 NOTE — TELEPHONE ENCOUNTER
----- Message from John Damon MD sent at 4/21/2020  2:33 PM EDT -----  Out for 1 and 2  And 3 m  ----- Message -----  From: Iesha Her RN  Sent: 4/21/2020  10:15 AM EDT  To: John Damon MD    WOULD YOU LIKE TO PUSH PATIENTS APPNT OUT FOR TOMORROW FOR PHLEBOTOMY? LAST HCT 43.6 ON 3/11.

## 2020-04-22 ENCOUNTER — APPOINTMENT (OUTPATIENT)
Dept: LAB | Facility: HOSPITAL | Age: 75
End: 2020-04-22

## 2020-04-22 ENCOUNTER — APPOINTMENT (OUTPATIENT)
Dept: ONCOLOGY | Facility: HOSPITAL | Age: 75
End: 2020-04-22

## 2020-05-05 ENCOUNTER — TELEPHONE (OUTPATIENT)
Dept: CARDIOLOGY | Facility: CLINIC | Age: 75
End: 2020-05-05

## 2020-05-05 NOTE — TELEPHONE ENCOUNTER
Yes please let him know he still needs to have my lab drawn. His potassium level is mildly elevated which can be worsened by the losartan and we need to keep a close eye on this. Thanks

## 2020-05-05 NOTE — TELEPHONE ENCOUNTER
S/w pt through his wife and he states he is going to have lab done and call the office with any issues.    Was ordered on 03/12/20   Does he still need to have done.

## 2020-05-19 ENCOUNTER — TELEPHONE (OUTPATIENT)
Dept: ONCOLOGY | Facility: CLINIC | Age: 75
End: 2020-05-19

## 2020-05-20 ENCOUNTER — TELEPHONE (OUTPATIENT)
Dept: ONCOLOGY | Facility: CLINIC | Age: 75
End: 2020-05-20

## 2020-05-20 ENCOUNTER — INFUSION (OUTPATIENT)
Dept: ONCOLOGY | Facility: HOSPITAL | Age: 75
End: 2020-05-20

## 2020-05-20 ENCOUNTER — LAB (OUTPATIENT)
Dept: LAB | Facility: HOSPITAL | Age: 75
End: 2020-05-20

## 2020-05-20 VITALS
OXYGEN SATURATION: 91 % | DIASTOLIC BLOOD PRESSURE: 80 MMHG | HEART RATE: 69 BPM | SYSTOLIC BLOOD PRESSURE: 155 MMHG | BODY MASS INDEX: 25.09 KG/M2 | WEIGHT: 165 LBS | TEMPERATURE: 97.6 F

## 2020-05-20 DIAGNOSIS — D47.1 MYELOPROLIFERATIVE DISORDER (HCC): ICD-10-CM

## 2020-05-20 DIAGNOSIS — E87.5 HYPERKALEMIA: ICD-10-CM

## 2020-05-20 DIAGNOSIS — D45 POLYCYTHEMIA VERA (HCC): Primary | ICD-10-CM

## 2020-05-20 DIAGNOSIS — D75.1 ERYTHROCYTOSIS: ICD-10-CM

## 2020-05-20 DIAGNOSIS — C64.1 CANCER OF RIGHT KIDNEY (HCC): ICD-10-CM

## 2020-05-20 LAB
BASOPHILS # BLD AUTO: 0.09 10*3/MM3 (ref 0–0.2)
BASOPHILS NFR BLD AUTO: 0.9 % (ref 0–1.5)
DEPRECATED RDW RBC AUTO: 47.9 FL (ref 37–54)
EOSINOPHIL # BLD AUTO: 0.21 10*3/MM3 (ref 0–0.4)
EOSINOPHIL NFR BLD AUTO: 2 % (ref 0.3–6.2)
ERYTHROCYTE [DISTWIDTH] IN BLOOD BY AUTOMATED COUNT: 15.8 % (ref 12.3–15.4)
HCT VFR BLD AUTO: 46.5 % (ref 37.5–51)
HGB BLD-MCNC: 14.3 G/DL (ref 13–17.7)
IMM GRANULOCYTES # BLD AUTO: 0.07 10*3/MM3 (ref 0–0.05)
IMM GRANULOCYTES NFR BLD AUTO: 0.7 % (ref 0–0.5)
LYMPHOCYTES # BLD AUTO: 1.91 10*3/MM3 (ref 0.7–3.1)
LYMPHOCYTES NFR BLD AUTO: 18.3 % (ref 19.6–45.3)
MCH RBC QN AUTO: 26.4 PG (ref 26.6–33)
MCHC RBC AUTO-ENTMCNC: 30.8 G/DL (ref 31.5–35.7)
MCV RBC AUTO: 85.8 FL (ref 79–97)
MONOCYTES # BLD AUTO: 1.05 10*3/MM3 (ref 0.1–0.9)
MONOCYTES NFR BLD AUTO: 10.1 % (ref 5–12)
NEUTROPHILS # BLD AUTO: 7.1 10*3/MM3 (ref 1.7–7)
NEUTROPHILS NFR BLD AUTO: 68 % (ref 42.7–76)
NRBC BLD AUTO-RTO: 0 /100 WBC (ref 0–0.2)
PLATELET # BLD AUTO: 259 10*3/MM3 (ref 140–450)
PMV BLD AUTO: 8.9 FL (ref 6–12)
POTASSIUM BLD-SCNC: 4.7 MMOL/L (ref 3.5–4.7)
RBC # BLD AUTO: 5.42 10*6/MM3 (ref 4.14–5.8)
WBC NRBC COR # BLD: 10.43 10*3/MM3 (ref 3.4–10.8)

## 2020-05-20 PROCEDURE — 36415 COLL VENOUS BLD VENIPUNCTURE: CPT

## 2020-05-20 PROCEDURE — 84132 ASSAY OF SERUM POTASSIUM: CPT

## 2020-05-20 PROCEDURE — 99195 PHLEBOTOMY: CPT

## 2020-05-20 PROCEDURE — 85025 COMPLETE CBC W/AUTO DIFF WBC: CPT

## 2020-05-20 NOTE — PROGRESS NOTES
Copy of labs given to pt. Pt requesting phlebo today. Parameters suggest phlebo if hct greater than 45.

## 2020-05-20 NOTE — TELEPHONE ENCOUNTER
----- Message from Cindy Garcias RN sent at 5/20/2020  1:50 PM EDT -----  Pt would like to cancel June's appointment. Please keep July appt.      Thanks

## 2020-05-21 ENCOUNTER — TELEPHONE (OUTPATIENT)
Dept: CARDIOLOGY | Facility: CLINIC | Age: 75
End: 2020-05-21

## 2020-05-21 DIAGNOSIS — I10 ESSENTIAL HYPERTENSION: ICD-10-CM

## 2020-05-21 RX ORDER — LOSARTAN POTASSIUM 25 MG/1
25 TABLET ORAL DAILY
Qty: 90 TABLET | Refills: 0
Start: 2020-05-21 | End: 2021-08-23

## 2020-05-21 NOTE — TELEPHONE ENCOUNTER
Called and spoke with the patient.  Potassium level is back within range.  He reports he is taking losartan 25 mg once daily instead of twice daily because his potassium had run high.  His blood pressure is remaining good without issues.  He is quite worried about his kidney function is going to request his PCP recheck his blood work in July at his appointment.  He will call with any blood pressure or new or worsening cardiac problems between now and his next appointment with Dr. Roberts in September.

## 2020-05-21 NOTE — PROGRESS NOTES
Patient self cut his losartan back to 25 mg daily instead of twice daily.  His potassium is within range.  Any and is going to organize repeat renal function labs as he is concerned about his renal function and potassium

## 2020-05-22 ENCOUNTER — HOSPITAL ENCOUNTER (EMERGENCY)
Facility: HOSPITAL | Age: 75
Discharge: HOME | End: 2020-05-22
Attending: EMERGENCY MEDICINE | Admitting: EMERGENCY MEDICINE

## 2020-05-22 ENCOUNTER — TELEPHONE (OUTPATIENT)
Dept: CARDIOLOGY | Facility: CLINIC | Age: 75
End: 2020-05-22

## 2020-05-22 ENCOUNTER — APPOINTMENT (OUTPATIENT)
Dept: GENERAL RADIOLOGY | Facility: HOSPITAL | Age: 75
End: 2020-05-22

## 2020-05-22 ENCOUNTER — APPOINTMENT (OUTPATIENT)
Dept: CT IMAGING | Facility: HOSPITAL | Age: 75
End: 2020-05-22

## 2020-05-22 VITALS
DIASTOLIC BLOOD PRESSURE: 77 MMHG | HEIGHT: 68 IN | SYSTOLIC BLOOD PRESSURE: 110 MMHG | BODY MASS INDEX: 25.01 KG/M2 | OXYGEN SATURATION: 98 % | WEIGHT: 165 LBS | HEART RATE: 69 BPM | RESPIRATION RATE: 16 BRPM | TEMPERATURE: 97.5 F

## 2020-05-22 DIAGNOSIS — R42 VERTIGO: Primary | ICD-10-CM

## 2020-05-22 DIAGNOSIS — R07.89 ATYPICAL CHEST PAIN: ICD-10-CM

## 2020-05-22 LAB
ALBUMIN SERPL-MCNC: 4 G/DL (ref 3.5–5.2)
ALBUMIN/GLOB SERPL: 1.7 G/DL
ALP SERPL-CCNC: 68 U/L (ref 39–117)
ALT SERPL W P-5'-P-CCNC: 11 U/L (ref 1–41)
ANION GAP SERPL CALCULATED.3IONS-SCNC: 8.8 MMOL/L (ref 5–15)
AST SERPL-CCNC: 14 U/L (ref 1–40)
BASOPHILS # BLD AUTO: 0.09 10*3/MM3 (ref 0–0.2)
BASOPHILS NFR BLD AUTO: 0.9 % (ref 0–1.5)
BILIRUB SERPL-MCNC: 0.7 MG/DL (ref 0.2–1.2)
BUN BLD-MCNC: 15 MG/DL (ref 8–23)
BUN/CREAT SERPL: 13.6 (ref 7–25)
CALCIUM SPEC-SCNC: 8.7 MG/DL (ref 8.6–10.5)
CHLORIDE SERPL-SCNC: 106 MMOL/L (ref 98–107)
CO2 SERPL-SCNC: 26.2 MMOL/L (ref 22–29)
CREAT BLD-MCNC: 1.1 MG/DL (ref 0.76–1.27)
DEPRECATED RDW RBC AUTO: 43 FL (ref 37–54)
EOSINOPHIL # BLD AUTO: 0.24 10*3/MM3 (ref 0–0.4)
EOSINOPHIL NFR BLD AUTO: 2.4 % (ref 0.3–6.2)
ERYTHROCYTE [DISTWIDTH] IN BLOOD BY AUTOMATED COUNT: 14.8 % (ref 12.3–15.4)
GFR SERPL CREATININE-BSD FRML MDRD: 65 ML/MIN/1.73
GLOBULIN UR ELPH-MCNC: 2.3 GM/DL
GLUCOSE BLD-MCNC: 111 MG/DL (ref 65–99)
HCT VFR BLD AUTO: 40.8 % (ref 37.5–51)
HGB BLD-MCNC: 13.2 G/DL (ref 13–17.7)
HOLD SPECIMEN: NORMAL
IMM GRANULOCYTES # BLD AUTO: 0.07 10*3/MM3 (ref 0–0.05)
IMM GRANULOCYTES NFR BLD AUTO: 0.7 % (ref 0–0.5)
LYMPHOCYTES # BLD AUTO: 2.33 10*3/MM3 (ref 0.7–3.1)
LYMPHOCYTES NFR BLD AUTO: 23.2 % (ref 19.6–45.3)
MCH RBC QN AUTO: 26.1 PG (ref 26.6–33)
MCHC RBC AUTO-ENTMCNC: 32.4 G/DL (ref 31.5–35.7)
MCV RBC AUTO: 80.8 FL (ref 79–97)
MONOCYTES # BLD AUTO: 1.04 10*3/MM3 (ref 0.1–0.9)
MONOCYTES NFR BLD AUTO: 10.4 % (ref 5–12)
NEUTROPHILS # BLD AUTO: 6.26 10*3/MM3 (ref 1.7–7)
NEUTROPHILS NFR BLD AUTO: 62.4 % (ref 42.7–76)
NRBC BLD AUTO-RTO: 0 /100 WBC (ref 0–0.2)
PLATELET # BLD AUTO: 259 10*3/MM3 (ref 140–450)
PMV BLD AUTO: 9.3 FL (ref 6–12)
POTASSIUM BLD-SCNC: 4.3 MMOL/L (ref 3.5–5.2)
PROT SERPL-MCNC: 6.3 G/DL (ref 6–8.5)
RBC # BLD AUTO: 5.05 10*6/MM3 (ref 4.14–5.8)
SODIUM BLD-SCNC: 141 MMOL/L (ref 136–145)
T4 FREE SERPL-MCNC: 1.21 NG/DL (ref 0.93–1.7)
TROPONIN T SERPL-MCNC: <0.01 NG/ML (ref 0–0.03)
TROPONIN T SERPL-MCNC: <0.01 NG/ML (ref 0–0.03)
WBC NRBC COR # BLD: 10.03 10*3/MM3 (ref 3.4–10.8)
WHOLE BLOOD HOLD SPECIMEN: NORMAL

## 2020-05-22 PROCEDURE — 99284 EMERGENCY DEPT VISIT MOD MDM: CPT

## 2020-05-22 PROCEDURE — 85025 COMPLETE CBC W/AUTO DIFF WBC: CPT | Performed by: EMERGENCY MEDICINE

## 2020-05-22 PROCEDURE — 84439 ASSAY OF FREE THYROXINE: CPT | Performed by: EMERGENCY MEDICINE

## 2020-05-22 PROCEDURE — 93005 ELECTROCARDIOGRAM TRACING: CPT | Performed by: EMERGENCY MEDICINE

## 2020-05-22 PROCEDURE — 93010 ELECTROCARDIOGRAM REPORT: CPT | Performed by: INTERNAL MEDICINE

## 2020-05-22 PROCEDURE — 71045 X-RAY EXAM CHEST 1 VIEW: CPT

## 2020-05-22 PROCEDURE — 80053 COMPREHEN METABOLIC PANEL: CPT | Performed by: EMERGENCY MEDICINE

## 2020-05-22 PROCEDURE — 84484 ASSAY OF TROPONIN QUANT: CPT | Performed by: EMERGENCY MEDICINE

## 2020-05-22 PROCEDURE — 70450 CT HEAD/BRAIN W/O DYE: CPT

## 2020-05-22 RX ORDER — SODIUM CHLORIDE 0.9 % (FLUSH) 0.9 %
10 SYRINGE (ML) INJECTION AS NEEDED
Status: DISCONTINUED | OUTPATIENT
Start: 2020-05-22 | End: 2020-05-22 | Stop reason: HOSPADM

## 2020-05-22 NOTE — TELEPHONE ENCOUNTER
I called and spoke with the patient.  He continues to have atypical left-sided chest discomfort that is not worth it with exertion.  He actually feels good when he is walking the dog and playing tennis.  He notices the symptoms are worse when he cuts back on his prednisone and improves on higher doses of prednisone.  He does have history of PMR.  I do not think his chest pain is cardiac in nature nor is his vertigo.  I informed him to follow-up with his PCP for treatment of the vertigo.  He will call with any exertional chest pain or exertional dizziness or blood pressure heart rate issues otherwise I think we can keep his appointment in September with Dr. Roberts but he will call sooner for earlier evaluation if he does not have any improvement or symptoms worsen.  He verbalized and feels comfortable with this plan of care.

## 2020-05-22 NOTE — DISCHARGE INSTRUCTIONS
Follow-up with your primary care doctor.  Return to the emergency department for worsening persistent symptoms, difficulty breathing, vomiting, trouble walking, one-sided numbness/tingling/weakness, or other concern.

## 2020-05-22 NOTE — ED TRIAGE NOTES
Pt reports dull left chest pain into left axillary region x 1 month intermittently. Pt reports woke up this morning diaphoretic and dizzy. Pt arrives in triage with mask on. Triage staff wearing masks.

## 2020-05-22 NOTE — ED PROVIDER NOTES
EMERGENCY DEPARTMENT ENCOUNTER    Room Number:  33/33  Date of encounter:  5/22/2020  PCP: Mariah Delgado MD  Historian: Patient     I used full protective equipment while examining this patient.  This includes face mask, gloves and protective eyewear.  I washed my hands before entering the room and immediately upon leaving the room.  Patient was wearing a surgical mask.      HPI:  Chief Complaint: Dizziness  A complete HPI/ROS/PMH/PSH/SH/FH are unobtainable due to: None    Context: Scott Murphy is a 75 y.o. male who presents to the ED c/o dizziness that he noticed when he woke up at approximately 6:45 AM.  Patient was lying in bed at that time.  He reports that he felt dizzy, lightheaded, and like the room was spinning.  Patient became diaphoretic.  Symptoms were worse with turning his head.  Patient states he was able to get up and walk to the bathroom.  Symptoms resolved after a few minutes.  He denies associated headache, nausea, vomiting, earache, loss of hearing, or focal numbness/tingling/weakness.  Patient states he did have some mild aching in his left lateral chest wall.  This was nonradiating.  He reports he has had this off and on for several months.  He attributes this to his polymyalgia rheumatica.  Pain is nonexertional, and in fact, he states the pain is better when he is active.  Patient reports he had phlebotomy done earlier this week for his polycythemia.  He denies any recent illness, cough, fever, abdominal pain, sore throat, dysuria, or known exposure to anyone diagnosed with COVID-19.      PAST MEDICAL HISTORY  Active Ambulatory Problems     Diagnosis Date Noted   • Primary hypertension 05/16/2016   • Erythrocytosis 05/16/2016   • Cancer of right kidney (CMS/[facility]) 05/16/2016   • Prostate cancer (CMS/Formerly [facility]) 05/16/2016   • RBBB 01/30/2018   • PMR (polymyalgia rheumatica) (CMS/Former[facility]) 01/30/2018   • Episodic lightheadedness 01/30/2018   • Raynaud's disease without gangrene 02/26/2018   •  Encounter for anticoagulation discussion and counseling 03/05/2018   • Myeloproliferative disorder (CMS/HCC) 12/05/2018   • Thoracic aortic aneurysm without rupture (CMS/HCC) 09/06/2019   • Colon cancer screening 01/28/2020   • Right shoulder pain 12/20/2017   • Right shoulder injury 12/20/2017   • Hyperlipidemia 03/02/2020   • Hyperbilirubinemia 03/02/2020   • Dilated aortic root (CMS/HCC) 01/15/2020   • Cough 07/10/2019   • Asthma 01/15/2020   • Anxiety 12/26/2018   • Acute pain of right knee 01/17/2019   • Malignant neoplasm of kidney (CMS/HCC) 05/16/2016   • Erythrocytosis 03/02/2020   • Acquired hypothyroidism 10/21/2016   • Neck pain 03/02/2020   • Hypertension 03/02/2020   • Acute URI 03/05/2019   • Chest pain 03/02/2020   • Hyperkalemia 03/12/2020     Resolved Ambulatory Problems     Diagnosis Date Noted   • Other specified hypothyroidism 01/30/2018     Past Medical History:   Diagnosis Date   • Aortic aneurysm (CMS/HCC)    • Arthritis    • Baker's cyst of knee, right    • Disease of thyroid gland    • Gout    • H/O Muscle pain    • H/O Radiation treatment    • History of colon polyps    • Hyperthyroidism    • Hypothyroidism    • Kidney carcinoma (CMS/HCC)    • Left shoulder pain    • Polymyalgia rheumatica (CMS/HCC)    • Premature atrial contraction    • Raynaud disease    • Shoulder injury, left, sequela          PAST SURGICAL HISTORY  Past Surgical History:   Procedure Laterality Date   • CIRCUMCISION N/A 12/17/2018    Procedure: CIRCUMCISION;  Surgeon: Gallo Lopez MD;  Location: [facility] OR;  Service: Urology   • COLONOSCOPY  2010    Documented a tubulovillous adenom that was removed completely. Colonoscopy in 2014 was unremarkable.   • COLONOSCOPY N/A 2/20/2020    Procedure: COLONOSCOPY;  Surgeon: Claire Galo MD;  Location: [facility] OR;  Service: Gastroenterology;  Laterality: N/A;  diverticulosis   • HERNIA REPAIR      H/O multiple hernia repairs including right inguinal hernia repair  in 1981, 2003, and double hernia repair in 2013   • KIDNEY SURGERY     • PROSTATE BIOPSY      Showed features consistent with prostate cancer   • SHOULDER SURGERY  1995    Shoulder repair   • THYROIDECTOMY, PARTIAL  1983    For malignant tumor   • TOOTH EXTRACTION           FAMILY HISTORY  Family History   Problem Relation Age of Onset   • No Known Problems Mother    • Heart attack Father    • Heart disease Father    • Hypertension Father    • Diabetes Father    • Tuberculosis Maternal Grandmother    • No Known Problems Son    • Drug abuse Son    • Malig Hyperthermia Neg Hx          SOCIAL HISTORY  Social History     Socioeconomic History   • Marital status:      Spouse name: Not on file   • Number of children: Not on file   • Years of education: College   • Highest education level: Not on file   Occupational History     Employer: RETIRED     Comment: Corporate financial work   Tobacco Use   • Smoking status: Never Smoker   • Smokeless tobacco: Never Used   • Tobacco comment: caffeine use    Substance and Sexual Activity   • Alcohol use: Yes     Alcohol/week: 1.0 - 3.0 standard drinks     Types: 1 - 3 Glasses of wine per week     Comment: 1-3 glasses of wine a week   • Drug use: No   • Sexual activity: Defer   Social History Narrative    He is a very avid  and plays violin in an orchestra. He does not smoke but had a lot of second-hand smoke through his life.         ALLERGIES  Diphenhydramine; Statins; Aspirin; Latex; Peanut oil; and Shellfish-derived products       REVIEW OF SYSTEMS  Review of Systems      All systems have been reviewed and are negative except as as discussed in the HPI    PHYSICAL EXAM    I have reviewed the triage vital signs and nursing notes.    ED Triage Vitals   Temp Heart Rate Resp BP SpO2   05/22/20 0819 05/22/20 0819 05/22/20 0819 05/22/20 0826 05/22/20 0819   97.5 °F (36.4 °C) 97 18 149/71 100 %      Temp src Heart Rate Source Patient Position BP Location FiO2 (%)    05/22/20 0819 05/22/20 0819 05/22/20 0826 05/22/20 0826 --   Tympanic Monitor Lying Right arm        Physical Exam  GENERAL: Awake and alert in no acute distress  HENT: NCAT, nares patent, moist mucous membranes, TMs normal bilaterally  NECK: supple, no lymphadenopathy  EYES: no scleral icterus, extraocular muscles intact, no nystagmus  CV: regular rhythm, regular rate, no murmur, equal radial pulses bilaterally  RESPIRATORY: normal effort, clear to auscultation bilaterally  ABDOMEN: soft, nontender, nondistended  MUSCULOSKELETAL: no deformity, normal range of motion, no calf tenderness, no pedal edema  NEURO: No facial droop.  Speech is clear.  No aphasia.  Normal strength and sensation in bilateral upper and lower extremities.  Finger-to-nose and heel shin testing is normal bilaterally.  Gait is normal.  Oriented x3.  SKIN: warm, dry, no rash  PSYCH: Normal mood and affect      LAB RESULTS  Recent Results (from the past 24 hour(s))   Comprehensive Metabolic Panel    Collection Time: 05/22/20  8:37 AM   Result Value Ref Range    Glucose 111 (H) 65 - 99 mg/dL    BUN 15 8 - 23 mg/dL    Creatinine 1.10 0.76 - 1.27 mg/dL    Sodium 141 136 - 145 mmol/L    Potassium 4.3 3.5 - 5.2 mmol/L    Chloride 106 98 - 107 mmol/L    CO2 26.2 22.0 - 29.0 mmol/L    Calcium 8.7 8.6 - 10.5 mg/dL    Total Protein 6.3 6.0 - 8.5 g/dL    Albumin 4.00 3.50 - 5.20 g/dL    ALT (SGPT) 11 1 - 41 U/L    AST (SGOT) 14 1 - 40 U/L    Alkaline Phosphatase 68 39 - 117 U/L    Total Bilirubin 0.7 0.2 - 1.2 mg/dL    eGFR Non African Amer 65 >60 mL/min/1.73    Globulin 2.3 gm/dL    A/G Ratio 1.7 g/dL    BUN/Creatinine Ratio 13.6 7.0 - 25.0    Anion Gap 8.8 5.0 - 15.0 mmol/L   Troponin    Collection Time: 05/22/20  8:37 AM   Result Value Ref Range    Troponin T <0.010 0.000 - 0.030 ng/mL   CBC Auto Differential    Collection Time: 05/22/20  8:37 AM   Result Value Ref Range    WBC 10.03 3.40 - 10.80 10*3/mm3    RBC 5.05 4.14 - 5.80 10*6/mm3    Hemoglobin  13.2 13.0 - 17.7 g/dL    Hematocrit 40.8 37.5 - 51.0 %    MCV 80.8 79.0 - 97.0 fL    MCH 26.1 (L) 26.6 - 33.0 pg    MCHC 32.4 31.5 - 35.7 g/dL    RDW 14.8 12.3 - 15.4 %    RDW-SD 43.0 37.0 - 54.0 fl    MPV 9.3 6.0 - 12.0 fL    Platelets 259 140 - 450 10*3/mm3    Neutrophil % 62.4 42.7 - 76.0 %    Lymphocyte % 23.2 19.6 - 45.3 %    Monocyte % 10.4 5.0 - 12.0 %    Eosinophil % 2.4 0.3 - 6.2 %    Basophil % 0.9 0.0 - 1.5 %    Immature Grans % 0.7 (H) 0.0 - 0.5 %    Neutrophils, Absolute 6.26 1.70 - 7.00 10*3/mm3    Lymphocytes, Absolute 2.33 0.70 - 3.10 10*3/mm3    Monocytes, Absolute 1.04 (H) 0.10 - 0.90 10*3/mm3    Eosinophils, Absolute 0.24 0.00 - 0.40 10*3/mm3    Basophils, Absolute 0.09 0.00 - 0.20 10*3/mm3    Immature Grans, Absolute 0.07 (H) 0.00 - 0.05 10*3/mm3    nRBC 0.0 0.0 - 0.2 /100 WBC   Light Blue Top    Collection Time: 05/22/20  8:38 AM   Result Value Ref Range    Extra Tube hold for add-on    Gold Top - SST    Collection Time: 05/22/20  8:39 AM   Result Value Ref Range    Extra Tube Hold for add-ons.    T4, Free    Collection Time: 05/22/20  8:39 AM   Result Value Ref Range    Free T4 1.21 0.93 - 1.70 ng/dL   Troponin    Collection Time: 05/22/20 10:37 AM   Result Value Ref Range    Troponin T <0.010 0.000 - 0.030 ng/mL       Ordered the above labs and independently reviewed the results.      RADIOLOGY  Ct Head Without Contrast    Result Date: 5/22/2020  EMERGENCY NONCONTRAST HEAD CT 05/22/2020  CLINICAL HISTORY: Dizziness.  TECHNIQUE: Spiral CT images were obtained from the base of skull to the vertex without intravenous contrast. Images were reformatted and submitted in 3 mm thick axial CT section with brain algorithm and 2 mm thick sagittal and coronal reconstructions were performed and are submitted in brain algorithm.  COMPARISON: There are no prior studies for comparison.  FINDINGS: There is some mild low-density in the periventricular white matter consistent with mild small vessel disease. The  remainder of the brain parenchyma is normal in attenuation. The ventricles are normal in size. I see no focal mass effect. There is no midline shift. No extra-axial fluid collections are identified. There is no evidence of acute intracranial hemorrhage. There is partial opacification in the medial right frontal sinus and right frontal recess with some fluid and mucosal thickening, partial opacification in the anterior right ethmoid sinus and posterior inferior right maxillary sinus with fluid and mucosal thickening. The remainder of the paranasal sinuses and mastoid air cells and middle ear cavities are clear.      1. There is mild small vessel disease in the cerebral white matter. 2. Partial opacification inferior medial right frontal sinus, right frontal recess, right anterior ethmoid sinus and posterior and inferior right maxillary sinus with fluid and mucosal thickening. The remainder of the head CT is normal.  Radiation dose reduction techniques were utilized, including automated exposure control and exposure modulation based on body size.  This report was finalized on 5/22/2020 10:28 AM by Dr. Holland Medellin M.D.      Xr Chest 1 View    Result Date: 5/22/2020  PORTABLE CHEST X-RAY  HISTORY: Dizziness and diaphoresis this morning.  Portable chest x-ray is provided. Correlation: Chest x-ray from 07/22/2015.  FINDINGS: The cardiomediastinal silhouette is normal. The lungs are clear. The costophrenic sulci are dry and the bones appear normal. There is no pneumothorax.      Negative.  This report was finalized on 5/22/2020 9:02 AM by Dr. John Horowitz M.D.        I ordered the above noted radiological studies. Reviewed by me and discussed with radiologist.  See dictation for official radiology interpretation.      PROCEDURES  Procedures      MEDICATIONS GIVEN IN ER    Medications - No data to display      PROGRESS, DATA ANALYSIS, CONSULTS, AND MEDICAL DECISION MAKING    All labs have been independently reviewed by  me.  All radiology studies have been reviewed by me and discussed with radiologist dictating the report.   EKG's independently viewed and interpreted by me.  I have reviewed the nurse's notes, vital signs, past medical history, and medication list.  Discussion below represents my analysis of pertinent findings related to patient's condition, differential diagnosis, treatment plan and final disposition.      ED Course as of May 22 1456   Fri May 22, 2020   0824 Old records reviewed.  Patient had an echocardiogram done in February 2018.  LVEF was 66%.  There was grade 1 LV diastolic dysfunction.  No significant valvular disease.  Patient last saw his cardiologist in March 2020 for follow-up for hypertension, thoracic aortic aneurysm, and chest pain.  His chest pain was felt to be secondary to elevated blood pressure.  Patient was started back on amlodipine 5 mg daily.    [WH]   0837 EKG          EKG time: 8:22 AM  Rhythm/Rate: Sinus rhythm rate 74 with PACs  P waves and DC: Normal  QRS, axis: RBBB, normal  ST and T waves: Nonspecific T wave changes anteriorly and inferiorly    Interpreted Contemporaneously by me, independently viewed  EKG is not significantly changed compared to prior EKG done 12/13/2018      [WH]   0906 Results of the head CT discussed with Dr. Medellin (radiologist).  Images independently viewed by me.  There is some right-sided sinus mucosal thickening.  Study is otherwise negative acute.    [WH]   1002 Patient is resting comfortably.  Test results and plan for repeat troponin were discussed with him.  Patient currently has no complaints.  His vital signs are normal.    [WH]   1149 Repeat troponin was negative.  Patient is resting comfortably and remains asymptomatic.  He has been able to ambulate to the restroom without difficulty.  Patient will be discharged.  Return precautions were discussed with him.    [WH]   1151 HEART score: 4 (one-point for EKG and risk factors, 2 points for age).  Patient's  chest pain is extremely atypical.  He has had it off and on for many months.  He is seen his cardiologist for this.  Pain is not related to exertion.  Patient will be advised to follow-up with his cardiologist.    [WH]   1455 Patient had a normal neuro exam.  Head CT was unremarkable.  His symptoms have basically resolved by the time he got to the ER.  He was able to ambulate without difficulty.  His chest pain was quite atypical.  Troponin was negative x2.  EKG was unchanged.    [WH]      ED Course User Index  [WH] Og Lorenzana MD       AS OF 14:56 VITALS:    BP - 110/77  HR - 69  TEMP - 97.5 °F (36.4 °C) (Tympanic)  O2 SATS - 98%      DIAGNOSIS  Final diagnoses:   Vertigo   Atypical chest pain         DISPOSITION  Discharge    DISCHARGE    Patient discharged in stable condition.    Reviewed implications of results, diagnosis, meds, responsibility to follow up, warning signs and symptoms of possible worsening, potential complications and reasons to return to ER, including worsening or persistent symptoms, nausea, vomiting, headache, shortness of breath, or other concern..    Patient/Family voiced understanding of above instructions.    Discussed plan for discharge, as there is no emergent indication for admission. Patient referred to primary care provider for BP management due to today's BP. Pt/family is agreeable and understands need for follow up and repeat testing.  Pt is aware that discharge does not mean that nothing is wrong but it indicates no emergency is present that requires admission and they must continue care with follow-up as given below or physician of their choice.     FOLLOW-UP  Mariah Delgado MD  [facility] [facility] [address] [phone]    Schedule an appointment as soon as possible for a visit       [facility] CARDIOLOGY  [address] [facility] [address] [phone]  Schedule an appointment as soon as possible for a visit   If symptoms persist          Medication List      No changes were made to your prescriptions during this visit.                Og Lorenzana MD  05/22/20 5577

## 2020-05-22 NOTE — ED NOTES
Patient was wearing a face mask when I entered the room and they continued to wear a mask throughout their stay in the ED. I wore PPE including gloves, eye protection, and a surgical mask whenever I was in the room with patient.      Adrián Santamaria  05/22/20 0863

## 2020-05-22 NOTE — TELEPHONE ENCOUNTER
Regarding: Non-Urgent Medical Question  Contact: [phone]  ----- Message from Kathy Coleman MA sent at 5/22/2020  1:55 PM EDT -----       ----- Message from Scott Murphy to Vandana Lamas APRN sent at 5/22/2020  1:41 PM -----   Vandana,  I went to the ER this morning due to an attack of vertigo & sweats when I started to get out of bed this morning at 6:45AM.  It lasted a few minutes.  I decided about 8AM to go to the ER as I was still feeling the effects.  The ER performed a number of tests which are listed in MyChart.  The tests all checked out ok.   The follow up instructions were to make an appointment with you as soon as possible.  Please let me know when I can come in.    Thanks,    Fawad Murphy   Amblyopia    Dental caries    Obstructive sleep apnea    Tonsillar hypertrophy

## 2020-07-10 ENCOUNTER — OFFICE VISIT (OUTPATIENT)
Dept: ONCOLOGY | Facility: CLINIC | Age: 75
End: 2020-07-10

## 2020-07-10 ENCOUNTER — INFUSION (OUTPATIENT)
Dept: ONCOLOGY | Facility: HOSPITAL | Age: 75
End: 2020-07-10

## 2020-07-10 ENCOUNTER — LAB (OUTPATIENT)
Dept: LAB | Facility: HOSPITAL | Age: 75
End: 2020-07-10

## 2020-07-10 VITALS
TEMPERATURE: 97.3 F | HEART RATE: 77 BPM | RESPIRATION RATE: 20 BRPM | HEIGHT: 68 IN | SYSTOLIC BLOOD PRESSURE: 145 MMHG | DIASTOLIC BLOOD PRESSURE: 81 MMHG | OXYGEN SATURATION: 97 % | BODY MASS INDEX: 24.75 KG/M2 | WEIGHT: 163.3 LBS

## 2020-07-10 DIAGNOSIS — D75.1 ERYTHROCYTOSIS: ICD-10-CM

## 2020-07-10 DIAGNOSIS — C64.1 CANCER OF RIGHT KIDNEY (HCC): ICD-10-CM

## 2020-07-10 DIAGNOSIS — D75.1 ERYTHROCYTOSIS: Primary | ICD-10-CM

## 2020-07-10 DIAGNOSIS — D47.1 MYELOPROLIFERATIVE DISORDER (HCC): ICD-10-CM

## 2020-07-10 DIAGNOSIS — C64.1 CANCER OF RIGHT KIDNEY (HCC): Primary | ICD-10-CM

## 2020-07-10 LAB
BASOPHILS # BLD AUTO: 0.09 10*3/MM3 (ref 0–0.2)
BASOPHILS NFR BLD AUTO: 0.7 % (ref 0–1.5)
DEPRECATED RDW RBC AUTO: 48.6 FL (ref 37–54)
EOSINOPHIL # BLD AUTO: 0.04 10*3/MM3 (ref 0–0.4)
EOSINOPHIL NFR BLD AUTO: 0.3 % (ref 0.3–6.2)
ERYTHROCYTE [DISTWIDTH] IN BLOOD BY AUTOMATED COUNT: 15.9 % (ref 12.3–15.4)
HCT VFR BLD AUTO: 46.8 % (ref 37.5–51)
HGB BLD-MCNC: 14.7 G/DL (ref 13–17.7)
IMM GRANULOCYTES # BLD AUTO: 0.09 10*3/MM3 (ref 0–0.05)
IMM GRANULOCYTES NFR BLD AUTO: 0.7 % (ref 0–0.5)
LYMPHOCYTES # BLD AUTO: 1.41 10*3/MM3 (ref 0.7–3.1)
LYMPHOCYTES NFR BLD AUTO: 10.3 % (ref 19.6–45.3)
MCH RBC QN AUTO: 26.6 PG (ref 26.6–33)
MCHC RBC AUTO-ENTMCNC: 31.4 G/DL (ref 31.5–35.7)
MCV RBC AUTO: 84.8 FL (ref 79–97)
MONOCYTES # BLD AUTO: 1.04 10*3/MM3 (ref 0.1–0.9)
MONOCYTES NFR BLD AUTO: 7.6 % (ref 5–12)
NEUTROPHILS NFR BLD AUTO: 11.02 10*3/MM3 (ref 1.7–7)
NEUTROPHILS NFR BLD AUTO: 80.4 % (ref 42.7–76)
NRBC BLD AUTO-RTO: 0 /100 WBC (ref 0–0.2)
PLATELET # BLD AUTO: 246 10*3/MM3 (ref 140–450)
PMV BLD AUTO: 10.3 FL (ref 6–12)
RBC # BLD AUTO: 5.52 10*6/MM3 (ref 4.14–5.8)
WBC # BLD AUTO: 13.69 10*3/MM3 (ref 3.4–10.8)

## 2020-07-10 PROCEDURE — 99213 OFFICE O/P EST LOW 20 MIN: CPT | Performed by: INTERNAL MEDICINE

## 2020-07-10 PROCEDURE — 85025 COMPLETE CBC W/AUTO DIFF WBC: CPT

## 2020-07-10 PROCEDURE — 36415 COLL VENOUS BLD VENIPUNCTURE: CPT

## 2020-07-10 PROCEDURE — 99195 PHLEBOTOMY: CPT

## 2020-07-10 RX ORDER — SODIUM CHLORIDE 9 MG/ML
250 INJECTION, SOLUTION INTRAVENOUS ONCE
Status: CANCELLED | OUTPATIENT
Start: 2020-07-10

## 2020-07-10 NOTE — PROGRESS NOTES
REASONS FOR FOLLOWUP:       1. Myeloproliferative disorder with polycythemia vera, MAI-2 mutation positive requiring periodic phlebotomies whenever hematocrit is above 45.      2. His  tory of stage I clear cell carcinoma of the right kidney status post partial nephrectomy, nephron sparing surgery by Dr. Ganesh Lopez with no issues or consequences.  The patient has already an appointment to repeat CT scan of the abdomen in March 2016.                3. Patient has history of polymyalgia rheumatica.  He IS OFF 5 mg of prednisone a day.            HISTORY OF PRESENT ILLNESS  PATIENT WAS CALLED THE DAY BEFORE BY THE OFFICE TO ASK FOR SYMPTOMS THAT COULD BE CONSISTENT WITH CORONAVIRUS INFECTION, AND BEING NEGATIVE WAS SCHEDULED TO BE SEEN IN THE OFFICE TODAY. SIMILAR QUESTIONING TODAY INCLUDING, CHILLS, FEVER, NEW COUGH, SHORTNESS OF BREATH, DIARRHEA,DIFFUSE BODY ACHES  AND CHANGES IN SMELL OR TASTE WERE NEGATIVE.DURING THE VISIT WITH THE PATIENT TODAY , PATIENT HAD FACE MASK, I HAD PROPPER PROTECTIVE EQUIPMENT, AND I DID HAND HYGIENE WITH SOAP AND WATER BEFORE AND AFTER THE VISIT.  This patient returns today to the office stating that he has been feeling terrific in the last several weeks to the point that he is playing tennis 3 times a week and winning. His joint pain associated with polymyalgia is much better on the present doses of prednisone that he takes. Peculiarly, most of the pain is located only in the left side of his body. He has no tingling or numbness there though. Other than that, his appetite is good, his bowel activity and urination are normal. He has not had any other new health issues.                            PAST MEDICAL HISTORY:    1.;  Hypertension.    2.;   History of polyps in the colon.  Colonoscopy in 2010 documented a tubulovillous adenoma that was removed completely.  Further colonoscopy in 2014 was unremarkable.     3.; History of prostate biopsy in the past that showed  features consistent with prostate cancer  .  He has not required any specific therapy for this and he has been monitored through his urologist.    4.; History of multiple hernia repairs.     5.; History of polymyalgia rheumatica that was diagnosed 3 years by Dr. Karan Garrett.  He has been on a tapering dose   of prednisone, even though he admits that his polymyalgia was atypical because his sedimentation was never elevated.  He has been seen by other rheumatologists in the past as well and has been told he has Raynaud’  s disease.  He has never had diagnosis of rheumatoid arthritis, lupus or something of this nature.     6.; Partial thyroidectomy in 1983 for a malignant tumor of the thyroid gland that never required any other intervention.      7.; He used to have radiation treatment for tonsillar inflammation and infection when   he was a child and this led him to develop thyroid cancer.       HEMATOLOGIC/ONCOLOGIC HISTORY:  History of previous dates can be found in a separate document.         On 02/22/2016, he had no symptoms or signs that would indicate any kidney cancer recurrence.  Given   the mild achiness in his muscles, we went ahead and checked a CPK and an aldolase and also checked a sedimentation rate and vitamin D level.  TSH was also performed.  We asked him to remain on his dose of prednisone of 5 mg a day.  We asked him to initia  te a program of physical activity including going to the gym.  He did not need phlebotomy on this occasion, given hematocrit of 45.  His white count and platelet count remain stable.  He had no palpable splenomegaly.         MEDICATIONS:  The current medication list was reviewed with the patient and updated in the EMR this date per the Medical Assistant.  Medication dosages and frequencies were confirmed to be accurate.        It is important to mention the patient does not use any androgen or androgen replacement medication or anabolic steroids or any kind.         ALLERGIES:     1.  STATINS.    2. DIPHENHYDRAMINE.         SOCIAL HISTORY:  The patient is .  He lives with his wife in [place].  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  He plays violin in   an orchestra and also he is a very avid .  He does not smoke but he had a lot of second-hand smoke through his life.  He drinks 1-3 glasses of wine a week.  He has no use of any recreational drugs.          FAMILY HISTORY:  His father  of alcoh  ol and smoking disorders including heart attack.  His mother is 89 and in good health.  He has no brothers.  He has 1 adopted sister who is in good health with probable rheumatoid arthritis.  His wife is in good health.  He has 2 children, 1 who is in poo  r health due to drug abuse and the other son is in good health. He had another son who  as a consequence of Tylenol intoxication.   No family history of hematological disorder or myeloproliferative disorder.         REVIEW OF SYSTEMS:                 General: no fever, no chills, no fatigue,no weight changes, no lack of appetite.  Eyes: no epiphora, xerophthalmia,conjunctivitis, pain, glaucoma, blurred vision, blindness, secretion, photophobia, proptosis, diplopia.  Ears: no otorrhea, tinnitus, otorrhagia, deafness, pain, vertigo.  Nose: no rhinorrhea, no epistaxis, no alteration in perception of odors, no sinuses pressure.  Mouth: no alteration in gums or teeth,  No ulcers, no difficulty with mastication or deglut ion, no odynophagia.  Neck: no masses or pain, no thyroid alterations, no pain in muscles or arteries, no carotid odynia, no crepitation.  Respiratory: no cough, no sputum production,no dyspnea,no trepopnea, no pleuritic pain,no hemoptysis.  Heart: no syncope, no irregularity, no palpitations, no angina,no orthopnea,no paroxysmal nocturnal dyspnea.  Vascular Venous: no tenderness,no edema,no palpable cords,no postphlebitic syndrome, no skin changes no  "ulcerations.  Vascular Arterial: no distal ischemia, noclaudication, no gangrene, no neuropathic ischemic pain, no skin ulcers, no paleness no cyanosis.  GI: no dysphagia, no odynophagia, no regurgitation, no heartburn,no indigestion,no nausea,no vomiting,no hematemesis ,no melena,no jaundice,no distention, no obstipation,no enterorrhagia,no proctalgia,no anal  lesions, no changes in bowel habits.  : no frequency, no hesitancy, no hematuria, no discharge,no  pain.  Musculoskeletal: stated muscle or tendon pain or inflammation,no  joint pain, no edema, no functional limitation,no fasciculations, no mass.  Neurologic: no headache, no seizures, noalterations on Craneal nerves, no motor deficit, no sensory deficit, normal coordination, no alteration in memory,normal orientation, calculation,normal writting, verbal and written language.  Skin: no rashes,no pruritus no localized lesions.  Psychiatric: no anxiety, no depression,no agitation, no delusions, proper insight.        VITAL SIGNS:   Vitals:    07/10/20 1232   BP: 145/81   Pulse: 77   Resp: 20   Temp: 97.3 °F (36.3 °C)   TempSrc: Temporal   SpO2: 97%   Weight: 74.1 kg (163 lb 4.8 oz)   Height: 172.7 cm (67.99\")            PHYSICAL EXAMINATION:           Patient screened  for depression based on a PHQ-9 score of 0 on 3/11/2020.      This patient's ACP documentation is up to date, and there's nothing further left to document.    GENERAL ASPECT: IN GOOD HEALTH WALKING THROUGH TE OFFICE NO DIFFICULTIES, NO PAIN.PROPER NUTRITION.WELL KEPT PHYSICALLY.  EYES : NOT JAUNDICED, PUPILS WERE EQUAL.  HEART: REGULAR NO MURMURS , NO GALLOPS, NO RUBS.  ABDOMEN: NOT DISTENDED, NO PAIN, NO LIVER OR SPLEEN ENLARGEMENT, NO ASCITES, NO COLLATERAL CIRCULATION.  EXTREMITIES: NO EDEMA, NO PAIN, NO CLUBBING, NO DEFORMITIES.  NEUROLOGIC: NORMAL CONVERSATION, MEMORY , SPEECH AND GAIT.  SKIN: NO LESIONS.              LABORATORY DATA:Auto Differential   Order: [id number] - Part of Panel " Order [id number]   Status:  Final result   Visible to patient:  No (Not Released)   Dx:  Erythrocytosis; Myeloproliferative di...   Specimen Information: Blood        Component  Ref Range & Units 12:25   WBC  3.40 - 10.80 10*3/mm3 13.69High     RBC  4.14 - 5.80 10*6/mm3 5.52    Hemoglobin  13.0 - 17.7 g/dL 14.7    Hematocrit  37.5 - 51.0 % 46.8    MCV  79.0 - 97.0 fL 84.8    MCH  26.6 - 33.0 pg 26.6    MCHC  31.5 - 35.7 g/dL 31.4Low     RDW  12.3 - 15.4 % 15.9High     RDW-SD  37.0 - 54.0 fl 48.6    MPV  6.0 - 12.0 fL 10.3    Platelets  140 - 450 10*3/mm3 246    Neutrophil %  42.7 - 76.0 % 80.4High     Lymphocyte %  19.6 - 45.3 % 10.3Low     Monocyte %  5.0 - 12.0 % 7.6    Eosinophil %  0.3 - 6.2 % 0.3    Basophil %  0.0 - 1.5 % 0.7    Immature Grans %  0.0 - 0.5 % 0.7High     Neutrophils, Absolute  1.70 - 7.00 10*3/mm3 11.02High     Lymphocytes, Absolute  0.70 - 3.10 10*3/mm3 1.41    Monocytes, Absolute  0.10 - 0.90 10*3/mm3 1.04High     Eosinophils, Absolute  0.00 - 0.40 10*3/mm3 0.04    Basophils, Absolute  0.00 - 0.20 10*3/mm3 0.09    Immature Grans, Absolute  0.00 - 0.05 10*3/mm3 0.09High     nRBC  0.0 - 0.2 /100 WBC 0.0                      ASSESSMENT/PLAN:   1. This patient has polycythemia vera, JAK2 mutation positive, and has taken in the past Hydrea. He discontinued this medicine because of feeling afraid of side effects even though I never noticed anything in particular related to this medicine.   Today his hematocrit is 47.I do believe that he needs to have a phlebotomy today and he will proceed with that. His white count is elevated because of prednisone use mostly. He has no infection.     The platelet count remains stable.    Otherwise, he will return to the office every 6 weeks for a blood count and phlebotomy if hematocrit is above 45. I will review him back in 3 or 4 months.    In regard to his previous history of kidney cancer, there are no issues pertinent to this. He will see his urologist at some  point in the future as well as related to his prostate cancer.     I DISCUSSED WITH PATIENT IN DETAIL FORMS TO DECREASE CHANCES OF CORONAVIRUS INFECTION INCLUDING ISOLATION, PROPER HAND HIGIENE, AVOID PUBLIC PLACES  WITH CROWDS, FOLLOW  CDC RECOMENDATIONS, AND KEEP PERSONAL AND SOCIAL RESPONSIBILITY, WARE A MASK IN PUBLIC PLACES.  PATIENT IS AWARE THIS INFECTION COULD HAVE SEVERE CONSEQUENCES TO PERSONAL HEALTH AND FAMILY RAMIFICATIONS OF THIS.

## 2020-08-18 RX ORDER — SODIUM CHLORIDE 9 MG/ML
250 INJECTION, SOLUTION INTRAVENOUS ONCE
Status: CANCELLED | OUTPATIENT
Start: 2020-08-18

## 2020-08-20 ENCOUNTER — INFUSION (OUTPATIENT)
Dept: ONCOLOGY | Facility: HOSPITAL | Age: 75
End: 2020-08-20

## 2020-08-20 ENCOUNTER — LAB (OUTPATIENT)
Dept: LAB | Facility: HOSPITAL | Age: 75
End: 2020-08-20

## 2020-08-20 VITALS
BODY MASS INDEX: 25.43 KG/M2 | SYSTOLIC BLOOD PRESSURE: 138 MMHG | HEART RATE: 52 BPM | DIASTOLIC BLOOD PRESSURE: 79 MMHG | OXYGEN SATURATION: 99 % | RESPIRATION RATE: 16 BRPM | WEIGHT: 167.2 LBS | TEMPERATURE: 98.4 F

## 2020-08-20 DIAGNOSIS — C64.1 CANCER OF RIGHT KIDNEY (HCC): ICD-10-CM

## 2020-08-20 DIAGNOSIS — D75.1 ERYTHROCYTOSIS: ICD-10-CM

## 2020-08-20 DIAGNOSIS — D47.1 MYELOPROLIFERATIVE DISORDER (HCC): ICD-10-CM

## 2020-08-20 LAB
BASOPHILS # BLD AUTO: 0.1 10*3/MM3 (ref 0–0.2)
BASOPHILS NFR BLD AUTO: 1 % (ref 0–1.5)
DEPRECATED RDW RBC AUTO: 45.3 FL (ref 37–54)
EOSINOPHIL # BLD AUTO: 0.09 10*3/MM3 (ref 0–0.4)
EOSINOPHIL NFR BLD AUTO: 0.9 % (ref 0.3–6.2)
ERYTHROCYTE [DISTWIDTH] IN BLOOD BY AUTOMATED COUNT: 15 % (ref 12.3–15.4)
HCT VFR BLD AUTO: 45 % (ref 37.5–51)
HGB BLD-MCNC: 14.1 G/DL (ref 13–17.7)
IMM GRANULOCYTES # BLD AUTO: 0.03 10*3/MM3 (ref 0–0.05)
IMM GRANULOCYTES NFR BLD AUTO: 0.3 % (ref 0–0.5)
LYMPHOCYTES # BLD AUTO: 1.46 10*3/MM3 (ref 0.7–3.1)
LYMPHOCYTES NFR BLD AUTO: 14.4 % (ref 19.6–45.3)
MCH RBC QN AUTO: 26.1 PG (ref 26.6–33)
MCHC RBC AUTO-ENTMCNC: 31.3 G/DL (ref 31.5–35.7)
MCV RBC AUTO: 83.2 FL (ref 79–97)
MONOCYTES # BLD AUTO: 0.88 10*3/MM3 (ref 0.1–0.9)
MONOCYTES NFR BLD AUTO: 8.7 % (ref 5–12)
NEUTROPHILS NFR BLD AUTO: 7.61 10*3/MM3 (ref 1.7–7)
NEUTROPHILS NFR BLD AUTO: 74.7 % (ref 42.7–76)
NRBC BLD AUTO-RTO: 0 /100 WBC (ref 0–0.2)
PLATELET # BLD AUTO: 246 10*3/MM3 (ref 140–450)
PMV BLD AUTO: 9.8 FL (ref 6–12)
RBC # BLD AUTO: 5.41 10*6/MM3 (ref 4.14–5.8)
WBC # BLD AUTO: 10.17 10*3/MM3 (ref 3.4–10.8)

## 2020-08-20 PROCEDURE — G0463 HOSPITAL OUTPT CLINIC VISIT: HCPCS

## 2020-08-20 PROCEDURE — 85025 COMPLETE CBC W/AUTO DIFF WBC: CPT

## 2020-08-20 PROCEDURE — 36415 COLL VENOUS BLD VENIPUNCTURE: CPT

## 2020-08-20 NOTE — PROGRESS NOTES
Lab Results   Component Value Date    WBC 10.17 08/20/2020    HGB 14.1 08/20/2020    HCT 45.0 08/20/2020    MCV 83.2 08/20/2020     08/20/2020     Patient to receive phlebo for HCT greater than 45. Phlebo held today.

## 2020-09-10 ENCOUNTER — OFFICE VISIT (OUTPATIENT)
Dept: CARDIOLOGY | Facility: CLINIC | Age: 75
End: 2020-09-10

## 2020-09-10 VITALS
HEART RATE: 64 BPM | HEIGHT: 68 IN | SYSTOLIC BLOOD PRESSURE: 126 MMHG | DIASTOLIC BLOOD PRESSURE: 72 MMHG | BODY MASS INDEX: 25.31 KG/M2 | WEIGHT: 167 LBS

## 2020-09-10 DIAGNOSIS — I73.00 RAYNAUD'S DISEASE WITHOUT GANGRENE: ICD-10-CM

## 2020-09-10 DIAGNOSIS — I71.20 THORACIC AORTIC ANEURYSM WITHOUT RUPTURE (HCC): Primary | ICD-10-CM

## 2020-09-10 DIAGNOSIS — I10 HYPERTENSION, UNSPECIFIED TYPE: ICD-10-CM

## 2020-09-10 DIAGNOSIS — M35.3 PMR (POLYMYALGIA RHEUMATICA) (HCC): ICD-10-CM

## 2020-09-10 DIAGNOSIS — D47.1 MYELOPROLIFERATIVE DISORDER (HCC): ICD-10-CM

## 2020-09-10 DIAGNOSIS — E03.9 ACQUIRED HYPOTHYROIDISM: ICD-10-CM

## 2020-09-10 DIAGNOSIS — E78.5 HYPERLIPIDEMIA, UNSPECIFIED HYPERLIPIDEMIA TYPE: ICD-10-CM

## 2020-09-10 PROCEDURE — 93000 ELECTROCARDIOGRAM COMPLETE: CPT | Performed by: INTERNAL MEDICINE

## 2020-09-10 PROCEDURE — 99214 OFFICE O/P EST MOD 30 MIN: CPT | Performed by: INTERNAL MEDICINE

## 2020-09-10 NOTE — PROGRESS NOTES
Subjective:     Encounter Date:09/10/2020      Patient ID: Scott Murphy is a 75 y.o. male.    Chief Complaint:  History of Present Illness    This is a 75-year-old with a history of polycythemia vera, polymyalgia rheumatica, history of renal cell carcinoma status post partial nephrectomy, hypertension, hypothyroidism, Raynaud's disease, asthma, thoracic aortic aneurysm, who presents for follow up.        He presents today for routine follow-up.  Overall he has been feeling well.  He plays tennis twice a week without any issues.  Denies any shortness of breath, orthopnea, near-syncope or syncope, or lower extremity edema.  He continues to have intermittent episodes of sharp pain radiating from his left upper chest into his left shoulder.  This lasts only a few seconds at a time.  Does not occur with exertion or exercise.  He was due for annual echocardiogram this month but it does not appear that this was ever scheduled.    Prior History:  I saw the patient initially in 1/2018 when he presented for evaluation of an abnormal EKG.  Prior to that office visit the patient underwent his yearly Medicare physical with Dr. Delgado at which time he had a routine EKG performed that showed a right bundle branch block that was felt to be new.  Based on this finding he was sent here for further evaluation.  Ten years prior he was evaluated by Dr. Jarrett complaints of chest pain and underwent an echocardiogram, stress test, and a cardiac catheterization that were all reportedly unremarkable (records are not available from that workup).  At his initial office visit with me he denied any symptoms and was active playing tennis about 2 or 3 times a week.    At that office visit I noted that he had a similar appearing right bundle branch block on EKG from 1/2014.  Recommended proceeding with an echocardiogram to look for any structural heart disease with his right bundle branch block.  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  I was out of the office at the time that the echocardiogram was performed and resulted and these results were communicated to the patient at that time however it does not appear that he was told about the aortic dilatation.     More recently the patient has been having issues with shortness of breath and cough.  He ended up undergoing a chest x-ray was ordered by Dr. Delgado that showed no acute pulmonary disease but did show evidence of aortic arterial sclerosis.  He saw Dr. Damon in routine follow-up and mention these findings to him and with his recent dyspnea symptoms and these findings he ordered a CT of the chest.  This was performed on 8/23/2019 and it showed mild aneurysmal dilatation of the ascending aorta measuring 4.2 x 4 cm compared to a measurement of 4 x 3.9 cm in 2/2018.       He was seen by ALETHA Hu in 3/2020.  He presented with complaints of atypical sounding chest pain.  Blood pressures were noted to be elevated during that office visit.  His amlodipine was resumed which is tolerated well.    Review of Systems   Constitution: Negative for malaise/fatigue.   HENT: Negative for hearing loss, hoarse voice, nosebleeds and sore throat.    Eyes: Negative for pain.   Cardiovascular: Negative for chest pain, claudication, cyanosis, dyspnea on exertion, irregular heartbeat, leg swelling, near-syncope, orthopnea, palpitations, paroxysmal nocturnal dyspnea and syncope.   Respiratory: Negative for shortness of breath and snoring.    Endocrine: Negative for cold intolerance, heat intolerance, polydipsia, polyphagia and polyuria.   Skin: Negative for itching and rash.    Musculoskeletal: Negative for arthritis, falls, joint pain, joint swelling, muscle cramps, muscle weakness and myalgias.   Gastrointestinal: Negative for constipation, diarrhea, dysphagia, heartburn, hematemesis, hematochezia, melena, nausea and vomiting.   Genitourinary: Negative for frequency, hematuria and hesitancy.   Neurological: Negative for excessive daytime sleepiness, dizziness, headaches, light-headedness, numbness and weakness.   Psychiatric/Behavioral: Negative for depression. The patient is not nervous/anxious.          Current Outpatient Medications:   •  amLODIPine (NORVASC) 5 MG tablet, Take 5 mg by mouth Daily., Disp: , Rfl:   •  B Complex Vitamins (VITAMIN B-COMPLEX PO), Take 1 tablet by mouth Daily., Disp: , Rfl:   •  levothyroxine (SYNTHROID, LEVOTHROID) 100 MCG tablet, Take 1 tablet by mouth daily., Disp: 30 tablet, Rfl: 6  •  losartan (COZAAR) 25 MG tablet, Take 1 tablet by mouth Daily., Disp: 90 tablet, Rfl: 0  •  montelukast (SINGULAIR) 10 MG tablet, Take 10 mg by mouth Every Night., Disp: , Rfl:   •  predniSONE (DELTASONE) 2.5 MG tablet, Take 2.5 mg by mouth Daily., Disp: , Rfl:   •  zolpidem (AMBIEN) 10 MG tablet, TAKE ONE-HALF TO ONE TABLET BY MOUTH ONCE NIGHTLY AS NEEDED FOR SLEEP  **MUST LAST 30 DAYS, Disp: , Rfl:     Past Medical History:   Diagnosis Date   • Acquired hypothyroidism    • Anxiety    • Aortic aneurysm (CMS/HCC)    • Arthritis    • Asthma    • Baker's cyst of knee, right    • Disease of thyroid gland    • Gout    • H/O Muscle pain     Right shoulder and neck area   • H/O Radiation treatment     For tonsillar inflammation and infection when he was a child and this led him to develop thyroid cancer.   • History of colon polyps    • Hypertension    • Hyperthyroidism    • Hypothyroidism    • Kidney carcinoma (CMS/HCC)     H/O stage I clear cell carcinoma of the right kidney, status post partial nephrectomy, nephron-sparing surgery by Dr. Ganesh Lopez   • Left shoulder  pain    • Myeloproliferative disorder (CMS/HCC)     With polycythemia vera, JAK2 mutation positive requiring periodic phlebotomies (hematocrit above 47).   • Polymyalgia rheumatica (CMS/HCC)    • Premature atrial contraction    • Prostate cancer (CMS/HCC)    • Raynaud disease    • Shoulder injury, left, sequela        Past Surgical History:   Procedure Laterality Date   • CIRCUMCISION N/A 12/17/2018    Procedure: CIRCUMCISION;  Surgeon: Gallo Lopez MD;  Location: [facility] OR;  Service: Urology   • COLONOSCOPY  2010    Documented a tubulovillous adenom that was removed completely. Colonoscopy in 2014 was unremarkable.   • COLONOSCOPY N/A 2/20/2020    Procedure: COLONOSCOPY;  Surgeon: Claire Galo MD;  Location: [facility] OR;  Service: Gastroenterology;  Laterality: N/A;  diverticulosis   • HERNIA REPAIR      H/O multiple hernia repairs including right inguinal hernia repair in 1981, 2003, and double hernia repair in 2013   • KIDNEY SURGERY     • PROSTATE BIOPSY      Showed features consistent with prostate cancer   • SHOULDER SURGERY  1995    Shoulder repair   • THYROIDECTOMY, PARTIAL  1983    For malignant tumor   • TOOTH EXTRACTION         Family History   Problem Relation Age of Onset   • No Known Problems Mother    • Heart attack Father    • Heart disease Father    • Hypertension Father    • Diabetes Father    • Tuberculosis Maternal Grandmother    • No Known Problems Son    • Drug abuse Son    • Malig Hyperthermia Neg Hx        Social History     Tobacco Use   • Smoking status: Never Smoker   • Smokeless tobacco: Never Used   • Tobacco comment: caffeine use    Substance Use Topics   • Alcohol use: Yes     Alcohol/week: 1.0 - 3.0 standard drinks     Types: 1 - 3 Glasses of wine per week     Comment: 1-3 glasses of wine a week   • Drug use: No         ECG 12 Lead  Date/Time: 9/10/2020 11:51 AM  Performed by: Di Roberts MD  Authorized by: Di Roberts MD   Comparison: compared with previous  "ECG   Similar to previous ECG  Rhythm: sinus rhythm  Ectopy: atrial premature contractions  Conduction: right bundle branch block               Objective:     Visit Vitals  /72   Pulse 64   Ht 172.7 cm (68\")   Wt 75.8 kg (167 lb)   BMI 25.39 kg/m²         Physical Exam   Constitutional: He is oriented to person, place, and time. He appears well-developed and well-nourished.   HENT:   Head: Normocephalic and atraumatic.   Neck: No JVD present. Carotid bruit is not present.   Cardiovascular: Normal rate, regular rhythm, S1 normal and S2 normal. Exam reveals no gallop.   No murmur heard.  Pulses:       Radial pulses are 2+ on the right side, and 2+ on the left side.   Pulmonary/Chest: Effort normal and breath sounds normal.   Abdominal: Soft. Normal appearance.   Musculoskeletal: He exhibits no edema.   Neurological: He is alert and oriented to person, place, and time.   Skin: Skin is warm, dry and intact.   Psychiatric: He has a normal mood and affect.           Assessment:          Diagnosis Plan   1. Thoracic aortic aneurysm without rupture (CMS/HCC)     2. Raynaud's disease without gangrene     3. Hyperlipidemia, unspecified hyperlipidemia type     4. Hypertension, unspecified type     5. PMR (polymyalgia rheumatica) (CMS/HCC)     6. Acquired hypothyroidism     7. Myeloproliferative disorder (CMS/HCC)            Plan:       1.  Mild thoracic aortic aneurysm.  Stable on his last couple of CT scans and echocardiograms.  We will proceed with a repeat echocardiogram this year to ensure no significant change.  2.  Atypical chest pain.  I do not think further work-up is required at this time.  3.  Chronic right bundle branch block.  4.  Hypertension.  Well-controlled on his current medications.  5.  Raynaud's disease  6.  Polymyalgia rheumatica  7.  Polycythemia vera  8.  Hypothyroidism  9.  Myeloproliferative disorder.  Followed by Dr. Damon.    We will call and discuss results of his echocardiogram.  We will " plan on seeing him back again in 1 year or sooner if further issues arise.

## 2020-09-25 ENCOUNTER — HOSPITAL ENCOUNTER (OUTPATIENT)
Dept: CARDIOLOGY | Facility: HOSPITAL | Age: 75
Discharge: HOME | End: 2020-09-25
Admitting: INTERNAL MEDICINE

## 2020-09-25 VITALS — SYSTOLIC BLOOD PRESSURE: 106 MMHG | DIASTOLIC BLOOD PRESSURE: 80 MMHG

## 2020-09-25 PROCEDURE — 93306 TTE W/DOPPLER COMPLETE: CPT

## 2020-09-25 PROCEDURE — 93306 TTE W/DOPPLER COMPLETE: CPT | Performed by: INTERNAL MEDICINE

## 2020-09-28 LAB
AORTIC ARCH: 3.6 CM
ASCENDING AORTA: 4.3 CM
BH CV ECHO MEAS - ACS: 2.1 CM
BH CV ECHO MEAS - AO MAX PG (FULL): 4.6 MMHG
BH CV ECHO MEAS - AO MAX PG: 6.2 MMHG
BH CV ECHO MEAS - AO MEAN PG (FULL): 2.2 MMHG
BH CV ECHO MEAS - AO MEAN PG: 3.2 MMHG
BH CV ECHO MEAS - AO ROOT AREA (BSA CORRECTED): 2.6
BH CV ECHO MEAS - AO ROOT AREA: 18.1 CM^2
BH CV ECHO MEAS - AO ROOT DIAM: 4.8 CM
BH CV ECHO MEAS - AO V2 MAX: 124.9 CM/SEC
BH CV ECHO MEAS - AO V2 MEAN: 84.8 CM/SEC
BH CV ECHO MEAS - AO V2 VTI: 24.6 CM
BH CV ECHO MEAS - ASC AORTA: 4.3 CM
BH CV ECHO MEAS - AVA(I,A): 2.3 CM^2
BH CV ECHO MEAS - AVA(I,D): 2.3 CM^2
BH CV ECHO MEAS - AVA(V,A): 2.1 CM^2
BH CV ECHO MEAS - AVA(V,D): 2.1 CM^2
BH CV ECHO MEAS - BSA(HAYCOCK): 1.9 M^2
BH CV ECHO MEAS - BSA: 1.9 M^2
BH CV ECHO MEAS - BZI_BMI: 24.6 KILOGRAMS/M^2
BH CV ECHO MEAS - BZI_METRIC_HEIGHT: 172.7 CM
BH CV ECHO MEAS - BZI_METRIC_WEIGHT: 73.5 KG
BH CV ECHO MEAS - EDV(MOD-SP2): 86 ML
BH CV ECHO MEAS - EDV(MOD-SP4): 112 ML
BH CV ECHO MEAS - EDV(TEICH): 87.5 ML
BH CV ECHO MEAS - EF(CUBED): 63.7 %
BH CV ECHO MEAS - EF(MOD-BP): 65 %
BH CV ECHO MEAS - EF(MOD-SP2): 60.5 %
BH CV ECHO MEAS - EF(MOD-SP4): 67 %
BH CV ECHO MEAS - EF(TEICH): 55.5 %
BH CV ECHO MEAS - ESV(MOD-SP2): 34 ML
BH CV ECHO MEAS - ESV(MOD-SP4): 37 ML
BH CV ECHO MEAS - ESV(TEICH): 39 ML
BH CV ECHO MEAS - FS: 28.7 %
BH CV ECHO MEAS - IVS/LVPW: 0.96
BH CV ECHO MEAS - IVSD: 0.99 CM
BH CV ECHO MEAS - LAT PEAK E' VEL: 8.7 CM/SEC
BH CV ECHO MEAS - LV DIASTOLIC VOL/BSA (35-75): 59.9 ML/M^2
BH CV ECHO MEAS - LV MASS(C)D: 150.9 GRAMS
BH CV ECHO MEAS - LV MASS(C)DI: 80.8 GRAMS/M^2
BH CV ECHO MEAS - LV MAX PG: 1.7 MMHG
BH CV ECHO MEAS - LV MEAN PG: 1.1 MMHG
BH CV ECHO MEAS - LV SYSTOLIC VOL/BSA (12-30): 19.8 ML/M^2
BH CV ECHO MEAS - LV V1 MAX: 64.7 CM/SEC
BH CV ECHO MEAS - LV V1 MEAN: 49.2 CM/SEC
BH CV ECHO MEAS - LV V1 VTI: 14.2 CM
BH CV ECHO MEAS - LVIDD: 4.4 CM
BH CV ECHO MEAS - LVIDS: 3.1 CM
BH CV ECHO MEAS - LVLD AP2: 7.3 CM
BH CV ECHO MEAS - LVLD AP4: 7.6 CM
BH CV ECHO MEAS - LVLS AP2: 6.8 CM
BH CV ECHO MEAS - LVLS AP4: 6.6 CM
BH CV ECHO MEAS - LVOT AREA (M): 4.2 CM^2
BH CV ECHO MEAS - LVOT AREA: 4 CM^2
BH CV ECHO MEAS - LVOT DIAM: 2.3 CM
BH CV ECHO MEAS - LVPWD: 1 CM
BH CV ECHO MEAS - MED PEAK E' VEL: 4.2 CM/SEC
BH CV ECHO MEAS - MR MAX PG: 100.9 MMHG
BH CV ECHO MEAS - MR MAX VEL: 502.2 CM/SEC
BH CV ECHO MEAS - MV A DUR: 0.13 SEC
BH CV ECHO MEAS - MV A MAX VEL: 73.7 CM/SEC
BH CV ECHO MEAS - MV DEC SLOPE: 112 CM/SEC^2
BH CV ECHO MEAS - MV DEC TIME: 0.47 SEC
BH CV ECHO MEAS - MV E MAX VEL: 37.7 CM/SEC
BH CV ECHO MEAS - MV E/A: 0.51
BH CV ECHO MEAS - MV MAX PG: 1.8 MMHG
BH CV ECHO MEAS - MV MEAN PG: 0.79 MMHG
BH CV ECHO MEAS - MV P1/2T MAX VEL: 52.9 CM/SEC
BH CV ECHO MEAS - MV P1/2T: 138.5 MSEC
BH CV ECHO MEAS - MV V2 MAX: 67.9 CM/SEC
BH CV ECHO MEAS - MV V2 MEAN: 43 CM/SEC
BH CV ECHO MEAS - MV V2 VTI: 33.4 CM
BH CV ECHO MEAS - MVA P1/2T LCG: 4.2 CM^2
BH CV ECHO MEAS - MVA(P1/2T): 1.6 CM^2
BH CV ECHO MEAS - MVA(VTI): 1.7 CM^2
BH CV ECHO MEAS - PA ACC TIME: 0.1 SEC
BH CV ECHO MEAS - PA MAX PG (FULL): 0.81 MMHG
BH CV ECHO MEAS - PA MAX PG: 1.7 MMHG
BH CV ECHO MEAS - PA PR(ACCEL): 36.2 MMHG
BH CV ECHO MEAS - PA V2 MAX: 65.2 CM/SEC
BH CV ECHO MEAS - PULM A REVS DUR: 0.11 SEC
BH CV ECHO MEAS - PULM A REVS VEL: 35.1 CM/SEC
BH CV ECHO MEAS - PULM DIAS VEL: 29.1 CM/SEC
BH CV ECHO MEAS - PULM S/D: 1.7
BH CV ECHO MEAS - PULM SYS VEL: 49.7 CM/SEC
BH CV ECHO MEAS - PVA(V,A): 2.6 CM^2
BH CV ECHO MEAS - PVA(V,D): 2.6 CM^2
BH CV ECHO MEAS - QP/QS: 0.62
BH CV ECHO MEAS - RAP SYSTOLE: 3 MMHG
BH CV ECHO MEAS - RV MAX PG: 0.89 MMHG
BH CV ECHO MEAS - RV MEAN PG: 0.46 MMHG
BH CV ECHO MEAS - RV V1 MAX: 47.1 CM/SEC
BH CV ECHO MEAS - RV V1 MEAN: 32.5 CM/SEC
BH CV ECHO MEAS - RV V1 VTI: 10 CM
BH CV ECHO MEAS - RVOT AREA: 3.5 CM^2
BH CV ECHO MEAS - RVOT DIAM: 2.1 CM
BH CV ECHO MEAS - RVSP: 20.9 MMHG
BH CV ECHO MEAS - SI(AO): 238.8 ML/M^2
BH CV ECHO MEAS - SI(CUBED): 29 ML/M^2
BH CV ECHO MEAS - SI(LVOT): 30.4 ML/M^2
BH CV ECHO MEAS - SI(MOD-SP2): 27.8 ML/M^2
BH CV ECHO MEAS - SI(MOD-SP4): 40.1 ML/M^2
BH CV ECHO MEAS - SI(TEICH): 26 ML/M^2
BH CV ECHO MEAS - SUP REN AO DIAM: 2.1 CM
BH CV ECHO MEAS - SV(AO): 446.3 ML
BH CV ECHO MEAS - SV(CUBED): 54.1 ML
BH CV ECHO MEAS - SV(LVOT): 56.9 ML
BH CV ECHO MEAS - SV(MOD-SP2): 52 ML
BH CV ECHO MEAS - SV(MOD-SP4): 75 ML
BH CV ECHO MEAS - SV(RVOT): 35.2 ML
BH CV ECHO MEAS - SV(TEICH): 48.5 ML
BH CV ECHO MEAS - TAPSE (>1.6): 2.1 CM
BH CV ECHO MEAS - TR MAX VEL: 211.8 CM/SEC
BH CV ECHO MEASUREMENTS AVERAGE E/E' RATIO: 5.84
BH CV XLRA - RV BASE: 2.6 CM
BH CV XLRA - RV LENGTH: 6.2 CM
BH CV XLRA - RV MID: 3.2 CM
BH CV XLRA - TDI S': 13.8 CM/SEC
LEFT ATRIUM VOLUME INDEX: 28 ML/M2
MAXIMAL PREDICTED HEART RATE: 146 BPM
SINUS: 4.3 CM
STJ: 4.1 CM
STRESS TARGET HR: 124 BPM

## 2020-09-29 ENCOUNTER — TELEPHONE (OUTPATIENT)
Dept: CARDIOLOGY | Facility: CLINIC | Age: 75
End: 2020-09-29

## 2020-09-29 DIAGNOSIS — I79.0 ANEURYSM OF AORTA IN DISEASES CLASSIFIED ELSEWHERE (HCC): ICD-10-CM

## 2020-09-29 DIAGNOSIS — I77.810 AORTIC ROOT DILATATION (HCC): Primary | ICD-10-CM

## 2020-09-29 NOTE — TELEPHONE ENCOUNTER
I have reviewed the echocardiogram report and the images with Dr. Rai.  It appears that his aortic root was measured about 4.3 cm for the images.  The value placed in the body of the report is incorrect.  Explained this to the patient and reassured him that there is some enlargement in his aortic root but something we just monitor for now.      He reports he was having some issues taking both amlodipine and losartan.  He describes feeling hot flashes.  He stopped taking the amlodipine and now just takes 2 of the losartan 25 mg a day.  This has resolved his symptoms and has continue to keep his blood pressures under well control.  I told him it was fine with this change.      Order for an echocardiogram to performed at his one-year follow-up was placed.

## 2020-09-29 NOTE — TELEPHONE ENCOUNTER
Regarding: Test Results Question  Contact: [phone]  ----- Message from Martita Garcia MA sent at 9/29/2020  1:45 PM EDT -----       ----- Message from Scott Murphy to Di Roberts MD sent at 9/29/2020  8:18 AM -----   Dr. Roberts,  On my recent echocardiogram summary,  you indicated that the size of my ascending aorta was 4.3 cm and the aorta root was also 4.3 cm.  However, in the actual numbers it shows that my Ao root diam is 4.8 cm up from 3.8 cm in Feb 2018, which seems to be a significant increase.  Is the 4.8 cm correct and if so - is 5 cm the threshold for surgery just like the ascending aorta?    Fawad Murphy

## 2020-10-01 ENCOUNTER — INFUSION (OUTPATIENT)
Dept: ONCOLOGY | Facility: HOSPITAL | Age: 75
End: 2020-10-01

## 2020-10-01 ENCOUNTER — LAB (OUTPATIENT)
Dept: LAB | Facility: HOSPITAL | Age: 75
End: 2020-10-01

## 2020-10-01 VITALS
TEMPERATURE: 96.9 F | WEIGHT: 165.4 LBS | BODY MASS INDEX: 25.15 KG/M2 | OXYGEN SATURATION: 98 % | SYSTOLIC BLOOD PRESSURE: 150 MMHG | DIASTOLIC BLOOD PRESSURE: 80 MMHG | RESPIRATION RATE: 16 BRPM | HEART RATE: 80 BPM

## 2020-10-01 DIAGNOSIS — C64.1 CANCER OF RIGHT KIDNEY (HCC): ICD-10-CM

## 2020-10-01 DIAGNOSIS — D75.1 ERYTHROCYTOSIS: ICD-10-CM

## 2020-10-01 DIAGNOSIS — D75.1 ERYTHROCYTOSIS: Primary | ICD-10-CM

## 2020-10-01 DIAGNOSIS — D47.1 MYELOPROLIFERATIVE DISORDER (HCC): ICD-10-CM

## 2020-10-01 LAB
BASOPHILS # BLD AUTO: 0.08 10*3/MM3 (ref 0–0.2)
BASOPHILS NFR BLD AUTO: 0.8 % (ref 0–1.5)
DEPRECATED RDW RBC AUTO: 43.5 FL (ref 37–54)
EOSINOPHIL # BLD AUTO: 0.15 10*3/MM3 (ref 0–0.4)
EOSINOPHIL NFR BLD AUTO: 1.6 % (ref 0.3–6.2)
ERYTHROCYTE [DISTWIDTH] IN BLOOD BY AUTOMATED COUNT: 14.5 % (ref 12.3–15.4)
HCT VFR BLD AUTO: 45.6 % (ref 37.5–51)
HGB BLD-MCNC: 14.4 G/DL (ref 13–17.7)
IMM GRANULOCYTES # BLD AUTO: 0.07 10*3/MM3 (ref 0–0.05)
IMM GRANULOCYTES NFR BLD AUTO: 0.7 % (ref 0–0.5)
LYMPHOCYTES # BLD AUTO: 1.28 10*3/MM3 (ref 0.7–3.1)
LYMPHOCYTES NFR BLD AUTO: 13.3 % (ref 19.6–45.3)
MCH RBC QN AUTO: 25.9 PG (ref 26.6–33)
MCHC RBC AUTO-ENTMCNC: 31.6 G/DL (ref 31.5–35.7)
MCV RBC AUTO: 82 FL (ref 79–97)
MONOCYTES # BLD AUTO: 0.95 10*3/MM3 (ref 0.1–0.9)
MONOCYTES NFR BLD AUTO: 9.9 % (ref 5–12)
NEUTROPHILS NFR BLD AUTO: 7.11 10*3/MM3 (ref 1.7–7)
NEUTROPHILS NFR BLD AUTO: 73.7 % (ref 42.7–76)
NRBC BLD AUTO-RTO: 0 /100 WBC (ref 0–0.2)
PLATELET # BLD AUTO: 245 10*3/MM3 (ref 140–450)
PMV BLD AUTO: 9.6 FL (ref 6–12)
RBC # BLD AUTO: 5.56 10*6/MM3 (ref 4.14–5.8)
WBC # BLD AUTO: 9.64 10*3/MM3 (ref 3.4–10.8)

## 2020-10-01 PROCEDURE — 36415 COLL VENOUS BLD VENIPUNCTURE: CPT

## 2020-10-01 PROCEDURE — 99195 PHLEBOTOMY: CPT

## 2020-10-01 PROCEDURE — 85025 COMPLETE CBC W/AUTO DIFF WBC: CPT

## 2020-10-01 RX ORDER — SODIUM CHLORIDE 9 MG/ML
250 INJECTION, SOLUTION INTRAVENOUS ONCE
Status: CANCELLED | OUTPATIENT
Start: 2020-10-01

## 2020-11-11 ENCOUNTER — APPOINTMENT (OUTPATIENT)
Dept: ONCOLOGY | Facility: HOSPITAL | Age: 75
End: 2020-11-11

## 2020-11-11 ENCOUNTER — LAB (OUTPATIENT)
Dept: LAB | Facility: HOSPITAL | Age: 75
End: 2020-11-11

## 2020-11-11 ENCOUNTER — OFFICE VISIT (OUTPATIENT)
Dept: ONCOLOGY | Facility: CLINIC | Age: 75
End: 2020-11-11

## 2020-11-11 VITALS
RESPIRATION RATE: 19 BRPM | HEIGHT: 68 IN | DIASTOLIC BLOOD PRESSURE: 71 MMHG | SYSTOLIC BLOOD PRESSURE: 134 MMHG | WEIGHT: 166.9 LBS | OXYGEN SATURATION: 100 % | TEMPERATURE: 97.5 F | HEART RATE: 52 BPM | BODY MASS INDEX: 25.29 KG/M2

## 2020-11-11 DIAGNOSIS — D75.1 ERYTHROCYTOSIS: ICD-10-CM

## 2020-11-11 DIAGNOSIS — D47.1 MYELOPROLIFERATIVE DISORDER (HCC): ICD-10-CM

## 2020-11-11 DIAGNOSIS — C64.1 CANCER OF RIGHT KIDNEY (HCC): Primary | ICD-10-CM

## 2020-11-11 DIAGNOSIS — C64.1 CANCER OF RIGHT KIDNEY (HCC): ICD-10-CM

## 2020-11-11 LAB
BASOPHILS # BLD AUTO: 0.11 10*3/MM3 (ref 0–0.2)
BASOPHILS NFR BLD AUTO: 1.2 % (ref 0–1.5)
DEPRECATED RDW RBC AUTO: 41.5 FL (ref 37–54)
EOSINOPHIL # BLD AUTO: 0.32 10*3/MM3 (ref 0–0.4)
EOSINOPHIL NFR BLD AUTO: 3.4 % (ref 0.3–6.2)
ERYTHROCYTE [DISTWIDTH] IN BLOOD BY AUTOMATED COUNT: 14.3 % (ref 12.3–15.4)
HCT VFR BLD AUTO: 42.2 % (ref 37.5–51)
HGB BLD-MCNC: 12.9 G/DL (ref 13–17.7)
IMM GRANULOCYTES # BLD AUTO: 0.05 10*3/MM3 (ref 0–0.05)
IMM GRANULOCYTES NFR BLD AUTO: 0.5 % (ref 0–0.5)
LYMPHOCYTES # BLD AUTO: 2.24 10*3/MM3 (ref 0.7–3.1)
LYMPHOCYTES NFR BLD AUTO: 23.9 % (ref 19.6–45.3)
MCH RBC QN AUTO: 24.6 PG (ref 26.6–33)
MCHC RBC AUTO-ENTMCNC: 30.6 G/DL (ref 31.5–35.7)
MCV RBC AUTO: 80.5 FL (ref 79–97)
MONOCYTES # BLD AUTO: 1.15 10*3/MM3 (ref 0.1–0.9)
MONOCYTES NFR BLD AUTO: 12.2 % (ref 5–12)
NEUTROPHILS NFR BLD AUTO: 5.52 10*3/MM3 (ref 1.7–7)
NEUTROPHILS NFR BLD AUTO: 58.8 % (ref 42.7–76)
NRBC BLD AUTO-RTO: 0 /100 WBC (ref 0–0.2)
PLATELET # BLD AUTO: 277 10*3/MM3 (ref 140–450)
PMV BLD AUTO: 9.2 FL (ref 6–12)
RBC # BLD AUTO: 5.24 10*6/MM3 (ref 4.14–5.8)
WBC # BLD AUTO: 9.39 10*3/MM3 (ref 3.4–10.8)

## 2020-11-11 PROCEDURE — 85025 COMPLETE CBC W/AUTO DIFF WBC: CPT

## 2020-11-11 PROCEDURE — 99213 OFFICE O/P EST LOW 20 MIN: CPT | Performed by: INTERNAL MEDICINE

## 2020-11-11 PROCEDURE — 36415 COLL VENOUS BLD VENIPUNCTURE: CPT

## 2020-11-11 RX ORDER — ALPRAZOLAM 0.5 MG/1
0.5 TABLET ORAL NIGHTLY PRN
COMMUNITY
Start: 2020-10-07

## 2020-11-11 NOTE — PROGRESS NOTES
REASONS FOR FOLLOWUP:       1. Myeloproliferative disorder with polycythemia vera, MAI-2 mutation positive requiring periodic phlebotomies whenever hematocrit is above 45.      2. His  tory of stage I clear cell carcinoma of the right kidney status post partial nephrectomy, nephron sparing surgery by Dr. Ganesh Lopez with no issues or consequences.  The patient has already an appointment to repeat CT scan of the abdomen in March 2016.                3. Patient has history of polymyalgia rheumatica.  He IS OFF 5 mg of prednisone a day.            HISTORY OF PRESENT ILLNESS    PATIENT WAS CALLED THE DAY BEFORE BY THE OFFICE TO ASK FOR SYMPTOMS THAT COULD BE CONSISTENT WITH CORONAVIRUS INFECTION, AND BEING NEGATIVE WAS SCHEDULED TO BE SEEN IN THE OFFICE TODAY. SIMILAR QUESTIONING TODAY INCLUDING, CHILLS, FEVER, NEW COUGH, SHORTNESS OF BREATH, DIARRHEA,DIFFUSE BODY ACHES  AND CHANGES IN SMELL OR TASTE WERE NEGATIVE.THE PATIENT DENIED ANY CONTACT WITH PERSONS WHO WERE POSITIVE FOR COVID, AND PATIENT IS NOT IN CATEGORY OF HIGH RISK BEHAVIOR TO ACQUIRE COVID.    DURING THE VISIT WITH THE PATIENT TODAY , PATIENT HAD FACE MASK, MY MEDICAL ASSISTANT AND I  HAD PROPPER PROTECTIVE EQUIPMENT, AND I DID HAND HYGIENE WITH SOAP AND WATER BEFORE AND AFTER THE VISIT.  This patient returns today to the office for follow up. He is here today with no specific complaints of any nature related to his polycythemia vera. His primary physician has mentioned to him that he is mildly anemic and suggested multivitamin. Please review my ideas in this regard below. His appetite is good, his weight is stable, his bowel function and urination remain normal. He was seen by his urologist, his PSA remains stable in the 10-12 category. The patient has not had any hematuria, urinary frequency, urgency, incontinence or urinary tract infections. He has not had any exacerbations of asthma or respiratory tract issues. He remains on a very  low dose prednisone 0.25 mg every other day but per his rheumatologist in regard to his polymyalgia rheumatica. He has no major symptoms related to this with the exception of minimal Raynaud's  now that it is becoming colder.      Past Medical History:   Diagnosis Date   • Acquired hypothyroidism    • Anxiety    • Aortic aneurysm (CMS/HCC)    • Arthritis    • Asthma    • Baker's cyst of knee, right    • Disease of thyroid gland    • Gout    • H/O Muscle pain     Right shoulder and neck area   • H/O Radiation treatment     For tonsillar inflammation and infection when he was a child and this led him to develop thyroid cancer.   • History of colon polyps    • Hypertension    • Hyperthyroidism    • Hypothyroidism    • Kidney carcinoma (CMS/HCC)     H/O stage I clear cell carcinoma of the right kidney, status post partial nephrectomy, nephron-sparing surgery by Dr. Ganesh Lopez   • Left shoulder pain    • Myeloproliferative disorder (CMS/HCC)     With polycythemia vera, JAK2 mutation positive requiring periodic phlebotomies (hematocrit above 47).   • Polymyalgia rheumatica (CMS/HCC)    • Premature atrial contraction    • Prostate cancer (CMS/HCC)    • Raynaud disease    • Shoulder injury, left, sequela      Past Surgical History:   Procedure Laterality Date   • CIRCUMCISION N/A 12/17/2018    Procedure: CIRCUMCISION;  Surgeon: Gallo Lopez MD;  Location: [facility];  Service: Urology   • COLONOSCOPY  2010    Documented a tubulovillous adenom that was removed completely. Colonoscopy in 2014 was unremarkable.   • COLONOSCOPY N/A 2/20/2020    Procedure: COLONOSCOPY;  Surgeon: Claire Galo MD;  Location: [facility];  Service: Gastroenterology;  Laterality: N/A;  diverticulosis   • HERNIA REPAIR      H/O multiple hernia repairs including right inguinal hernia repair in 1981, 2003, and double hernia repair in 2013   • KIDNEY SURGERY     • PROSTATE BIOPSY      Showed features consistent with prostate cancer    • SHOULDER SURGERY  1995    Shoulder repair   • THYROIDECTOMY, PARTIAL  1983    For malignant tumor   • TOOTH EXTRACTION       Social History     Social History Narrative    He is a very avid  and plays violin in an orchestra. He does not smoke but had a lot of second-hand smoke through his life.     Family History   Problem Relation Age of Onset   • No Known Problems Mother    • Heart attack Father    • Heart disease Father    • Hypertension Father    • Diabetes Father    • Tuberculosis Maternal Grandmother    • No Known Problems Son    • Drug abuse Son    • Malig Hyperthermia Neg Hx      Allergies   Allergen Reactions   • Diphenhydramine Rash     HEART PALPITATIONS, RASH   • Statins Myalgia     MUSCLE ACHES   • Aspirin Unknown - Low Severity     PT CANNOT REMEMBER   • Latex Rash   • Peanut Oil Unknown - Low Severity     PT CANNOT REMEMBER   • Shellfish-Derived Products Unknown - Low Severity     PT CANNOT REMEMBER     Current Outpatient Medications on File Prior to Visit   Medication Sig Dispense Refill   • ALPRAZolam (XANAX) 0.5 MG tablet      • amLODIPine (NORVASC) 5 MG tablet Take 5 mg by mouth Daily.     • B Complex Vitamins (VITAMIN B-COMPLEX PO) Take 1 tablet by mouth Daily.     • levothyroxine (SYNTHROID, LEVOTHROID) 100 MCG tablet Take 1 tablet by mouth daily. 30 tablet 6   • losartan (COZAAR) 25 MG tablet Take 1 tablet by mouth Daily. 90 tablet 0   • montelukast (SINGULAIR) 10 MG tablet Take 10 mg by mouth Every Night.     • predniSONE (DELTASONE) 2.5 MG tablet Take 2.5 mg by mouth Daily.     • zolpidem (AMBIEN) 10 MG tablet TAKE ONE-HALF TO ONE TABLET BY MOUTH ONCE NIGHTLY AS NEEDED FOR SLEEP  **MUST LAST 30 DAYS       No current facility-administered medications on file prior to visit.            REVIEW OF SYSTEMS:     General: no fever, no chills, no fatigue,no weight changes, no lack of appetite.  Eyes: no epiphora, xerophthalmia,conjunctivitis, pain, glaucoma, blurred vision,  "blindness, secretion, photophobia, proptosis, diplopia.  Ears: no otorrhea, tinnitus, otorrhagia, deafness, pain, vertigo.  Nose: no rhinorrhea, no epistaxis, no alteration in perception of odors, no sinuses pressure.  Mouth: no alteration in gums or teeth,  No ulcers, no difficulty with mastication or deglut ion, no odynophagia.  Neck: no masses or pain, no thyroid alterations, no pain in muscles or arteries, no carotid odynia, no crepitation.  Respiratory: no cough, no sputum production,no dyspnea,no trepopnea, no pleuritic pain,no hemoptysis.  Heart: no syncope, no irregularity, no palpitations, no angina,no orthopnea,no paroxysmal nocturnal dyspnea.  Vascular Venous: no tenderness,no edema,no palpable cords,no postphlebitic syndrome, no skin changes no ulcerations.  Vascular Arterial: no distal ischemia, noclaudication, no gangrene, no neuropathic ischemic pain, no skin ulcers, no paleness no cyanosis.  GI: no dysphagia, no odynophagia, no regurgitation, no heartburn,no indigestion,no nausea,no vomiting,no hematemesis ,no melena,no jaundice,no distention, no obstipation,no enterorrhagia,no proctalgia,no anal  lesions, no changes in bowel habits.  : no frequency, no hesitancy, no hematuria, no discharge,no  pain.  Musculoskeletal: no muscle or tendon pain or inflammation,no  joint pain, no edema, no functional limitation,no fasciculations, no mass.  Neurologic: no headache, no seizures, noalterations on Craneal nerves, no motor deficit, no sensory deficit, normal coordination, no alteration in memory,normal orientation, calculation,normal writting, verbal and written language.  Skin: no rashes,no pruritus no localized lesions.  Psychiatric: no anxiety, no depression,no agitation, no delusions, proper insight.        VITAL SIGNS:   Vitals:    11/11/20 0755   BP: 134/71   Pulse: 52   Resp: 19   Temp: 97.5 °F (36.4 °C)   TempSrc: Temporal   SpO2: 100%   Weight: 75.7 kg (166 lb 14.4 oz)   Height: 172.7 cm (67.99\") "            PHYSICAL EXAMINATION:   I HAVE PERSONALLY REVIEWED THE HISTORY OF THE PRESENT ILLNESS, PAST MEDICAL HISTORY, FAMILY HISTORY, SOCIAL HISTORY, ALLERGIES, MEDICATIONS STATED ABOVE IN THE OFFICE NOTE FROM TODAY.    Patient screened  for depression based on a PHQ-9 score of 0 on 3/11/2020.     GENERAL:  Well-developed, well-nourished  Patient  in no acute distress.   SKIN:  Warm, dry ,NO rashes,NO purpura ,NO petechiae.  HEENT:  Pupils were equal and reactive to light and accomodation, conjunctivae noninjected, no pterygium, normal extraocular movements, normal visual acuity.   NECK:  Supple with good range of motion; no thyromegaly or masses, no JVD or bruits, no cervical adenopathies.No carotid artery pain, no carotid abnormal pulsation , NO arterial dance.  LYMPHATICS:  No cervical, NO supraclavicular, NO axillary,NO epitrochlear , NO inguinal adenopathy.  CARDIAC   normal rate and regular rhythm, without murmur,NO rubs NO S3 NO S4 right or left . Normal femoral, popliteal, pedis, brachial and carotid pulses.  VASCULAR ARTERIAL: normal carotids,brachial,radial,femoral,popliteal, pedis pulses , no bruits.no paleness or cyanosis, no pain, no edema, no numbness, no gangrene.  VASCULAR VENOUS: no cyanosis, collateral circulation, varicosities, edema, palpable cords, pain, erythema.  ABDOMEN:  Soft, nontender with no hepatomegaly, no splenomegaly,no masses, no ascites, no collateral circulation,no distention,no Liberty sign, no abdominal pain, no inguinal hernias,no umbilical hernia, no abdominal bruits. Normal bowel sounds.  GENITAL: Not  Performed.  EXTREMITIES  AND SPINE:  No clubbing, cyanosis or edema, no deformities or pain .No kyphosis, scoliosis, deformities or pain in spine, ribs or pelvic bone.  NEUROLOGICAL:  Patient was awake, alert, oriented to time, person and place.      LABORATORY DATA:    CBC Auto Differential  Order: [id number] - Part of Panel Order [id number]  Status:  Final result   Visible to  patient:  No (not released)   Dx:  Erythrocytosis; Myeloproliferative di...  Specimen Information: Blood        Component   Ref Range & Units 07:40   WBC   3.40 - 10.80 10*3/mm3 9.39    RBC   4.14 - 5.80 10*6/mm3 5.24    Hemoglobin   13.0 - 17.7 g/dL 12.9Low     Hematocrit   37.5 - 51.0 % 42.2    MCV   79.0 - 97.0 fL 80.5    MCH   26.6 - 33.0 pg 24.6Low     MCHC   31.5 - 35.7 g/dL 30.6Low     RDW   12.3 - 15.4 % 14.3    RDW-SD   37.0 - 54.0 fl 41.5    MPV   6.0 - 12.0 fL 9.2    Platelets   140 - 450 10*3/mm3 277    Neutrophil %   42.7 - 76.0 % 58.8    Lymphocyte %   19.6 - 45.3 % 23.9    Monocyte %   5.0 - 12.0 % 12.2High     Eosinophil %   0.3 - 6.2 % 3.4    Basophil %   0.0 - 1.5 % 1.2    Immature Grans %   0.0 - 0.5 % 0.5    Neutrophils, Absolute   1.70 - 7.00 10*3/mm3 5.52    Lymphocytes, Absolute   0.70 - 3.10 10*3/mm3 2.24                            ASSESSMENT/PLAN:  This patient has history of polycythemia vera JAK2 mutation positive. He has had multiple phlebotomies in the past and this has produced a drop in the MCV. Now the patient is not requiring phlebotomies as often as before and is related to the mild iron deficiency that has been produced by the phlebotomy. We want for that to happen. Less frequency of phlebotomies is better for him in the long run and better control of his hematocrit in absence of any need to drain blood from him. His white count remains excellent, his platelet count remains excellent and I find no reason for him to proceed with therapy with Hydrea at this time.     In regard to his other comorbidities they remain very stable. His PSA has been brought to my attention around 12 and this has been monitored by his urologist.    In regard to his history of kidney cancer he will have a follow up CT scan in 2021. He has no symptoms pertinent to this.     I do believe that the patient is doing fine and I do not advise him to take any multivitamins because this will increase production of  red cells and it will raise the need for him to go back on a schedule of phlebotomy.    I will review him back in 3 months and he will come every 6 weeks for blood count and phlebotomy if hematocrit is above 45.         I DISCUSSED WITH PATIENT IN DETAIL FORMS TO DECREASE CHANCES OF CORONAVIRUS INFECTION INCLUDING ISOLATION, PROPER HAND HIGIENE, AVOID PUBLIC PLACES  WITH CROWDS, FOLLOW  CDC RECOMENDATIONS, AND KEEP PERSONAL AND SOCIAL RESPONSIBILITY, WARE A MASK IN PUBLIC PLACES.  PATIENT IS AWARE THIS INFECTION COULD HAVE SEVERE CONSEQUENCES TO PERSONAL HEALTH AND FAMILY RAMIFICATIONS OF THIS.

## 2020-11-11 NOTE — PROGRESS NOTES
REASONS FOR FOLLOWUP:       1. Myeloproliferative disorder with polycythemia vera, MAI-2 mutation positive requiring periodic phlebotomies whenever hematocrit is above 45.      2. His  tory of stage I clear cell carcinoma of the right kidney status post partial nephrectomy, nephron sparing surgery by Dr. Ganesh Lopez with no issues or consequences.  The patient has already an appointment to repeat CT scan of the abdomen in March 2016.                3. Patient has history of polymyalgia rheumatica.  He IS OFF 5 mg of prednisone a day.            HISTORY OF PRESENT ILLNESS                       PAST MEDICAL HISTORY:    1.;  Hypertension.    2.;   History of polyps in the colon.  Colonoscopy in 2010 documented a tubulovillous adenoma that was removed completely.  Further colonoscopy in 2014 was unremarkable.     3.; History of prostate biopsy in the past that showed features consistent with prostate cancer  .  He has not required any specific therapy for this and he has been monitored through his urologist.    4.; History of multiple hernia repairs.     5.; History of polymyalgia rheumatica that was diagnosed 3 years by Dr. Karan Garrett.  He has been on a tapering dose   of prednisone, even though he admits that his polymyalgia was atypical because his sedimentation was never elevated.  He has been seen by other rheumatologists in the past as well and has been told he has Raynaud’  s disease.  He has never had diagnosis of rheumatoid arthritis, lupus or something of this nature.     6.; Partial thyroidectomy in 1983 for a malignant tumor of the thyroid gland that never required any other intervention.      7.; He used to have radiation treatment for tonsillar inflammation and infection when   he was a child and this led him to develop thyroid cancer.       HEMATOLOGIC/ONCOLOGIC HISTORY:  History of previous dates can be found in a separate document.         On 02/22/2016, he had no symptoms or signs  that would indicate any kidney cancer recurrence.  Given   the mild achiness in his muscles, we went ahead and checked a CPK and an aldolase and also checked a sedimentation rate and vitamin D level.  TSH was also performed.  We asked him to remain on his dose of prednisone of 5 mg a day.  We asked him to initia  te a program of physical activity including going to the gym.  He did not need phlebotomy on this occasion, given hematocrit of 45.  His white count and platelet count remain stable.  He had no palpable splenomegaly.         MEDICATIONS:  The current medication list was reviewed with the patient and updated in the EMR this date per the Medical Assistant.  Medication dosages and frequencies were confirmed to be accurate.        It is important to mention the patient does not use any androgen or androgen replacement medication or anabolic steroids or any kind.        ALLERGIES:     1.  STATINS.    2. DIPHENHYDRAMINE.         SOCIAL HISTORY:  The patient is .  He lives with his wife in [place].  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  He plays violin in   an orchestra and also he is a very avid .  He does not smoke but he had a lot of second-hand smoke through his life.  He drinks 1-3 glasses of wine a week.  He has no use of any recreational drugs.          FAMILY HISTORY:  His father  of alcoh  ol and smoking disorders including heart attack.  His mother is 89 and in good health.  He has no brothers.  He has 1 adopted sister who is in good health with probable rheumatoid arthritis.  His wife is in good health.  He has 2 children, 1 who is in [facility] health due to drug abuse and the other son is in good health. He had another son who  as a consequence of Tylenol intoxication.   No family history of hematological disorder or myeloproliferative disorder.         REVIEW OF SYSTEMS:                       VITAL SIGNS:   Vitals:    20 0755  "  BP: 134/71   Pulse: 52   Resp: 19   Temp: 97.5 °F (36.4 °C)   TempSrc: Temporal   SpO2: 100%   Weight: 75.7 kg (166 lb 14.4 oz)   Height: 172.7 cm (67.99\")            PHYSICAL EXAMINATION:                     LABORATORY DATA:Auto Differential   Order: [id number] - Part of Panel Order [id number]   Status:  Final result   Visible to patient:  No (Not Released)   Dx:  Erythrocytosis; Myeloproliferative di...   Specimen Information: Blood        Component  Ref Range & Units 12:25   WBC  3.40 - 10.80 10*3/mm3 13.69High     RBC  4.14 - 5.80 10*6/mm3 5.52    Hemoglobin  13.0 - 17.7 g/dL 14.7    Hematocrit  37.5 - 51.0 % 46.8    MCV  79.0 - 97.0 fL 84.8    MCH  26.6 - 33.0 pg 26.6    MCHC  31.5 - 35.7 g/dL 31.4Low     RDW  12.3 - 15.4 % 15.9High     RDW-SD  37.0 - 54.0 fl 48.6    MPV  6.0 - 12.0 fL 10.3    Platelets  140 - 450 10*3/mm3 246    Neutrophil %  42.7 - 76.0 % 80.4High     Lymphocyte %  19.6 - 45.3 % 10.3Low     Monocyte %  5.0 - 12.0 % 7.6    Eosinophil %  0.3 - 6.2 % 0.3    Basophil %  0.0 - 1.5 % 0.7    Immature Grans %  0.0 - 0.5 % 0.7High     Neutrophils, Absolute  1.70 - 7.00 10*3/mm3 11.02High     Lymphocytes, Absolute  0.70 - 3.10 10*3/mm3 1.41    Monocytes, Absolute  0.10 - 0.90 10*3/mm3 1.04High     Eosinophils, Absolute  0.00 - 0.40 10*3/mm3 0.04    Basophils, Absolute  0.00 - 0.20 10*3/mm3 0.09    Immature Grans, Absolute  0.00 - 0.05 10*3/mm3 0.09High     nRBC  0.0 - 0.2 /100 WBC 0.0                      ASSESSMENT/PLAN:   1. This patient has polycythemia vera, JAK2 mutation positive, and has taken in the past Hydrea. He discontinued this medicine because of feeling afraid of side effects even though I never noticed anything in particular related to this medicine.   Today his hematocrit is 47.I do believe that he needs to have a phlebotomy today and he will proceed with that. His white count is elevated because of prednisone use mostly. He has no infection.     The platelet count remains " stable.    Otherwise, he will return to the office every 6 weeks for a blood count and phlebotomy if hematocrit is above 45. I will review him back in 3 or 4 months.    In regard to his previous history of kidney cancer, there are no issues pertinent to this. He will see his urologist at some point in the future as well as related to his prostate cancer.     I DISCUSSED WITH PATIENT IN DETAIL FORMS TO DECREASE CHANCES OF CORONAVIRUS INFECTION INCLUDING ISOLATION, PROPER HAND HIGIENE, AVOID PUBLIC PLACES  WITH CROWDS, FOLLOW  CDC RECOMENDATIONS, AND KEEP PERSONAL AND SOCIAL RESPONSIBILITY, WARE A MASK IN PUBLIC PLACES.  PATIENT IS AWARE THIS INFECTION COULD HAVE SEVERE CONSEQUENCES TO PERSONAL HEALTH AND FAMILY RAMIFICATIONS OF THIS.

## 2020-11-13 ENCOUNTER — APPOINTMENT (OUTPATIENT)
Dept: LAB | Facility: HOSPITAL | Age: 75
End: 2020-11-13

## 2020-12-31 ENCOUNTER — INFUSION (OUTPATIENT)
Dept: ONCOLOGY | Facility: HOSPITAL | Age: 75
End: 2020-12-31

## 2020-12-31 ENCOUNTER — LAB (OUTPATIENT)
Dept: LAB | Facility: HOSPITAL | Age: 75
End: 2020-12-31

## 2020-12-31 DIAGNOSIS — D75.1 ERYTHROCYTOSIS: ICD-10-CM

## 2020-12-31 DIAGNOSIS — D47.1 MYELOPROLIFERATIVE DISORDER (HCC): ICD-10-CM

## 2020-12-31 DIAGNOSIS — C64.1 CANCER OF RIGHT KIDNEY (HCC): ICD-10-CM

## 2020-12-31 LAB
BASOPHILS # BLD AUTO: 0.11 10*3/MM3 (ref 0–0.2)
BASOPHILS NFR BLD AUTO: 0.9 % (ref 0–1.5)
DEPRECATED RDW RBC AUTO: 44.6 FL (ref 37–54)
EOSINOPHIL # BLD AUTO: 0.12 10*3/MM3 (ref 0–0.4)
EOSINOPHIL NFR BLD AUTO: 1 % (ref 0.3–6.2)
ERYTHROCYTE [DISTWIDTH] IN BLOOD BY AUTOMATED COUNT: 15.8 % (ref 12.3–15.4)
HCT VFR BLD AUTO: 44.6 % (ref 37.5–51)
HGB BLD-MCNC: 14 G/DL (ref 13–17.7)
IMM GRANULOCYTES # BLD AUTO: 0.07 10*3/MM3 (ref 0–0.05)
IMM GRANULOCYTES NFR BLD AUTO: 0.6 % (ref 0–0.5)
LYMPHOCYTES # BLD AUTO: 1.57 10*3/MM3 (ref 0.7–3.1)
LYMPHOCYTES NFR BLD AUTO: 12.9 % (ref 19.6–45.3)
MCH RBC QN AUTO: 24.7 PG (ref 26.6–33)
MCHC RBC AUTO-ENTMCNC: 31.4 G/DL (ref 31.5–35.7)
MCV RBC AUTO: 78.8 FL (ref 79–97)
MONOCYTES # BLD AUTO: 1.09 10*3/MM3 (ref 0.1–0.9)
MONOCYTES NFR BLD AUTO: 9 % (ref 5–12)
NEUTROPHILS NFR BLD AUTO: 75.6 % (ref 42.7–76)
NEUTROPHILS NFR BLD AUTO: 9.17 10*3/MM3 (ref 1.7–7)
NRBC BLD AUTO-RTO: 0 /100 WBC (ref 0–0.2)
PLATELET # BLD AUTO: 263 10*3/MM3 (ref 140–450)
PMV BLD AUTO: 9.3 FL (ref 6–12)
RBC # BLD AUTO: 5.66 10*6/MM3 (ref 4.14–5.8)
WBC # BLD AUTO: 12.13 10*3/MM3 (ref 3.4–10.8)

## 2020-12-31 PROCEDURE — 36415 COLL VENOUS BLD VENIPUNCTURE: CPT

## 2020-12-31 PROCEDURE — 85025 COMPLETE CBC W/AUTO DIFF WBC: CPT

## 2020-12-31 NOTE — NURSING NOTE
Pt here for possible phlebotomy.  Pt Hct 44.6 and under parameters.  Pt given copy of labs and explained no need for phlebo today.  Pt v/u

## 2021-02-04 ENCOUNTER — LAB (OUTPATIENT)
Dept: LAB | Facility: HOSPITAL | Age: 76
End: 2021-02-04

## 2021-02-04 ENCOUNTER — APPOINTMENT (OUTPATIENT)
Dept: ONCOLOGY | Facility: HOSPITAL | Age: 76
End: 2021-02-04

## 2021-02-04 ENCOUNTER — OFFICE VISIT (OUTPATIENT)
Dept: ONCOLOGY | Facility: CLINIC | Age: 76
End: 2021-02-04

## 2021-02-04 VITALS
OXYGEN SATURATION: 97 % | TEMPERATURE: 97.5 F | SYSTOLIC BLOOD PRESSURE: 144 MMHG | DIASTOLIC BLOOD PRESSURE: 78 MMHG | BODY MASS INDEX: 25.05 KG/M2 | WEIGHT: 165.3 LBS | HEART RATE: 56 BPM | HEIGHT: 68 IN | RESPIRATION RATE: 18 BRPM

## 2021-02-04 DIAGNOSIS — C64.1 CANCER OF RIGHT KIDNEY (HCC): Primary | ICD-10-CM

## 2021-02-04 DIAGNOSIS — D75.1 ERYTHROCYTOSIS: ICD-10-CM

## 2021-02-04 DIAGNOSIS — D47.1 MYELOPROLIFERATIVE DISORDER (HCC): ICD-10-CM

## 2021-02-04 DIAGNOSIS — C64.1 CANCER OF RIGHT KIDNEY (HCC): ICD-10-CM

## 2021-02-04 DIAGNOSIS — I73.00 RAYNAUD'S DISEASE WITHOUT GANGRENE: ICD-10-CM

## 2021-02-04 LAB
BASOPHILS # BLD AUTO: 0.08 10*3/MM3 (ref 0–0.2)
BASOPHILS NFR BLD AUTO: 0.7 % (ref 0–1.5)
DEPRECATED RDW RBC AUTO: 46.5 FL (ref 37–54)
EOSINOPHIL # BLD AUTO: 0.1 10*3/MM3 (ref 0–0.4)
EOSINOPHIL NFR BLD AUTO: 0.9 % (ref 0.3–6.2)
ERYTHROCYTE [DISTWIDTH] IN BLOOD BY AUTOMATED COUNT: 16.3 % (ref 12.3–15.4)
HCT VFR BLD AUTO: 45 % (ref 37.5–51)
HGB BLD-MCNC: 14.2 G/DL (ref 13–17.7)
IMM GRANULOCYTES # BLD AUTO: 0.06 10*3/MM3 (ref 0–0.05)
IMM GRANULOCYTES NFR BLD AUTO: 0.5 % (ref 0–0.5)
LYMPHOCYTES # BLD AUTO: 1.26 10*3/MM3 (ref 0.7–3.1)
LYMPHOCYTES NFR BLD AUTO: 10.8 % (ref 19.6–45.3)
MCH RBC QN AUTO: 25.1 PG (ref 26.6–33)
MCHC RBC AUTO-ENTMCNC: 31.6 G/DL (ref 31.5–35.7)
MCV RBC AUTO: 79.5 FL (ref 79–97)
MONOCYTES # BLD AUTO: 1.12 10*3/MM3 (ref 0.1–0.9)
MONOCYTES NFR BLD AUTO: 9.6 % (ref 5–12)
NEUTROPHILS NFR BLD AUTO: 77.5 % (ref 42.7–76)
NEUTROPHILS NFR BLD AUTO: 9.08 10*3/MM3 (ref 1.7–7)
NRBC BLD AUTO-RTO: 0 /100 WBC (ref 0–0.2)
PLATELET # BLD AUTO: 261 10*3/MM3 (ref 140–450)
PMV BLD AUTO: 9 FL (ref 6–12)
RBC # BLD AUTO: 5.66 10*6/MM3 (ref 4.14–5.8)
WBC # BLD AUTO: 11.7 10*3/MM3 (ref 3.4–10.8)

## 2021-02-04 PROCEDURE — 36415 COLL VENOUS BLD VENIPUNCTURE: CPT

## 2021-02-04 PROCEDURE — 85025 COMPLETE CBC W/AUTO DIFF WBC: CPT

## 2021-02-04 PROCEDURE — 99214 OFFICE O/P EST MOD 30 MIN: CPT | Performed by: INTERNAL MEDICINE

## 2021-02-04 NOTE — PROGRESS NOTES
REASONS FOR FOLLOWUP:       1. Myeloproliferative disorder with polycythemia vera, MAI-2 mutation positive requiring periodic phlebotomies whenever hematocrit is above 45.      2. His  tory of stage I clear cell carcinoma of the right kidney status post partial nephrectomy, nephron sparing surgery by Dr. Ganesh Lopez with no issues or consequences.  The patient has already an appointment to repeat CT scan of the abdomen in March 2016.                3. Patient has history of polymyalgia rheumatica.  He IS OFF 5 mg of prednisone a day.            HISTORY OF PRESENT ILLNESS    PATIENT WAS CALLED THE DAY BEFORE BY THE OFFICE TO ASK FOR SYMPTOMS THAT COULD BE CONSISTENT WITH CORONAVIRUS INFECTION, AND BEING NEGATIVE WAS SCHEDULED TO BE SEEN IN THE OFFICE TODAY. SIMILAR QUESTIONING TODAY INCLUDING, CHILLS, FEVER, NEW COUGH, SHORTNESS OF BREATH, DIARRHEA,DIFFUSE BODY ACHES  AND CHANGES IN SMELL OR TASTE WERE NEGATIVE.THE PATIENT DENIED ANY CONTACT WITH PERSONS WHO WERE POSITIVE FOR COVID, AND PATIENT IS NOT IN CATEGORY OF HIGH RISK BEHAVIOR TO ACQUIRE COVID.    DURING THE VISIT WITH THE PATIENT TODAY , PATIENT HAD FACE MASK, MY MEDICAL ASSISTANT AND I  HAD PROPPER PROTECTIVE EQUIPMENT, AND I DID HAND HYGIENE WITH SOAP AND WATER BEFORE AND AFTER THE VISIT.    This patient returns today to the office for follow up stating that he is ready to have his COVID vaccination tomorrow at [facility]. From my point of view in regard to his polycythemia he has not had any issues but now he has given the cold weather Raynaud's disease activity especially in the right hand 3rd and 4th digits. He has had pain and persistent erythema with persistent ischemia, not enough to produce necrosis. As long as they warm up he feels better. He also has been experiencing pain in the chest anteriorly on the side of the sternum right and left that is associated with food and is partially relieved with Protonix. He has some  heartburn. He denies any obvious symptoms that suggest pleuritic pain or pericardial pain and there is no cardiac pain. The pain intensity is 4 over 10, lasts for a few seconds, is not associated with physical activity, position change and has no alterations in the skin or the soft tissues. Palpation of these areas triggers no pain on him at all. He has not had any cough, sputum production. He has shortness of breath associated with his asthma but no different than how it was before. He has been seen by cardiology. No mention of anything related to the heart about this pain. The patient otherwise has a good appetite, good bowel activity, normal urination. He had a tennis ball that hit his penis at 100 mph, fortunately with no lesions. He has not had any swelling in his lower extremities.         Past Medical History:   Diagnosis Date   • Acquired hypothyroidism    • Anxiety    • Aortic aneurysm (CMS/HCC)    • Arthritis    • Asthma    • Baker's cyst of knee, right    • Disease of thyroid gland    • Gout    • H/O Muscle pain     Right shoulder and neck area   • H/O Radiation treatment     For tonsillar inflammation and infection when he was a child and this led him to develop thyroid cancer.   • History of colon polyps    • Hypertension    • Hyperthyroidism    • Hypothyroidism    • Kidney carcinoma (CMS/HCC)     H/O stage I clear cell carcinoma of the right kidney, status post partial nephrectomy, nephron-sparing surgery by Dr. Ganesh Lopez   • Left shoulder pain    • Myeloproliferative disorder (CMS/HCC)     With polycythemia vera, JAK2 mutation positive requiring periodic phlebotomies (hematocrit above 47).   • Polymyalgia rheumatica (CMS/HCC)    • Premature atrial contraction    • Prostate cancer (CMS/HCC)    • Raynaud disease    • Shoulder injury, left, sequela      Past Surgical History:   Procedure Laterality Date   • CIRCUMCISION N/A 12/17/2018    Procedure: CIRCUMCISION;  Surgeon: Gallo Lopez MD;   Location: [facility] MAIN OR;  Service: Urology   • COLONOSCOPY  2010    Documented a tubulovillous adenom that was removed completely. Colonoscopy in 2014 was unremarkable.   • COLONOSCOPY N/A 2/20/2020    Procedure: COLONOSCOPY;  Surgeon: Claire Galo MD;  Location: [facility] OR;  Service: Gastroenterology;  Laterality: N/A;  diverticulosis   • HERNIA REPAIR      H/O multiple hernia repairs including right inguinal hernia repair in 1981, 2003, and double hernia repair in 2013   • KIDNEY SURGERY     • PROSTATE BIOPSY      Showed features consistent with prostate cancer   • SHOULDER SURGERY  1995    Shoulder repair   • THYROIDECTOMY, PARTIAL  1983    For malignant tumor   • TOOTH EXTRACTION       Social History     Social History Narrative    He is a very avid  and plays violin in an orchestra. He does not smoke but had a lot of second-hand smoke through his life.     Family History   Problem Relation Age of Onset   • No Known Problems Mother    • Heart attack Father    • Heart disease Father    • Hypertension Father    • Diabetes Father    • Tuberculosis Maternal Grandmother    • No Known Problems Son    • Drug abuse Son    • Malig Hyperthermia Neg Hx      Allergies   Allergen Reactions   • Diphenhydramine Rash     HEART PALPITATIONS, RASH   • Statins Myalgia     MUSCLE ACHES   • Aspirin Unknown - Low Severity     PT CANNOT REMEMBER   • Latex Rash   • Peanut Oil Unknown - Low Severity     PT CANNOT REMEMBER   • Shellfish-Derived Products Unknown - Low Severity     PT CANNOT REMEMBER     Current Outpatient Medications on File Prior to Visit   Medication Sig Dispense Refill   • ALPRAZolam (XANAX) 0.5 MG tablet      • B Complex Vitamins (VITAMIN B-COMPLEX PO) Take 1 tablet by mouth Daily.     • levothyroxine (SYNTHROID, LEVOTHROID) 100 MCG tablet Take 1 tablet by mouth daily. 30 tablet 6   • losartan (COZAAR) 25 MG tablet Take 1 tablet by mouth Daily. 90 tablet 0   • montelukast (SINGULAIR) 10 MG tablet  "Take 10 mg by mouth Every Night.     • predniSONE (DELTASONE) 2.5 MG tablet Take 2.5 mg by mouth Daily.     • zolpidem (AMBIEN) 10 MG tablet TAKE ONE-HALF TO ONE TABLET BY MOUTH ONCE NIGHTLY AS NEEDED FOR SLEEP  **MUST LAST 30 DAYS       No current facility-administered medications on file prior to visit.                VITAL SIGNS:   Vitals:    02/04/21 1111   BP: 144/78   Pulse: 56   Resp: 18   Temp: 97.5 °F (36.4 °C)   TempSrc: Temporal   SpO2: 97%   Weight: 75 kg (165 lb 4.8 oz)   Height: 172.7 cm (67.99\")            PHYSICAL EXAMINATION:     Patient screened  for depression based on a PHQ-9 score of 0 on 2/4/2021.       I HAVE PERSONALLY REVIEWED THE HISTORY OF THE PRESENT ILLNESS, PAST MEDICAL HISTORY, FAMILY HISTORY, SOCIAL HISTORY, ALLERGIES, MEDICATIONS STATED ABOVE IN THE OFFICE NOTE FROM TODAY.        GENERAL:  Well-developed, well-nourished  Patient  in no acute distress.   SKIN:  Warm, dry ,NO rashes,NO purpura ,NO petechiae.  HEENT:  Pupils were equal and reactive to light and accomodation, conjunctivae noninjected, no pterygium, normal extraocular movements, normal visual acuity.   NECK:  Supple with good range of motion; no thyromegaly or masses, no JVD or bruits, no cervical adenopathies.No carotid artery pain, no carotid abnormal pulsation , NO arterial dance.  LYMPHATICS:  No cervical, NO supraclavicular, NO axillary,NO epitrochlear , NO inguinal adenopathy.  CARDIAC   normal rate and regular rhythm, without murmur,NO rubs NO S3 NO S4 right or left . Normal femoral, popliteal, pedis, brachial and carotid pulses.  LUNGS: DECREASED BS BILATERALLY MINIMAL EXPIRATORY WHEEZING, NO RUBS, NO STERNUM OR CARTILAGE PAIN NO RAH IN THE SKIN.  INSPECTION of  breasts documented symmetry of the tissue per se and location and size of the nipple,no retractions or inversion of the nipple, normal skin without lesions, no erythema or nodules,no peau d'orange, no prominence of superficial veins or chest wall " collateral circulation.PALPATION of the breast documented normal skin turgor, no induration, alteration in local temperature, or pain, no palpable masses or nodules, normal mobility of the tissues,no fixation of the tissue or parenchyma to the chest wall, no alteration at the tail of the breasts or axillas, no adenopathies. .No lymphedema in either extremity.  VASCULAR ARTERIAL: normal carotids,brachial,radial,femoral,popliteal, pedis pulses , no bruits.no paleness or cyanosis, no pain, no edema, no numbness, no gangrene.ACTIVE RAYNAUD'S R HAND 3D AND 4TH DIGITS  VASCULAR VENOUS: no cyanosis, collateral circulation, varicosities, edema, palpable cords, pain, erythema.  ABDOMEN:  Soft, nontender with no hepatomegaly, no splenomegaly,no masses, no ascites, no collateral circulation,no distention,no Aniket sign, no abdominal pain, no inguinal hernias,no umbilical hernia, no abdominal bruits. Normal bowel sounds.  GENITAL: Not  Performed.  EXTREMITIES  AND SPINE:  No clubbing, cyanosis or edema, no deformities or pain .No kyphosis, scoliosis, deformities or pain in spine, ribs or pelvic bone.  NEUROLOGICAL:  Patient was awake, alert, oriented to time, person and place.      LABORATORY DATA:    Auto Differential  Order: [id number] - Part of Panel Order [id number]  Status:  Final result   Visible to patient:  No (not released)   Dx:  Erythrocytosis; Myeloproliferative di...  Specimen Information: Blood        Component   Ref Range & Units 11:07   WBC   3.40 - 10.80 10*3/mm3 11.70High     RBC   4.14 - 5.80 10*6/mm3 5.66    Hemoglobin   13.0 - 17.7 g/dL 14.2    Hematocrit   37.5 - 51.0 % 45.0    MCV   79.0 - 97.0 fL 79.5    MCH   26.6 - 33.0 pg 25.1Low     MCHC   31.5 - 35.7 g/dL 31.6    RDW   12.3 - 15.4 % 16.3High     RDW-SD   37.0 - 54.0 fl 46.5    MPV   6.0 - 12.0 fL 9.0    Platelets   140 - 450 10*3/mm3 261    Neutrophil %   42.7 - 76.0 % 77.5High     Lymphocyte %   19.6 - 45.3 % 10.8Low     Monocyte %   5.0 - 12.0 % 9.6     Eosinophil %   0.3 - 6.2 % 0.9    Basophil %   0.0 - 1.5 % 0.7    Immature Grans %   0.0 - 0.5 % 0.5                                  ASSESSMENT/PLAN:  This patient has history of polycythemia vera JAK2 mutation positive. He has had multiple phlebotomies in the past and this has produced a drop in the MCV. Now the patient is not requiring phlebotomies as often as before and is related to the mild iron deficiency that has been produced by the phlebotomy.1. Upon reviewing the patient on 02/04/2021 his hematocrit is 45 and I find no need to proceed with a phlebotomy at this time. His white count is 11,000, platelet count is normal. The patient refuses to take any aspirin.   2. The patient has active Raynaud's in the right hand. He has ischemia in his 3rd and 4th digits in the right hand, not losing skin pieces or nailbeds but this is concerning and I asked him to talk with his rheumatologist about what to do. He is supposed to take amlodipine for this because it helps vasodilation. I asked him to go back on this medicine, take pictures of his fingers and send them to his rheumatologist.   3. The patient has a peculiar pain in his chest anteriorly. It lasts for a few seconds, is associated with food and heartburn, is not cardiac in origin, is not pericardial in origin and is not chest wall in origin. Breast anatomy is normal, skin is normal and there are no alterations in the costochondral joints as far as I can tell. This is related according to him to acid. The Protonix that he takes makes the symptoms improve. I suggested for him to take the Protonix steadily everyday for the next 3 weeks. I asked him to call my nurse Candida at that period of time and see how things are going.     Otherwise he will return for a CBC and phlebotomy if hematocrit is above 45 in 6 weeks and 12 weeks. I will review him back in 12 weeks.     From my point of view he is perfectly fine to go ahead and receive his COVID vaccination. I find  no contraindication whatsoever.     He will follow up with his urologist in regard to his previous history of kidney cancer.            I DISCUSSED WITH PATIENT IN DETAIL FORMS TO DECREASE CHANCES OF CORONAVIRUS INFECTION INCLUDING ISOLATION, PROPER HAND HIGIENE, AVOID PUBLIC PLACES  WITH CROWDS, FOLLOW  CDC RECOMENDATIONS, AND KEEP PERSONAL AND SOCIAL RESPONSIBILITY, WARE A MASK IN PUBLIC PLACES.  PATIENT IS AWARE THIS INFECTION COULD HAVE SEVERE CONSEQUENCES TO PERSONAL HEALTH AND FAMILY RAMIFICATIONS OF THIS.

## 2021-02-10 ENCOUNTER — LAB (OUTPATIENT)
Dept: LAB | Facility: HOSPITAL | Age: 76
End: 2021-02-10

## 2021-02-10 ENCOUNTER — HOSPITAL ENCOUNTER (OUTPATIENT)
Dept: CT IMAGING | Facility: HOSPITAL | Age: 76
Discharge: HOME | End: 2021-02-10

## 2021-02-10 ENCOUNTER — TELEPHONE (OUTPATIENT)
Dept: CT IMAGING | Facility: HOSPITAL | Age: 76
End: 2021-02-10

## 2021-02-10 ENCOUNTER — TELEPHONE (OUTPATIENT)
Dept: ONCOLOGY | Facility: CLINIC | Age: 76
End: 2021-02-10

## 2021-02-10 DIAGNOSIS — M35.3 PMR (POLYMYALGIA RHEUMATICA) (HCC): ICD-10-CM

## 2021-02-10 DIAGNOSIS — C64.1 CANCER OF RIGHT KIDNEY (HCC): ICD-10-CM

## 2021-02-10 DIAGNOSIS — M35.3 PMR (POLYMYALGIA RHEUMATICA) (HCC): Primary | ICD-10-CM

## 2021-02-10 LAB
BASOPHILS # BLD AUTO: 0.09 10*3/MM3 (ref 0–0.2)
BASOPHILS NFR BLD AUTO: 1 % (ref 0–1.5)
CREAT BLDA-MCNC: 1.3 MG/DL (ref 0.6–1.3)
DEPRECATED RDW RBC AUTO: 47.9 FL (ref 37–54)
EOSINOPHIL # BLD AUTO: 0.26 10*3/MM3 (ref 0–0.4)
EOSINOPHIL NFR BLD AUTO: 2.9 % (ref 0.3–6.2)
ERYTHROCYTE [DISTWIDTH] IN BLOOD BY AUTOMATED COUNT: 16.4 % (ref 12.3–15.4)
HCT VFR BLD AUTO: 44 % (ref 37.5–51)
HGB BLD-MCNC: 13.4 G/DL (ref 13–17.7)
IMM GRANULOCYTES # BLD AUTO: 0.04 10*3/MM3 (ref 0–0.05)
IMM GRANULOCYTES NFR BLD AUTO: 0.4 % (ref 0–0.5)
LYMPHOCYTES # BLD AUTO: 1.67 10*3/MM3 (ref 0.7–3.1)
LYMPHOCYTES NFR BLD AUTO: 18.5 % (ref 19.6–45.3)
MCH RBC QN AUTO: 24.6 PG (ref 26.6–33)
MCHC RBC AUTO-ENTMCNC: 30.5 G/DL (ref 31.5–35.7)
MCV RBC AUTO: 80.7 FL (ref 79–97)
MONOCYTES # BLD AUTO: 0.96 10*3/MM3 (ref 0.1–0.9)
MONOCYTES NFR BLD AUTO: 10.6 % (ref 5–12)
NEUTROPHILS NFR BLD AUTO: 6.01 10*3/MM3 (ref 1.7–7)
NEUTROPHILS NFR BLD AUTO: 66.6 % (ref 42.7–76)
NRBC BLD AUTO-RTO: 0 /100 WBC (ref 0–0.2)
PLATELET # BLD AUTO: 268 10*3/MM3 (ref 140–450)
PMV BLD AUTO: 9.4 FL (ref 6–12)
RBC # BLD AUTO: 5.45 10*6/MM3 (ref 4.14–5.8)
WBC # BLD AUTO: 9.03 10*3/MM3 (ref 3.4–10.8)

## 2021-02-10 PROCEDURE — 71275 CT ANGIOGRAPHY CHEST: CPT

## 2021-02-10 PROCEDURE — 0 IOPAMIDOL PER 1 ML: Performed by: INTERNAL MEDICINE

## 2021-02-10 PROCEDURE — 82565 ASSAY OF CREATININE: CPT

## 2021-02-10 PROCEDURE — 85025 COMPLETE CBC W/AUTO DIFF WBC: CPT | Performed by: INTERNAL MEDICINE

## 2021-02-10 RX ADMIN — IOPAMIDOL 95 ML: 755 INJECTION, SOLUTION INTRAVENOUS at 14:30

## 2021-02-10 NOTE — TELEPHONE ENCOUNTER
Patient called me this am concerned about the left side chest pain he has been having. Dr. Damon tried protonix but patient states it is not helping. Patient was playing tennis yesterday, coughed and had the pain come and go. States it is vertical and dull. Discussed with Dr. Damon. An order for stat ct chest angiogram was placed. Patient aware. Message sent to scheduling.

## 2021-02-10 NOTE — TELEPHONE ENCOUNTER
This patient called this morning complaining of some chest pain and discomfort after playing tennis yesterday. Given the fact that he has a thoracic aortic aneurysm and also he has polycythemia, I thought it was reasonable to proceed with a CT angiogram of the chest. I already have reviewed the report with the radiologist, Dr. Garcia. The aneurysm in the aorta has not changed. The heart size is normal. There is no pericarditis. There is no pleuritic. There is no pulmonary infiltrate and there is no pulmonary embolism. The patient feels back to normal at this point. I will talk with him again tomorrow morning and see how he is feeling and maybe he needs to be rechecked on clinical grounds tomorrow and see how he is doing. He has no pain at this time at all and he feels perfectly fine otherwise.

## 2021-02-10 NOTE — TELEPHONE ENCOUNTER
----- Message from Candida Espino RN sent at 2/10/2021  9:43 AM EST -----  Regarding: wilian  Can we get him in here at [facility] for stat ct chest angiogram per dr cedeno. Patient was made aware and expecting call    Thanks!!

## 2021-02-22 ENCOUNTER — TELEPHONE (OUTPATIENT)
Dept: ONCOLOGY | Facility: CLINIC | Age: 76
End: 2021-02-22

## 2021-02-22 NOTE — TELEPHONE ENCOUNTER
Returned pts call. Pt was calling about his recent CT of the chest. Pt asked if Dr. Damon recommended he f/u with a pulmonologist and wanted to know if he should move up his cardiologist appt in Sept. Sent Candida ZHAO a message to have Dr. Damon advise.

## 2021-02-22 NOTE — TELEPHONE ENCOUNTER
Candida Espino RN Montes, Ignacio, MD Dr. Montes, please see below and let me know what you would like for me to do     Thanks    Previous Messages    ----- Message -----   From: Cindy Stiles RN   Sent: 2/22/2021   8:47 AM EST   To: Candida Espino RN     Pt calling about his test results from his CT. Pt wanting to know if Dr. Damon wants him to f/u with a pulmonologist based on the CT (he said he will need a referral if so). Pt said he doesn't see his cardiologist until September and isn't sure if he needs to move up this appt as well. Please have Dr. Damon advise.         I talked with this patient today about the CT angiogram of the chest that was done recently because of chest pain that disclosed his thoracic aortic aneurysm without any changes. No pulmonary clots, no embolus and calcification in the lymph nodes and in the mediastinum consistent with old granulomas. The patient tested positive for tuberculosis when he was in high school and what we see there is probably a reflection of the healing of that infection that has no consequences. The patient has no symptoms in the respiratory tract at this time and I have advised him that I do not believe he will need to see a pulmonary medicine specialist for this kind of finding. He is welcome to have a visit if he wants to but I find not too much of purpose. He is asymptomatic today. He agrees in just staying put and no other implications.

## 2021-03-18 ENCOUNTER — APPOINTMENT (OUTPATIENT)
Dept: LAB | Facility: HOSPITAL | Age: 76
End: 2021-03-18

## 2021-03-18 ENCOUNTER — APPOINTMENT (OUTPATIENT)
Dept: ONCOLOGY | Facility: HOSPITAL | Age: 76
End: 2021-03-18

## 2021-04-02 ENCOUNTER — LAB (OUTPATIENT)
Dept: LAB | Facility: HOSPITAL | Age: 76
End: 2021-04-02

## 2021-04-02 ENCOUNTER — INFUSION (OUTPATIENT)
Dept: ONCOLOGY | Facility: HOSPITAL | Age: 76
End: 2021-04-02

## 2021-04-02 VITALS
SYSTOLIC BLOOD PRESSURE: 142 MMHG | HEART RATE: 63 BPM | WEIGHT: 166 LBS | OXYGEN SATURATION: 100 % | DIASTOLIC BLOOD PRESSURE: 78 MMHG | BODY MASS INDEX: 25.25 KG/M2 | TEMPERATURE: 97.1 F

## 2021-04-02 DIAGNOSIS — C64.1 CANCER OF RIGHT KIDNEY (HCC): ICD-10-CM

## 2021-04-02 DIAGNOSIS — D75.1 ERYTHROCYTOSIS: Primary | ICD-10-CM

## 2021-04-02 DIAGNOSIS — D75.1 ERYTHROCYTOSIS: ICD-10-CM

## 2021-04-02 DIAGNOSIS — D47.1 MYELOPROLIFERATIVE DISORDER (HCC): ICD-10-CM

## 2021-04-02 LAB
BASOPHILS # BLD AUTO: 0.08 10*3/MM3 (ref 0–0.2)
BASOPHILS NFR BLD AUTO: 0.6 % (ref 0–1.5)
DEPRECATED RDW RBC AUTO: 48.5 FL (ref 37–54)
EOSINOPHIL # BLD AUTO: 0.13 10*3/MM3 (ref 0–0.4)
EOSINOPHIL NFR BLD AUTO: 1 % (ref 0.3–6.2)
ERYTHROCYTE [DISTWIDTH] IN BLOOD BY AUTOMATED COUNT: 16.9 % (ref 12.3–15.4)
HCT VFR BLD AUTO: 46.1 % (ref 37.5–51)
HGB BLD-MCNC: 14.9 G/DL (ref 13–17.7)
IMM GRANULOCYTES # BLD AUTO: 0.08 10*3/MM3 (ref 0–0.05)
IMM GRANULOCYTES NFR BLD AUTO: 0.6 % (ref 0–0.5)
LYMPHOCYTES # BLD AUTO: 1.32 10*3/MM3 (ref 0.7–3.1)
LYMPHOCYTES NFR BLD AUTO: 10.6 % (ref 19.6–45.3)
MCH RBC QN AUTO: 26.5 PG (ref 26.6–33)
MCHC RBC AUTO-ENTMCNC: 32.3 G/DL (ref 31.5–35.7)
MCV RBC AUTO: 81.9 FL (ref 79–97)
MONOCYTES # BLD AUTO: 0.91 10*3/MM3 (ref 0.1–0.9)
MONOCYTES NFR BLD AUTO: 7.3 % (ref 5–12)
NEUTROPHILS NFR BLD AUTO: 79.9 % (ref 42.7–76)
NEUTROPHILS NFR BLD AUTO: 9.92 10*3/MM3 (ref 1.7–7)
NRBC BLD AUTO-RTO: 0 /100 WBC (ref 0–0.2)
PLATELET # BLD AUTO: 278 10*3/MM3 (ref 140–450)
PMV BLD AUTO: 9.7 FL (ref 6–12)
RBC # BLD AUTO: 5.63 10*6/MM3 (ref 4.14–5.8)
WBC # BLD AUTO: 12.44 10*3/MM3 (ref 3.4–10.8)

## 2021-04-02 PROCEDURE — 99195 PHLEBOTOMY: CPT

## 2021-04-02 PROCEDURE — 85025 COMPLETE CBC W/AUTO DIFF WBC: CPT

## 2021-04-02 PROCEDURE — 36415 COLL VENOUS BLD VENIPUNCTURE: CPT

## 2021-04-02 RX ORDER — SODIUM CHLORIDE 9 MG/ML
250 INJECTION, SOLUTION INTRAVENOUS ONCE
Status: CANCELLED | OUTPATIENT
Start: 2021-04-02

## 2021-04-13 ENCOUNTER — OFFICE VISIT (OUTPATIENT)
Dept: CARDIAC SURGERY | Facility: CLINIC | Age: 76
End: 2021-04-13

## 2021-04-13 VITALS
BODY MASS INDEX: 24.25 KG/M2 | WEIGHT: 160 LBS | HEIGHT: 68 IN | OXYGEN SATURATION: 100 % | HEART RATE: 64 BPM | RESPIRATION RATE: 20 BRPM | DIASTOLIC BLOOD PRESSURE: 75 MMHG | SYSTOLIC BLOOD PRESSURE: 117 MMHG | TEMPERATURE: 96.8 F

## 2021-04-13 DIAGNOSIS — I77.810 DILATED AORTIC ROOT (HCC): ICD-10-CM

## 2021-04-13 DIAGNOSIS — I10 PRIMARY HYPERTENSION: ICD-10-CM

## 2021-04-13 DIAGNOSIS — I71.20 THORACIC AORTIC ANEURYSM WITHOUT RUPTURE (HCC): Primary | ICD-10-CM

## 2021-04-13 PROCEDURE — 99215 OFFICE O/P EST HI 40 MIN: CPT | Performed by: NURSE PRACTITIONER

## 2021-04-13 RX ORDER — AMLODIPINE BESYLATE 5 MG/1
5 TABLET ORAL DAILY
COMMUNITY
Start: 2021-04-05 | End: 2022-04-19

## 2021-04-13 NOTE — PROGRESS NOTES
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 He is a 76 y.o. male with a history of low proliferative disorder with polycythemia vera, MAI-2 mutation positive, early myalgia rheumatica, Raynaud's syndrome, prostate cancer, and history of renal cell carcinoma and partial right nephrectomy.  He is currently on a tapering regimen of his thyroid medications.  His aorta problems were originally discovered while being worked up and intermittent cough. He was initially seen by Dr. Alcantara in January 2020.  At that time his ascending aorta measured 4.2 cm.  He comes in today for routine follow-up for aneurysm surveillence.  The CTA of chest  on 2/10/2021 was reviewed.  This CTA was obtained due to left-sided chest pain Dr. Chino wanted to rule out pulmonary embolus.  The aortic root measures 4.4 cm, ascending aorta 4.2 cm, and DTA 2.6 cm.  In review of older CT scans, 2018 his aortic root measured 3.8 cm, ascending 4.1 cm, DTA 2.5 cm; 2019 his aortic root measured 4.1 cm, ascending 4 cm, DTA 2.7 cm.  He had a transthoracic echo in September 2020 that showed EF 65%, aortic root 4.3 cm, mild aortic regurgitation, and no aortic stenosis.  He continues to follow with Dr. Braxton Roberts.  He denies shortness of breath, chest/back pain, numbness/tingling/pain of extremities.  No vascular deficiencies or hyperextensible or hypermobile joints are noted on exam.  The patient's family history is negative for aneurysms or dissections, negative for connective tissue disease, and positive for coronary disease in first degree family members.  His father passed at age 60 with heart disease in his paternal grandfather passed at 61 with heart disease.  Mother is 95 alive and doing well.  He does not smoke.  His  blood pressure today was 117/75.  Remains active playing tennis and the violin.  He is alone for his appointment.      Patient Active Problem List   Diagnosis   • Primary hypertension   • Erythrocytosis   • Cancer of right kidney (CMS/HCC)   • Prostate cancer (CMS/HCC)   • RBBB   • PMR (polymyalgia rheumatica) (CMS/HCC)   • Episodic lightheadedness   • Raynaud's disease without gangrene   • Encounter for anticoagulation discussion and counseling   • Myeloproliferative disorder (CMS/HCC)   • Thoracic aortic aneurysm without rupture (CMS/HCC)   • Colon cancer screening   • Right shoulder pain   • Right shoulder injury   • Hyperlipidemia   • Hyperbilirubinemia   • Dilated aortic root (CMS/HCC)   • Cough   • Asthma   • Anxiety   • Acute pain of right knee   • Malignant neoplasm of kidney (CMS/HCC)   • Erythrocytosis   • Acquired hypothyroidism   • Neck pain   • Hypertension   • Acute URI   • Chest pain   • Hyperkalemia       Past Medical History:   Diagnosis Date   • Acquired hypothyroidism    • Anxiety    • Aortic aneurysm (CMS/HCC)    • Arthritis    • Asthma    • Baker's cyst of knee, right    • Disease of thyroid gland    • Gout    • H/O Muscle pain     Right shoulder and neck area   • H/O Radiation treatment     For tonsillar inflammation and infection when he was a child and this led him to develop thyroid cancer.   • History of colon polyps    • Hypertension    • Hyperthyroidism    • Hypothyroidism    • Kidney carcinoma (CMS/HCC)     H/O stage I clear cell carcinoma of the right kidney, status post partial nephrectomy, nephron-sparing surgery by Dr. Ganesh Lopez   • Left shoulder pain    • Myeloproliferative disorder (CMS/HCC)     With polycythemia vera, JAK2 mutation positive requiring periodic phlebotomies (hematocrit above 47).   • Polymyalgia rheumatica (CMS/HCC)    • Premature atrial contraction    • Prostate cancer (CMS/HCC)    • Raynaud disease    • Shoulder injury, left, sequela        Past Surgical  History:   Procedure Laterality Date   • CIRCUMCISION N/A 12/17/2018    Procedure: CIRCUMCISION;  Surgeon: Gallo Lopez MD;  Location: [facility] OR;  Service: Urology   • COLONOSCOPY  2010    Documented a tubulovillous adenom that was removed completely. Colonoscopy in 2014 was unremarkable.   • COLONOSCOPY N/A 2/20/2020    Procedure: COLONOSCOPY;  Surgeon: Claire Galo MD;  Location: [facility] OR;  Service: Gastroenterology;  Laterality: N/A;  diverticulosis   • HERNIA REPAIR      H/O multiple hernia repairs including right inguinal hernia repair in 1981, 2003, and double hernia repair in 2013   • KIDNEY SURGERY     • PROSTATE BIOPSY      Showed features consistent with prostate cancer   • SHOULDER SURGERY  1995    Shoulder repair   • THYROIDECTOMY, PARTIAL  1983    For malignant tumor   • TOOTH EXTRACTION         Allergies   Allergen Reactions   • Diphenhydramine Rash     HEART PALPITATIONS, RASH   • Statins Myalgia     MUSCLE ACHES   • Aspirin Unknown - Low Severity     PT CANNOT REMEMBER   • Latex Rash   • Peanut Oil Unknown - Low Severity     PT CANNOT REMEMBER   • Shellfish-Derived Products Unknown - Low Severity     PT CANNOT REMEMBER         Current Outpatient Medications:   •  ALPRAZolam (XANAX) 0.5 MG tablet, Take 0.5 mg by mouth At Night As Needed., Disp: , Rfl:   •  amLODIPine (NORVASC) 5 MG tablet, Take 5 mg by mouth Daily., Disp: , Rfl:   •  B Complex Vitamins (VITAMIN B-COMPLEX PO), Take 1 tablet by mouth Daily., Disp: , Rfl:   •  levothyroxine (SYNTHROID, LEVOTHROID) 100 MCG tablet, Take 1 tablet by mouth daily., Disp: 30 tablet, Rfl: 6  •  losartan (COZAAR) 25 MG tablet, Take 1 tablet by mouth Daily., Disp: 90 tablet, Rfl: 0  •  montelukast (SINGULAIR) 10 MG tablet, Take 10 mg by mouth Every Night., Disp: , Rfl:   •  predniSONE (DELTASONE) 2.5 MG tablet, Take 3 mg by mouth Daily., Disp: , Rfl:   •  zolpidem (AMBIEN) 10 MG tablet, TAKE ONE-HALF TO ONE TABLET BY MOUTH ONCE NIGHTLY AS  NEEDED FOR SLEEP  **MUST LAST 30 DAYS, Disp: , Rfl:     Social History     Socioeconomic History   • Marital status:      Spouse name: Not on file   • Number of children: Not on file   • Years of education: College   • Highest education level: Not on file   Tobacco Use   • Smoking status: Never Smoker   • Smokeless tobacco: Never Used   • Tobacco comment: caffeine use    Vaping Use   • Vaping Use: Never used   Substance and Sexual Activity   • Alcohol use: Yes     Alcohol/week: 1.0 - 3.0 standard drinks     Types: 1 - 3 Glasses of wine per week     Comment: 1-3 glasses of wine a week   • Drug use: No   • Sexual activity: Defer       Family History   Problem Relation Age of Onset   • No Known Problems Mother    • Heart attack Father    • Heart disease Father    • Hypertension Father    • Diabetes Father    • Tuberculosis Maternal Grandmother    • No Known Problems Son    • Drug abuse Son    • Malig Hyperthermia Neg Hx            Review of Systems:  Review of Systems   Constitutional: Negative for fatigue and fever.   HENT: Negative for postnasal drip and rhinorrhea.    Respiratory: Negative for shortness of breath and stridor.    Cardiovascular: Negative for chest pain, palpitations and leg swelling.   Gastrointestinal: Negative for abdominal pain, constipation, diarrhea and nausea.   Genitourinary: Negative for difficulty urinating.   Musculoskeletal: Positive for arthralgias.   Skin: Negative for rash and wound.   Allergic/Immunologic: Positive for immunocompromised state.   Neurological: Negative for dizziness, seizures, syncope, speech difficulty, weakness, light-headedness and numbness.   Hematological: Does not bruise/bleed easily.   Psychiatric/Behavioral: Negative for sleep disturbance. The patient is not nervous/anxious.        Physical Exam:    Vital Signs:  Weight: 72.6 kg (160 lb)   Body mass index is 24.33 kg/m².  Temp: 96.8 °F (36 °C)   Heart Rate: 64   BP: 117/75     Physical Exam  Vitals and  nursing note reviewed.   Constitutional:       General: He is awake.      Appearance: Normal appearance. He is well-developed, well-groomed and normal weight.      Comments: Pleasant and conversant.   HENT:      Head: Normocephalic and atraumatic.      Nose: Nose normal.      Mouth/Throat:      Lips: Pink.      Mouth: Mucous membranes are moist.      Pharynx: Uvula midline.   Eyes:      General: Lids are normal. No scleral icterus.     Extraocular Movements: Extraocular movements intact.      Conjunctiva/sclera: Conjunctivae normal.      Pupils: Pupils are equal, round, and reactive to light.      Comments: Wearing glasses   Neck:      Thyroid: No thyroid mass or thyromegaly.      Vascular: Normal carotid pulses. No carotid bruit, hepatojugular reflux or JVD.      Trachea: Trachea normal.   Cardiovascular:      Rate and Rhythm: Normal rate and regular rhythm.      Pulses:           Carotid pulses are 2+ on the right side and 2+ on the left side.       Radial pulses are 2+ on the right side and 2+ on the left side.        Femoral pulses are 2+ on the right side and 2+ on the left side.       Popliteal pulses are 2+ on the right side and 2+ on the left side.        Dorsalis pedis pulses are 2+ on the right side and 2+ on the left side.        Posterior tibial pulses are 2+ on the right side and 2+ on the left side.      Heart sounds: Normal heart sounds. No murmur heard.     Pulmonary:      Effort: Pulmonary effort is normal.      Breath sounds: Normal breath sounds.   Abdominal:      General: Abdomen is protuberant. Bowel sounds are normal. There is no distension or abdominal bruit.      Palpations: Abdomen is soft.      Tenderness: There is no abdominal tenderness.   Musculoskeletal:      Cervical back: Neck supple.      Right lower leg: No edema.      Left lower leg: No edema.      Comments: Gait steady and strong without use of assistive devices   Lymphadenopathy:      Head:      Right side of head: No submental,  submandibular, tonsillar, preauricular, posterior auricular or occipital adenopathy.      Left side of head: No submental, submandibular, tonsillar, preauricular, posterior auricular or occipital adenopathy.      Cervical: No cervical adenopathy.      Upper Body:      Right upper body: No supraclavicular adenopathy.      Left upper body: No supraclavicular adenopathy.   Skin:     General: Skin is warm and dry.      Capillary Refill: Capillary refill takes less than 2 seconds.      Findings: No erythema or rash.      Nails: There is no clubbing.      Comments: .   Neurological:      Mental Status: He is alert and oriented to person, place, and time.      GCS: GCS eye subscore is 4. GCS verbal subscore is 5. GCS motor subscore is 6.   Psychiatric:         Attention and Perception: Attention and perception normal.         Mood and Affect: Mood and affect normal.         Speech: Speech normal.         Behavior: Behavior normal. Behavior is cooperative.         Thought Content: Thought content normal.         Cognition and Memory: Cognition and memory normal.         Judgment: Judgment normal.          Assessment:     1.  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  Ascending aortic ectasia, 4.2 cm--stable  3.  Right renal cancer, status post nephrectomy/prostate cancer--per heme-onc  4.  Myeloproliferative disorder with polycythemia--per heme-onc  5.  MAI-2 mutation positive--per heme-onc  6.  Polymyalgia rheumatica    Recommendation/Plan:       Continued risk factor modification of hypertension with a goal blood pressure less than 130/80, hyperlipidemia optimization, and continued avoidance of tobacco/nicotine products.    We discussed the importance of avoidance of over the counter decongestants and stimulants, specifically pseudoephedrine, for hypertension and aneurysm management.    Initiation of low aerobic exercise is indicated to help reduce body habitus, assist with blood pressure and  cholesterol control.       Although risk of rupture is low, emergency department presentation is warranted for acute chest, back, or abdominal pain, syncope, or stroke like symptoms.    Follow up in [facility] in 1 year(s) with CT scan of chest without contrast.  He will call if he has any chest scans before his yearly appt for us to review as well.    I spent approximately 45 minutes total in evaluating the data, examining the patient, discussing the options and counseling.  Imaging was shown to pt.    Advance Care Planning   ACP discussion was held with the patient during this visit. Patient has an advance directive in EMR which is still valid.     Thank you for allowing us to participate in his care.    Regards,    Mehnaz Joel, ALETHA      **All problems and history are new to this examiner.  Extensive review of records prior to and during patient visit

## 2021-04-19 ENCOUNTER — APPOINTMENT (OUTPATIENT)
Dept: CT IMAGING | Facility: HOSPITAL | Age: 76
End: 2021-04-19

## 2021-04-30 ENCOUNTER — LAB (OUTPATIENT)
Dept: LAB | Facility: HOSPITAL | Age: 76
End: 2021-04-30

## 2021-04-30 ENCOUNTER — OFFICE VISIT (OUTPATIENT)
Dept: ONCOLOGY | Facility: CLINIC | Age: 76
End: 2021-04-30

## 2021-04-30 ENCOUNTER — APPOINTMENT (OUTPATIENT)
Dept: ONCOLOGY | Facility: HOSPITAL | Age: 76
End: 2021-04-30

## 2021-04-30 VITALS
SYSTOLIC BLOOD PRESSURE: 136 MMHG | OXYGEN SATURATION: 99 % | TEMPERATURE: 97 F | WEIGHT: 164.8 LBS | RESPIRATION RATE: 20 BRPM | HEIGHT: 68 IN | HEART RATE: 54 BPM | BODY MASS INDEX: 24.98 KG/M2 | DIASTOLIC BLOOD PRESSURE: 74 MMHG

## 2021-04-30 DIAGNOSIS — D47.1 MYELOPROLIFERATIVE DISORDER (HCC): ICD-10-CM

## 2021-04-30 DIAGNOSIS — C64.1 CANCER OF RIGHT KIDNEY (HCC): Primary | ICD-10-CM

## 2021-04-30 DIAGNOSIS — D75.1 ERYTHROCYTOSIS: ICD-10-CM

## 2021-04-30 DIAGNOSIS — C64.1 CANCER OF RIGHT KIDNEY (HCC): ICD-10-CM

## 2021-04-30 PROBLEM — Z71.89 ENCOUNTER FOR ANTICOAGULATION DISCUSSION AND COUNSELING: Status: RESOLVED | Noted: 2018-03-05 | Resolved: 2021-04-30

## 2021-04-30 PROBLEM — M25.561 ACUTE PAIN OF RIGHT KNEE: Status: RESOLVED | Noted: 2019-01-17 | Resolved: 2021-04-30

## 2021-04-30 LAB
BASOPHILS # BLD AUTO: 0.08 10*3/MM3 (ref 0–0.2)
BASOPHILS NFR BLD AUTO: 0.7 % (ref 0–1.5)
DEPRECATED RDW RBC AUTO: 45.7 FL (ref 37–54)
EOSINOPHIL # BLD AUTO: 0.18 10*3/MM3 (ref 0–0.4)
EOSINOPHIL NFR BLD AUTO: 1.5 % (ref 0.3–6.2)
ERYTHROCYTE [DISTWIDTH] IN BLOOD BY AUTOMATED COUNT: 15.5 % (ref 12.3–15.4)
HCT VFR BLD AUTO: 45 % (ref 37.5–51)
HGB BLD-MCNC: 13.9 G/DL (ref 13–17.7)
IMM GRANULOCYTES # BLD AUTO: 0.05 10*3/MM3 (ref 0–0.05)
IMM GRANULOCYTES NFR BLD AUTO: 0.4 % (ref 0–0.5)
LYMPHOCYTES # BLD AUTO: 1.31 10*3/MM3 (ref 0.7–3.1)
LYMPHOCYTES NFR BLD AUTO: 11 % (ref 19.6–45.3)
MCH RBC QN AUTO: 25.1 PG (ref 26.6–33)
MCHC RBC AUTO-ENTMCNC: 30.9 G/DL (ref 31.5–35.7)
MCV RBC AUTO: 81.4 FL (ref 79–97)
MONOCYTES # BLD AUTO: 1.08 10*3/MM3 (ref 0.1–0.9)
MONOCYTES NFR BLD AUTO: 9.1 % (ref 5–12)
NEUTROPHILS NFR BLD AUTO: 77.3 % (ref 42.7–76)
NEUTROPHILS NFR BLD AUTO: 9.21 10*3/MM3 (ref 1.7–7)
NRBC BLD AUTO-RTO: 0 /100 WBC (ref 0–0.2)
PLATELET # BLD AUTO: 184 10*3/MM3 (ref 140–450)
PMV BLD AUTO: 9.5 FL (ref 6–12)
RBC # BLD AUTO: 5.53 10*6/MM3 (ref 4.14–5.8)
WBC # BLD AUTO: 11.91 10*3/MM3 (ref 3.4–10.8)

## 2021-04-30 PROCEDURE — 85025 COMPLETE CBC W/AUTO DIFF WBC: CPT

## 2021-04-30 PROCEDURE — 36415 COLL VENOUS BLD VENIPUNCTURE: CPT

## 2021-04-30 PROCEDURE — 99213 OFFICE O/P EST LOW 20 MIN: CPT | Performed by: INTERNAL MEDICINE

## 2021-04-30 NOTE — PROGRESS NOTES
REASONS FOR FOLLOWUP:       1. Myeloproliferative disorder with polycythemia vera, MAI-2 mutation positive requiring periodic phlebotomies whenever hematocrit is above 45.      2. His  tory of stage I clear cell carcinoma of the right kidney status post partial nephrectomy, nephron sparing surgery by Dr. Ganesh Lopez with no issues or consequences.  The patient has already an appointment to repeat CT scan of the abdomen in March 2016.                3. Patient has history of polymyalgia rheumatica.  He IS OFF 5 mg of prednisone a day.            HISTORY OF PRESENT ILLNESS    PATIENT WAS CALLED THE DAY BEFORE BY THE OFFICE TO ASK FOR SYMPTOMS THAT COULD BE CONSISTENT WITH CORONAVIRUS INFECTION, AND BEING NEGATIVE WAS SCHEDULED TO BE SEEN IN THE OFFICE TODAY. SIMILAR QUESTIONING TODAY INCLUDING, CHILLS, FEVER, NEW COUGH, SHORTNESS OF BREATH, DIARRHEA,DIFFUSE BODY ACHES  AND CHANGES IN SMELL OR TASTE WERE NEGATIVE.THE PATIENT DENIED ANY CONTACT WITH PERSONS WHO WERE POSITIVE FOR COVID, AND PATIENT IS NOT IN CATEGORY OF HIGH RISK BEHAVIOR TO ACQUIRE COVID.    DURING THE VISIT WITH THE PATIENT TODAY , PATIENT HAD FACE MASK, MY MEDICAL ASSISTANT AND I  HAD PROPPER PROTECTIVE EQUIPMENT, AND I DID HAND HYGIENE WITH SOAP AND WATER BEFORE AND AFTER THE VISIT.        Past Medical History:   Diagnosis Date   • Acquired hypothyroidism    • Anxiety    • Aortic aneurysm (CMS/HCC)    • Arthritis    • Asthma    • Baker's cyst of knee, right    • Disease of thyroid gland    • Gout    • H/O Muscle pain     Right shoulder and neck area   • H/O Radiation treatment     For tonsillar inflammation and infection when he was a child and this led him to develop thyroid cancer.   • History of colon polyps    • Hypertension    • Hyperthyroidism    • Hypothyroidism    • Kidney carcinoma (CMS/HCC)     H/O stage I clear cell carcinoma of the right kidney, status post partial nephrectomy, nephron-sparing surgery by Dr. Andersen  John   • Left shoulder pain    • Myeloproliferative disorder (CMS/HCC)     With polycythemia vera, JAK2 mutation positive requiring periodic phlebotomies (hematocrit above 47).   • Polymyalgia rheumatica (CMS/HCC)    • Premature atrial contraction    • Prostate cancer (CMS/HCC)    • Raynaud disease    • Shoulder injury, left, sequela      Past Surgical History:   Procedure Laterality Date   • CIRCUMCISION N/A 12/17/2018    Procedure: CIRCUMCISION;  Surgeon: Gallo Lopez MD;  Location: [facility];  Service: Urology   • COLONOSCOPY  2010    Documented a tubulovillous adenom that was removed completely. Colonoscopy in 2014 was unremarkable.   • COLONOSCOPY N/A 2/20/2020    Procedure: COLONOSCOPY;  Surgeon: Claire Galo MD;  Location: [facility];  Service: Gastroenterology;  Laterality: N/A;  diverticulosis   • HERNIA REPAIR      H/O multiple hernia repairs including right inguinal hernia repair in 1981, 2003, and double hernia repair in 2013   • KIDNEY SURGERY     • PROSTATE BIOPSY      Showed features consistent with prostate cancer   • SHOULDER SURGERY  1995    Shoulder repair   • THYROIDECTOMY, PARTIAL  1983    For malignant tumor   • TOOTH EXTRACTION       Social History     Social History Narrative    He is a very avid  and plays violin in an orchestra. He does not smoke but had a lot of second-hand smoke through his life.     Family History   Problem Relation Age of Onset   • No Known Problems Mother    • Heart attack Father    • Heart disease Father    • Hypertension Father    • Diabetes Father    • Tuberculosis Maternal Grandmother    • No Known Problems Son    • Drug abuse Son    • Malig Hyperthermia Neg Hx      Allergies   Allergen Reactions   • Diphenhydramine Rash     HEART PALPITATIONS, RASH   • Statins Myalgia     MUSCLE ACHES   • Aspirin Unknown - Low Severity     PT CANNOT REMEMBER   • Latex Rash   • Peanut Oil Unknown - Low Severity     PT CANNOT REMEMBER   •  "Shellfish-Derived Products Unknown - Low Severity     PT CANNOT REMEMBER     Current Outpatient Medications on File Prior to Visit   Medication Sig Dispense Refill   • ALPRAZolam (XANAX) 0.5 MG tablet Take 0.5 mg by mouth At Night As Needed.     • amLODIPine (NORVASC) 5 MG tablet Take 5 mg by mouth Daily.     • B Complex Vitamins (VITAMIN B-COMPLEX PO) Take 1 tablet by mouth Daily.     • levothyroxine (SYNTHROID, LEVOTHROID) 100 MCG tablet Take 1 tablet by mouth daily. 30 tablet 6   • losartan (COZAAR) 25 MG tablet Take 1 tablet by mouth Daily. 90 tablet 0   • montelukast (SINGULAIR) 10 MG tablet Take 10 mg by mouth Every Night.     • predniSONE (DELTASONE) 2.5 MG tablet Take 3 mg by mouth Daily.     • zolpidem (AMBIEN) 10 MG tablet TAKE ONE-HALF TO ONE TABLET BY MOUTH ONCE NIGHTLY AS NEEDED FOR SLEEP  **MUST LAST 30 DAYS       No current facility-administered medications on file prior to visit.               VITAL SIGNS:   Vitals:    04/30/21 1157   BP: 136/74   Pulse: 54   Resp: 20   Temp: 97 °F (36.1 °C)   TempSrc: Temporal   SpO2: 99%   Weight: 74.8 kg (164 lb 12.8 oz)   Height: 172.7 cm (67.99\")            PHYSICAL EXAMINATION:     Patient screened  for depression based on a PHQ-9 score of 0 on 2/4/2021.       I HAVE PERSONALLY REVIEWED THE HISTORY OF THE PRESENT ILLNESS, PAST MEDICAL HISTORY, FAMILY HISTORY, SOCIAL HISTORY, ALLERGIES, MEDICATIONS STATED ABOVE IN THE OFFICE NOTE FROM TODAY.              LABORATORY DATA:    Auto Differential  Order: [id number] - Part of Panel Order [id number]  Status:  Final result   Visible to patient:  No (not released)   Dx:  Erythrocytosis; Myeloproliferative di...  Specimen Information: Blood        Component   Ref Range & Units 11:07   WBC   3.40 - 10.80 10*3/mm3 11.70High     RBC   4.14 - 5.80 10*6/mm3 5.66    Hemoglobin   13.0 - 17.7 g/dL 14.2    Hematocrit   37.5 - 51.0 % 45.0    MCV   79.0 - 97.0 fL 79.5    MCH   26.6 - 33.0 pg 25.1Low     MCHC   31.5 - 35.7 g/dL 31.6  "   RDW   12.3 - 15.4 % 16.3High     RDW-SD   37.0 - 54.0 fl 46.5    MPV   6.0 - 12.0 fL 9.0    Platelets   140 - 450 10*3/mm3 261    Neutrophil %   42.7 - 76.0 % 77.5High     Lymphocyte %   19.6 - 45.3 % 10.8Low     Monocyte %   5.0 - 12.0 % 9.6    Eosinophil %   0.3 - 6.2 % 0.9    Basophil %   0.0 - 1.5 % 0.7    Immature Grans %   0.0 - 0.5 % 0.5                                  ASSESSMENT/PLAN:  This patient has history of polycythemia vera JAK2 mutation positive. He has had multiple phlebotomies in the past and this has produced a drop in the MCV. Now the patient is not requiring phlebotomies as often as before and is related to the mild iron deficiency that has been produced by the phlebotomy.1. Upon reviewing the patient on 02/04/2021 his hematocrit is 45 and I find no need to proceed with a phlebotomy at this time. His white count is 11,000, platelet count is normal. The patient refuses to take any aspirin.   2. The patient has active Raynaud's in the right hand. He has ischemia in his 3rd and 4th digits in the right hand, not losing skin pieces or nailbeds but this is concerning and I asked him to talk with his rheumatologist about what to do. He is supposed to take amlodipine for this because it helps vasodilation. I asked him to go back on this medicine, take pictures of his fingers and send them to his rheumatologist.   3. The patient has a peculiar pain in his chest anteriorly. It lasts for a few seconds, is associated with food and heartburn, is not cardiac in origin, is not pericardial in origin and is not chest wall in origin. Breast anatomy is normal, skin is normal and there are no alterations in the costochondral joints as far as I can tell. This is related according to him to acid. The Protonix that he takes makes the symptoms improve. I suggested for him to take the Protonix steadily everyday for the next 3 weeks. I asked him to call my nurse Candida at that period of time and see how things are  going.     Otherwise he will return for a CBC and phlebotomy if hematocrit is above 45 in 6 weeks and 12 weeks. I will review him back in 12 weeks.     From my point of view he is perfectly fine to go ahead and receive his COVID vaccination. I find no contraindication whatsoever.     He will follow up with his urologist in regard to his previous history of kidney cancer.            I DISCUSSED WITH PATIENT IN DETAIL FORMS TO DECREASE CHANCES OF CORONAVIRUS INFECTION INCLUDING ISOLATION, PROPER HAND HIGIENE, AVOID PUBLIC PLACES  WITH CROWDS, FOLLOW  CDC RECOMENDATIONS, AND KEEP PERSONAL AND SOCIAL RESPONSIBILITY, WARE A MASK IN PUBLIC PLACES.  PATIENT IS AWARE THIS INFECTION COULD HAVE SEVERE CONSEQUENCES TO PERSONAL HEALTH AND FAMILY RAMIFICATIONS OF THIS.

## 2021-04-30 NOTE — PROGRESS NOTES
REASONS FOR FOLLOWUP:       1. Myeloproliferative disorder with polycythemia vera, MAI-2 mutation positive requiring periodic phlebotomies whenever hematocrit is above 45.      2. His  tory of stage I clear cell carcinoma of the right kidney status post partial nephrectomy, nephron sparing surgery by Dr. Ganesh Lopez with no issues or consequences.  The patient has already an appointment to repeat CT scan of the abdomen in March 2016.                3. Patient has history of polymyalgia rheumatica.  He IS OFF 5 mg of prednisone a day.            HISTORY OF PRESENT ILLNESS        DURING THE VISIT WITH THE PATIENT TODAY , PATIENT HAD FACE MASK, MY MEDICAL ASSISTANT AND I  HAD PROPPER PROTECTIVE EQUIPMENT, AND I DID HAND HYGIENE WITH SOAP AND WATER BEFORE AND AFTER THE VISIT.    This patient returns today to the office feeling terrific. His appetite is great. His ability to play tennis is normal. He has not had any cough, shortness of breath, sputum production. No side effects of his COVID vaccination. Normal bowel activity, normal urination. No bone or joint pain. His prednisone has further been dropped by his rheumatologist in regard to his polymyalgia rheumatica.           Past Medical History:   Diagnosis Date   • Acquired hypothyroidism    • Anxiety    • Aortic aneurysm (CMS/HCC)    • Arthritis    • Asthma    • Baker's cyst of knee, right    • Disease of thyroid gland    • Gout    • H/O Muscle pain     Right shoulder and neck area   • H/O Radiation treatment     For tonsillar inflammation and infection when he was a child and this led him to develop thyroid cancer.   • History of colon polyps    • Hypertension    • Hyperthyroidism    • Hypothyroidism    • Kidney carcinoma (CMS/HCC)     H/O stage I clear cell carcinoma of the right kidney, status post partial nephrectomy, nephron-sparing surgery by Dr. Ganesh Lopez   • Left shoulder pain    • Myeloproliferative disorder (CMS/HCC)     With  polycythemia vera, JAK2 mutation positive requiring periodic phlebotomies (hematocrit above 47).   • Polymyalgia rheumatica (CMS/HCC)    • Premature atrial contraction    • Prostate cancer (CMS/HCC)    • Raynaud disease    • Shoulder injury, left, sequela      Past Surgical History:   Procedure Laterality Date   • CIRCUMCISION N/A 12/17/2018    Procedure: CIRCUMCISION;  Surgeon: Gallo Lopez MD;  Location: [facility] OR;  Service: Urology   • COLONOSCOPY  2010    Documented a tubulovillous adenom that was removed completely. Colonoscopy in 2014 was unremarkable.   • COLONOSCOPY N/A 2/20/2020    Procedure: COLONOSCOPY;  Surgeon: Claire Galo MD;  Location: [facility] OR;  Service: Gastroenterology;  Laterality: N/A;  diverticulosis   • HERNIA REPAIR      H/O multiple hernia repairs including right inguinal hernia repair in 1981, 2003, and double hernia repair in 2013   • KIDNEY SURGERY     • PROSTATE BIOPSY      Showed features consistent with prostate cancer   • SHOULDER SURGERY  1995    Shoulder repair   • THYROIDECTOMY, PARTIAL  1983    For malignant tumor   • TOOTH EXTRACTION       Social History     Social History Narrative    He is a very avid  and plays violin in an orchestra. He does not smoke but had a lot of second-hand smoke through his life.     Family History   Problem Relation Age of Onset   • No Known Problems Mother    • Heart attack Father    • Heart disease Father    • Hypertension Father    • Diabetes Father    • Tuberculosis Maternal Grandmother    • No Known Problems Son    • Drug abuse Son    • Malig Hyperthermia Neg Hx      Allergies   Allergen Reactions   • Diphenhydramine Rash     HEART PALPITATIONS, RASH   • Statins Myalgia     MUSCLE ACHES   • Aspirin Unknown - Low Severity     PT CANNOT REMEMBER   • Latex Rash   • Peanut Oil Unknown - Low Severity     PT CANNOT REMEMBER   • Shellfish-Derived Products Unknown - Low Severity     PT CANNOT REMEMBER     Current  "Outpatient Medications on File Prior to Visit   Medication Sig Dispense Refill   • ALPRAZolam (XANAX) 0.5 MG tablet Take 0.5 mg by mouth At Night As Needed.     • amLODIPine (NORVASC) 5 MG tablet Take 5 mg by mouth Daily.     • B Complex Vitamins (VITAMIN B-COMPLEX PO) Take 1 tablet by mouth Daily.     • levothyroxine (SYNTHROID, LEVOTHROID) 100 MCG tablet Take 1 tablet by mouth daily. 30 tablet 6   • losartan (COZAAR) 25 MG tablet Take 1 tablet by mouth Daily. 90 tablet 0   • montelukast (SINGULAIR) 10 MG tablet Take 10 mg by mouth Every Night.     • predniSONE (DELTASONE) 2.5 MG tablet Take 3 mg by mouth Daily.     • zolpidem (AMBIEN) 10 MG tablet TAKE ONE-HALF TO ONE TABLET BY MOUTH ONCE NIGHTLY AS NEEDED FOR SLEEP  **MUST LAST 30 DAYS       No current facility-administered medications on file prior to visit.               VITAL SIGNS:   Vitals:    04/30/21 1157   BP: 136/74   Pulse: 54   Resp: 20   Temp: 97 °F (36.1 °C)   TempSrc: Temporal   SpO2: 99%   Weight: 74.8 kg (164 lb 12.8 oz)   Height: 172.7 cm (67.99\")            PHYSICAL EXAMINATION: I HAVE PERSONALLY REVIEWED THE HISTORY OF THE PRESENT ILLNESS, PAST MEDICAL HISTORY, FAMILY HISTORY, SOCIAL HISTORY, ALLERGIES, MEDICATIONS STATED ABOVE IN THE OFFICE NOTE FROM TODAY.        GENERAL:  Well-developed, well-nourished  Patient  in no acute distress.   SKIN:  Warm, dry ,NO rashes,NO purpura ,NO petechiae.  HEENT:  Pupils were equal and reactive to light and accomodation, conjunctivae noninjected, no pterygium, normal extraocular movements, normal visual acuity.   NECK:  Supple with good range of motion; no thyromegaly or masses, no JVD or bruits, no cervical adenopathies.No carotid artery pain, no carotid abnormal pulsation , NO arterial dance.  LYMPHATICS:  No cervical, NO supraclavicular, NO axillary,NO epitrochlear , NO inguinal adenopathy.  CARDIAC   normal rate and regular rhythm, without murmur,NO rubs NO S3 NO S4 right or left . Normal femoral, " popliteal, pedis, brachial and carotid pulses.  VASCULAR ARTERIAL: normal carotids,brachial,radial,femoral,popliteal, pedis pulses , no bruits.no paleness or cyanosis, no pain, no edema, no numbness, no gangrene.  VASCULAR VENOUS: no cyanosis, collateral circulation, varicosities, edema, palpable cords, pain, erythema.  ABDOMEN:  Soft, nontender with no hepatomegaly, no splenomegaly,no masses, no ascites, no collateral circulation,no distention,no Kiron sign, no abdominal pain, no inguinal hernias,no umbilical hernia, no abdominal bruits. Normal bowel sounds.  GENITAL: Not  Performed.  EXTREMITIES  AND SPINE:  No clubbing, cyanosis or edema, no deformities or pain .No kyphosis, scoliosis, deformities or pain in spine, ribs or pelvic bone.  NEUROLOGICAL:  Patient was awake, alert, oriented to time, person and place.        LABORATORY DATA:Auto Differential  Order: [id number] - Part of Panel Order [id number]  Status:  Final result   Visible to patient:  No (scheduled for 4/30/2021 11:56 AM)   Dx:  Erythrocytosis; Myeloproliferative di...  Specimen Information: Blood        Component   Ref Range & Units 11:38   WBC   3.40 - 10.80 10*3/mm3 11.91High     RBC   4.14 - 5.80 10*6/mm3 5.53    Hemoglobin   13.0 - 17.7 g/dL 13.9    Hematocrit   37.5 - 51.0 % 45.0    MCV   79.0 - 97.0 fL 81.4    MCH   26.6 - 33.0 pg 25.1Low     MCHC   31.5 - 35.7 g/dL 30.9Low     RDW   12.3 - 15.4 % 15.5High     RDW-SD   37.0 - 54.0 fl 45.7    MPV   6.0 - 12.0 fL 9.5    Platelets   140 - 450 10*3/mm3 184    Neutrophil %   42.7 - 76.0 % 77.3High     Lymphocyte %   19.6 - 45.3 % 11.0Low     Monocyte %   5.0 - 12.0 % 9.1    Eosinophil %   0.3 - 6.2 % 1.5    Basophil %   0.0 - 1.5 % 0.7    Immature Grans %   0.0 - 0.5 % 0.4    Neutrophils, Absolute   1.70 - 7.00 10*3/mm3 9.21High     Lymphocytes, Absolute   0.70 - 3.10 10*3/mm3 1.31    Monocytes, Absolute   0.10 - 0.90 10*3/mm3 1.08High                                            ASSESSMENT/PLAN:   This patient has history of polycythemia vera JAK2 mutation positive. He has had multiple phlebotomies in the past and this has produced a drop in the MCV. Now the patient is not requiring phlebotomies as often as before and is related to the mild iron deficiency that has been produced by the phlebotomy  I have reviewed the patient today. He is asymptomatic in regard to his erythrocytosis, his hematocrit is 45, his white count, platelet count are normal, white count differential is normal. He looks terrific and he has no need for phlebotomy today. He will require a blood count and phlebotomy in 2 months and 4 months and the phlebotomy will be done for hematocrit above 45.     In regard to his Raynaud's he is taking a minimal amount of prednisone at this time and this is handled by his rheumatologist. I will see him back in 4 months.

## 2021-06-25 ENCOUNTER — INFUSION (OUTPATIENT)
Dept: ONCOLOGY | Facility: HOSPITAL | Age: 76
End: 2021-06-25

## 2021-06-25 ENCOUNTER — LAB (OUTPATIENT)
Dept: LAB | Facility: HOSPITAL | Age: 76
End: 2021-06-25

## 2021-06-25 DIAGNOSIS — C64.1 CANCER OF RIGHT KIDNEY (HCC): ICD-10-CM

## 2021-06-25 DIAGNOSIS — D47.1 MYELOPROLIFERATIVE DISORDER (HCC): ICD-10-CM

## 2021-06-25 DIAGNOSIS — D75.1 ERYTHROCYTOSIS: ICD-10-CM

## 2021-06-25 LAB
BASOPHILS # BLD AUTO: 0.11 10*3/MM3 (ref 0–0.2)
BASOPHILS NFR BLD AUTO: 0.9 % (ref 0–1.5)
DEPRECATED RDW RBC AUTO: 44.3 FL (ref 37–54)
EOSINOPHIL # BLD AUTO: 0.23 10*3/MM3 (ref 0–0.4)
EOSINOPHIL NFR BLD AUTO: 1.8 % (ref 0.3–6.2)
ERYTHROCYTE [DISTWIDTH] IN BLOOD BY AUTOMATED COUNT: 15.5 % (ref 12.3–15.4)
HCT VFR BLD AUTO: 44.4 % (ref 37.5–51)
HGB BLD-MCNC: 13.9 G/DL (ref 13–17.7)
IMM GRANULOCYTES # BLD AUTO: 0.12 10*3/MM3 (ref 0–0.05)
IMM GRANULOCYTES NFR BLD AUTO: 1 % (ref 0–0.5)
LYMPHOCYTES # BLD AUTO: 1.53 10*3/MM3 (ref 0.7–3.1)
LYMPHOCYTES NFR BLD AUTO: 12.2 % (ref 19.6–45.3)
MCH RBC QN AUTO: 25.3 PG (ref 26.6–33)
MCHC RBC AUTO-ENTMCNC: 31.3 G/DL (ref 31.5–35.7)
MCV RBC AUTO: 80.7 FL (ref 79–97)
MONOCYTES # BLD AUTO: 1.29 10*3/MM3 (ref 0.1–0.9)
MONOCYTES NFR BLD AUTO: 10.3 % (ref 5–12)
NEUTROPHILS NFR BLD AUTO: 73.8 % (ref 42.7–76)
NEUTROPHILS NFR BLD AUTO: 9.26 10*3/MM3 (ref 1.7–7)
NRBC BLD AUTO-RTO: 0 /100 WBC (ref 0–0.2)
PLATELET # BLD AUTO: 239 10*3/MM3 (ref 140–450)
PMV BLD AUTO: 9.2 FL (ref 6–12)
RBC # BLD AUTO: 5.5 10*6/MM3 (ref 4.14–5.8)
WBC # BLD AUTO: 12.54 10*3/MM3 (ref 3.4–10.8)

## 2021-06-25 PROCEDURE — 36415 COLL VENOUS BLD VENIPUNCTURE: CPT

## 2021-06-25 PROCEDURE — 85025 COMPLETE CBC W/AUTO DIFF WBC: CPT

## 2021-06-25 NOTE — NURSING NOTE
Pt here for possible phlebo for Hct greater than 45.  Pt's Hct is 44.4.  Phlebotomy held per parameters.  No complaints.  Copy of labs given to patient.  Pt discharged ambulating.

## 2021-08-22 DIAGNOSIS — I10 ESSENTIAL HYPERTENSION: ICD-10-CM

## 2021-08-23 DIAGNOSIS — I10 ESSENTIAL HYPERTENSION: Primary | ICD-10-CM

## 2021-08-23 RX ORDER — LOSARTAN POTASSIUM 25 MG/1
TABLET ORAL
Qty: 180 TABLET | Refills: 0 | Status: SHIPPED | OUTPATIENT
Start: 2021-08-23 | End: 2022-03-01

## 2021-08-23 NOTE — TELEPHONE ENCOUNTER
I will go ahead and refill for 90 days.  I would like the patient to get a BMP performed to recheck his potassium before his next office visit with on 9/14.  Can you ask him Order for BMP has been placed.

## 2021-08-23 NOTE — TELEPHONE ENCOUNTER
I am going to order a BMP to recheck his potassium.  Can you ask him to get this done before his follow up with me on 9/14?  I will refill losartan for 90 days in meanwhile.

## 2021-08-24 ENCOUNTER — APPOINTMENT (OUTPATIENT)
Dept: ONCOLOGY | Facility: HOSPITAL | Age: 76
End: 2021-08-24

## 2021-08-24 ENCOUNTER — LAB (OUTPATIENT)
Dept: LAB | Facility: HOSPITAL | Age: 76
End: 2021-08-24

## 2021-08-24 ENCOUNTER — OFFICE VISIT (OUTPATIENT)
Dept: ONCOLOGY | Facility: CLINIC | Age: 76
End: 2021-08-24

## 2021-08-24 VITALS
OXYGEN SATURATION: 96 % | HEART RATE: 75 BPM | BODY MASS INDEX: 25.84 KG/M2 | SYSTOLIC BLOOD PRESSURE: 146 MMHG | WEIGHT: 169.9 LBS | DIASTOLIC BLOOD PRESSURE: 80 MMHG

## 2021-08-24 DIAGNOSIS — D75.1 ERYTHROCYTOSIS: ICD-10-CM

## 2021-08-24 DIAGNOSIS — D47.1 MYELOPROLIFERATIVE DISORDER (HCC): ICD-10-CM

## 2021-08-24 DIAGNOSIS — C64.1 CANCER OF RIGHT KIDNEY (HCC): Primary | ICD-10-CM

## 2021-08-24 DIAGNOSIS — C64.1 CANCER OF RIGHT KIDNEY (HCC): ICD-10-CM

## 2021-08-24 DIAGNOSIS — R94.6 ABNORMAL RESULTS OF THYROID FUNCTION STUDIES: ICD-10-CM

## 2021-08-24 LAB
ALBUMIN SERPL-MCNC: 4.3 G/DL (ref 3.5–5.2)
ALBUMIN/GLOB SERPL: 1.7 G/DL (ref 1.1–2.4)
ALP SERPL-CCNC: 77 U/L (ref 38–116)
ALT SERPL W P-5'-P-CCNC: 8 U/L (ref 0–41)
ANION GAP SERPL CALCULATED.3IONS-SCNC: 11.7 MMOL/L (ref 5–15)
AST SERPL-CCNC: 22 U/L (ref 0–40)
BASOPHILS # BLD AUTO: 0.1 10*3/MM3 (ref 0–0.2)
BASOPHILS NFR BLD AUTO: 0.9 % (ref 0–1.5)
BILIRUB SERPL-MCNC: 1.2 MG/DL (ref 0.2–1.2)
BUN SERPL-MCNC: 18 MG/DL (ref 6–20)
BUN/CREAT SERPL: 14 (ref 7.3–30)
CALCIUM SPEC-SCNC: 9.3 MG/DL (ref 8.5–10.2)
CHLORIDE SERPL-SCNC: 104 MMOL/L (ref 98–107)
CO2 SERPL-SCNC: 23.3 MMOL/L (ref 22–29)
CREAT SERPL-MCNC: 1.29 MG/DL (ref 0.7–1.3)
DEPRECATED RDW RBC AUTO: 49.8 FL (ref 37–54)
EOSINOPHIL # BLD AUTO: 0.23 10*3/MM3 (ref 0–0.4)
EOSINOPHIL NFR BLD AUTO: 2 % (ref 0.3–6.2)
ERYTHROCYTE [DISTWIDTH] IN BLOOD BY AUTOMATED COUNT: 17.5 % (ref 12.3–15.4)
GFR SERPL CREATININE-BSD FRML MDRD: 54 ML/MIN/1.73
GLOBULIN UR ELPH-MCNC: 2.5 GM/DL (ref 1.8–3.5)
GLUCOSE SERPL-MCNC: 101 MG/DL (ref 74–124)
HCT VFR BLD AUTO: 46.6 % (ref 37.5–51)
HGB BLD-MCNC: 14.5 G/DL (ref 13–17.7)
IMM GRANULOCYTES # BLD AUTO: 0.05 10*3/MM3 (ref 0–0.05)
IMM GRANULOCYTES NFR BLD AUTO: 0.4 % (ref 0–0.5)
LYMPHOCYTES # BLD AUTO: 1.65 10*3/MM3 (ref 0.7–3.1)
LYMPHOCYTES NFR BLD AUTO: 14.6 % (ref 19.6–45.3)
MCH RBC QN AUTO: 25.3 PG (ref 26.6–33)
MCHC RBC AUTO-ENTMCNC: 31.1 G/DL (ref 31.5–35.7)
MCV RBC AUTO: 81.5 FL (ref 79–97)
MONOCYTES # BLD AUTO: 1.07 10*3/MM3 (ref 0.1–0.9)
MONOCYTES NFR BLD AUTO: 9.5 % (ref 5–12)
NEUTROPHILS NFR BLD AUTO: 72.6 % (ref 42.7–76)
NEUTROPHILS NFR BLD AUTO: 8.21 10*3/MM3 (ref 1.7–7)
NRBC BLD AUTO-RTO: 0 /100 WBC (ref 0–0.2)
PLATELET # BLD AUTO: 240 10*3/MM3 (ref 140–450)
PMV BLD AUTO: 9.3 FL (ref 6–12)
POTASSIUM SERPL-SCNC: 4.9 MMOL/L (ref 3.5–4.7)
PROT SERPL-MCNC: 6.8 G/DL (ref 6.3–8)
RBC # BLD AUTO: 5.72 10*6/MM3 (ref 4.14–5.8)
SODIUM SERPL-SCNC: 139 MMOL/L (ref 134–145)
TSH SERPL DL<=0.05 MIU/L-ACNC: 0.38 UIU/ML (ref 0.27–4.2)
WBC # BLD AUTO: 11.31 10*3/MM3 (ref 3.4–10.8)

## 2021-08-24 PROCEDURE — 99214 OFFICE O/P EST MOD 30 MIN: CPT | Performed by: INTERNAL MEDICINE

## 2021-08-24 PROCEDURE — 85025 COMPLETE CBC W/AUTO DIFF WBC: CPT

## 2021-08-24 PROCEDURE — 80053 COMPREHEN METABOLIC PANEL: CPT | Performed by: INTERNAL MEDICINE

## 2021-08-24 PROCEDURE — 84443 ASSAY THYROID STIM HORMONE: CPT | Performed by: INTERNAL MEDICINE

## 2021-08-24 PROCEDURE — 36415 COLL VENOUS BLD VENIPUNCTURE: CPT

## 2021-08-24 NOTE — PROGRESS NOTES
REASONS FOR FOLLOWUP:       1. Myeloproliferative disorder with polycythemia vera, MAI-2 mutation positive requiring periodic phlebotomies whenever hematocrit is above 45.      2. His  tory of stage I clear cell carcinoma of the right kidney status post partial nephrectomy, nephron sparing surgery by Dr. Ganesh Lopez with no issues or consequences.               3. Patient has history of polymyalgia rheumatica.  He IS ON 2.5 mg of prednisone a day.            HISTORY OF PRESENT ILLNESS    DURING THE VISIT WITH THE PATIENT TODAY , PATIENT HAD FACE MASK, MY MEDICAL ASSISTANT AND I  HAD PROPPER PROTECTIVE EQUIPMENT, AND I DID HAND HYGIENE WITH SOAP AND WATER BEFORE AND AFTER THE VISIT.  This patient returns today to the office for follow up feeling extremely well. His appetite is good, his weight is stable, bowel function is normal. No cardiovascular or respiratory issues at this time. His pain in the left shoulder has improved now that he has almost a paperweight tennis racket. He is not having any other new health issues. Reviewing his laboratory testing recently made he had a high potassium of 5.3 and a TSH that was too low maybe overcorrection. He wants me to review this again.             Past Medical History:   Diagnosis Date   • Acquired hypothyroidism    • Anxiety    • Aortic aneurysm (CMS/HCC)    • Arthritis    • Asthma    • Baker's cyst of knee, right    • Disease of thyroid gland    • Gout    • H/O Muscle pain     Right shoulder and neck area   • H/O Radiation treatment     For tonsillar inflammation and infection when he was a child and this led him to develop thyroid cancer.   • History of colon polyps    • Hypertension    • Hyperthyroidism    • Hypothyroidism    • Kidney carcinoma (CMS/HCC)     H/O stage I clear cell carcinoma of the right kidney, status post partial nephrectomy, nephron-sparing surgery by Dr. Ganesh Lopez   • Left shoulder pain    • Myeloproliferative disorder (CMS/HCC)      With polycythemia vera, JAK2 mutation positive requiring periodic phlebotomies (hematocrit above 47).   • Polymyalgia rheumatica (CMS/HCC)    • Premature atrial contraction    • Prostate cancer (CMS/HCC)    • Raynaud disease    • Shoulder injury, left, sequela      Past Surgical History:   Procedure Laterality Date   • CIRCUMCISION N/A 12/17/2018    Procedure: CIRCUMCISION;  Surgeon: Gallo Lopez MD;  Location: [facility] OR;  Service: Urology   • COLONOSCOPY  2010    Documented a tubulovillous adenom that was removed completely. Colonoscopy in 2014 was unremarkable.   • COLONOSCOPY N/A 2/20/2020    Procedure: COLONOSCOPY;  Surgeon: Claire Galo MD;  Location: [facility] OR;  Service: Gastroenterology;  Laterality: N/A;  diverticulosis   • HERNIA REPAIR      H/O multiple hernia repairs including right inguinal hernia repair in 1981, 2003, and double hernia repair in 2013   • KIDNEY SURGERY     • PROSTATE BIOPSY      Showed features consistent with prostate cancer   • SHOULDER SURGERY  1995    Shoulder repair   • THYROIDECTOMY, PARTIAL  1983    For malignant tumor   • TOOTH EXTRACTION       Social History     Social History Narrative    He is a very avid  and plays violin in an orchestra. He does not smoke but had a lot of second-hand smoke through his life.     Family History   Problem Relation Age of Onset   • No Known Problems Mother    • Heart attack Father    • Heart disease Father    • Hypertension Father    • Diabetes Father    • Tuberculosis Maternal Grandmother    • No Known Problems Son    • Drug abuse Son    • Malig Hyperthermia Neg Hx      Allergies   Allergen Reactions   • Diphenhydramine Rash     HEART PALPITATIONS, RASH   • Statins Myalgia     MUSCLE ACHES   • Aspirin Unknown - Low Severity     PT CANNOT REMEMBER   • Latex Rash   • Peanut Oil Unknown - Low Severity     PT CANNOT REMEMBER   • Shellfish-Derived Products Unknown - Low Severity     PT CANNOT REMEMBER     Current  Outpatient Medications on File Prior to Visit   Medication Sig Dispense Refill   • ALPRAZolam (XANAX) 0.5 MG tablet Take 0.5 mg by mouth At Night As Needed.     • amLODIPine (NORVASC) 5 MG tablet Take 5 mg by mouth Daily.     • B Complex Vitamins (VITAMIN B-COMPLEX PO) Take 1 tablet by mouth Daily.     • levothyroxine (SYNTHROID, LEVOTHROID) 100 MCG tablet Take 1 tablet by mouth daily. 30 tablet 6   • losartan (COZAAR) 25 MG tablet TAKE ONE TABLET BY MOUTH TWICE A  tablet 0   • montelukast (SINGULAIR) 10 MG tablet Take 10 mg by mouth Every Night.     • predniSONE (DELTASONE) 2.5 MG tablet Take 3 mg by mouth Daily.     • zolpidem (AMBIEN) 10 MG tablet TAKE ONE-HALF TO ONE TABLET BY MOUTH ONCE NIGHTLY AS NEEDED FOR SLEEP  **MUST LAST 30 DAYS       No current facility-administered medications on file prior to visit.               VITAL SIGNS:   Vitals:    08/24/21 0935   BP: 146/80   Pulse: 75   SpO2: 96%   Weight: 77.1 kg (169 lb 14.4 oz)            PHYSICAL EXAMINATION:   I HAVE PERSONALLY REVIEWED THE HISTORY OF THE PRESENT ILLNESS, PAST MEDICAL HISTORY, FAMILY HISTORY, SOCIAL HISTORY, ALLERGIES, MEDICATIONS STATED ABOVE IN THE  NOTE FROM TODAY.        GENERAL:  Well-developed, well-nourished  Patient  in no acute distress.   SKIN:  Warm, dry ,NO rashes,NO purpura ,NO petechiae.  HEENT:  Pupils were equal and reactive to light and accomodation, conjunctivae noninjected, no pterygium, normal extraocular movements, normal visual acuity.   NECK:  Supple with good range of motion; no thyromegaly , no other masses, no JVD or bruits, no cervical adenopathies.No carotid artery pain, no carotid abnormal pulsation , NO arterial dance.  LYMPHATICS:  No cervical, NO supraclavicular, NO axillary,NO epitrochlear , NO inguinal adenopathy.  CARDIAC   normal rate and regular rhythm, without murmur,NO rubs NO S3 NO S4 right or left .  LUNGS: normal breath sounds bilateral, no wheezing, rhonchi, crackles or rubs.  VASCULAR  VENOUS: no cyanosis, collateral circulation, varicosities, edema, palpable cords, pain, erythema.  ABDOMEN:  Soft, nontender with no hepatomegaly, no splenomegaly,no masses, no ascites, no collateral circulation,no distention,no Aniket sign.  EXTREMITIES  AND SPINE:  No clubbing, cyanosis or edema, no deformities , no pain .No kyphosis, scoliosis, no other deformities, no pain in spine, no pain in ribs , no pain inpelvic bone.  NEUROLOGICAL:  Patient was awake, alert, oriented to time, person and place.          LABORATORY DATA:  CBC Auto Differential  Order: [id number] - Part of Panel Order [id number]  Status:  Final result   Visible to patient:  No (scheduled for 8/24/2021  9:32 AM)   Dx:  Erythrocytosis; Myeloproliferative di...  Specimen Information: Blood         0 Result Notes  Component   Ref Range & Units 09:23   WBC   3.40 - 10.80 10*3/mm3 11.31High     RBC   4.14 - 5.80 10*6/mm3 5.72    Hemoglobin   13.0 - 17.7 g/dL 14.5    Hematocrit   37.5 - 51.0 % 46.6    MCV   79.0 - 97.0 fL 81.5    MCH   26.6 - 33.0 pg 25.3Low     MCHC   31.5 - 35.7 g/dL 31.1Low     RDW   12.3 - 15.4 % 17.5High     RDW-SD   37.0 - 54.0 fl 49.8    MPV   6.0 - 12.0 fL 9.3    Platelets   140 - 450 10*3/mm3 240    Neutrophil %   42.7 - 76.0 % 72.6    Lymphocyte %   19.6 - 45.3 % 14.6Low     Monocyte %   5.0 - 12.0 % 9.5    Eosinophil %   0.3 - 6.2 % 2.0    Basophil %   0.0 - 1.5 % 0.9    Immature Grans %   0.0 - 0.5 % 0.4    Neutrophils, Absolute   1.70 - 7.00 10*3/mm3 8.21High     Lymphocytes, Absolute   0.70 - 3.10 10*3/mm3 1.65    Monocytes, Absolute   0.10 - 0.90 10*3/mm3 1.07High     Eosinophils, Absolute   0.00 - 0.40 10*3/mm3 0.23    Basophils, Absolute   0.00 - 0.20 10*3/mm3 0.10    Immature Grans, Absolute   0.00 - 0.05 10*3/mm3 0.05    nRBC   0.0 - 0.2 /100 WBC 0.0    Resulting Agency  [facility]         Specimen Collected: 08/24/21 09:23   Last Resulted: 08/24/21 09:32        Lab Flowsheet     Order Details     View Encounter      Lab and Collection Details     Routing     Result History                  COMPREHENSIVE METABOLIC PANEL  Order: [id number]  Status:  Final result   Next appt:  09/14/2021 at 10:00 AM in Cardiology ([facility])  Specimen Information: Fresh Frozen Plasma    Specimen type and source: Plasma, Blood        Component   Ref Range & Units 3 mo ago   Sodium   137 - 145 mmol/L 142    Potassium   3.5 - 5.1 mmol/L 5.3High     Chloride   98 - 107 mmol/L 107    Total CO2   22 - 30 mmol/L 22    Glucose   74 - 99 mg/dL 92    BUN   7 - 20 mg/dL 14    Creatinine   0.7 - 1.5 mg/dL 1.2    BUN/Creatinine Ratio   10.0 - 20.0 RATIO 12.0    Est GFR by Clearance   >60 /1.73 m2 59Low     Comment: Multiply by 1.212 if . Results provided are valid only for patients who are 18 to 70 years of age. Results do not take into account body mass.   Total Protein   6.3 - 8.2 g/dL 6.5    Albumin   3.5 - 5.0 g/dL 4.1    Globulin   1.5 - 4.5 g/dL 2.4    A/G Ratio   1.1 - 2.5 RATIO 1.7    Calcium   8.4 - 10.2 mg/dL 9.3    Total Bilirubin   0.2 - 1.3 mg/dL 0.8    AST (SGOT)   15 - 46 U/L 21    ALT (SGPT)   13 - 69 U/L 14    Alkaline Phosphatase   38 - 126 U/L 71    Resulting Agency [facility]         Specimen Collected: 05/04/21 10:04   Last Resulted: 05/05/21 10:07   Received From: [facility]    Result Received: 06/25/21 08:55           TSH  Order: [id number]  Status:  Final result   Next appt:  09/14/2021 at 10:00 AM in Cardiology ([facility]CHO[facility])  Specimen Information: Fresh Frozen Plasma    Specimen type and source: Plasma, Blood        Component   Ref Range & Units 3 mo ago   TSH   0.27 - 4.20 u(iU)/mL 0.138Low     Resulting Agency [facility]         Specimen Collected: 05/04/21 10:04   Last Resulted: 05/05/21 09:21   Received From: [facility]     Result Received: 06/25/21 08:55                    ASSESSMENT/PLAN: 1. This patient has history of polycythemia  vera JAK2 mutation positive. He has had multiple phlebotomies in the past and this has produced a drop in the MCV. Now the patient is not requiring phlebotomies as often as before and is related to the mild iron deficiency that has been produced by the phlebotomy    I reviewed the patient on 08/24/2021. He has no symptoms pertinent to polycythemia, his hematocrit is 46 but I think it is okay to let it go until he has to come back in a couple of months to have another CBC and possibly he will require phlebotomy then. Otherwise I will review him back in 4 months.     2.The patient has minimal leukocytosis with a normal white count differential. This has been present since the time of the diagnosis of his myeloproliferative disorder, he is BCR/ABL negative. This will be watched in absence of any other intervention.     3.The patient has hypokalemia with a potassium level of 5.3 documented in 06/2021 through his primary physician [facility]. I think it is time to repeat another potassium level. At the same kind of reassessment he had a TSH done also in 06/2021 that showed a TSH too low. Maybe he is overcorrected in thyroid replacement medication. He has no symptoms pertinent to this that suggest hyperthyroidism but I think it will be okay for him to have a TSH repeated today. Obviously I will share these 2 numbers with his primary physician [facility].     The patient will be informed about these numbers as well.    Otherwise I will review him back in 4 months.

## 2021-09-14 ENCOUNTER — OFFICE VISIT (OUTPATIENT)
Dept: CARDIOLOGY | Facility: CLINIC | Age: 76
End: 2021-09-14

## 2021-09-14 ENCOUNTER — HOSPITAL ENCOUNTER (OUTPATIENT)
Dept: CARDIOLOGY | Facility: HOSPITAL | Age: 76
Discharge: HOME | End: 2021-09-14
Admitting: INTERNAL MEDICINE

## 2021-09-14 VITALS
DIASTOLIC BLOOD PRESSURE: 60 MMHG | HEART RATE: 71 BPM | WEIGHT: 169 LBS | HEIGHT: 67 IN | SYSTOLIC BLOOD PRESSURE: 100 MMHG | BODY MASS INDEX: 26.53 KG/M2 | OXYGEN SATURATION: 97 %

## 2021-09-14 VITALS
SYSTOLIC BLOOD PRESSURE: 122 MMHG | BODY MASS INDEX: 25.52 KG/M2 | HEART RATE: 59 BPM | WEIGHT: 168.4 LBS | OXYGEN SATURATION: 99 % | DIASTOLIC BLOOD PRESSURE: 92 MMHG | HEIGHT: 68 IN

## 2021-09-14 DIAGNOSIS — I79.0 ANEURYSM OF AORTA IN DISEASES CLASSIFIED ELSEWHERE (HCC): ICD-10-CM

## 2021-09-14 DIAGNOSIS — I10 HYPERTENSION, UNSPECIFIED TYPE: ICD-10-CM

## 2021-09-14 DIAGNOSIS — I71.20 THORACIC AORTIC ANEURYSM WITHOUT RUPTURE (HCC): Primary | ICD-10-CM

## 2021-09-14 DIAGNOSIS — E78.5 HYPERLIPIDEMIA, UNSPECIFIED HYPERLIPIDEMIA TYPE: ICD-10-CM

## 2021-09-14 DIAGNOSIS — I77.810 AORTIC ROOT DILATATION (HCC): ICD-10-CM

## 2021-09-14 LAB
AORTIC ARCH: 3.3 CM
ASCENDING AORTA: 4.2 CM
BH CV ECHO MEAS - ACS: 1.8 CM
BH CV ECHO MEAS - AO MAX PG (FULL): 0.61 MMHG
BH CV ECHO MEAS - AO MAX PG: 4.2 MMHG
BH CV ECHO MEAS - AO MEAN PG (FULL): 0.69 MMHG
BH CV ECHO MEAS - AO MEAN PG: 2.8 MMHG
BH CV ECHO MEAS - AO ROOT AREA (BSA CORRECTED): 2.4
BH CV ECHO MEAS - AO ROOT AREA: 16.4 CM^2
BH CV ECHO MEAS - AO ROOT DIAM: 4.6 CM
BH CV ECHO MEAS - AO V2 MAX: 102.8 CM/SEC
BH CV ECHO MEAS - AO V2 MEAN: 80.7 CM/SEC
BH CV ECHO MEAS - AO V2 VTI: 20.2 CM
BH CV ECHO MEAS - ASC AORTA: 4.2 CM
BH CV ECHO MEAS - AVA(I,A): 3.4 CM^2
BH CV ECHO MEAS - AVA(I,D): 3.4 CM^2
BH CV ECHO MEAS - AVA(V,A): 3.9 CM^2
BH CV ECHO MEAS - AVA(V,D): 3.9 CM^2
BH CV ECHO MEAS - BSA(HAYCOCK): 1.9 M^2
BH CV ECHO MEAS - BSA: 1.9 M^2
BH CV ECHO MEAS - BZI_BMI: 26.5 KILOGRAMS/M^2
BH CV ECHO MEAS - BZI_METRIC_HEIGHT: 170.2 CM
BH CV ECHO MEAS - BZI_METRIC_WEIGHT: 76.7 KG
BH CV ECHO MEAS - EDV(CUBED): 95 ML
BH CV ECHO MEAS - EDV(MOD-SP2): 71 ML
BH CV ECHO MEAS - EDV(MOD-SP4): 98 ML
BH CV ECHO MEAS - EDV(TEICH): 95.5 ML
BH CV ECHO MEAS - EF(CUBED): 61.3 %
BH CV ECHO MEAS - EF(MOD-BP): 70.2 %
BH CV ECHO MEAS - EF(MOD-SP2): 69 %
BH CV ECHO MEAS - EF(MOD-SP4): 71.4 %
BH CV ECHO MEAS - EF(TEICH): 52.9 %
BH CV ECHO MEAS - ESV(CUBED): 36.8 ML
BH CV ECHO MEAS - ESV(MOD-SP2): 22 ML
BH CV ECHO MEAS - ESV(MOD-SP4): 28 ML
BH CV ECHO MEAS - ESV(TEICH): 45 ML
BH CV ECHO MEAS - FS: 27.1 %
BH CV ECHO MEAS - IVS/LVPW: 1.1
BH CV ECHO MEAS - IVSD: 1.2 CM
BH CV ECHO MEAS - LAT PEAK E' VEL: 7.7 CM/SEC
BH CV ECHO MEAS - LV DIASTOLIC VOL/BSA (35-75): 52.1 ML/M^2
BH CV ECHO MEAS - LV MASS(C)D: 198.7 GRAMS
BH CV ECHO MEAS - LV MASS(C)DI: 105.5 GRAMS/M^2
BH CV ECHO MEAS - LV MAX PG: 3.6 MMHG
BH CV ECHO MEAS - LV MEAN PG: 2.1 MMHG
BH CV ECHO MEAS - LV SYSTOLIC VOL/BSA (12-30): 14.9 ML/M^2
BH CV ECHO MEAS - LV V1 MAX: 95.1 CM/SEC
BH CV ECHO MEAS - LV V1 MEAN: 68.9 CM/SEC
BH CV ECHO MEAS - LV V1 VTI: 16.3 CM
BH CV ECHO MEAS - LVIDD: 4.6 CM
BH CV ECHO MEAS - LVIDS: 3.3 CM
BH CV ECHO MEAS - LVLD AP2: 7.8 CM
BH CV ECHO MEAS - LVLD AP4: 7.7 CM
BH CV ECHO MEAS - LVLS AP2: 5.5 CM
BH CV ECHO MEAS - LVLS AP4: 5.4 CM
BH CV ECHO MEAS - LVOT AREA (M): 4.2 CM^2
BH CV ECHO MEAS - LVOT AREA: 4.3 CM^2
BH CV ECHO MEAS - LVOT DIAM: 2.3 CM
BH CV ECHO MEAS - LVPWD: 1.1 CM
BH CV ECHO MEAS - MED PEAK E' VEL: 5.1 CM/SEC
BH CV ECHO MEAS - MV A DUR: 0.12 SEC
BH CV ECHO MEAS - MV A MAX VEL: 66.7 CM/SEC
BH CV ECHO MEAS - MV DEC SLOPE: 180.3 CM/SEC^2
BH CV ECHO MEAS - MV DEC TIME: 0.29 SEC
BH CV ECHO MEAS - MV E MAX VEL: 52 CM/SEC
BH CV ECHO MEAS - MV E/A: 0.78
BH CV ECHO MEAS - MV MAX PG: 2.4 MMHG
BH CV ECHO MEAS - MV MEAN PG: 0.98 MMHG
BH CV ECHO MEAS - MV P1/2T MAX VEL: 57.2 CM/SEC
BH CV ECHO MEAS - MV P1/2T: 92.9 MSEC
BH CV ECHO MEAS - MV V2 MAX: 77.6 CM/SEC
BH CV ECHO MEAS - MV V2 MEAN: 46.9 CM/SEC
BH CV ECHO MEAS - MV V2 VTI: 29.6 CM
BH CV ECHO MEAS - MVA P1/2T LCG: 3.8 CM^2
BH CV ECHO MEAS - MVA(P1/2T): 2.4 CM^2
BH CV ECHO MEAS - MVA(VTI): 2.3 CM^2
BH CV ECHO MEAS - PA ACC TIME: 0.09 SEC
BH CV ECHO MEAS - PA MAX PG (FULL): 1.2 MMHG
BH CV ECHO MEAS - PA MAX PG: 3.3 MMHG
BH CV ECHO MEAS - PA PR(ACCEL): 39.4 MMHG
BH CV ECHO MEAS - PA V2 MAX: 90.9 CM/SEC
BH CV ECHO MEAS - PULM A REVS DUR: 0.12 SEC
BH CV ECHO MEAS - PULM A REVS VEL: 30 CM/SEC
BH CV ECHO MEAS - PULM DIAS VEL: 44.1 CM/SEC
BH CV ECHO MEAS - PULM S/D: 0.91
BH CV ECHO MEAS - PULM SYS VEL: 40.3 CM/SEC
BH CV ECHO MEAS - PVA(V,A): 4.6 CM^2
BH CV ECHO MEAS - PVA(V,D): 4.6 CM^2
BH CV ECHO MEAS - QP/QS: 1.2
BH CV ECHO MEAS - RAP SYSTOLE: 3 MMHG
BH CV ECHO MEAS - RV MAX PG: 2.1 MMHG
BH CV ECHO MEAS - RV MEAN PG: 1.2 MMHG
BH CV ECHO MEAS - RV V1 MAX: 72 CM/SEC
BH CV ECHO MEAS - RV V1 MEAN: 51.5 CM/SEC
BH CV ECHO MEAS - RV V1 VTI: 14.3 CM
BH CV ECHO MEAS - RVOT AREA: 5.8 CM^2
BH CV ECHO MEAS - RVOT DIAM: 2.7 CM
BH CV ECHO MEAS - RVSP: 26.4 MMHG
BH CV ECHO MEAS - SI(AO): 175.2 ML/M^2
BH CV ECHO MEAS - SI(CUBED): 31 ML/M^2
BH CV ECHO MEAS - SI(LVOT): 36.8 ML/M^2
BH CV ECHO MEAS - SI(MOD-SP2): 26 ML/M^2
BH CV ECHO MEAS - SI(MOD-SP4): 37.2 ML/M^2
BH CV ECHO MEAS - SI(TEICH): 26.9 ML/M^2
BH CV ECHO MEAS - SV(AO): 329.8 ML
BH CV ECHO MEAS - SV(CUBED): 58.3 ML
BH CV ECHO MEAS - SV(LVOT): 69.3 ML
BH CV ECHO MEAS - SV(MOD-SP2): 49 ML
BH CV ECHO MEAS - SV(MOD-SP4): 70 ML
BH CV ECHO MEAS - SV(RVOT): 83.5 ML
BH CV ECHO MEAS - SV(TEICH): 50.6 ML
BH CV ECHO MEAS - TAPSE (>1.6): 2.4 CM
BH CV ECHO MEAS - TR MAX VEL: 241.6 CM/SEC
BH CV ECHO MEASUREMENTS AVERAGE E/E' RATIO: 8.13
BH CV VAS BP RIGHT ARM: NORMAL MMHG
BH CV XLRA - RV BASE: 2.9 CM
BH CV XLRA - RV LENGTH: 6.5 CM
BH CV XLRA - RV MID: 3.6 CM
BH CV XLRA - TDI S': 11.6 CM/SEC
LEFT ATRIUM VOLUME INDEX: 28 ML/M2
MAXIMAL PREDICTED HEART RATE: 145 BPM
SINUS: 4.3 CM
STJ: 4.2 CM
STRESS TARGET HR: 123 BPM

## 2021-09-14 PROCEDURE — 99214 OFFICE O/P EST MOD 30 MIN: CPT | Performed by: INTERNAL MEDICINE

## 2021-09-14 PROCEDURE — 93000 ELECTROCARDIOGRAM COMPLETE: CPT | Performed by: INTERNAL MEDICINE

## 2021-09-14 PROCEDURE — 93306 TTE W/DOPPLER COMPLETE: CPT

## 2021-09-14 PROCEDURE — 93306 TTE W/DOPPLER COMPLETE: CPT | Performed by: INTERNAL MEDICINE

## 2021-09-14 NOTE — PROGRESS NOTES
Subjective:     Encounter Date:09/14/2021      Patient ID: Scott Murphy is a 76 y.o. male.    Chief Complaint:  History of Present Illness    This is a 76-year-old with a history of polycythemia vera, polymyalgia rheumatica, history of renal cell carcinoma status post partial nephrectomy, hypertension, hypothyroidism, Raynaud's disease, asthma, thoracic aortic aneurysm, who presents for follow up.        He presents today for routine annual follow-up.  Following his last office visit he underwent repeat echocardiogram which showed mild to moderate dilatation of his aortic root and ascending aorta measuring about 4.3 cm.  Remainder of his echocardiogram showed normal left ventricular systolic function wall motion with an EF of 65% grade 1 diastolic dysfunction and no significant valvular disease.    He presents today for routine annual follow-up.  He underwent a repeat echocardiogram this morning that upon my review shows stable appearing size of his aortic root and ascending aorta measuring about 4.2 cm.  Remainder of his echocardiogram is unremarkable and stable.    Patient had some issues with left-sided sharp chest pain.  Dr. Damon recommended proceeding with a CT angiogram of the chest which was performed on 2/10/2021.  This showed no evidence of pulmonary embolism and no significant change in the approximately 4.4 cm dilatation of the aortic root or 4 cm dilatation of the ascending aorta.      He has had no further chest pain since then.  He continues to play tennis 3 times a week without any significant issues.  Denies any palpitations, orthopnea, near-syncope or syncope, or lower extremity edema.  He recently received his COVID-19 booster injection.     Prior History:  I saw the patient initially in 1/2018 when he presented for evaluation of an abnormal EKG.  Prior to that office visit the patient underwent his yearly Medicare physical with Dr. Delgado at which time he had a routine EKG  performed that showed a right bundle branch block that was felt to be new.  Based on this finding he was sent here for further evaluation.  Ten years prior he was evaluated by Dr. Jarrett complaints of chest pain and underwent an echocardiogram, stress test, and a cardiac catheterization that were all reportedly unremarkable (records are not available from that workup).  At his initial office visit with me he denied any symptoms and was active playing tennis about 2 or 3 times a week.    At that office visit I noted that he had a similar appearing right bundle branch block on EKG from 1/2014.  Recommended proceeding with an echocardiogram to look for any structural heart disease with his right bundle branch block.  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  I was out of the office at the time that the echocardiogram was performed and resulted and these results were communicated to the patient at that time however it does not appear that he was told about the aortic dilatation.     More recently the patient has been having issues with shortness of breath and cough.  He ended up undergoing a chest x-ray was ordered by Dr. Delgado that showed no acute pulmonary disease but did show evidence of aortic arterial sclerosis.  He saw Dr. Damon in routine follow-up and mention these findings to him and with his recent dyspnea symptoms and these findings he ordered a CT of the chest.  This was performed on 8/23/2019 and it showed mild aneurysmal dilatation of the ascending aorta measuring 4.2 x 4 cm compared to a measurement of 4 x 3.9 cm in 2/2018.       He was seen by ALETHA Hu in 3/2020.  He presented  with complaints of atypical sounding chest pain.  Blood pressures were noted to be elevated during that office visit.  His amlodipine was resumed which is tolerated well.       Review of Systems   Constitutional: Negative for malaise/fatigue.   HENT: Negative for hearing loss, hoarse voice, nosebleeds and sore throat.    Eyes: Negative for pain.   Cardiovascular: Positive for chest pain. Negative for claudication, cyanosis, dyspnea on exertion, irregular heartbeat, leg swelling, near-syncope, orthopnea, palpitations, paroxysmal nocturnal dyspnea and syncope.   Respiratory: Negative for shortness of breath and snoring.    Endocrine: Negative for cold intolerance, heat intolerance, polydipsia, polyphagia and polyuria.   Skin: Negative for itching and rash.   Musculoskeletal: Negative for arthritis, falls, joint pain, joint swelling, muscle cramps, muscle weakness and myalgias.   Gastrointestinal: Negative for constipation, diarrhea, dysphagia, heartburn, hematemesis, hematochezia, melena, nausea and vomiting.   Genitourinary: Negative for frequency, hematuria and hesitancy.   Neurological: Negative for excessive daytime sleepiness, dizziness, headaches, light-headedness, numbness and weakness.   Psychiatric/Behavioral: Negative for depression. The patient is not nervous/anxious.          Current Outpatient Medications:   •  ALPRAZolam (XANAX) 0.5 MG tablet, Take 0.5 mg by mouth At Night As Needed., Disp: , Rfl:   •  amLODIPine (NORVASC) 5 MG tablet, Take 5 mg by mouth Daily., Disp: , Rfl:   •  B Complex Vitamins (VITAMIN B-COMPLEX PO), Take 1 tablet by mouth Daily., Disp: , Rfl:   •  levothyroxine (SYNTHROID, LEVOTHROID) 100 MCG tablet, Take 1 tablet by mouth daily., Disp: 30 tablet, Rfl: 6  •  losartan (COZAAR) 25 MG tablet, TAKE ONE TABLET BY MOUTH TWICE A DAY (Patient taking differently: Take 25 mg by mouth Daily.), Disp: 180 tablet, Rfl: 0  •  montelukast (SINGULAIR) 10 MG tablet, Take 10 mg by mouth Every Night.,  Disp: , Rfl:   •  predniSONE (DELTASONE) 2.5 MG tablet, Take 1 mg by mouth Daily., Disp: , Rfl:   •  zolpidem (AMBIEN) 10 MG tablet, TAKE ONE-HALF TO ONE TABLET BY MOUTH ONCE NIGHTLY AS NEEDED FOR SLEEP  **MUST LAST 30 DAYS, Disp: , Rfl:     Past Medical History:   Diagnosis Date   • Acquired hypothyroidism    • Anxiety    • Aortic aneurysm (CMS/HCC)    • Arthritis    • Asthma    • Baker's cyst of knee, right    • Disease of thyroid gland    • Gout    • H/O Muscle pain     Right shoulder and neck area   • H/O Radiation treatment     For tonsillar inflammation and infection when he was a child and this led him to develop thyroid cancer.   • History of colon polyps    • Hypertension    • Hyperthyroidism    • Hypothyroidism    • Kidney carcinoma (CMS/HCC)     H/O stage I clear cell carcinoma of the right kidney, status post partial nephrectomy, nephron-sparing surgery by Dr. Ganesh Lopez   • Left shoulder pain    • Myeloproliferative disorder (CMS/HCC)     With polycythemia vera, JAK2 mutation positive requiring periodic phlebotomies (hematocrit above 47).   • Polymyalgia rheumatica (CMS/HCC)    • Premature atrial contraction    • Prostate cancer (CMS/HCC)    • Raynaud disease    • Shoulder injury, left, sequela        Past Surgical History:   Procedure Laterality Date   • CIRCUMCISION N/A 12/17/2018    Procedure: CIRCUMCISION;  Surgeon: Gallo Lopez MD;  Location: [facility];  Service: Urology   • COLONOSCOPY  2010    Documented a tubulovillous adenom that was removed completely. Colonoscopy in 2014 was unremarkable.   • COLONOSCOPY N/A 2/20/2020    Procedure: COLONOSCOPY;  Surgeon: Claire Galo MD;  Location: [facility];  Service: Gastroenterology;  Laterality: N/A;  diverticulosis   • HERNIA REPAIR      H/O multiple hernia repairs including right inguinal hernia repair in 1981, 2003, and double hernia repair in 2013   • KIDNEY SURGERY     • PROSTATE BIOPSY      Showed features consistent with  "prostate cancer   • SHOULDER SURGERY  1995    Shoulder repair   • THYROIDECTOMY, PARTIAL  1983    For malignant tumor   • TOOTH EXTRACTION         Family History   Problem Relation Age of Onset   • No Known Problems Mother    • Heart attack Father    • Heart disease Father    • Hypertension Father    • Diabetes Father    • Tuberculosis Maternal Grandmother    • No Known Problems Son    • Drug abuse Son    • Malig Hyperthermia Neg Hx        Social History     Tobacco Use   • Smoking status: Never Smoker   • Smokeless tobacco: Never Used   • Tobacco comment: caffeine use    Vaping Use   • Vaping Use: Never used   Substance Use Topics   • Alcohol use: Yes     Alcohol/week: 1.0 - 3.0 standard drinks     Types: 1 - 3 Glasses of wine per week     Comment: 1-3 glasses of wine a week   • Drug use: No         ECG 12 Lead    Date/Time: 9/14/2021 11:21 AM  Performed by: Di Roberts MD  Authorized by: Di Roberts MD   Comparison: compared with previous ECG   Similar to previous ECG  Rhythm: sinus rhythm  Ectopy: atrial premature contractions  Conduction: right bundle branch block               Objective:     Visit Vitals  /92 (BP Location: Left arm, Patient Position: Sitting, Cuff Size: Adult)   Pulse 59   Ht 172.7 cm (68\")   Wt 76.4 kg (168 lb 6.4 oz)   SpO2 99%   BMI 25.61 kg/m²         Constitutional:       Appearance: Normal appearance. Well-developed.   HENT:      Head: Normocephalic and atraumatic.   Neck:      Vascular: No carotid bruit or JVD.   Pulmonary:      Effort: Pulmonary effort is normal.      Breath sounds: Normal breath sounds.   Cardiovascular:      Normal rate. Regular rhythm.      No gallop.   Pulses:     Radial: 2+ bilaterally.  Edema:     Peripheral edema absent.   Abdominal:      Palpations: Abdomen is soft.   Skin:     General: Skin is warm and dry.   Neurological:      Mental Status: Alert and oriented to person, place, and time.             Assessment:          Diagnosis Plan   1. " Thoracic aortic aneurysm without rupture (CMS/HCC)     2. Hyperlipidemia, unspecified hyperlipidemia type     3. Hypertension, unspecified type            Plan:       1.  Thoracic aortic aneurysm.  Stable appearing on a CT angiogram of the chest earlier this year and echocardiogram today.  I think we can start backing off on annual evaluations of his aneurysm to every other year.  Continue management with blood pressure control.  2.  Hypertension.  Well-controlled on his current regimen medications.  Continue the same.  3.  Hyperlipidemia.  4.  Chronic right bundle branch block  5.  Raynaud's disease  6.  Polymyalgia rheumatica.  Patient is down to prednisone 1 mg daily and is hoping to come off of prednisone completely.  7.  Polycythemia vera.  Followed by Dr. Damon.  8.  Hypothyroidism  9.  Myeloproliferative disorder.  Followed by Dr. Damon.    I will see the patient back again in 1 year or sooner if further issues arise.

## 2021-09-15 NOTE — PROGRESS NOTES
I called and went over the results of his echocardiogram including the finding that his aortic root has increased in size.  He has follow-up with Dr. Alcantara.  We will have him review the findings and determine if further work-up with a repeat CT scan needs to be performed.

## 2021-10-20 ENCOUNTER — INFUSION (OUTPATIENT)
Dept: ONCOLOGY | Facility: HOSPITAL | Age: 76
End: 2021-10-20

## 2021-10-20 ENCOUNTER — LAB (OUTPATIENT)
Dept: LAB | Facility: HOSPITAL | Age: 76
End: 2021-10-20

## 2021-10-20 DIAGNOSIS — D75.1 ERYTHROCYTOSIS: ICD-10-CM

## 2021-10-20 DIAGNOSIS — D47.1 MYELOPROLIFERATIVE DISORDER (HCC): ICD-10-CM

## 2021-10-20 DIAGNOSIS — C64.1 CANCER OF RIGHT KIDNEY (HCC): ICD-10-CM

## 2021-10-20 LAB
BASOPHILS # BLD AUTO: 0.08 10*3/MM3 (ref 0–0.2)
BASOPHILS NFR BLD AUTO: 0.8 % (ref 0–1.5)
DEPRECATED RDW RBC AUTO: 47.5 FL (ref 37–54)
EOSINOPHIL # BLD AUTO: 0.1 10*3/MM3 (ref 0–0.4)
EOSINOPHIL NFR BLD AUTO: 0.9 % (ref 0.3–6.2)
ERYTHROCYTE [DISTWIDTH] IN BLOOD BY AUTOMATED COUNT: 15.9 % (ref 12.3–15.4)
HCT VFR BLD AUTO: 45.4 % (ref 37.5–51)
HGB BLD-MCNC: 14.4 G/DL (ref 13–17.7)
IMM GRANULOCYTES # BLD AUTO: 0.06 10*3/MM3 (ref 0–0.05)
IMM GRANULOCYTES NFR BLD AUTO: 0.6 % (ref 0–0.5)
LYMPHOCYTES # BLD AUTO: 1.38 10*3/MM3 (ref 0.7–3.1)
LYMPHOCYTES NFR BLD AUTO: 13 % (ref 19.6–45.3)
MCH RBC QN AUTO: 26.3 PG (ref 26.6–33)
MCHC RBC AUTO-ENTMCNC: 31.7 G/DL (ref 31.5–35.7)
MCV RBC AUTO: 83 FL (ref 79–97)
MONOCYTES # BLD AUTO: 1.13 10*3/MM3 (ref 0.1–0.9)
MONOCYTES NFR BLD AUTO: 10.7 % (ref 5–12)
NEUTROPHILS NFR BLD AUTO: 7.83 10*3/MM3 (ref 1.7–7)
NEUTROPHILS NFR BLD AUTO: 74 % (ref 42.7–76)
NRBC BLD AUTO-RTO: 0 /100 WBC (ref 0–0.2)
PLATELET # BLD AUTO: 239 10*3/MM3 (ref 140–450)
PMV BLD AUTO: 9.1 FL (ref 6–12)
RBC # BLD AUTO: 5.47 10*6/MM3 (ref 4.14–5.8)
WBC # BLD AUTO: 10.58 10*3/MM3 (ref 3.4–10.8)

## 2021-10-20 PROCEDURE — 36415 COLL VENOUS BLD VENIPUNCTURE: CPT

## 2021-10-20 PROCEDURE — 85025 COMPLETE CBC W/AUTO DIFF WBC: CPT

## 2021-10-20 NOTE — NURSING NOTE
HCT 45.5%.  Parameter for phlebo is 45%.  Phlebo was held by Dr. Damon 8/24/21 for HCT 46.6%.  Pt declines phlebo today. Copy of labs given and pt will call with any questions or concerns.    Lab Results   Component Value Date    WBC 10.58 10/20/2021    HGB 14.4 10/20/2021    HCT 45.4 10/20/2021    MCV 83.0 10/20/2021     10/20/2021

## 2021-12-15 ENCOUNTER — INFUSION (OUTPATIENT)
Dept: ONCOLOGY | Facility: HOSPITAL | Age: 76
End: 2021-12-15

## 2021-12-15 ENCOUNTER — OFFICE VISIT (OUTPATIENT)
Dept: ONCOLOGY | Facility: CLINIC | Age: 76
End: 2021-12-15

## 2021-12-15 ENCOUNTER — LAB (OUTPATIENT)
Dept: LAB | Facility: HOSPITAL | Age: 76
End: 2021-12-15

## 2021-12-15 VITALS
HEART RATE: 64 BPM | BODY MASS INDEX: 25.1 KG/M2 | OXYGEN SATURATION: 98 % | TEMPERATURE: 97.3 F | SYSTOLIC BLOOD PRESSURE: 148 MMHG | HEIGHT: 68 IN | RESPIRATION RATE: 18 BRPM | WEIGHT: 165.6 LBS | DIASTOLIC BLOOD PRESSURE: 83 MMHG

## 2021-12-15 DIAGNOSIS — C64.1 CANCER OF RIGHT KIDNEY (HCC): ICD-10-CM

## 2021-12-15 DIAGNOSIS — D47.1 MYELOPROLIFERATIVE DISORDER (HCC): ICD-10-CM

## 2021-12-15 DIAGNOSIS — E03.9 ACQUIRED HYPOTHYROIDISM: ICD-10-CM

## 2021-12-15 DIAGNOSIS — C64.1 CANCER OF RIGHT KIDNEY (HCC): Primary | ICD-10-CM

## 2021-12-15 DIAGNOSIS — D75.1 ERYTHROCYTOSIS: ICD-10-CM

## 2021-12-15 LAB
BASOPHILS # BLD AUTO: 0.06 10*3/MM3 (ref 0–0.2)
BASOPHILS NFR BLD AUTO: 0.6 % (ref 0–1.5)
DEPRECATED RDW RBC AUTO: 47.1 FL (ref 37–54)
EOSINOPHIL # BLD AUTO: 0.21 10*3/MM3 (ref 0–0.4)
EOSINOPHIL NFR BLD AUTO: 2.2 % (ref 0.3–6.2)
ERYTHROCYTE [DISTWIDTH] IN BLOOD BY AUTOMATED COUNT: 15.7 % (ref 12.3–15.4)
HCT VFR BLD AUTO: 47.6 % (ref 37.5–51)
HGB BLD-MCNC: 15.4 G/DL (ref 13–17.7)
IMM GRANULOCYTES # BLD AUTO: 0.05 10*3/MM3 (ref 0–0.05)
IMM GRANULOCYTES NFR BLD AUTO: 0.5 % (ref 0–0.5)
LYMPHOCYTES # BLD AUTO: 1.85 10*3/MM3 (ref 0.7–3.1)
LYMPHOCYTES NFR BLD AUTO: 19.2 % (ref 19.6–45.3)
MCH RBC QN AUTO: 27 PG (ref 26.6–33)
MCHC RBC AUTO-ENTMCNC: 32.4 G/DL (ref 31.5–35.7)
MCV RBC AUTO: 83.5 FL (ref 79–97)
MONOCYTES # BLD AUTO: 0.91 10*3/MM3 (ref 0.1–0.9)
MONOCYTES NFR BLD AUTO: 9.5 % (ref 5–12)
NEUTROPHILS NFR BLD AUTO: 6.54 10*3/MM3 (ref 1.7–7)
NEUTROPHILS NFR BLD AUTO: 68 % (ref 42.7–76)
NRBC BLD AUTO-RTO: 0 /100 WBC (ref 0–0.2)
PLATELET # BLD AUTO: 232 10*3/MM3 (ref 140–450)
PMV BLD AUTO: 9.5 FL (ref 6–12)
RBC # BLD AUTO: 5.7 10*6/MM3 (ref 4.14–5.8)
WBC NRBC COR # BLD: 9.62 10*3/MM3 (ref 3.4–10.8)

## 2021-12-15 PROCEDURE — 99214 OFFICE O/P EST MOD 30 MIN: CPT | Performed by: INTERNAL MEDICINE

## 2021-12-15 PROCEDURE — 85025 COMPLETE CBC W/AUTO DIFF WBC: CPT

## 2021-12-15 PROCEDURE — 36415 COLL VENOUS BLD VENIPUNCTURE: CPT

## 2021-12-15 PROCEDURE — 99195 PHLEBOTOMY: CPT

## 2021-12-15 RX ORDER — PREDNISONE 1 MG/1
TABLET ORAL DAILY
COMMUNITY
Start: 2021-10-13

## 2021-12-15 RX ORDER — AMLODIPINE BESYLATE 5 MG/1
1 TABLET ORAL DAILY
COMMUNITY
Start: 2021-09-30

## 2021-12-15 NOTE — PROGRESS NOTES
REASONS FOR FOLLOWUP:       1. Myeloproliferative disorder with polycythemia vera, MAI-2 mutation positive requiring periodic phlebotomies whenever hematocrit is above 45.      2. His  tory of stage I clear cell carcinoma of the right kidney status post partial nephrectomy, nephron sparing surgery by Dr. Ganesh Lopez with no issues or consequences.               3. Patient has history of polymyalgia rheumatica.  He IS ON  prednisone LOW DOSE          HISTORY OF PRESENT ILLNESS    DURING THE VISIT WITH THE PATIENT TODAY , PATIENT HAD FACE MASK, MY MEDICAL ASSISTANT AND I  HAD PROPPER PROTECTIVE EQUIPMENT, AND I DID HAND HYGIENE WITH SOAP AND WATER BEFORE AND AFTER THE VISIT.    This patient returns today to the office for followup. He is here today with no specific issues besides that he has been seen by Dr. Ganesh Lopez after he had an elevated PSA and seems that the MRI of the prostate failed to document any malignancy. He was advised to return back to see him in 6 months. No prostate biopsies were performed. Besides this he feels well. He has had good appetite, normal bowel activity, normal urination. He has been under stress because of taking care of his elderly mother, 96, who still lives independently at home on her own. Otherwise, the patient has not had any new health issues.     He has not had any evidence of bleeding or thrombosis and he has continued requiring phlebotomy very sporadically.            Past Medical History:   Diagnosis Date   • Acquired hypothyroidism    • Anxiety    • Aortic aneurysm (HCC)    • Arthritis    • Asthma    • Baker's cyst of knee, right    • Disease of thyroid gland    • Gout    • H/O Muscle pain     Right shoulder and neck area   • H/O Radiation treatment     For tonsillar inflammation and infection when he was a child and this led him to develop thyroid cancer.   • History of colon polyps    • Hypertension    • Hyperthyroidism    • Hypothyroidism    • Kidney  carcinoma (HCC)     H/O stage I clear cell carcinoma of the right kidney, status post partial nephrectomy, nephron-sparing surgery by Dr. Ganesh Lopez   • Left shoulder pain    • Myeloproliferative disorder (HCC)     With polycythemia vera, JAK2 mutation positive requiring periodic phlebotomies (hematocrit above 47).   • Polymyalgia rheumatica (HCC)    • Premature atrial contraction    • Prostate cancer (HCC)    • Raynaud disease    • Shoulder injury, left, sequela      Past Surgical History:   Procedure Laterality Date   • CIRCUMCISION N/A 12/17/2018    Procedure: CIRCUMCISION;  Surgeon: Gallo Lopez MD;  Location: [facility];  Service: Urology   • COLONOSCOPY  2010    Documented a tubulovillous adenom that was removed completely. Colonoscopy in 2014 was unremarkable.   • COLONOSCOPY N/A 2/20/2020    Procedure: COLONOSCOPY;  Surgeon: Claire Galo MD;  Location: [facility] OR;  Service: Gastroenterology;  Laterality: N/A;  diverticulosis   • HERNIA REPAIR      H/O multiple hernia repairs including right inguinal hernia repair in 1981, 2003, and double hernia repair in 2013   • KIDNEY SURGERY     • PROSTATE BIOPSY      Showed features consistent with prostate cancer   • SHOULDER SURGERY  1995    Shoulder repair   • THYROIDECTOMY, PARTIAL  1983    For malignant tumor   • TOOTH EXTRACTION       Social History     Social History Narrative    He is a very avid  and plays violin in an orchestra. He does not smoke but had a lot of second-hand smoke through his life.     Family History   Problem Relation Age of Onset   • No Known Problems Mother    • Heart attack Father    • Heart disease Father    • Hypertension Father    • Diabetes Father    • Tuberculosis Maternal Grandmother    • No Known Problems Son    • Drug abuse Son    • Malig Hyperthermia Neg Hx      Allergies   Allergen Reactions   • Diphenhydramine Rash     HEART PALPITATIONS, RASH   • Statins Myalgia     MUSCLE ACHES   • Aspirin  "Unknown - Low Severity     PT CANNOT REMEMBER   • Latex Rash   • Peanut Oil Unknown - Low Severity     PT CANNOT REMEMBER   • Shellfish-Derived Products Unknown - Low Severity     PT CANNOT REMEMBER     Current Outpatient Medications on File Prior to Visit   Medication Sig Dispense Refill   • amLODIPine (NORVASC) 5 MG tablet Take 1 tablet by mouth Daily.     • B Complex Vitamins (VITAMIN B-COMPLEX PO) Take 1 tablet by mouth Daily.     • levothyroxine (SYNTHROID, LEVOTHROID) 100 MCG tablet Take 1 tablet by mouth daily. 30 tablet 6   • losartan (COZAAR) 25 MG tablet TAKE ONE TABLET BY MOUTH TWICE A DAY (Patient taking differently: Take 25 mg by mouth Daily.) 180 tablet 0   • montelukast (SINGULAIR) 10 MG tablet Take 10 mg by mouth Every Night.     • predniSONE (DELTASONE) 1 MG tablet      • predniSONE (DELTASONE) 2.5 MG tablet Take 1 mg by mouth Daily.     • zolpidem (AMBIEN) 10 MG tablet TAKE ONE-HALF TO ONE TABLET BY MOUTH ONCE NIGHTLY AS NEEDED FOR SLEEP  **MUST LAST 30 DAYS     • ALPRAZolam (XANAX) 0.5 MG tablet Take 0.5 mg by mouth At Night As Needed.     • amLODIPine (NORVASC) 5 MG tablet Take 5 mg by mouth Daily.       No current facility-administered medications on file prior to visit.               VITAL SIGNS:   Vitals:    12/15/21 1531   BP: 148/83   Pulse: 64   Resp: 18   Temp: 97.3 °F (36.3 °C)   TempSrc: Temporal   SpO2: 98%   Weight: 75.1 kg (165 lb 9.6 oz)   Height: 172.7 cm (67.99\")            PHYSICAL EXAMINATION:     I HAVE PERSONALLY REVIEWED THE HISTORY OF THE PRESENT ILLNESS, PAST MEDICAL HISTORY, FAMILY HISTORY, SOCIAL HISTORY, ALLERGIES, MEDICATIONS STATED ABOVE IN THE  NOTE FROM TODAY.        GENERAL:  Well-developed, well-nourished  Patient  in no acute distress.   SKIN:  Warm, dry ,NO rashes,NO purpura ,NO petechiae.  HEENT:  Pupils were equal and reactive to light and accomodation, conjunctivae noninjected, no pterygium, normal extraocular movements, normal visual acuity.   NECK:  Supple " with good range of motion; no thyromegaly , no other masses, no JVD or bruits, no cervical adenopathies.No carotid artery pain, no carotid abnormal pulsation , NO arterial dance.  LYMPHATICS:  No cervical, NO supraclavicular, NO axillary,NO epitrochlear , NO inguinal adenopathy.  CARDIAC   normal rate and regular rhythm, without murmur,NO rubs NO S3 NO S4 right or left .  LUNGS: normal breath sounds bilateral, no wheezing, rhonchi, crackles or rubs.  VASCULAR VENOUS: no cyanosis, collateral circulation, varicosities, edema, palpable cords, pain, erythema.  ABDOMEN:  Soft, nontender with no hepatomegaly, no splenomegaly,no masses, no ascites, no collateral circulation,no distention,no Colman sign.  EXTREMITIES  AND SPINE:  No clubbing, cyanosis or edema, no deformities , no pain .No kyphosis, scoliosis, no other deformities, no pain in spine, no pain in ribs , no pain inpelvic bone.  NEUROLOGICAL:  Patient was awake, alert, oriented to time, person and place.            LABORATORY DATA:  Contains abnormal data CBC Auto Differential  Order: [id number] - Part of Panel Order [id number]   Status: Final result     Visible to patient: No (scheduled for 12/16/2021  5:33 AM)     Dx: Erythrocytosis; Myeloproliferative di...    Specimen Information: Blood         0 Result Notes    Component   Ref Range & Units 15:27   (12/15/21) 1 mo ago   (11/1/21) 1 mo ago   (10/20/21) 3 mo ago   (8/24/21) 5 mo ago   (6/25/21) 7 mo ago   (5/4/21) 7 mo ago   (4/30/21)   WBC   3.40 - 10.80 10*3/mm3 9.62  8.94 R  10.58  11.31 High   12.54 High   10.62 R  11.91 High     RBC   4.14 - 5.80 10*6/mm3 5.70  5.59 R  5.47  5.72  5.50  5.18 R  5.53    Hemoglobin   13.0 - 17.7 g/dL 15.4  15.0 R  14.4  14.5  13.9  13.1 Low  R  13.9    Hematocrit   37.5 - 51.0 % 47.6  49.7 R  45.4  46.6  44.4  44.9 R  45.0    MCV   79.0 - 97.0 fL 83.5  88.9 R  83.0  81.5  80.7  86.7 R  81.4    MCH   26.6 - 33.0 pg 27.0  26.8 R  26.3 Low   25.3 Low   25.3 Low   25.3 Low  R   25.1 Low     MCHC   31.5 - 35.7 g/dL 32.4  30.2 Low  R  31.7  31.1 Low   31.3 Low   29.2 Low  R  30.9 Low     RDW   12.3 - 15.4 % 15.7 High   15.9 R  15.9 High   17.5 High   15.5 High   15.9 R  15.5 High     RDW-SD   37.0 - 54.0 fl 47.1   47.5  49.8  44.3   45.7    MPV   6.0 - 12.0 fL 9.5  10.3 R  9.1  9.3  9.2  10.4 R  9.5    Platelets   140 - 450 10*3/mm3 232  272 R  239  240  239  302 R  184    Neutrophil %   42.7 - 76.0 % 68.0  60.8 R  74.0  72.6  73.8  76.8 R  77.3 High     Lymphocyte %   19.6 - 45.3 % 19.2 Low   21.7 R  13.0 Low   14.6 Low   12.2 Low   10.5 Low  R  11.0 Low     Monocyte %   5.0 - 12.0 % 9.5  13.2 R  10.7  9.5  10.3  9.6 R  9.1    Eosinophil %   0.3 - 6.2 % 2.2  3.0 R  0.9  2.0  1.8  1.8 R  1.5    Basophil %   0.0 - 1.5 % 0.6  0.9 R  0.8  0.9  0.9  0.8 R  0.7    Immature Grans %   0.0 - 0.5 % 0.5  0.4 R  0.6 High   0.4  1.0 High   0.5 R  0.4    Neutrophils, Absolute   1.70 - 7.00 10*3/mm3 6.54  5.43 R  7.83 High   8.21 High   9.26 High   8.15 R  9.21 High     Lymphocytes, Absolute   0.70 - 3.10 10*3/mm3 1.85  1.94 R  1.38  1.65  1.53  1.12 R  1.31    Monocytes, Absolute   0.10 - 0.90 10*3/mm3 0.91 High   1.18 R  1.13 High   1.07 High   1.29 High   1.02 R  1.08 High     Eosinophils, Absolute   0.00 - 0.40 10*3/mm3 0.21  0.27 R  0.10  0.23                                ASSESSMENT/PLAN: 1. This patient has history of polycythemia vera JAK2 mutation positive. He has had multiple phlebotomies in the past and this has produced a drop in the MCV. Now the patient is not requiring phlebotomies as often as before and is related to the mild iron deficiency that has been produced by the phlebotomy    I reviewed the patient on 08/24/2021. He has no symptoms pertinent to polycythemia, his hematocrit is 46 but I think it is okay to let it go until he has to come back in a couple of months to have another CBC and possibly he will require phlebotomy then. Otherwise I will review him back in 4 months.   I  reviewed the patient on 12/15/2021. Since the previous visit he has not had any episodes of bleeding or thrombosis. His hematocrit today is 47 and he will proceed with a phlebotomy today of 500 mL. He will return in 2 and 4 months for CBC and phlebotomy if hematocrit is above 45. I will review him back in 4 months.      2.The patient has minimal leukocytosis with a normal white count differential. This has been present since the time of the diagnosis of his myeloproliferative disorder, he is BCR/ABL negative. This will be watched in absence of any other intervention.       3.During the previous visit the patient was very concerned about the possibility of prostate cancer. He was seen by Dr. Ganesh Lopez. An MRI of the prostate was performed that disclosed no abnormalities. No biopsy was necessary and he will return to see Dr. Lopez again in 6 months.    4.The patient has history of very early Stage I kidney cancer status post Nephron-sparing partial nephrectomy. The patient has not had any issues pertinent to this. He never required any form of adjuvant therapy and he will remain to be watched in absence of any other intervention.

## 2022-02-16 ENCOUNTER — LAB (OUTPATIENT)
Dept: LAB | Facility: HOSPITAL | Age: 77
End: 2022-02-16

## 2022-02-16 ENCOUNTER — INFUSION (OUTPATIENT)
Dept: ONCOLOGY | Facility: HOSPITAL | Age: 77
End: 2022-02-16

## 2022-02-16 DIAGNOSIS — D47.1 MYELOPROLIFERATIVE DISORDER: ICD-10-CM

## 2022-02-16 DIAGNOSIS — C64.1 CANCER OF RIGHT KIDNEY: ICD-10-CM

## 2022-02-16 DIAGNOSIS — D75.1 ERYTHROCYTOSIS: ICD-10-CM

## 2022-02-16 LAB
BASOPHILS # BLD AUTO: 0.07 10*3/MM3 (ref 0–0.2)
BASOPHILS NFR BLD AUTO: 0.8 % (ref 0–1.5)
DEPRECATED RDW RBC AUTO: 44.4 FL (ref 37–54)
EOSINOPHIL # BLD AUTO: 0.16 10*3/MM3 (ref 0–0.4)
EOSINOPHIL NFR BLD AUTO: 1.9 % (ref 0.3–6.2)
ERYTHROCYTE [DISTWIDTH] IN BLOOD BY AUTOMATED COUNT: 14.6 % (ref 12.3–15.4)
HCT VFR BLD AUTO: 43.1 % (ref 37.5–51)
HGB BLD-MCNC: 13.9 G/DL (ref 13–17.7)
IMM GRANULOCYTES # BLD AUTO: 0.03 10*3/MM3 (ref 0–0.05)
IMM GRANULOCYTES NFR BLD AUTO: 0.4 % (ref 0–0.5)
LYMPHOCYTES # BLD AUTO: 1.51 10*3/MM3 (ref 0.7–3.1)
LYMPHOCYTES NFR BLD AUTO: 18 % (ref 19.6–45.3)
MCH RBC QN AUTO: 27.1 PG (ref 26.6–33)
MCHC RBC AUTO-ENTMCNC: 32.3 G/DL (ref 31.5–35.7)
MCV RBC AUTO: 84.2 FL (ref 79–97)
MONOCYTES # BLD AUTO: 1.07 10*3/MM3 (ref 0.1–0.9)
MONOCYTES NFR BLD AUTO: 12.7 % (ref 5–12)
NEUTROPHILS NFR BLD AUTO: 5.56 10*3/MM3 (ref 1.7–7)
NEUTROPHILS NFR BLD AUTO: 66.2 % (ref 42.7–76)
NRBC BLD AUTO-RTO: 0 /100 WBC (ref 0–0.2)
PLATELET # BLD AUTO: 228 10*3/MM3 (ref 140–450)
PMV BLD AUTO: 9.1 FL (ref 6–12)
RBC # BLD AUTO: 5.12 10*6/MM3 (ref 4.14–5.8)
WBC NRBC COR # BLD: 8.4 10*3/MM3 (ref 3.4–10.8)

## 2022-02-16 PROCEDURE — 85025 COMPLETE CBC W/AUTO DIFF WBC: CPT

## 2022-02-16 PROCEDURE — 36415 COLL VENOUS BLD VENIPUNCTURE: CPT

## 2022-02-16 NOTE — NURSING NOTE
Pt presents for phlebo for HCT > 45 today. Per treatment parameters, no phlebo needed. Copy of labs given and pt v/u.    Lab Results   Component Value Date    WBC 8.40 02/16/2022    HGB 13.9 02/16/2022    HCT 43.1 02/16/2022    MCV 84.2 02/16/2022     02/16/2022

## 2022-03-01 DIAGNOSIS — I10 ESSENTIAL HYPERTENSION: ICD-10-CM

## 2022-03-01 RX ORDER — LOSARTAN POTASSIUM 25 MG/1
TABLET ORAL
Qty: 180 TABLET | Refills: 1 | Status: SHIPPED | OUTPATIENT
Start: 2022-03-01 | End: 2023-02-23

## 2022-04-19 ENCOUNTER — APPOINTMENT (OUTPATIENT)
Dept: ONCOLOGY | Facility: HOSPITAL | Age: 77
End: 2022-04-19

## 2022-04-19 ENCOUNTER — LAB (OUTPATIENT)
Dept: LAB | Facility: HOSPITAL | Age: 77
End: 2022-04-19

## 2022-04-19 ENCOUNTER — OFFICE VISIT (OUTPATIENT)
Dept: ONCOLOGY | Facility: CLINIC | Age: 77
End: 2022-04-19

## 2022-04-19 VITALS
TEMPERATURE: 97.5 F | DIASTOLIC BLOOD PRESSURE: 78 MMHG | SYSTOLIC BLOOD PRESSURE: 119 MMHG | WEIGHT: 167.3 LBS | OXYGEN SATURATION: 98 % | HEART RATE: 68 BPM | BODY MASS INDEX: 25.36 KG/M2 | HEIGHT: 68 IN

## 2022-04-19 DIAGNOSIS — D75.1 ERYTHROCYTOSIS: ICD-10-CM

## 2022-04-19 DIAGNOSIS — C64.1 CANCER OF RIGHT KIDNEY: Primary | ICD-10-CM

## 2022-04-19 DIAGNOSIS — C64.1 CANCER OF RIGHT KIDNEY: ICD-10-CM

## 2022-04-19 DIAGNOSIS — D47.1 MYELOPROLIFERATIVE DISORDER: ICD-10-CM

## 2022-04-19 DIAGNOSIS — I73.00 RAYNAUD'S DISEASE WITHOUT GANGRENE: ICD-10-CM

## 2022-04-19 LAB
BASOPHILS # BLD AUTO: 0.06 10*3/MM3 (ref 0–0.2)
BASOPHILS NFR BLD AUTO: 0.6 % (ref 0–1.5)
DEPRECATED RDW RBC AUTO: 45.6 FL (ref 37–54)
EOSINOPHIL # BLD AUTO: 0.17 10*3/MM3 (ref 0–0.4)
EOSINOPHIL NFR BLD AUTO: 1.8 % (ref 0.3–6.2)
ERYTHROCYTE [DISTWIDTH] IN BLOOD BY AUTOMATED COUNT: 15.1 % (ref 12.3–15.4)
HCT VFR BLD AUTO: 45.9 % (ref 37.5–51)
HGB BLD-MCNC: 14.4 G/DL (ref 13–17.7)
IMM GRANULOCYTES # BLD AUTO: 0.08 10*3/MM3 (ref 0–0.05)
IMM GRANULOCYTES NFR BLD AUTO: 0.9 % (ref 0–0.5)
LYMPHOCYTES # BLD AUTO: 1.5 10*3/MM3 (ref 0.7–3.1)
LYMPHOCYTES NFR BLD AUTO: 16.1 % (ref 19.6–45.3)
MCH RBC QN AUTO: 26.5 PG (ref 26.6–33)
MCHC RBC AUTO-ENTMCNC: 31.4 G/DL (ref 31.5–35.7)
MCV RBC AUTO: 84.5 FL (ref 79–97)
MONOCYTES # BLD AUTO: 0.79 10*3/MM3 (ref 0.1–0.9)
MONOCYTES NFR BLD AUTO: 8.5 % (ref 5–12)
NEUTROPHILS NFR BLD AUTO: 6.73 10*3/MM3 (ref 1.7–7)
NEUTROPHILS NFR BLD AUTO: 72.1 % (ref 42.7–76)
NRBC BLD AUTO-RTO: 0 /100 WBC (ref 0–0.2)
PLATELET # BLD AUTO: 238 10*3/MM3 (ref 140–450)
PMV BLD AUTO: 9.2 FL (ref 6–12)
RBC # BLD AUTO: 5.43 10*6/MM3 (ref 4.14–5.8)
WBC NRBC COR # BLD: 9.33 10*3/MM3 (ref 3.4–10.8)

## 2022-04-19 PROCEDURE — 85025 COMPLETE CBC W/AUTO DIFF WBC: CPT

## 2022-04-19 PROCEDURE — 36415 COLL VENOUS BLD VENIPUNCTURE: CPT

## 2022-04-19 PROCEDURE — 99214 OFFICE O/P EST MOD 30 MIN: CPT | Performed by: INTERNAL MEDICINE

## 2022-04-19 NOTE — PROGRESS NOTES
REASONS FOR FOLLOWUP:       1. Myeloproliferative disorder with polycythemia vera, MAI-2 mutation positive requiring periodic phlebotomies whenever hematocrit is above 45.      2. His  tory of stage I clear cell carcinoma of the right kidney status post partial nephrectomy, nephron sparing surgery by Dr. Ganesh Lopez with no issues or consequences.               3. Patient has history of polymyalgia rheumatica.  He IS ON  prednisone LOW DOSE          HISTORY OF PRESENT ILLNESS    DURING THE VISIT WITH THE PATIENT TODAY , PATIENT HAD FACE MASK, MY MEDICAL ASSISTANT AND I  HAD PROPPER PROTECTIVE EQUIPMENT, AND I DID HAND HYGIENE WITH SOAP AND WATER BEFORE AND AFTER THE VISIT.      This patient returns today to the office for follow up. Since the previous visit his mother  in 2022 at the age of 96 with ovarian cancer that was diagnosed 2 weeks before her death. He has already taken care of all of her estate and this time has been very stressful to him. Overall he has coped well with this situation. The patient's situation is now better in regard to his overall health with the exception of arthritis in the knees and hands that has obligated him to quit playing tennis. He remains physically very active though walking and doing many other things. His appetite and weight are good. His bowel activity is normal. Urination with occasional incontinence, no hematuria and a PSA that remains steady at 11 with no evidence of malignancy in the prostate whatsoever. He has seen the urologist for this. He denies any new cardiovascular or respiratory problems. He has upper respiratory allergies. His allergist asked him to remain on a low dose prednisone.           Past Medical History:   Diagnosis Date   • Acquired hypothyroidism    • Anxiety    • Aortic aneurysm (HCC)    • Arthritis    • Asthma    • Baker's cyst of knee, right    • Disease of thyroid gland    • Gout    • H/O Muscle pain     Right shoulder and  neck area   • H/O Radiation treatment     For tonsillar inflammation and infection when he was a child and this led him to develop thyroid cancer.   • History of colon polyps    • Hypertension    • Hyperthyroidism    • Hypothyroidism    • Kidney carcinoma (HCC)     H/O stage I clear cell carcinoma of the right kidney, status post partial nephrectomy, nephron-sparing surgery by Dr. Ganesh Lopez   • Left shoulder pain    • Myeloproliferative disorder (HCC)     With polycythemia vera, JAK2 mutation positive requiring periodic phlebotomies (hematocrit above 47).   • Polymyalgia rheumatica (HCC)    • Premature atrial contraction    • Prostate cancer (HCC)    • Raynaud disease    • Shoulder injury, left, sequela      Past Surgical History:   Procedure Laterality Date   • CIRCUMCISION N/A 12/17/2018    Procedure: CIRCUMCISION;  Surgeon: Gallo Lopez MD;  Location: [facility];  Service: Urology   • COLONOSCOPY  2010    Documented a tubulovillous adenom that was removed completely. Colonoscopy in 2014 was unremarkable.   • COLONOSCOPY N/A 2/20/2020    Procedure: COLONOSCOPY;  Surgeon: Claire Galo MD;  Location: [facility];  Service: Gastroenterology;  Laterality: N/A;  diverticulosis   • HERNIA REPAIR      H/O multiple hernia repairs including right inguinal hernia repair in 1981, 2003, and double hernia repair in 2013   • KIDNEY SURGERY     • PROSTATE BIOPSY      Showed features consistent with prostate cancer   • SHOULDER SURGERY  1995    Shoulder repair   • THYROIDECTOMY, PARTIAL  1983    For malignant tumor   • TOOTH EXTRACTION       Social History     Social History Narrative    He is a very avid  and plays violin in an orchestra. He does not smoke but had a lot of second-hand smoke through his life.     Family History   Problem Relation Age of Onset   • No Known Problems Mother    • Heart attack Father    • Heart disease Father    • Hypertension Father    • Diabetes Father    •  "Tuberculosis Maternal Grandmother    • No Known Problems Son    • Drug abuse Son    • Malig Hyperthermia Neg Hx      Allergies   Allergen Reactions   • Diphenhydramine Rash     HEART PALPITATIONS, RASH   • Statins Myalgia     MUSCLE ACHES   • Aspirin Unknown - Low Severity     PT CANNOT REMEMBER   • Latex Rash   • Peanut Oil Unknown - Low Severity     PT CANNOT REMEMBER   • Shellfish-Derived Products Unknown - Low Severity     PT CANNOT REMEMBER     Current Outpatient Medications on File Prior to Visit   Medication Sig Dispense Refill   • ALPRAZolam (XANAX) 0.5 MG tablet Take 0.5 mg by mouth At Night As Needed.     • amLODIPine (NORVASC) 5 MG tablet Take 1 tablet by mouth Daily.     • B Complex Vitamins (VITAMIN B-COMPLEX PO) Take 1 tablet by mouth Daily.     • levothyroxine (SYNTHROID, LEVOTHROID) 100 MCG tablet Take 1 tablet by mouth daily. 30 tablet 6   • losartan (COZAAR) 25 MG tablet TAKE ONE TABLET BY MOUTH TWICE A  tablet 1   • montelukast (SINGULAIR) 10 MG tablet Take 10 mg by mouth Every Night.     • predniSONE (DELTASONE) 1 MG tablet      • zolpidem (AMBIEN) 10 MG tablet TAKE ONE-HALF TO ONE TABLET BY MOUTH ONCE NIGHTLY AS NEEDED FOR SLEEP  **MUST LAST 30 DAYS     • amLODIPine (NORVASC) 5 MG tablet Take 5 mg by mouth Daily.     • predniSONE (DELTASONE) 2.5 MG tablet Take 1 mg by mouth Daily.       No current facility-administered medications on file prior to visit.               VITAL SIGNS:   Vitals:    04/19/22 1537   Height: 172.7 cm (67.99\")            PHYSICAL EXAMINATION:   I HAVE PERSONALLY REVIEWED THE HISTORY OF THE PRESENT ILLNESS, PAST MEDICAL HISTORY, FAMILY HISTORY, SOCIAL HISTORY, ALLERGIES, MEDICATIONS STATED ABOVE IN THE  NOTE FROM TODAY.        GENERAL:  Well-developed, well-nourished  Patient  in no acute distress.   SKIN:  Warm, dry ,NO purpura ,NO petechiae, no rash.  HEENT:  Pupils were equal and reactive to light and accomodation, conjunctivae noninjected, no pterygium, normal " extraocular movements, normal visual acuity.   NECK:  Supple with good range of motion; no thyromegaly , no other masses, no JVD or bruits,.No carotid artery pain, no carotid abnormal pulsation , NO arterial dance.  LYMPHATICS:  No cervical, NO supraclavicular, NO axillary,NO epitrochlear , NO inguinal adenopathies.  CARDIAC   normal rate , regular rhythm, without murmur,NO rubs NO S3 NO S4   LUNGS: normal breath sounds bilateral, no wheezing, NO rhonchi, NO crackles ,NO rubs.  VASCULAR VENOUS: no cyanosis, NO collateral circulation, NO varicosities, NO edema, NO palpable cords, NO pain,NO erythema, NO pigmentation of the skin.  ABDOMEN:  Soft, NO pain,no hepatomegaly, no splenomegaly,no masses, no ascites, no collateral circulation,no distention,no [place] sign.  EXTREMITIES  AND SPINE:  No clubbing, no cyanosis ,OA HANDS deformities ,  pain .No kyphosis,  no pain in spine, no pain in ribs , no pain in pelvic bone.  NEUROLOGICAL:  Patient was awake, alert, oriented to time, person and place.                  LABORATORY DATA:    Lab Results   Component Value Date    WBC 9.33 04/19/2022    HGB 14.4 04/19/2022    HCT 45.9 04/19/2022    MCV 84.5 04/19/2022     04/19/2022                           ASSESSMENT/PLAN: 1. This patient has history of polycythemia vera JAK2 mutation positive. He has had multiple phlebotomies in the past and this has produced a drop in the MCV. Now the patient is not requiring phlebotomies as often as before and is related to the mild iron deficiency that has been produced by the phlebotomy    I reviewed the patient on 08/24/2021. He has no symptoms pertinent to polycythemia, his hematocrit is 46 but I think it is okay to let it go until he has to come back in a couple of months to have another CBC and possibly he will require phlebotomy then. Otherwise I will review him back in 4 months.   I reviewed the patient on 12/15/2021. Since the previous visit he has not had any episodes of  bleeding or thrombosis. His hematocrit today is 47 and he will proceed with a phlebotomy today of 500 mL. He will return in 2 and 4 months for CBC and phlebotomy if hematocrit is above 45. I will review him back in 4 months.  The patient on 04/19/2022 has no symptoms related to his polycythemia vera. His hematocrit is 47. I think it will be okay to forego any phlebotomy today. I would like for him to return every 3 months for a CBC and phlebotomy if hematocrit is above 49. The patient's white count is normal, the patient's platelet count is normal. He is not requiring any medicine otherwise to control this issue and he has taken Hydrea in the past. He refused to further continue taking this medicine.         2.The patient has minimal leukocytosis with a normal white count differential. This has been present since the time of the diagnosis of his myeloproliferative disorder, he is BCR/ABL negative. This will be watched in absence of any other intervention.     On 04/18/2022 the patient's white count is normal, the white count differential is normal. No issues pertinent to this at this point.     ·   3.During the previous visit the patient was very concerned about the possibility of prostate cancer. He was seen by Dr. Ganesh Lopez. An MRI of the prostate was performed that disclosed no abnormalities. No biopsy was necessary and he will return to see Dr. Lopez again in 6 months.  On 04/19/2022 the patient's PSA remains at 11. He has had further radiological assessment by Ganesh Lopez MD, no prostate cancer has been found. He is not going to have any prostate biopsies unless the PSA further changes. He has no major symptoms related to an elevated PSA besides some prostatism.     ·   4.The patient has history of very early Stage I kidney cancer status post Nephron-sparing partial nephrectomy. The patient has not had any issues pertinent to this. He never required any form of adjuvant therapy and he will remain to  be watched in absence of any other intervention.  The relationship with his previous history of kidney cancer, the patient has continued follow up with Ganesh Lopez MD who has monitored him on clinical grounds and radiologically. Today on 04/19/2022 he has no symptoms or signs of kidney cancer recurrence.     RECOMMENDATIONS: As posted above. The patient will return in 3 and 6 months for CBC and phlebotomy if hematocrit is above 49. We will continue monitoring white count and platelet count. I will review him back on clinical grounds in 6 months.     ·

## 2022-05-23 DIAGNOSIS — I71.20 THORACIC AORTIC ANEURYSM WITHOUT RUPTURE: Primary | ICD-10-CM

## 2022-06-14 ENCOUNTER — HOSPITAL ENCOUNTER (OUTPATIENT)
Dept: CT IMAGING | Facility: HOSPITAL | Age: 77
Discharge: HOME | End: 2022-06-14
Admitting: NURSE PRACTITIONER

## 2022-06-14 DIAGNOSIS — I71.20 THORACIC AORTIC ANEURYSM WITHOUT RUPTURE: ICD-10-CM

## 2022-06-14 PROCEDURE — 71250 CT THORAX DX C-: CPT

## 2022-07-12 ENCOUNTER — APPOINTMENT (OUTPATIENT)
Dept: ONCOLOGY | Facility: HOSPITAL | Age: 77
End: 2022-07-12

## 2022-07-12 ENCOUNTER — APPOINTMENT (OUTPATIENT)
Dept: LAB | Facility: HOSPITAL | Age: 77
End: 2022-07-12

## 2022-07-14 ENCOUNTER — LAB (OUTPATIENT)
Dept: LAB | Facility: HOSPITAL | Age: 77
End: 2022-07-14

## 2022-07-14 ENCOUNTER — INFUSION (OUTPATIENT)
Dept: ONCOLOGY | Facility: HOSPITAL | Age: 77
End: 2022-07-14

## 2022-07-14 VITALS
BODY MASS INDEX: 25.79 KG/M2 | RESPIRATION RATE: 16 BRPM | SYSTOLIC BLOOD PRESSURE: 125 MMHG | TEMPERATURE: 97.8 F | OXYGEN SATURATION: 99 % | DIASTOLIC BLOOD PRESSURE: 78 MMHG | HEART RATE: 56 BPM | WEIGHT: 169.6 LBS

## 2022-07-14 DIAGNOSIS — D47.1 MYELOPROLIFERATIVE DISORDER: ICD-10-CM

## 2022-07-14 DIAGNOSIS — D75.1 ERYTHROCYTOSIS: ICD-10-CM

## 2022-07-14 DIAGNOSIS — I73.00 RAYNAUD'S DISEASE WITHOUT GANGRENE: ICD-10-CM

## 2022-07-14 DIAGNOSIS — C64.1 CANCER OF RIGHT KIDNEY: ICD-10-CM

## 2022-07-14 LAB
BASOPHILS # BLD AUTO: 0.11 10*3/MM3 (ref 0–0.2)
BASOPHILS NFR BLD AUTO: 1.1 % (ref 0–1.5)
DEPRECATED RDW RBC AUTO: 46.2 FL (ref 37–54)
EOSINOPHIL # BLD AUTO: 0.26 10*3/MM3 (ref 0–0.4)
EOSINOPHIL NFR BLD AUTO: 2.6 % (ref 0.3–6.2)
ERYTHROCYTE [DISTWIDTH] IN BLOOD BY AUTOMATED COUNT: 15 % (ref 12.3–15.4)
HCT VFR BLD AUTO: 47.5 % (ref 37.5–51)
HGB BLD-MCNC: 15.3 G/DL (ref 13–17.7)
IMM GRANULOCYTES # BLD AUTO: 0.07 10*3/MM3 (ref 0–0.05)
IMM GRANULOCYTES NFR BLD AUTO: 0.7 % (ref 0–0.5)
LYMPHOCYTES # BLD AUTO: 1.75 10*3/MM3 (ref 0.7–3.1)
LYMPHOCYTES NFR BLD AUTO: 17.4 % (ref 19.6–45.3)
MCH RBC QN AUTO: 27.1 PG (ref 26.6–33)
MCHC RBC AUTO-ENTMCNC: 32.2 G/DL (ref 31.5–35.7)
MCV RBC AUTO: 84.2 FL (ref 79–97)
MONOCYTES # BLD AUTO: 1.27 10*3/MM3 (ref 0.1–0.9)
MONOCYTES NFR BLD AUTO: 12.6 % (ref 5–12)
NEUTROPHILS NFR BLD AUTO: 6.62 10*3/MM3 (ref 1.7–7)
NEUTROPHILS NFR BLD AUTO: 65.6 % (ref 42.7–76)
NRBC BLD AUTO-RTO: 0 /100 WBC (ref 0–0.2)
PLATELET # BLD AUTO: 228 10*3/MM3 (ref 140–450)
PMV BLD AUTO: 9.5 FL (ref 6–12)
RBC # BLD AUTO: 5.64 10*6/MM3 (ref 4.14–5.8)
WBC NRBC COR # BLD: 10.08 10*3/MM3 (ref 3.4–10.8)

## 2022-07-14 PROCEDURE — 36415 COLL VENOUS BLD VENIPUNCTURE: CPT

## 2022-07-14 PROCEDURE — 99195 PHLEBOTOMY: CPT

## 2022-07-14 PROCEDURE — 85025 COMPLETE CBC W/AUTO DIFF WBC: CPT

## 2022-08-09 ENCOUNTER — OFFICE VISIT (OUTPATIENT)
Dept: CARDIAC SURGERY | Facility: CLINIC | Age: 77
End: 2022-08-09

## 2022-08-09 VITALS
WEIGHT: 165 LBS | RESPIRATION RATE: 20 BRPM | HEIGHT: 68 IN | SYSTOLIC BLOOD PRESSURE: 132 MMHG | BODY MASS INDEX: 25.01 KG/M2 | OXYGEN SATURATION: 99 % | HEART RATE: 75 BPM | DIASTOLIC BLOOD PRESSURE: 85 MMHG | TEMPERATURE: 97.3 F

## 2022-08-09 DIAGNOSIS — I77.810 DILATED AORTIC ROOT: ICD-10-CM

## 2022-08-09 DIAGNOSIS — I10 PRIMARY HYPERTENSION: ICD-10-CM

## 2022-08-09 DIAGNOSIS — I71.20 THORACIC AORTIC ANEURYSM WITHOUT RUPTURE: Primary | ICD-10-CM

## 2022-08-09 PROCEDURE — 99214 OFFICE O/P EST MOD 30 MIN: CPT | Performed by: NURSE PRACTITIONER

## 2022-08-09 NOTE — PROGRESS NOTES
2022      Subjective:      Mariah Delgado MD    Chief Complaint: follow up aortic root aneurysm and ascending aortic ectasia    History of Present Illness:       Dear Mariah Lyle MD and Colleagues,    It was nice to see Scott Murphy in [facility] for follow-up. He is a 77 y.o. male with a history of aortic root aneurysm, ascending aortic ectasia, as well as myeloproliferative disorder with polycythemia vera, MAI-2 mutation positive, polymyalgia rheumatica with chronic low dose steroid use, Raynaud's syndrome, prostate cancer, history of renal cell carcinoma and partial right nephrectomy, hypothyroidism, and HTN.  His aortic issue was originally discovered while being worked up for an intermittent cough.  He comes in today for routine follow-up for aneurysm surveillence.  The CT of chest without contrast on 2022 was reviewed.  The aortic root measures 4.2 cm, ascending aorta measures 4.2 cm, and DTA measures 2.8 cm.  These measurements are stable.  The slight variations are likely d/t contrasted vs non-contrasted studies.  Transthoracic echo in 2022 showed EF 66-70%, LVH, mild aortic regurgitation, and aortic root was noted to be 4.6 cm on echo with prior measurement being 4.3 cm.  He denies shortness of breath, chest/back pain, numbness/tingling/pain of extremities.  No vascular deficiencies or hyperextensible or hypermobile joints are noted on exam.  The patient's family history is negative for aneurysms or dissections, negative for connective tissue disease, and positive for coronary disease in first degree family members.  His father  at age 60 with heart disease.  He has never smoked.  His BP today is 132/85.  He continues to play tennis weekly although this activity has slowed d/t left rib discomfort.  He has discussed this with his PCP.  He reports he has been vaccinated and boosted x2 for COVID-19.  He continues to follow with Dr. Damon.  He is alone for his  appt.        Patient Active Problem List   Diagnosis   • Primary hypertension   • Erythrocytosis   • Cancer of right kidney (HCC)   • Prostate cancer (HCC)   • RBBB   • PMR (polymyalgia rheumatica) (HCC)   • Episodic lightheadedness   • Raynaud's disease without gangrene   • Myeloproliferative disorder (HCC)   • Thoracic aortic aneurysm without rupture (HCC)   • Colon cancer screening   • Right shoulder pain   • Right shoulder injury   • Hyperlipidemia   • Hyperbilirubinemia   • Dilated aortic root (HCC)   • Cough   • Asthma   • Anxiety   • Malignant neoplasm of kidney (HCC)   • Erythrocytosis   • Acquired hypothyroidism   • Neck pain   • Hypertension   • Acute URI   • Chest pain   • Hyperkalemia       Past Medical History:   Diagnosis Date   • Acquired hypothyroidism    • Anxiety    • Aortic aneurysm (HCC)    • Arthritis    • Asthma    • Baker's cyst of knee, right    • Disease of thyroid gland    • Gout    • H/O Muscle pain     Right shoulder and neck area   • H/O Radiation treatment     For tonsillar inflammation and infection when he was a child and this led him to develop thyroid cancer.   • History of colon polyps    • Hypertension    • Hyperthyroidism    • Hypothyroidism    • Kidney carcinoma (HCC)     H/O stage I clear cell carcinoma of the right kidney, status post partial nephrectomy, nephron-sparing surgery by Dr. Ganesh Lopez   • Left shoulder pain    • Myeloproliferative disorder (HCC)     With polycythemia vera, JAK2 mutation positive requiring periodic phlebotomies (hematocrit above 47).   • Polymyalgia rheumatica (HCC)    • Premature atrial contraction    • Prostate cancer (HCC)    • Raynaud disease    • Shoulder injury, left, sequela        Past Surgical History:   Procedure Laterality Date   • CIRCUMCISION N/A 12/17/2018    Procedure: CIRCUMCISION;  Surgeon: Gallo Lopez MD;  Location: [facility];  Service: Urology   • COLONOSCOPY  2010    Documented a tubulovillous adenom that was  removed completely. Colonoscopy in 2014 was unremarkable.   • COLONOSCOPY N/A 2/20/2020    Procedure: COLONOSCOPY;  Surgeon: Claire Galo MD;  Location: [facility];  Service: Gastroenterology;  Laterality: N/A;  diverticulosis   • HERNIA REPAIR      H/O multiple hernia repairs including right inguinal hernia repair in 1981, 2003, and double hernia repair in 2013   • KIDNEY SURGERY     • PROSTATE BIOPSY      Showed features consistent with prostate cancer   • SHOULDER SURGERY  1995    Shoulder repair   • THYROIDECTOMY, PARTIAL  1983    For malignant tumor   • TOOTH EXTRACTION         Allergies   Allergen Reactions   • Diphenhydramine Rash     HEART PALPITATIONS, RASH   • Statins Myalgia     MUSCLE ACHES   • Aspirin Unknown - Low Severity     PT CANNOT REMEMBER   • Latex Rash   • Peanut Oil Unknown - Low Severity     PT CANNOT REMEMBER   • Shellfish-Derived Products Unknown - Low Severity     PT CANNOT REMEMBER         Current Outpatient Medications:   •  ALPRAZolam (XANAX) 0.5 MG tablet, Take 0.5 mg by mouth At Night As Needed., Disp: , Rfl:   •  amLODIPine (NORVASC) 5 MG tablet, Take 1 tablet by mouth Daily., Disp: , Rfl:   •  B Complex Vitamins (VITAMIN B-COMPLEX PO), Take 1 tablet by mouth Daily., Disp: , Rfl:   •  levothyroxine (SYNTHROID, LEVOTHROID) 100 MCG tablet, Take 1 tablet by mouth daily., Disp: 30 tablet, Rfl: 6  •  losartan (COZAAR) 25 MG tablet, TAKE ONE TABLET BY MOUTH TWICE A DAY (Patient taking differently: Daily.), Disp: 180 tablet, Rfl: 1  •  montelukast (SINGULAIR) 10 MG tablet, Take 10 mg by mouth Every Night., Disp: , Rfl:   •  predniSONE (DELTASONE) 1 MG tablet, Daily., Disp: , Rfl:   •  zolpidem (AMBIEN) 10 MG tablet, TAKE ONE-HALF TO ONE TABLET BY MOUTH ONCE NIGHTLY AS NEEDED FOR SLEEP  **MUST LAST 30 DAYS, Disp: , Rfl:     Social History     Socioeconomic History   • Marital status:    • Years of education: College   Tobacco Use   • Smoking status: Never Smoker   • Smokeless  tobacco: Never Used   • Tobacco comment: caffeine use    Vaping Use   • Vaping Use: Never used   Substance and Sexual Activity   • Alcohol use: Yes     Alcohol/week: 1.0 - 3.0 standard drink     Types: 1 - 3 Glasses of wine per week     Comment: 1-3 glasses of wine a week   • Drug use: No   • Sexual activity: Defer       Family History   Problem Relation Age of Onset   • No Known Problems Mother    • Heart attack Father    • Heart disease Father    • Hypertension Father    • Diabetes Father    • Tuberculosis Maternal Grandmother    • No Known Problems Son    • Drug abuse Son    • Malig Hyperthermia Neg Hx            Review of Systems:  Review of Systems   Constitutional: Positive for fatigue.   Respiratory: Negative for shortness of breath.    Cardiovascular: Negative for chest pain and leg swelling.   Gastrointestinal: Negative for abdominal pain.   Musculoskeletal: Positive for arthralgias.   Skin: Negative for rash and wound.   Neurological: Negative for dizziness, weakness and light-headedness.       Physical Exam:    Vital Signs:  Weight: 74.8 kg (165 lb)   Body mass index is 25.09 kg/m².  Temp: 97.3 °F (36.3 °C)   Heart Rate: 75   BP: 132/85     Physical Exam  Vitals and nursing note reviewed.   Constitutional:       General: He is awake.      Appearance: Normal appearance. He is well-developed and well-groomed.   HENT:      Head: Normocephalic and atraumatic.      Nose: Nose normal.      Mouth/Throat:      Lips: Pink.      Mouth: Mucous membranes are moist.      Pharynx: Uvula midline.   Eyes:      General: Lids are normal. No scleral icterus.     Extraocular Movements: Extraocular movements intact.      Conjunctiva/sclera: Conjunctivae normal.      Pupils: Pupils are equal, round, and reactive to light.      Comments: Wearing glasses   Neck:      Thyroid: No thyroid mass or thyromegaly.      Vascular: Normal carotid pulses. No carotid bruit, hepatojugular reflux or JVD.      Trachea: Trachea normal.    Cardiovascular:      Rate and Rhythm: Normal rate and regular rhythm.      Pulses:           Carotid pulses are 2+ on the right side and 2+ on the left side.       Radial pulses are 2+ on the right side and 2+ on the left side.        Femoral pulses are 2+ on the right side and 2+ on the left side.       Popliteal pulses are 2+ on the right side and 2+ on the left side.        Dorsalis pedis pulses are 2+ on the right side and 2+ on the left side.        Posterior tibial pulses are 2+ on the right side and 2+ on the left side.      Heart sounds: Normal heart sounds. No murmur heard.  Pulmonary:      Effort: Pulmonary effort is normal.      Breath sounds: Normal breath sounds.   Chest:   Breasts:      Right: No supraclavicular adenopathy.      Left: No supraclavicular adenopathy.       Abdominal:      General: Bowel sounds are normal. There is no distension.      Palpations: Abdomen is soft.      Tenderness: There is no abdominal tenderness.   Musculoskeletal:      Cervical back: Neck supple.      Right lower leg: No edema.      Left lower leg: No edema.      Comments: Gait steady and strong without use of assistive devices   Lymphadenopathy:      Head:      Right side of head: No submental, submandibular, tonsillar, preauricular, posterior auricular or occipital adenopathy.      Left side of head: No submental, submandibular, tonsillar, preauricular, posterior auricular or occipital adenopathy.      Cervical: No cervical adenopathy.      Upper Body:      Right upper body: No supraclavicular adenopathy.      Left upper body: No supraclavicular adenopathy.   Skin:     General: Skin is warm and dry.      Capillary Refill: Capillary refill takes less than 2 seconds.      Findings: No erythema or rash.      Nails: There is no clubbing.   Neurological:      Mental Status: He is alert and oriented to person, place, and time.      GCS: GCS eye subscore is 4. GCS verbal subscore is 5. GCS motor subscore is 6.    Psychiatric:         Attention and Perception: Attention and perception normal.         Mood and Affect: Mood and affect normal.         Speech: Speech normal.         Behavior: Behavior normal. Behavior is cooperative.         Thought Content: Thought content normal.         Cognition and Memory: Cognition and memory normal.         Judgment: Judgment normal.          Assessment:     1.  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  Ascending aortic aneurysm, 4.2 cm--stable  3.  HTN--stable  4.  Right renal cancer, status post nephrectomy--per heme-onc  5.  Myeloproliferative disorder with polycythemia--per heme-onc  6.  MAI-2 mutation positive--per heme-onc  7.  Polymyalgia rheumatica with chronic steroid use--per Rheumatology    Recommendation/Plan:       Continued risk factor modification of hypertension with a goal blood pressure less than 130/80, hyperlipidemia optimization, and continued avoidance of tobacco/nicotine products.    We discussed the importance of avoidance of over the counter decongestants and stimulants, specifically pseudoephedrine, for hypertension and aneurysm management.    Initiation of low aerobic exercise is indicated to help reduce body habitus, assist with blood pressure and cholesterol control.       Although risk of rupture is low, emergency department presentation is warranted for acute chest, back, or abdominal pain, syncope, or stroke like symptoms.    Follow up in [facility] in 1 year(s) with CT scan of chest without contrast.  His aortic measurements are stable.    I spent approximately 30 minutes total in evaluating the data, examining the patient, discussing the options and counseling.  Independent interpretation of imaging.  Imaging shown to pt.     Advance Care Planning   ACP discussion was held with the patient during this visit. Patient has an advance directive (not in EMR), copy requested.       Thank you for allowing  us to participate in his care.    Regards,    Mehnaz Joel, APRN

## 2022-09-29 ENCOUNTER — OFFICE VISIT (OUTPATIENT)
Dept: CARDIOLOGY | Facility: CLINIC | Age: 77
End: 2022-09-29

## 2022-09-29 VITALS
HEIGHT: 68 IN | OXYGEN SATURATION: 97 % | WEIGHT: 168 LBS | HEART RATE: 49 BPM | BODY MASS INDEX: 25.46 KG/M2 | SYSTOLIC BLOOD PRESSURE: 121 MMHG | DIASTOLIC BLOOD PRESSURE: 73 MMHG

## 2022-09-29 DIAGNOSIS — I10 HYPERTENSION, UNSPECIFIED TYPE: ICD-10-CM

## 2022-09-29 DIAGNOSIS — I73.00 RAYNAUD'S DISEASE WITHOUT GANGRENE: ICD-10-CM

## 2022-09-29 DIAGNOSIS — E78.5 HYPERLIPIDEMIA, UNSPECIFIED HYPERLIPIDEMIA TYPE: ICD-10-CM

## 2022-09-29 DIAGNOSIS — I71.20 THORACIC AORTIC ANEURYSM WITHOUT RUPTURE: Primary | ICD-10-CM

## 2022-09-29 DIAGNOSIS — I45.10 RBBB: ICD-10-CM

## 2022-09-29 PROBLEM — R07.9 CHEST PAIN: Status: RESOLVED | Noted: 2020-03-02 | Resolved: 2022-09-29

## 2022-09-29 PROCEDURE — 93000 ELECTROCARDIOGRAM COMPLETE: CPT | Performed by: INTERNAL MEDICINE

## 2022-09-29 PROCEDURE — 99214 OFFICE O/P EST MOD 30 MIN: CPT | Performed by: INTERNAL MEDICINE

## 2022-09-29 RX ORDER — LEVOCETIRIZINE DIHYDROCHLORIDE 5 MG/1
1 TABLET, FILM COATED ORAL EVERY EVENING
COMMUNITY

## 2022-09-29 RX ORDER — LORATADINE 10 MG/1
1 TABLET ORAL DAILY
COMMUNITY

## 2022-09-29 RX ORDER — FLUTICASONE PROPIONATE 50 MCG
1-2 SPRAY, SUSPENSION (ML) NASAL DAILY
COMMUNITY

## 2022-09-29 NOTE — PROGRESS NOTES
Subjective:     Encounter Date:09/29/2022      Patient ID: Scott Murphy is a 77 y.o. male.    Chief Complaint:  History of Present Illness    This is a 77-year-old with a history of polycythemia vera, polymyalgia rheumatica, history of renal cell carcinoma status post partial nephrectomy, hypertension, hypothyroidism, Raynaud's disease, asthma, thoracic aortic aneurysm, who presents for follow up.        He presents today for routine annual follow-up.  Overall he has been feeling well.  He was seen in the aorta clinic by ALETHA Krishnamurthy on 8/9/2022.  At that time patient appeared to be doing well.  The recommendation was for the patient to return in 1 year with a repeat CT scan without contrast.    The patient reports that he has been exercising on a regular basis without any significant issues.  Denies any chest pain, shortness of breath, palpitations, orthopnea, near-syncope or syncope, or lower extremity edema.     Prior History:  I saw the patient initially in 1/2018 when he presented for evaluation of an abnormal EKG.  Prior to that office visit the patient underwent his yearly Medicare physical with Dr. Delgado at which time he had a routine EKG performed that showed a right bundle branch block that was felt to be new.  Based on this finding he was sent here for further evaluation.  Ten years prior he was evaluated by Dr. Jarrett complaints of chest pain and underwent an echocardiogram, stress test, and a cardiac catheterization that were all reportedly unremarkable (records are not available from that workup).  At his initial office visit with me he denied any symptoms and was active playing tennis about 2 or 3 times a week.    At that office visit I noted that he had a similar appearing right bundle branch block on EKG from 1/2014.  Recommended proceeding with an echocardiogram to look for any structural heart disease with his right bundle branch block.  Echocardiogram was  performed on 2/2/2018 and showed normal left ventricular systolic function and wall motion with an EF of 66%, grade 1 diastolic dysfunction, mild mitral and tricuspid regurgitation, right ventricular systolic pressure of 30 mmHg, and moderate dilatation of the sinus of Valsalva and mild dilatation of the ascending aorta and aortic arch.  I was out of the office at the time that the echocardiogram was performed and resulted and these results were communicated to the patient at that time however it does not appear that he was told about the aortic dilatation.     More recently the patient has been having issues with shortness of breath and cough.  He ended up undergoing a chest x-ray was ordered by Dr. Delgado that showed no acute pulmonary disease but did show evidence of aortic arterial sclerosis.  He saw Dr. Damon in routine follow-up and mention these findings to him and with his recent dyspnea symptoms and these findings he ordered a CT of the chest.  This was performed on 8/23/2019 and it showed mild aneurysmal dilatation of the ascending aorta measuring 4.2 x 4 cm compared to a measurement of 4 x 3.9 cm in 2/2018.       He was seen by ALETHA Hu in 3/2020.  He presented with complaints of atypical sounding chest pain.  Blood pressures were noted to be elevated during that office visit.  His amlodipine was resumed which is tolerated well.    An echocardiogram in 2020 showed mild to moderate dilatation of his aortic root and ascending aorta measuring about 4.3 cm.  Remainder of his echocardiogram showed normal left ventricular systolic function wall motion with an EF of 65% grade 1 diastolic dysfunction and no significant valvular disease.     Repeat in 9/2021 showed stable appearing size of his aortic root and ascending aorta measuring about 4.2 cm.  Remainder of his echocardiogram is unremarkable and stable.     Patient had some issues with left-sided sharp chest pain.  Dr. Damon recommended  proceeding with a CT angiogram of the chest which was performed on 2/10/2021.  This showed no evidence of pulmonary embolism and no significant change in the approximately 4.4 cm dilatation of the aortic root or 4 cm dilatation of the ascending aorta.      Review of Systems   Constitutional: Negative for malaise/fatigue.   HENT: Negative for hearing loss, hoarse voice, nosebleeds and sore throat.    Eyes: Negative for pain.   Cardiovascular: Negative for chest pain, claudication, cyanosis, dyspnea on exertion, irregular heartbeat, leg swelling, near-syncope, orthopnea, palpitations, paroxysmal nocturnal dyspnea and syncope.   Respiratory: Negative for shortness of breath and snoring.    Endocrine: Negative for cold intolerance, heat intolerance, polydipsia, polyphagia and polyuria.   Skin: Negative for itching and rash.   Musculoskeletal: Negative for arthritis, falls, joint pain, joint swelling, muscle cramps, muscle weakness and myalgias.   Gastrointestinal: Negative for constipation, diarrhea, dysphagia, heartburn, hematemesis, hematochezia, melena, nausea and vomiting.   Genitourinary: Negative for frequency, hematuria and hesitancy.   Neurological: Negative for excessive daytime sleepiness, dizziness, headaches, light-headedness, numbness and weakness.   Psychiatric/Behavioral: Negative for depression. The patient is not nervous/anxious.          Current Outpatient Medications:   •  ALPRAZolam (XANAX) 0.5 MG tablet, Take 0.5 mg by mouth At Night As Needed., Disp: , Rfl:   •  amLODIPine (NORVASC) 5 MG tablet, Take 1 tablet by mouth Daily., Disp: , Rfl:   •  B Complex Vitamins (VITAMIN B-COMPLEX PO), Take 1 tablet by mouth Daily., Disp: , Rfl:   •  fluticasone (FLONASE) 50 MCG/ACT nasal spray, 1-2 sprays into the nostril(s) as directed by provider Daily., Disp: , Rfl:   •  levocetirizine (XYZAL) 5 MG tablet, Take 1 tablet by mouth Every Evening., Disp: , Rfl:   •  levothyroxine (SYNTHROID, LEVOTHROID) 100 MCG  tablet, Take 1 tablet by mouth daily., Disp: 30 tablet, Rfl: 6  •  loratadine (CLARITIN) 10 MG tablet, Take 1 tablet by mouth Daily., Disp: , Rfl:   •  losartan (COZAAR) 25 MG tablet, TAKE ONE TABLET BY MOUTH TWICE A DAY (Patient taking differently: Daily.), Disp: 180 tablet, Rfl: 1  •  montelukast (SINGULAIR) 10 MG tablet, Take 10 mg by mouth Every Night., Disp: , Rfl:   •  predniSONE (DELTASONE) 1 MG tablet, Daily., Disp: , Rfl:   •  zolpidem (AMBIEN) 10 MG tablet, TAKE ONE-HALF TO ONE TABLET BY MOUTH ONCE NIGHTLY AS NEEDED FOR SLEEP  **MUST LAST 30 DAYS, Disp: , Rfl:     Past Medical History:   Diagnosis Date   • Acquired hypothyroidism    • Anxiety    • Aortic aneurysm (HCC)    • Arthritis    • Asthma    • Baker's cyst of knee, right    • Disease of thyroid gland    • Gout    • H/O Muscle pain     Right shoulder and neck area   • H/O Radiation treatment     For tonsillar inflammation and infection when he was a child and this led him to develop thyroid cancer.   • History of colon polyps    • Hypertension    • Hyperthyroidism    • Hypothyroidism    • Kidney carcinoma (HCC)     H/O stage I clear cell carcinoma of the right kidney, status post partial nephrectomy, nephron-sparing surgery by Dr. Ganesh Lopez   • Left shoulder pain    • Myeloproliferative disorder (HCC)     With polycythemia vera, JAK2 mutation positive requiring periodic phlebotomies (hematocrit above 47).   • Polymyalgia rheumatica (HCC)    • Premature atrial contraction    • Prostate cancer (HCC)    • Raynaud disease    • Shoulder injury, left, sequela        Past Surgical History:   Procedure Laterality Date   • CIRCUMCISION N/A 12/17/2018    Procedure: CIRCUMCISION;  Surgeon: Gallo Lopez MD;  Location: [facility];  Service: Urology   • COLONOSCOPY  2010    Documented a tubulovillous adenom that was removed completely. Colonoscopy in 2014 was unremarkable.   • COLONOSCOPY N/A 2/20/2020    Procedure: COLONOSCOPY;  Surgeon: Clover  "Claire ZAVALA MD;  Location: [facility];  Service: Gastroenterology;  Laterality: N/A;  diverticulosis   • HERNIA REPAIR      H/O multiple hernia repairs including right inguinal hernia repair in 1981, 2003, and double hernia repair in 2013   • KIDNEY SURGERY     • PROSTATE BIOPSY      Showed features consistent with prostate cancer   • SHOULDER SURGERY  1995    Shoulder repair   • THYROIDECTOMY, PARTIAL  1983    For malignant tumor   • TOOTH EXTRACTION         Family History   Problem Relation Age of Onset   • No Known Problems Mother    • Heart attack Father    • Heart disease Father    • Hypertension Father    • Diabetes Father    • Tuberculosis Maternal Grandmother    • No Known Problems Son    • Drug abuse Son    • Malig Hyperthermia Neg Hx        Social History     Tobacco Use   • Smoking status: Never Smoker   • Smokeless tobacco: Never Used   • Tobacco comment: caffeine use    Vaping Use   • Vaping Use: Never used   Substance Use Topics   • Alcohol use: Yes     Alcohol/week: 1.0 - 3.0 standard drink     Types: 1 - 3 Glasses of wine per week     Comment: 1-3 glasses of wine a week   • Drug use: No         ECG 12 Lead    Date/Time: 9/29/2022 8:07 AM  Performed by: Di Roberts MD  Authorized by: Di Roberts MD   Comparison: compared with previous ECG   Similar to previous ECG  Rhythm: sinus rhythm  Conduction: right bundle branch block               Objective:     Visit Vitals  /73   Pulse (!) 49   Ht 172.7 cm (68\")   Wt 76.2 kg (168 lb)   SpO2 97%   BMI 25.54 kg/m²         Constitutional:       Appearance: Normal appearance. Well-developed.   HENT:      Head: Normocephalic and atraumatic.   Neck:      Vascular: No carotid bruit or JVD.   Pulmonary:      Effort: Pulmonary effort is normal.      Breath sounds: Normal breath sounds.   Cardiovascular:      Normal rate. Regular rhythm.      No gallop.   Pulses:     Radial: 2+ bilaterally.  Edema:     Peripheral edema absent.   Abdominal:      " Palpations: Abdomen is soft.   Skin:     General: Skin is warm and dry.   Neurological:      Mental Status: Alert and oriented to person, place, and time.             Assessment:          Diagnosis Plan   1. Thoracic aortic aneurysm without rupture (HCC)     2. Raynaud's disease without gangrene     3. RBBB     4. Hyperlipidemia, unspecified hyperlipidemia type     5. Hypertension, unspecified type            Plan:       1.  Thoracic aortic aneurysm.  Stable in size over the last few years.  Followed in the aortic clinic.  2.  Hypertension.  Well-controlled on his current regimen medications.  Continue the same.  3.  Hyperlipidemia.  4.  Chronic right bundle branch block  5.  Raynaud's disease  6.  Polymyalgia rheumatica.  Remains on a low-dose of prednisone.  7.  Polycythemia vera.  Followed by Dr. Damon.  8.  Hypothyroidism  9.  Myeloproliferative disorder.  Followed by Dr. Damon.    We will plan on seeing the patient back again in 1 year or sooner if further issues arise.

## 2022-10-04 ENCOUNTER — APPOINTMENT (OUTPATIENT)
Dept: ONCOLOGY | Facility: HOSPITAL | Age: 77
End: 2022-10-04

## 2022-10-04 ENCOUNTER — LAB (OUTPATIENT)
Dept: LAB | Facility: HOSPITAL | Age: 77
End: 2022-10-04

## 2022-10-04 ENCOUNTER — OFFICE VISIT (OUTPATIENT)
Dept: ONCOLOGY | Facility: CLINIC | Age: 77
End: 2022-10-04

## 2022-10-04 VITALS
BODY MASS INDEX: 25.04 KG/M2 | HEART RATE: 79 BPM | RESPIRATION RATE: 16 BRPM | DIASTOLIC BLOOD PRESSURE: 81 MMHG | OXYGEN SATURATION: 98 % | WEIGHT: 165.2 LBS | TEMPERATURE: 98 F | HEIGHT: 68 IN | SYSTOLIC BLOOD PRESSURE: 117 MMHG

## 2022-10-04 DIAGNOSIS — C64.1 CANCER OF RIGHT KIDNEY: ICD-10-CM

## 2022-10-04 DIAGNOSIS — I73.00 RAYNAUD'S DISEASE WITHOUT GANGRENE: ICD-10-CM

## 2022-10-04 DIAGNOSIS — D75.1 ERYTHROCYTOSIS: ICD-10-CM

## 2022-10-04 DIAGNOSIS — D47.1 MYELOPROLIFERATIVE DISORDER: ICD-10-CM

## 2022-10-04 DIAGNOSIS — C64.1 CANCER OF RIGHT KIDNEY: Primary | ICD-10-CM

## 2022-10-04 LAB
BASOPHILS # BLD AUTO: 0.08 10*3/MM3 (ref 0–0.2)
BASOPHILS NFR BLD AUTO: 0.7 % (ref 0–1.5)
DEPRECATED RDW RBC AUTO: 43.1 FL (ref 37–54)
EOSINOPHIL # BLD AUTO: 0.12 10*3/MM3 (ref 0–0.4)
EOSINOPHIL NFR BLD AUTO: 1.1 % (ref 0.3–6.2)
ERYTHROCYTE [DISTWIDTH] IN BLOOD BY AUTOMATED COUNT: 14.6 % (ref 12.3–15.4)
HCT VFR BLD AUTO: 45.2 % (ref 37.5–51)
HGB BLD-MCNC: 14.4 G/DL (ref 13–17.7)
IMM GRANULOCYTES # BLD AUTO: 0.08 10*3/MM3 (ref 0–0.05)
IMM GRANULOCYTES NFR BLD AUTO: 0.7 % (ref 0–0.5)
LYMPHOCYTES # BLD AUTO: 1.4 10*3/MM3 (ref 0.7–3.1)
LYMPHOCYTES NFR BLD AUTO: 13.1 % (ref 19.6–45.3)
MCH RBC QN AUTO: 26.2 PG (ref 26.6–33)
MCHC RBC AUTO-ENTMCNC: 31.9 G/DL (ref 31.5–35.7)
MCV RBC AUTO: 82.2 FL (ref 79–97)
MONOCYTES # BLD AUTO: 1.07 10*3/MM3 (ref 0.1–0.9)
MONOCYTES NFR BLD AUTO: 10 % (ref 5–12)
NEUTROPHILS NFR BLD AUTO: 7.93 10*3/MM3 (ref 1.7–7)
NEUTROPHILS NFR BLD AUTO: 74.4 % (ref 42.7–76)
NRBC BLD AUTO-RTO: 0 /100 WBC (ref 0–0.2)
PLATELET # BLD AUTO: 233 10*3/MM3 (ref 140–450)
PMV BLD AUTO: 9.7 FL (ref 6–12)
RBC # BLD AUTO: 5.5 10*6/MM3 (ref 4.14–5.8)
WBC NRBC COR # BLD: 10.68 10*3/MM3 (ref 3.4–10.8)

## 2022-10-04 PROCEDURE — 85025 COMPLETE CBC W/AUTO DIFF WBC: CPT

## 2022-10-04 PROCEDURE — 36415 COLL VENOUS BLD VENIPUNCTURE: CPT

## 2022-10-04 PROCEDURE — 99214 OFFICE O/P EST MOD 30 MIN: CPT | Performed by: INTERNAL MEDICINE

## 2022-10-04 NOTE — PROGRESS NOTES
REASONS FOR FOLLOWUP:       1. Myeloproliferative disorder with polycythemia vera, MAI-2 mutation positive requiring periodic phlebotomies whenever hematocrit is above 45.      2. His  tory of stage I clear cell carcinoma of the right kidney status post partial nephrectomy, nephron sparing surgery by Dr. Ganesh Lopez with no issues or consequences.               3. Patient has history of polymyalgia rheumatica.  He IS ON  prednisone LOW DOSE          HISTORY OF PRESENT ILLNESS    DURING THE VISIT WITH THE PATIENT TODAY , PATIENT HAD FACE MASK, MY MEDICAL ASSISTANT AND I  HAD PROPPER PROTECTIVE EQUIPMENT, AND I DID HAND HYGIENE WITH SOAP AND WATER BEFORE AND AFTER THE VISIT.      On 10/04/2022 this 77-year-old white male who has history of erythrocytosis, myeloproliferative disorder returns to the office for follow up and possible phlebotomy. The good news today is his hematocrit is 45 and there is no need for phlebotomy at this point. He is going to see Dr. Lopez, Urologist again because his PSA remains elevated at 10 and further analysis of a urine specimen recently obtained will be discussed with him at that point. The patient denies any frequency, urgency, hematuria or difficulty with his miction. His ability to eat and drink are fine and he is back to playing tennis. He feels extremely well physically. He has not had any chest pain, palpitations or any other new symptoms. His polymyalgia rheumatica remains extremely quiet being seen by Iesha Miramontes MD, recently, prednisone at 1 mg a day.               Past Medical History:   Diagnosis Date   • Acquired hypothyroidism    • Anxiety    • Aortic aneurysm (HCC)    • Arthritis    • Asthma    • Baker's cyst of knee, right    • Disease of thyroid gland    • Gout    • H/O Muscle pain     Right shoulder and neck area   • H/O Radiation treatment     For tonsillar inflammation and infection when he was a child and this led him to develop thyroid cancer.    • History of colon polyps    • Hypertension    • Hyperthyroidism    • Hypothyroidism    • Kidney carcinoma (HCC)     H/O stage I clear cell carcinoma of the right kidney, status post partial nephrectomy, nephron-sparing surgery by Dr. Ganesh Lopez   • Left shoulder pain    • Myeloproliferative disorder (HCC)     With polycythemia vera, JAK2 mutation positive requiring periodic phlebotomies (hematocrit above 47).   • Polymyalgia rheumatica (HCC)    • Premature atrial contraction    • Prostate cancer (HCC)    • Raynaud disease    • Shoulder injury, left, sequela      Past Surgical History:   Procedure Laterality Date   • CIRCUMCISION N/A 12/17/2018    Procedure: CIRCUMCISION;  Surgeon: Gallo Lopze MD;  Location: [facility];  Service: Urology   • COLONOSCOPY  2010    Documented a tubulovillous adenom that was removed completely. Colonoscopy in 2014 was unremarkable.   • COLONOSCOPY N/A 2/20/2020    Procedure: COLONOSCOPY;  Surgeon: Claire Galo MD;  Location: [facility];  Service: Gastroenterology;  Laterality: N/A;  diverticulosis   • HERNIA REPAIR      H/O multiple hernia repairs including right inguinal hernia repair in 1981, 2003, and double hernia repair in 2013   • KIDNEY SURGERY     • PROSTATE BIOPSY      Showed features consistent with prostate cancer   • SHOULDER SURGERY  1995    Shoulder repair   • THYROIDECTOMY, PARTIAL  1983    For malignant tumor   • TOOTH EXTRACTION       Social History     Social History Narrative    He is a very avid  and plays violin in an orchestra. He does not smoke but had a lot of second-hand smoke through his life.     Family History   Problem Relation Age of Onset   • No Known Problems Mother    • Heart attack Father    • Heart disease Father    • Hypertension Father    • Diabetes Father    • Tuberculosis Maternal Grandmother    • No Known Problems Son    • Drug abuse Son    • Malig Hyperthermia Neg Hx      Allergies   Allergen Reactions   •  "Diphenhydramine Rash     HEART PALPITATIONS, RASH   • Statins Myalgia     MUSCLE ACHES   • Aspirin Unknown - Low Severity     PT CANNOT REMEMBER   • Latex Rash   • Peanut Oil Unknown - Low Severity     PT CANNOT REMEMBER   • Shellfish-Derived Products Unknown - Low Severity     PT CANNOT REMEMBER     Current Outpatient Medications on File Prior to Visit   Medication Sig Dispense Refill   • ALPRAZolam (XANAX) 0.5 MG tablet Take 0.5 mg by mouth At Night As Needed.     • amLODIPine (NORVASC) 5 MG tablet Take 1 tablet by mouth Daily.     • B Complex Vitamins (VITAMIN B-COMPLEX PO) Take 1 tablet by mouth Daily.     • fluticasone (FLONASE) 50 MCG/ACT nasal spray 1-2 sprays into the nostril(s) as directed by provider Daily.     • levocetirizine (XYZAL) 5 MG tablet Take 1 tablet by mouth Every Evening.     • levothyroxine (SYNTHROID, LEVOTHROID) 100 MCG tablet Take 1 tablet by mouth daily. 30 tablet 6   • loratadine (CLARITIN) 10 MG tablet Take 1 tablet by mouth Daily.     • losartan (COZAAR) 25 MG tablet TAKE ONE TABLET BY MOUTH TWICE A DAY (Patient taking differently: Daily.) 180 tablet 1   • montelukast (SINGULAIR) 10 MG tablet Take 10 mg by mouth Every Night.     • predniSONE (DELTASONE) 1 MG tablet Daily.     • zolpidem (AMBIEN) 10 MG tablet TAKE ONE-HALF TO ONE TABLET BY MOUTH ONCE NIGHTLY AS NEEDED FOR SLEEP  **MUST LAST 30 DAYS       No current facility-administered medications on file prior to visit.               VITAL SIGNS:   Vitals:    10/04/22 1127   BP: 117/81   Pulse: 79   Resp: 16   Temp: 98 °F (36.7 °C)   TempSrc: Temporal   SpO2: 98%   Weight: 74.9 kg (165 lb 3.2 oz)   Height: 172.7 cm (67.99\")            PHYSICAL EXAMINATION:           GENERAL:  Well-developed, well-nourished  Patient  in no acute distress.   SKIN:  Warm, dry ,NO purpura ,no rash.  HEENT:  Pupils were equal and reactive to light and accomodation, conjunctivae noninjected, normal extraocular movements, normal visual acuity.   NECK:  " Supple with good range of motion; no thyromegaly , no JVD or bruits,.No carotid artery pain, no carotid abnormal pulsation   LYMPHATICS:  No cervical, NO supraclavicular, NO axillary, NO inguinal adenopathies.  CARDIAC   normal rate , regular rhythm, without murmur,NO rubs NO S3 NO S4   LUNGS: normal breath sounds bilateral, no wheezing, NO rhonchi, NO crackles ,NO rubs.  VASCULAR VENOUS: no cyanosis, NO collateral circulation, NO varicosities, NO edema, NO palpable cords, NO pain,NO erythema, NO pigmentation of the skin.  ABDOMEN:  Soft, NO pain,no hepatomegaly, no splenomegaly,no masses, no ascites, no collateral circulation,no distention.  EXTREMITIES  AND SPINE:  No clubbing, no cyanosis ,no deformities , no pain .No kyphosis,  no pain in spine, no pain in ribs , no pain in pelvic bone.  NEUROLOGICAL:  Patient was awake, alert, oriented to time, person and place.                    LABORATORY DATA:    Lab Results   Component Value Date    WBC 10.68 10/04/2022    HGB 14.4 10/04/2022    HCT 45.2 10/04/2022    MCV 82.2 10/04/2022     10/04/2022                           ASSESSMENT/PLAN: 1. This patient has history of polycythemia vera JAK2 mutation positive. He has had multiple phlebotomies in the past and this has produced a drop in the MCV. Now the patient is not requiring phlebotomies as often as before and is related to the mild iron deficiency that has been produced by the phlebotomy    I reviewed the patient on 08/24/2021. He has no symptoms pertinent to polycythemia, his hematocrit is 46 but I think it is okay to let it go until he has to come back in a couple of months to have another CBC and possibly he will require phlebotomy then. Otherwise I will review him back in 4 months.   I reviewed the patient on 12/15/2021. Since the previous visit he has not had any episodes of bleeding or thrombosis. His hematocrit today is 47 and he will proceed with a phlebotomy today of 500 mL. He will return in 2 and  4 months for CBC and phlebotomy if hematocrit is above 45. I will review him back in 4 months.  The patient on 04/19/2022 has no symptoms related to his polycythemia vera. His hematocrit is 47. I think it will be okay to forego any phlebotomy today. I would like for him to return every 3 months for a CBC and phlebotomy if hematocrit is above 49. The patient's white count is normal, the patient's platelet count is normal. He is not requiring any medicine otherwise to control this issue and he has taken Hydrea in the past. He refused to further continue taking this medicine.   On 10/04/2022 the patient's hematocrit is normal, white count is normal, platelet count is normal. He has no obvious clinical splenomegaly. He will remain in observation trying to keep a hematocrit below 47 all of the time. For this reason he was scheduled to have a repeat visit in 3 months with a CBC, CMP, RN and phlebotomy if necessary and repeat the same analysis in 6 month visit with me at that point. He does not need to be on any medication to decrease production of blood cells at this time. He is taking a baby aspirin.           2.The patient has minimal leukocytosis with a normal white count differential. This has been present since the time of the diagnosis of his myeloproliferative disorder, he is BCR/ABL negative. This will be watched in absence of any other intervention.     On 04/18/2022 the patient's white count is normal, the white count differential is normal. No issues pertinent to this at this point.   On 10/04/2022 his white count remains stable in comparison with previous assessment. No issues in regard to anything to this. This is reflection of his myeloproliferative disorder and does not need to be treated at this point.       ·   3.During the previous visit the patient was very concerned about the possibility of prostate cancer. He was seen by Dr. Ganesh Lopez. An MRI of the prostate was performed that disclosed no  abnormalities. No biopsy was necessary and he will return to see Dr. Lopez again in 6 months.  On 04/19/2022 the patient's PSA remains at 11. He has had further radiological assessment by Ganesh Lopez MD, no prostate cancer has been found. He is not going to have any prostate biopsies unless the PSA further changes. He has no major symptoms related to an elevated PSA besides some prostatism.   On 10/04/2022 the patient continues having PSA elevation of 10. Dr. Lopez has done a prostate massage recently, urine specimen obtained after that to further refine molecular testing on this. He is going to discuss this further with Dr. Lopez. The question was raised in regard what to do. Likely he will recommend radiation therapy.       ·   4.The patient has history of very early Stage I kidney cancer status post Nephron-sparing partial nephrectomy. The patient has not had any issues pertinent to this. He never required any form of adjuvant therapy and he will remain to be watched in absence of any other intervention.  The relationship with his previous history of kidney cancer, the patient has continued follow up with Ganesh Lopez MD who has monitored him on clinical grounds and radiologically. Today on 04/19/2022 he has no symptoms or signs of kidney cancer recurrence.   On 10/04/2022 the patient already has seen Dr. Lopez in regard to his previous history of kidney cancer. His CT scan remains quiet with no evidence of recurrent disease.     5.On recent evaluation through his primary physician the patient had an elevated creatinine of 1.3. his rheumatologist has done this and the creatinine remains at 1.3. I pointed out to him that this needs to be repeated. He will be seen by Dr. Delgado in 11/2022. This needs to be monitored.     I would like for him to have a CMP on each one of the visits in 3 and 6 months to monitor creatinine and kidney function.     ·   · RECOMMENDATIONS:   Each one of the issues have  been discussed in detail above as summarized.     ·

## 2023-01-11 ENCOUNTER — APPOINTMENT (OUTPATIENT)
Dept: ONCOLOGY | Facility: HOSPITAL | Age: 78
End: 2023-01-11
Payer: MEDICARE

## 2023-01-11 ENCOUNTER — APPOINTMENT (OUTPATIENT)
Dept: LAB | Facility: HOSPITAL | Age: 78
End: 2023-01-11
Payer: MEDICARE

## 2023-01-13 ENCOUNTER — APPOINTMENT (OUTPATIENT)
Dept: LAB | Facility: HOSPITAL | Age: 78
End: 2023-01-13
Payer: MEDICARE

## 2023-01-13 ENCOUNTER — APPOINTMENT (OUTPATIENT)
Dept: ONCOLOGY | Facility: HOSPITAL | Age: 78
End: 2023-01-13
Payer: MEDICARE

## 2023-01-19 ENCOUNTER — LAB (OUTPATIENT)
Dept: LAB | Facility: HOSPITAL | Age: 78
End: 2023-01-19
Payer: MEDICARE

## 2023-01-19 ENCOUNTER — INFUSION (OUTPATIENT)
Dept: ONCOLOGY | Facility: HOSPITAL | Age: 78
End: 2023-01-19
Payer: MEDICARE

## 2023-01-19 VITALS
RESPIRATION RATE: 16 BRPM | BODY MASS INDEX: 24.76 KG/M2 | WEIGHT: 162.8 LBS | OXYGEN SATURATION: 99 % | HEART RATE: 77 BPM | SYSTOLIC BLOOD PRESSURE: 133 MMHG | DIASTOLIC BLOOD PRESSURE: 76 MMHG | TEMPERATURE: 98.2 F

## 2023-01-19 DIAGNOSIS — D75.1 ERYTHROCYTOSIS: ICD-10-CM

## 2023-01-19 DIAGNOSIS — C64.1 CANCER OF RIGHT KIDNEY: ICD-10-CM

## 2023-01-19 DIAGNOSIS — D47.1 MYELOPROLIFERATIVE DISORDER: ICD-10-CM

## 2023-01-19 LAB
BASOPHILS # BLD AUTO: 0.13 10*3/MM3 (ref 0–0.2)
BASOPHILS NFR BLD AUTO: 1.2 % (ref 0–1.5)
DEPRECATED RDW RBC AUTO: 47.1 FL (ref 37–54)
EOSINOPHIL # BLD AUTO: 0.99 10*3/MM3 (ref 0–0.4)
EOSINOPHIL NFR BLD AUTO: 9.3 % (ref 0.3–6.2)
ERYTHROCYTE [DISTWIDTH] IN BLOOD BY AUTOMATED COUNT: 16.1 % (ref 12.3–15.4)
HCT VFR BLD AUTO: 45.2 % (ref 37.5–51)
HGB BLD-MCNC: 14.5 G/DL (ref 13–17.7)
IMM GRANULOCYTES # BLD AUTO: 0.15 10*3/MM3 (ref 0–0.05)
IMM GRANULOCYTES NFR BLD AUTO: 1.4 % (ref 0–0.5)
LYMPHOCYTES # BLD AUTO: 1.57 10*3/MM3 (ref 0.7–3.1)
LYMPHOCYTES NFR BLD AUTO: 14.8 % (ref 19.6–45.3)
MCH RBC QN AUTO: 26 PG (ref 26.6–33)
MCHC RBC AUTO-ENTMCNC: 32.1 G/DL (ref 31.5–35.7)
MCV RBC AUTO: 81 FL (ref 79–97)
MONOCYTES # BLD AUTO: 1.41 10*3/MM3 (ref 0.1–0.9)
MONOCYTES NFR BLD AUTO: 13.3 % (ref 5–12)
NEUTROPHILS NFR BLD AUTO: 6.38 10*3/MM3 (ref 1.7–7)
NEUTROPHILS NFR BLD AUTO: 60 % (ref 42.7–76)
NRBC BLD AUTO-RTO: 0 /100 WBC (ref 0–0.2)
PLATELET # BLD AUTO: 169 10*3/MM3 (ref 140–450)
PMV BLD AUTO: 10.4 FL (ref 6–12)
RBC # BLD AUTO: 5.58 10*6/MM3 (ref 4.14–5.8)
WBC NRBC COR # BLD: 10.63 10*3/MM3 (ref 3.4–10.8)

## 2023-01-19 PROCEDURE — G0463 HOSPITAL OUTPT CLINIC VISIT: HCPCS

## 2023-01-19 PROCEDURE — 85025 COMPLETE CBC W/AUTO DIFF WBC: CPT

## 2023-01-19 PROCEDURE — 36415 COLL VENOUS BLD VENIPUNCTURE: CPT

## 2023-02-23 DIAGNOSIS — I10 ESSENTIAL HYPERTENSION: ICD-10-CM

## 2023-02-23 RX ORDER — LOSARTAN POTASSIUM 25 MG/1
TABLET ORAL
Qty: 180 TABLET | Refills: 2 | Status: SHIPPED | OUTPATIENT
Start: 2023-02-23

## 2023-04-12 ENCOUNTER — APPOINTMENT (OUTPATIENT)
Dept: ONCOLOGY | Facility: HOSPITAL | Age: 78
End: 2023-04-12
Payer: MEDICARE

## 2023-04-12 ENCOUNTER — LAB (OUTPATIENT)
Dept: LAB | Facility: HOSPITAL | Age: 78
End: 2023-04-12
Payer: MEDICARE

## 2023-04-12 ENCOUNTER — OFFICE VISIT (OUTPATIENT)
Dept: ONCOLOGY | Facility: CLINIC | Age: 78
End: 2023-04-12
Payer: MEDICARE

## 2023-04-12 VITALS
SYSTOLIC BLOOD PRESSURE: 137 MMHG | TEMPERATURE: 97.1 F | BODY MASS INDEX: 24.87 KG/M2 | DIASTOLIC BLOOD PRESSURE: 84 MMHG | HEIGHT: 68 IN | WEIGHT: 164.1 LBS | OXYGEN SATURATION: 98 % | HEART RATE: 69 BPM

## 2023-04-12 DIAGNOSIS — C64.1 CANCER OF RIGHT KIDNEY: ICD-10-CM

## 2023-04-12 DIAGNOSIS — D47.1 MYELOPROLIFERATIVE DISORDER: ICD-10-CM

## 2023-04-12 DIAGNOSIS — D75.1 ERYTHROCYTOSIS: ICD-10-CM

## 2023-04-12 DIAGNOSIS — C64.1 CANCER OF RIGHT KIDNEY: Primary | ICD-10-CM

## 2023-04-12 LAB
BASOPHILS # BLD AUTO: 0.1 10*3/MM3 (ref 0–0.2)
BASOPHILS NFR BLD AUTO: 1.1 % (ref 0–1.5)
DEPRECATED RDW RBC AUTO: 48.4 FL (ref 37–54)
EOSINOPHIL # BLD AUTO: 0.3 10*3/MM3 (ref 0–0.4)
EOSINOPHIL NFR BLD AUTO: 3.4 % (ref 0.3–6.2)
ERYTHROCYTE [DISTWIDTH] IN BLOOD BY AUTOMATED COUNT: 15.7 % (ref 12.3–15.4)
HCT VFR BLD AUTO: 48.8 % (ref 37.5–51)
HGB BLD-MCNC: 15.7 G/DL (ref 13–17.7)
IMM GRANULOCYTES # BLD AUTO: 0.07 10*3/MM3 (ref 0–0.05)
IMM GRANULOCYTES NFR BLD AUTO: 0.8 % (ref 0–0.5)
LYMPHOCYTES # BLD AUTO: 2.02 10*3/MM3 (ref 0.7–3.1)
LYMPHOCYTES NFR BLD AUTO: 22.6 % (ref 19.6–45.3)
MCH RBC QN AUTO: 27.4 PG (ref 26.6–33)
MCHC RBC AUTO-ENTMCNC: 32.2 G/DL (ref 31.5–35.7)
MCV RBC AUTO: 85 FL (ref 79–97)
MONOCYTES # BLD AUTO: 1.17 10*3/MM3 (ref 0.1–0.9)
MONOCYTES NFR BLD AUTO: 13.1 % (ref 5–12)
NEUTROPHILS NFR BLD AUTO: 5.27 10*3/MM3 (ref 1.7–7)
NEUTROPHILS NFR BLD AUTO: 59 % (ref 42.7–76)
NRBC BLD AUTO-RTO: 0 /100 WBC (ref 0–0.2)
PLATELET # BLD AUTO: 242 10*3/MM3 (ref 140–450)
PMV BLD AUTO: 9.2 FL (ref 6–12)
RBC # BLD AUTO: 5.74 10*6/MM3 (ref 4.14–5.8)
WBC NRBC COR # BLD: 8.93 10*3/MM3 (ref 3.4–10.8)

## 2023-04-12 PROCEDURE — 85025 COMPLETE CBC W/AUTO DIFF WBC: CPT

## 2023-04-12 PROCEDURE — 36415 COLL VENOUS BLD VENIPUNCTURE: CPT

## 2023-04-12 NOTE — PROGRESS NOTES
REASONS FOR FOLLOWUP:       1. Myeloproliferative disorder with polycythemia vera, MAI-2 mutation positive requiring periodic phlebotomies whenever hematocrit is above 45.      2. His  tory of stage I clear cell carcinoma of the right kidney status post partial nephrectomy, nephron sparing surgery by Dr. Ganesh Lopez with no issues or consequences.               3. Patient has history of polymyalgia rheumatica.  He IS ON  prednisone LOW DOSE          HISTORY OF PRESENT ILLNESS    On 04/12/2023 this 78-year-old male returns to the office for follow-up stating that in the wintertime he had a polymyalgia rheumatica crisis requiring prednisone utilization and also hydroxychloroquine. This medicine put him in depression and this was discontinued through his rheumatologist. Now he is on a different dose of prednisone with symptoms under excellent control. He has been seen by Urology in regard to his history of kidney cancer. Radiological assessment has been negative in regard to metastasis. In regard to his erythrocytosis the patient has not required any phlebotomy in a good time and he has not had any vascular events. His appetite is good. His weight is stable. Bowel function and urination remain normal. He has not had any new issues pertinent to his heart function or aneurysm. His blood pressure is under good control. He remains active playing at the [employer] and he continues playing tennis.              Past Medical History:   Diagnosis Date   • Acquired hypothyroidism    • Anxiety    • Aortic aneurysm    • Arthritis    • Asthma    • Baker's cyst of knee, right    • Disease of thyroid gland    • Gout    • H/O Muscle pain     Right shoulder and neck area   • H/O Radiation treatment     For tonsillar inflammation and infection when he was a child and this led him to develop thyroid cancer.   • History of colon polyps    • Hypertension    • Hyperthyroidism    • Hypothyroidism    • Kidney  carcinoma     H/O stage I clear cell carcinoma of the right kidney, status post partial nephrectomy, nephron-sparing surgery by Dr. Ganesh Lopez   • Left shoulder pain    • Myeloproliferative disorder     With polycythemia vera, JAK2 mutation positive requiring periodic phlebotomies (hematocrit above 47).   • Polymyalgia rheumatica    • Premature atrial contraction    • Prostate cancer    • Raynaud disease    • Shoulder injury, left, sequela      Past Surgical History:   Procedure Laterality Date   • CIRCUMCISION N/A 12/17/2018    Procedure: CIRCUMCISION;  Surgeon: Gallo Lopez MD;  Location: [facility] OR;  Service: Urology   • COLONOSCOPY  2010    Documented a tubulovillous adenom that was removed completely. Colonoscopy in 2014 was unremarkable.   • COLONOSCOPY N/A 2/20/2020    Procedure: COLONOSCOPY;  Surgeon: Claire Galo MD;  Location: [facility] OR;  Service: Gastroenterology;  Laterality: N/A;  diverticulosis   • HERNIA REPAIR      H/O multiple hernia repairs including right inguinal hernia repair in 1981, 2003, and double hernia repair in 2013   • KIDNEY SURGERY     • PROSTATE BIOPSY      Showed features consistent with prostate cancer   • SHOULDER SURGERY  1995    Shoulder repair   • THYROIDECTOMY, PARTIAL  1983    For malignant tumor   • TOOTH EXTRACTION       Social History     Social History Narrative    He is a very avid  and plays violin in an orchestra. He does not smoke but had a lot of second-hand smoke through his life.     Family History   Problem Relation Age of Onset   • No Known Problems Mother    • Heart attack Father    • Heart disease Father    • Hypertension Father    • Diabetes Father    • Tuberculosis Maternal Grandmother    • No Known Problems Son    • Drug abuse Son    • Malig Hyperthermia Neg Hx      Allergies   Allergen Reactions   • Diphenhydramine Rash     HEART PALPITATIONS, RASH   • Statins Myalgia     MUSCLE ACHES   • Amlodipine Other (See Comments)      "PT has welling in both ankles   • Aspirin Unknown - Low Severity     PT CANNOT REMEMBER   • Latex Rash   • Peanut Oil Unknown - Low Severity     PT CANNOT REMEMBER   • Shellfish-Derived Products Unknown - Low Severity     PT CANNOT REMEMBER     Current Outpatient Medications on File Prior to Visit   Medication Sig Dispense Refill   • ALPRAZolam (XANAX) 0.5 MG tablet Take 1 tablet by mouth At Night As Needed.     • B Complex Vitamins (VITAMIN B-COMPLEX PO) Take 1 tablet by mouth Daily.     • fluticasone (FLONASE) 50 MCG/ACT nasal spray 1-2 sprays into the nostril(s) as directed by provider Daily.     • levocetirizine (XYZAL) 5 MG tablet Take 1 tablet by mouth Every Evening.     • levothyroxine (SYNTHROID, LEVOTHROID) 100 MCG tablet Take 1 tablet by mouth daily. 30 tablet 6   • loratadine (CLARITIN) 10 MG tablet Take 1 tablet by mouth Daily.     • losartan (COZAAR) 25 MG tablet TAKE ONE TABLET BY MOUTH TWICE A  tablet 2   • montelukast (SINGULAIR) 10 MG tablet Take 1 tablet by mouth Every Night.     • predniSONE (DELTASONE) 1 MG tablet Daily.     • zolpidem (AMBIEN) 10 MG tablet TAKE ONE-HALF TO ONE TABLET BY MOUTH ONCE NIGHTLY AS NEEDED FOR SLEEP  **MUST LAST 30 DAYS     • amLODIPine (NORVASC) 5 MG tablet Take 1 tablet by mouth Daily. (Patient not taking: Reported on 4/12/2023)       No current facility-administered medications on file prior to visit.               VITAL SIGNS:   Vitals:    04/12/23 1049   BP: 137/84   Pulse: 69   Temp: 97.1 °F (36.2 °C)   TempSrc: Temporal   SpO2: 98%   Weight: 74.4 kg (164 lb 1.6 oz)   Height: 172.7 cm (67.99\")            PHYSICAL EXAMINATION:       GENERAL:  Well-developed, Patient  in no acute distress.   SKIN:  Warm, dry ,NO purpura ,no rash.  HEENT:  Pupils were equal and reactive to light and accomodation, conjunctivae noninjected,  normal visual acuity.   NECK:  Supple with good range of motion; no thyromegaly , no JVD or bruits,.No carotid artery pain, no carotid " abnormal pulsation   LYMPHATICS:  No cervical, NO supraclavicular, NO axillary, NO inguinal adenopathies.  CARDIAC   normal rate , regular rhythm, without murmur,NO rubs NO S3 NO S4   LUNGS: normal breath sounds bilateral, no wheezing, NO rhonchi, NO crackles ,NO rubs.  VASCULAR VENOUS: no cyanosis, NO collateral circulation, NO varicosities, NO edema, NO palpable cords, NO pain,NO erythema, NO pigmentation of the skin.  ABDOMEN:  Soft, NO pain,no hepatomegaly, no splenomegaly,no masses, no ascites, no collateral circulation,no distention.  EXTREMITIES  AND SPINE:  No clubbing, no cyanosis ,no deformities , no pain .No kyphosis,  no pain in spine, no pain in ribs , no pain in pelvic bone.  NEUROLOGICAL:  Patient was awake, alert, oriented to time, person and place.                    LABORATORY DATA:    Lab Results   Component Value Date    WBC 8.93 04/12/2023    HGB 15.7 04/12/2023    HCT 48.8 04/12/2023    MCV 85.0 04/12/2023     04/12/2023                           ASSESSMENT/PLAN: 1. This patient has history of polycythemia vera JAK2 mutation positive. He has had multiple phlebotomies in the past and this has produced a drop in the MCV. Now the patient is not requiring phlebotomies as often as before and is related to the mild iron deficiency that has been produced by the phlebotomy    I reviewed the patient on 08/24/2021. He has no symptoms pertinent to polycythemia, his hematocrit is 46 but I think it is okay to let it go until he has to come back in a couple of months to have another CBC and possibly he will require phlebotomy then. Otherwise I will review him back in 4 months.   I reviewed the patient on 12/15/2021. Since the previous visit he has not had any episodes of bleeding or thrombosis. His hematocrit today is 47 and he will proceed with a phlebotomy today of 500 mL. He will return in 2 and 4 months for CBC and phlebotomy if hematocrit is above 45. I will review him back in 4 months.  The  patient on 04/19/2022 has no symptoms related to his polycythemia vera. His hematocrit is 47. I think it will be okay to forego any phlebotomy today. I would like for him to return every 3 months for a CBC and phlebotomy if hematocrit is above 49. The patient's white count is normal, the patient's platelet count is normal. He is not requiring any medicine otherwise to control this issue and he has taken Hydrea in the past. He refused to further continue taking this medicine.   On 10/04/2022 the patient's hematocrit is normal, white count is normal, platelet count is normal. He has no obvious clinical splenomegaly. He will remain in observation trying to keep a hematocrit below 47 all of the time. For this reason he was scheduled to have a repeat visit in 3 months with a CBC, CMP, RN and phlebotomy if necessary and repeat the same analysis in 6 month visit with me at that point. He does not need to be on any medication to decrease production of blood cells at this time. He is taking a baby aspirin.   On 04/12/2023 the patient has no need for phlebotomy today. Hematocrit is 48. I think his threshold will be 50 from now on. He has not had any vascular events. His white count and platelet count remain normal. He has no clinical palpable splenomegaly and he has no other new health issues.            2.The patient has minimal leukocytosis with a normal white count differential. This has been present since the time of the diagnosis of his myeloproliferative disorder, he is BCR/ABL negative. This will be watched in absence of any other intervention.     On 04/18/2022 the patient's white count is normal, the white count differential is normal. No issues pertinent to this at this point.   On 10/04/2022 his white count remains stable in comparison with previous assessment. No issues in regard to anything to this. This is reflection of his myeloproliferative disorder and does not need to be treated at this point.   On  04/12/2023 his white count is stable. White count differential is normal.        ·   3.During the previous visit the patient was very concerned about the possibility of prostate cancer. He was seen by Dr. Ganesh Lopez. An MRI of the prostate was performed that disclosed no abnormalities. No biopsy was necessary and he will return to see Dr. Lopez again in 6 months.  On 04/19/2022 the patient's PSA remains at 11. He has had further radiological assessment by Ganesh Lopez MD, no prostate cancer has been found. He is not going to have any prostate biopsies unless the PSA further changes. He has no major symptoms related to an elevated PSA besides some prostatism.   On 10/04/2022 the patient continues having PSA elevation of 10. Dr. Lopez has done a prostate massage recently, urine specimen obtained after that to further refine molecular testing on this. He is going to discuss this further with Dr. Lopez. The question was raised in regard what to do. Likely he will recommend radiation therapy.   On 04/12/2023 he continues seeing Dr. Lopez in regard to his prostate tissues and his previous kidney cancer. Radiological assessment in this regard has not shown any evidence of recurrent disease. Clinically he has no symptoms pertinent to this.        ·   4.The patient has history of very early Stage I kidney cancer status post Nephron-sparing partial nephrectomy. The patient has not had any issues pertinent to this. He never required any form of adjuvant therapy and he will remain to be watched in absence of any other intervention.  The relationship with his previous history of kidney cancer, the patient has continued follow up with Ganesh Lopez MD who has monitored him on clinical grounds and radiologically. Today on 04/19/2022 he has no symptoms or signs of kidney cancer recurrence.   On 10/04/2022 the patient already has seen Dr. Lopez in regard to his previous history of kidney cancer. His CT scan  remains quiet with no evidence of recurrent disease.     RECOMMENDATIONS:  The patient will return for a CBC and phlebotomy if hematocrit is above 50 every couple of months. Otherwise, we will review him back in 6 months with a CBC and a CMP.

## 2023-04-18 ENCOUNTER — TELEPHONE (OUTPATIENT)
Dept: CARDIOLOGY | Facility: CLINIC | Age: 78
End: 2023-04-18
Payer: MEDICARE

## 2023-04-18 NOTE — TELEPHONE ENCOUNTER
Patient called because he had to stop his amlodipine 2 weeks ago d/t swelling and constipation. He then was taking losartan BID, but then had to drop it down to one a day because his BP was dropping. Now his BPs are up in the 140s-150s. D/t multiple medication changes already and problems with BP I scheduled him to see you tomorrow since he was last seen back in September 2022.    Adry Rubi RN  Triage [facility]

## 2023-04-19 ENCOUNTER — OFFICE VISIT (OUTPATIENT)
Dept: CARDIOLOGY | Facility: CLINIC | Age: 78
End: 2023-04-19
Payer: MEDICARE

## 2023-04-19 VITALS
HEIGHT: 68 IN | BODY MASS INDEX: 24.77 KG/M2 | DIASTOLIC BLOOD PRESSURE: 68 MMHG | HEART RATE: 56 BPM | WEIGHT: 163.4 LBS | SYSTOLIC BLOOD PRESSURE: 112 MMHG

## 2023-04-19 DIAGNOSIS — I10 PRIMARY HYPERTENSION: Primary | ICD-10-CM

## 2023-04-19 DIAGNOSIS — E78.5 HYPERLIPIDEMIA, UNSPECIFIED HYPERLIPIDEMIA TYPE: ICD-10-CM

## 2023-04-19 DIAGNOSIS — I77.810 DILATED AORTIC ROOT: ICD-10-CM

## 2023-04-19 DIAGNOSIS — I45.10 RBBB: ICD-10-CM

## 2023-04-19 DIAGNOSIS — I71.20 THORACIC AORTIC ANEURYSM WITHOUT RUPTURE, UNSPECIFIED PART: ICD-10-CM

## 2023-04-19 RX ORDER — LOSARTAN POTASSIUM 25 MG/1
25 TABLET ORAL DAILY
Qty: 90 TABLET | Refills: 3
Start: 2023-04-19

## 2023-04-19 RX ORDER — AMLODIPINE BESYLATE 5 MG/1
5 TABLET ORAL DAILY
Qty: 90 TABLET | Refills: 3
Start: 2023-04-19

## 2023-04-19 NOTE — PROGRESS NOTES
Subjective:     Encounter Date:04/19/2023      Patient ID: Scott Murphy is a 78 y.o. male.    Chief Complaint:follow up HTN  History of Present Illness  This is a 77 y/o man who follows with Dr. Roberts and is new to me today. He has a pmhx of polycythemia vera, polymyalgia rheumatica, renal cell carcinoma s/p partial nephrectomy, hypertension, hypothyroidism, Raynaud's disease, asthma and thoracic aortic aneurysm. Dr. Roberts began following the patient in 2018 when he was referred to her for an abnormal EKG which showed a right bundle branch block. He underwent an echocardiogram that showed normal left ventricular systolic function and wall motion with an EF of 66%, grade 1 diastolic dysfunction, mild mitral and tricuspid regurgitation, right ventricular systolic pressure of 30 mmHg, and moderate dilatation of the sinus of Valsalva and mild dilatation of the ascending aorta and aortic arch. He then underwent a CT of th chest in August 2019 for complaints of dyspnea and it showed mild aneurysmal dilatation of the ascending aorta measuring 4.2 x 4 cm compared to a measurement of 4 x 3.9 cm in 2/2018. Follow up imaging of his aneurysmal dilatation has showed stable measurements.     He was last seen in the office by Dr. Roberts in September 2022 and was doing well. No changes were made and he was to follow up in one year. He contacted the office yesterday with complaints of labile blood pressure. He recently saw his rheumatologist and was having problems with ankle swelling and constipation. He was taken off amlodipine 10mg daily. He was then instructed to increase losartan to 25 mg BID. He tried this, however, he began having issues with his blood pressure dropping. So, he changed to one a day. His blood pressure then went into the 140-150s. Yesterday, his blood pressure was 146/76 so he took losartan 25 mg. Last night his BP was 124/71, so he took half a tablet. He denies any chest pain, shortness of breath,  palpitations, dizziness or syncope. The swelling in his lower extremities resolved after stopping amlodipine. He denies any orthopnea or PND. Also, of note, he had an alert from his watch notifying him that his heart rate was under 40 while sleeping. He was asymptomatic with this. He is very active and plays tennis and walks. He says his heart rate increases with activity. He denies any daytime fatigue, difficulty going to sleep or staying asleep. He does snore. He only takes Ambien about once a week to sleep.    I have reviewed and updated as appropriate allergies, current medications, past family history, past medical history, past surgical history and problem list.    Review of Systems   Constitutional: Negative for fever, malaise/fatigue, weight gain and weight loss.   HENT: Negative for congestion, hoarse voice and sore throat.    Eyes: Negative for blurred vision and double vision.   Cardiovascular: Negative for chest pain, dyspnea on exertion, leg swelling, orthopnea, palpitations and syncope.   Respiratory: Negative for cough, shortness of breath and wheezing.    Gastrointestinal: Negative for abdominal pain, hematemesis, hematochezia and melena.   Genitourinary: Negative for dysuria and hematuria.   Neurological: Negative for dizziness, headaches, light-headedness and numbness.   Psychiatric/Behavioral: Negative for depression. The patient is not nervous/anxious.          Current Outpatient Medications:   •  ALPRAZolam (XANAX) 0.5 MG tablet, Take 1 tablet by mouth At Night As Needed., Disp: , Rfl:   •  B Complex Vitamins (VITAMIN B-COMPLEX PO), Take 1 tablet by mouth Daily., Disp: , Rfl:   •  fluticasone (FLONASE) 50 MCG/ACT nasal spray, 1-2 sprays into the nostril(s) as directed by provider Daily., Disp: , Rfl:   •  levocetirizine (XYZAL) 5 MG tablet, Take 1 tablet by mouth Every Evening., Disp: , Rfl:   •  levothyroxine (SYNTHROID, LEVOTHROID) 100 MCG tablet, Take 1 tablet by mouth daily., Disp: 30  tablet, Rfl: 6  •  montelukast (SINGULAIR) 10 MG tablet, Take 1 tablet by mouth Every Night., Disp: , Rfl:   •  zolpidem (AMBIEN) 10 MG tablet, TAKE ONE-HALF TO ONE TABLET BY MOUTH ONCE NIGHTLY AS NEEDED FOR SLEEP  **MUST LAST 30 DAYS, Disp: , Rfl:   •  amLODIPine (NORVASC) 5 MG tablet, Take 1 tablet by mouth Daily., Disp: , Rfl:   •  amLODIPine (NORVASC) 5 MG tablet, Take 1 tablet by mouth Daily., Disp: 90 tablet, Rfl: 3  •  loratadine (CLARITIN) 10 MG tablet, Take 1 tablet by mouth Daily., Disp: , Rfl:   •  losartan (Cozaar) 25 MG tablet, Take 1 tablet by mouth Daily., Disp: 90 tablet, Rfl: 3  •  predniSONE (DELTASONE) 1 MG tablet, Daily. (Patient not taking: Reported on 4/19/2023), Disp: , Rfl:     Past Medical History:   Diagnosis Date   • Acquired hypothyroidism    • Anxiety    • Aortic aneurysm    • Arthritis    • Asthma    • Baker's cyst of knee, right    • Disease of thyroid gland    • Gout    • H/O Muscle pain     Right shoulder and neck area   • H/O Radiation treatment     For tonsillar inflammation and infection when he was a child and this led him to develop thyroid cancer.   • History of colon polyps    • Hypertension    • Hyperthyroidism    • Hypothyroidism    • Kidney carcinoma     H/O stage I clear cell carcinoma of the right kidney, status post partial nephrectomy, nephron-sparing surgery by Dr. Ganesh Lopez   • Left shoulder pain    • Myeloproliferative disorder     With polycythemia vera, JAK2 mutation positive requiring periodic phlebotomies (hematocrit above 47).   • Polymyalgia rheumatica    • Premature atrial contraction    • Prostate cancer    • Raynaud disease    • Shoulder injury, left, sequela        Past Surgical History:   Procedure Laterality Date   • CIRCUMCISION N/A 12/17/2018    Procedure: CIRCUMCISION;  Surgeon: Gallo Lopez MD;  Location: [facility]  Service: Urology   • COLONOSCOPY  2010    Documented a tubulovillous adenom that was removed completely. Colonoscopy in  "2014 was unremarkable.   • COLONOSCOPY N/A 2/20/2020    Procedure: COLONOSCOPY;  Surgeon: Claire Galo MD;  Location: [facility];  Service: Gastroenterology;  Laterality: N/A;  diverticulosis   • HERNIA REPAIR      H/O multiple hernia repairs including right inguinal hernia repair in 1981, 2003, and double hernia repair in 2013   • KIDNEY SURGERY     • PROSTATE BIOPSY      Showed features consistent with prostate cancer   • SHOULDER SURGERY  1995    Shoulder repair   • THYROIDECTOMY, PARTIAL  1983    For malignant tumor   • TOOTH EXTRACTION         Family History   Problem Relation Age of Onset   • No Known Problems Mother    • Heart attack Father    • Heart disease Father    • Hypertension Father    • Diabetes Father    • Tuberculosis Maternal Grandmother    • No Known Problems Son    • Drug abuse Son    • Malig Hyperthermia Neg Hx        Social History     Tobacco Use   • Smoking status: Never   • Smokeless tobacco: Never   • Tobacco comments:     caffeine use    Vaping Use   • Vaping Use: Never used   Substance Use Topics   • Alcohol use: Yes     Alcohol/week: 1.0 - 3.0 standard drink     Types: 1 - 3 Glasses of wine per week     Comment: 1-3 glasses of wine a week   • Drug use: No         ECG 12 Lead    Date/Time: 4/19/2023 11:13 AM  Performed by: Janey Queen APRN  Authorized by: Janey Queen APRN   Comparison: compared with previous ECG from 9/29/2022  Similar to previous ECG  Rhythm: sinus rhythm  Ectopy: atrial premature contractions  Conduction: right bundle branch block  Comments: No significant change from previous EKG               Objective:     Visit Vitals  /68   Pulse 56   Ht 172.7 cm (67.99\")   Wt 74.1 kg (163 lb 6.4 oz)   BMI 24.85 kg/m²             Physical Exam  Constitutional:       Appearance: Normal appearance. He is normal weight.   HENT:      Head: Normocephalic.   Neck:      Vascular: No carotid bruit.   Cardiovascular:      Rate and Rhythm: Normal rate and regular rhythm. "      Chest Wall: PMI is not displaced.      Pulses: Normal pulses.           Radial pulses are 2+ on the right side and 2+ on the left side.        Posterior tibial pulses are 2+ on the right side and 2+ on the left side.      Heart sounds: Normal heart sounds. No murmur heard.    No friction rub. No gallop.   Pulmonary:      Effort: Pulmonary effort is normal.      Breath sounds: Normal breath sounds.   Abdominal:      General: Bowel sounds are normal. There is no distension.      Palpations: Abdomen is soft.   Musculoskeletal:      Right lower leg: No edema.      Left lower leg: No edema.   Skin:     General: Skin is warm and dry.      Capillary Refill: Capillary refill takes less than 2 seconds.   Neurological:      Mental Status: He is alert and oriented to person, place, and time.   Psychiatric:         Mood and Affect: Mood normal.         Behavior: Behavior normal.         Thought Content: Thought content normal.          Lab Review:         Cardiac Procedures:   1.     Assessment:         Diagnoses and all orders for this visit:    1. Primary hypertension (Primary)    2. RBBB    3. Thoracic aortic aneurysm without rupture, unspecified part    4. Hyperlipidemia, unspecified hyperlipidemia type    5. Dilated aortic root    Other orders  -     amLODIPine (NORVASC) 5 MG tablet; Take 1 tablet by mouth Daily.  Dispense: 90 tablet; Refill: 3  -     losartan (Cozaar) 25 MG tablet; Take 1 tablet by mouth Daily.  Dispense: 90 tablet; Refill: 3  -     ECG 12 Lead            Plan:       1. Hypertension: He brought a BP log in with him today and his blood pressure does vary some. He says that his BP was stable when he was on amlodipine and losartan. I am going to restart amlodipine at a lower dose. He will go back to taking amlodipine in the morning and losartan in the evening as he was before. If swelling returns, will have to look at a different regimen.   2. Bradycardia: it sounds like it was one episode that occurred  while sleeping. He is not on any rate reducing medications. I think we can monitor for now since he remained asymptomatic and has good chronotropic response with activity.  3. Chronic RBBB  4. Thoracic aortic aneurysm: stable per last imaging.     Thank you for allowing me to participate in this patient's care. Please call with any questions or concerns. Mr. Murphy will follow up with Dr. Roberts as previously scheduled in October.          Your medication list          Accurate as of April 19, 2023 11:19 AM. If you have any questions, ask your nurse or doctor.            CHANGE how you take these medications      Instructions Last Dose Given Next Dose Due   amLODIPine 5 MG tablet  Commonly known as: NORVASC  What changed: Another medication with the same name was added. Make sure you understand how and when to take each.      Take 1 tablet by mouth Daily.       amLODIPine 5 MG tablet  Commonly known as: NORVASC  What changed: You were already taking a medication with the same name, and this prescription was added. Make sure you understand how and when to take each.      Take 1 tablet by mouth Daily.       losartan 25 MG tablet  Commonly known as: Cozaar  What changed: when to take this      Take 1 tablet by mouth Daily.          CONTINUE taking these medications      Instructions Last Dose Given Next Dose Due   ALPRAZolam 0.5 MG tablet  Commonly known as: XANAX      Take 1 tablet by mouth At Night As Needed.       fluticasone 50 MCG/ACT nasal spray  Commonly known as: FLONASE      1-2 sprays into the nostril(s) as directed by provider Daily.       levocetirizine 5 MG tablet  Commonly known as: XYZAL      Take 1 tablet by mouth Every Evening.       levothyroxine 100 MCG tablet  Commonly known as: SYNTHROID, LEVOTHROID      Take 1 tablet by mouth daily.       loratadine 10 MG tablet  Commonly known as: CLARITIN      Take 1 tablet by mouth Daily.       montelukast 10 MG tablet  Commonly known as: SINGULAIR      Take  1 tablet by mouth Every Night.       predniSONE 1 MG tablet  Commonly known as: DELTASONE      Daily.       VITAMIN B-COMPLEX PO      Take 1 tablet by mouth Daily.       zolpidem 10 MG tablet  Commonly known as: AMBIEN      TAKE ONE-HALF TO ONE TABLET BY MOUTH ONCE NIGHTLY AS NEEDED FOR SLEEP  **MUST LAST 30 DAYS             Where to Get Your Medications      Information about where to get these medications is not yet available    Ask your nurse or doctor about these medications  · amLODIPine 5 MG tablet  · losartan 25 MG tablet           ALETHA West  04/19/23  8:38 AM EDT

## 2023-07-12 PROCEDURE — 36415 COLL VENOUS BLD VENIPUNCTURE: CPT

## 2023-08-16 ENCOUNTER — HOSPITAL ENCOUNTER (OUTPATIENT)
Dept: CT IMAGING | Facility: HOSPITAL | Age: 78
Discharge: HOME | End: 2023-08-16
Admitting: NURSE PRACTITIONER
Payer: MEDICARE

## 2023-08-16 DIAGNOSIS — I71.20 THORACIC AORTIC ANEURYSM WITHOUT RUPTURE, UNSPECIFIED PART: ICD-10-CM

## 2023-08-16 PROCEDURE — 71250 CT THORAX DX C-: CPT

## 2023-10-10 ENCOUNTER — OFFICE VISIT (OUTPATIENT)
Dept: CARDIAC SURGERY | Facility: CLINIC | Age: 78
End: 2023-10-10
Payer: MEDICARE

## 2023-10-10 VITALS
HEART RATE: 75 BPM | SYSTOLIC BLOOD PRESSURE: 124 MMHG | HEIGHT: 68 IN | RESPIRATION RATE: 18 BRPM | BODY MASS INDEX: 24.25 KG/M2 | DIASTOLIC BLOOD PRESSURE: 80 MMHG | TEMPERATURE: 97.7 F | OXYGEN SATURATION: 98 % | WEIGHT: 160 LBS

## 2023-10-10 DIAGNOSIS — I77.810 DILATED AORTIC ROOT: Primary | ICD-10-CM

## 2023-10-10 DIAGNOSIS — I77.810 AORTIC ECTASIA, THORACIC: ICD-10-CM

## 2023-10-10 PROCEDURE — 3074F SYST BP LT 130 MM HG: CPT | Performed by: NURSE PRACTITIONER

## 2023-10-10 PROCEDURE — 99213 OFFICE O/P EST LOW 20 MIN: CPT | Performed by: NURSE PRACTITIONER

## 2023-10-10 PROCEDURE — 1160F RVW MEDS BY RX/DR IN RCRD: CPT | Performed by: NURSE PRACTITIONER

## 2023-10-10 PROCEDURE — 1159F MED LIST DOCD IN RCRD: CPT | Performed by: NURSE PRACTITIONER

## 2023-10-10 PROCEDURE — 3079F DIAST BP 80-89 MM HG: CPT | Performed by: NURSE PRACTITIONER

## 2023-10-10 NOTE — LETTER
October 10, 2023       No Recipients    Patient: Scott Murphy   YOB: 1945   Date of Visit: 10/10/2023     Dear Di Roberts MD:       Thank you for referring Scott Murphy to me for evaluation. Below are the relevant portions of my assessment and plan of care.    If you have questions, please do not hesitate to call me. I look forward to following Scott along with you.         Sincerely,        ALETHA Pedraza        CC:   No Recipients    Amy Leon APRN  10/10/23 0957  Sign when Signing Visit  10/10/2023      Subjective:      Mariah Delgado MD    Chief Complaint: Follow up ascending aortic ectasia/dilated aortic root    History of Present Illness:       Dear Mariah Lyle MD and Colleagues,    It was nice to see Scott Murphy in [facility] for follow-up. He is a 78 y.o. male with a history of hypertensin, Raynaud's, hyperlipidemia, renal cell carcinoma s/p partial nephrectomy, myeloproliferative disorder, polycythemia vera, prostate CA, asthma, polymyalgia rheumatica, and hypothyroidism who was incidentally found to have aortic dilation during a work up for cough. He has been followed in our aorta clinic annually with serial CT scans for continued monitoring.  He comes in today for routine follow-up for aneurysm surveillence. He denies any medical changes since last being seen. The CT of chest without contrast on 8/16/23 was reviewed. The aortic root measures 4.3 cm, ascending aorta measures 4.2 cm, and DTA measures 2.8 cm.  These measurements are stable. There was a slight difference in the measurement of the aortic root from his last scan (previously 4.2 cm). This is likely secondary to differences in technique. Last echocardiogram in 2021 showed mild aortic valve insufficiency.  He denies shortness of breath or numbness/tingling/pain of extremities. He does report occasional soreness in his left upper chest/side that he feels in muscular in  nature. No vascular deficiencies or hyperextensible or hypermobile joints are noted on exam.  The patient's family history is negative for aneurysms or dissections, negative for connective tissue disease, and positive for coronary disease in first degree family members with his father passing of heart disease in his 60s. He tells me that he had a heart cath back in 2006, and given his family history he is inquiring about stress test/repeat cath to evaluate his coronaries. Blood pressure appears to be well controlled. He remains very active, and plays in a tennis league multiple days a week. He is retired from finance.       Patient Active Problem List   Diagnosis    Primary hypertension    Erythrocytosis    Cancer of right kidney    Prostate cancer    RBBB    PMR (polymyalgia rheumatica)    Episodic lightheadedness    Raynaud's disease without gangrene    Myeloproliferative disorder    Thoracic aortic aneurysm without rupture    Colon cancer screening    Right shoulder pain    Right shoulder injury    Hyperlipidemia    Hyperbilirubinemia    Dilated aortic root    Cough    Asthma    Anxiety    Malignant neoplasm of kidney    Erythrocytosis    Acquired hypothyroidism    Neck pain    Hypertension    Acute URI    Hyperkalemia       Past Medical History:   Diagnosis Date    Acquired hypothyroidism     Anxiety     Aortic aneurysm     Arthritis     Asthma     Baker's cyst of knee, right     Disease of thyroid gland     Gout     H/O Muscle pain     Right shoulder and neck area    H/O Radiation treatment     For tonsillar inflammation and infection when he was a child and this led him to develop thyroid cancer.    History of colon polyps     Hypertension     Hyperthyroidism     Hypothyroidism     Kidney carcinoma     H/O stage I clear cell carcinoma of the right kidney, status post partial nephrectomy, nephron-sparing surgery by Dr. Ganesh Lopez    Left shoulder pain      Myeloproliferative disorder     With polycythemia vera, JAK2 mutation positive requiring periodic phlebotomies (hematocrit above 47).    Polymyalgia rheumatica     Premature atrial contraction     Prostate cancer     Raynaud disease     Shoulder injury, left, sequela        Past Surgical History:   Procedure Laterality Date    CIRCUMCISION N/A 12/17/2018    Procedure: CIRCUMCISION;  Surgeon: Gallo Lopez MD;  Location: [facility] OR;  Service: Urology    COLONOSCOPY  2010    Documented a tubulovillous adenom that was removed completely. Colonoscopy in 2014 was unremarkable.    COLONOSCOPY N/A 2/20/2020    Procedure: COLONOSCOPY;  Surgeon: Claire Galo MD;  Location: [facility] OR;  Service: Gastroenterology;  Laterality: N/A;  diverticulosis    HERNIA REPAIR      H/O multiple hernia repairs including right inguinal hernia repair in 1981, 2003, and double hernia repair in 2013    KIDNEY SURGERY      PROSTATE BIOPSY      Showed features consistent with prostate cancer    SHOULDER SURGERY  1995    Shoulder repair    THYROIDECTOMY, PARTIAL  1983    For malignant tumor    TOOTH EXTRACTION         Allergies   Allergen Reactions    Diphenhydramine Rash     HEART PALPITATIONS, RASH    Statins Myalgia     MUSCLE ACHES    Amlodipine Other (See Comments)     PT has swelling in both ankles    Aspirin Unknown - Low Severity     PT CANNOT REMEMBER    Latex Rash    Peanut Oil Unknown - Low Severity     PT CANNOT REMEMBER    Shellfish-Derived Products Unknown - Low Severity     PT CANNOT REMEMBER         Current Outpatient Medications:     ALPRAZolam (XANAX) 0.5 MG tablet, Take 1 tablet by mouth At Night As Needed., Disp: , Rfl:     amLODIPine (NORVASC) 5 MG tablet, Take 1 tablet by mouth Daily., Disp: , Rfl:     B Complex Vitamins (VITAMIN B-COMPLEX PO), Take 1 tablet by mouth Daily., Disp: , Rfl:     fluticasone (FLONASE) 50 MCG/ACT nasal spray, 1-2 sprays into the nostril(s) as directed by  provider Daily., Disp: , Rfl:     levocetirizine (XYZAL) 5 MG tablet, Take 1 tablet by mouth Every Evening., Disp: , Rfl:     levothyroxine (SYNTHROID, LEVOTHROID) 100 MCG tablet, Take 1 tablet by mouth daily., Disp: 30 tablet, Rfl: 6    losartan (Cozaar) 25 MG tablet, Take 1 tablet by mouth Daily., Disp: 90 tablet, Rfl: 3    montelukast (SINGULAIR) 10 MG tablet, Take 1 tablet by mouth Every Night., Disp: , Rfl:     predniSONE (DELTASONE) 1 MG tablet, Daily., Disp: , Rfl:     zolpidem (AMBIEN) 10 MG tablet, TAKE ONE-HALF TO ONE TABLET BY MOUTH ONCE NIGHTLY AS NEEDED FOR SLEEP  **MUST LAST 30 DAYS, Disp: , Rfl:     Social History     Socioeconomic History    Marital status:     Years of education: College   Tobacco Use    Smoking status: Never    Smokeless tobacco: Never    Tobacco comments:     caffeine use    Vaping Use    Vaping Use: Never used   Substance and Sexual Activity    Alcohol use: Yes     Alcohol/week: 1.0 - 3.0 standard drink of alcohol     Types: 1 - 3 Glasses of wine per week     Comment: 1-3 glasses of wine a week    Drug use: No    Sexual activity: Defer       Family History   Problem Relation Age of Onset    No Known Problems Mother     Heart attack Father     Heart disease Father     Hypertension Father     Diabetes Father     Tuberculosis Maternal Grandmother     No Known Problems Son     Drug abuse Son     Malig Hyperthermia Neg Hx            Review of Systems:  Review of Systems   Constitutional:  Negative for activity change and fatigue.   HENT:  Negative for dental problem and hearing loss.    Eyes: Negative.    Respiratory:  Negative for chest tightness and shortness of breath.    Cardiovascular:  Negative for palpitations and leg swelling.        Left upper chest/side discomfort   Gastrointestinal:  Negative for diarrhea, nausea and vomiting.   Endocrine: Negative for polydipsia, polyphagia and polyuria.   Genitourinary:  Negative for difficulty urinating,  frequency and urgency.   Musculoskeletal:  Positive for arthralgias. Negative for myalgias.   Skin:  Negative for rash and wound.   Allergic/Immunologic: Negative.    Neurological:  Negative for dizziness, seizures, syncope and numbness.   Hematological: Negative.    Psychiatric/Behavioral: Negative.         Physical Exam:    Vital Signs:  Weight: 72.6 kg (160 lb)   Body mass index is 24.33 kg/mý.  Temp: 97.7 øF (36.5 øC)   Heart Rate: 75   BP: 124/80     Physical Exam  Vitals reviewed.   Constitutional:       General: He is not in acute distress.     Appearance: Normal appearance. He is not ill-appearing or toxic-appearing.   HENT:      Head: Normocephalic and atraumatic.      Nose: Nose normal. No congestion or rhinorrhea.      Mouth/Throat:      Mouth: Mucous membranes are moist.      Pharynx: Oropharynx is clear.   Eyes:      Pupils: Pupils are equal, round, and reactive to light.   Cardiovascular:      Rate and Rhythm: Normal rate and regular rhythm.      Pulses: Normal pulses.      Heart sounds: Normal heart sounds. No murmur heard.  Pulmonary:      Effort: Pulmonary effort is normal.      Breath sounds: Normal breath sounds.   Abdominal:      General: Bowel sounds are normal.      Palpations: Abdomen is soft.   Musculoskeletal:         General: Normal range of motion.      Cervical back: Normal range of motion and neck supple.      Right lower leg: No edema.      Left lower leg: No edema.   Skin:     General: Skin is warm and dry.      Capillary Refill: Capillary refill takes less than 2 seconds.      Coloration: Skin is not pale.      Findings: No bruising, erythema, lesion or rash.   Neurological:      General: No focal deficit present.      Mental Status: He is alert and oriented to person, place, and time. Mental status is at baseline.   Psychiatric:         Mood and Affect: Mood normal.         Behavior: Behavior normal.         Thought Content: Thought content normal.         Judgment: Judgment normal.         Assessment:   - ascending aortic ectasia--stable at 4.2cm  - aortic root dilation with mild AI per echo in 2021--dilation stable at 4.2-4.3 cm  - hypertension--well controlled; follows with Dr. Roberts  - right renal cell carcinoma s/p nephrectomy--follows with Dr. Damon  - polymyalgia rheumatica--followed by rheumatology; on chronic steroids  - myeloproliferative disorder/polycythemia vera/MAI-2 mutation--management per hematology    Recommendation/Plan:     Continued risk factor modification of hypertension with a goal blood pressure less than 130/80, hyperlipidemia optimization, and continued avoidance of tobacco/nicotine products.    Continuation of low aerobic exercise is indicated to help reduce body habitus, assist with blood pressure and cholesterol control.       Although risk of rupture is low, emergency department presentation is warranted for acute chest, back, or abdominal pain, syncope, or stroke like symptoms.    Follow up in [facility] in 1 year(s) with CT scan of chest without contrast. No murmur on exam. Will defer timing/need of repeat echocardiogram to cardiology. He is also inquiring about evaluation of his coronaries given his family history. He has an upcoming appointment with cardiology, and I recommended that he discuss this with Dr. Roberts.    I spent approximately 25 minutes total in evaluating the data, examining the patient, discussing the options and counseling.  Independent interpretation of imaging.     Thank you for allowing us to participate in his care.    Regards,    ALETHA Pedraza    **All problems and history are new to this examiner.  Extensive review of records prior to and during patient visit

## 2023-10-10 NOTE — PROGRESS NOTES
10/10/2023      Subjective:      Mariah Delgado MD    Chief Complaint: Follow up ascending aortic ectasia/dilated aortic root    History of Present Illness:       Dear Mariah Lyle MD and Colleagues,    It was nice to see Scott Murphy in [facility] for follow-up. He is a 78 y.o. male with a history of hypertensin, Raynaud's, hyperlipidemia, renal cell carcinoma s/p partial nephrectomy, myeloproliferative disorder, polycythemia vera, prostate CA, asthma, polymyalgia rheumatica, and hypothyroidism who was incidentally found to have aortic dilation during a work up for cough. He has been followed in our aorta clinic annually with serial CT scans for continued monitoring.  He comes in today for routine follow-up for aneurysm surveillence. He denies any medical changes since last being seen. The CT of chest without contrast on 8/16/23 was reviewed. The aortic root measures 4.3 cm, ascending aorta measures 4.2 cm, and DTA measures 2.8 cm.  These measurements are stable. There was a slight difference in the measurement of the aortic root from his last scan (previously 4.2 cm). This is likely secondary to differences in technique. Last echocardiogram in 2021 showed mild aortic valve insufficiency.  He denies shortness of breath or numbness/tingling/pain of extremities. He does report occasional soreness in his left upper chest/side that he feels in muscular in nature. No vascular deficiencies or hyperextensible or hypermobile joints are noted on exam.  The patient's family history is negative for aneurysms or dissections, negative for connective tissue disease, and positive for coronary disease in first degree family members with his father passing of heart disease in his 60s. He tells me that he had a heart cath back in 2006, and given his family history he is inquiring about stress test/repeat cath to evaluate his coronaries. Blood pressure appears to be well controlled. He remains very active, and plays in a  tennis league multiple days a week. He is retired from finance.       Patient Active Problem List   Diagnosis    Primary hypertension    Erythrocytosis    Cancer of right kidney    Prostate cancer    RBBB    PMR (polymyalgia rheumatica)    Episodic lightheadedness    Raynaud's disease without gangrene    Myeloproliferative disorder    Thoracic aortic aneurysm without rupture    Colon cancer screening    Right shoulder pain    Right shoulder injury    Hyperlipidemia    Hyperbilirubinemia    Dilated aortic root    Cough    Asthma    Anxiety    Malignant neoplasm of kidney    Erythrocytosis    Acquired hypothyroidism    Neck pain    Hypertension    Acute URI    Hyperkalemia       Past Medical History:   Diagnosis Date    Acquired hypothyroidism     Anxiety     Aortic aneurysm     Arthritis     Asthma     Hutton's cyst of knee, right     Disease of thyroid gland     Gout     H/O Muscle pain     Right shoulder and neck area    H/O Radiation treatment     For tonsillar inflammation and infection when he was a child and this led him to develop thyroid cancer.    History of colon polyps     Hypertension     Hyperthyroidism     Hypothyroidism     Kidney carcinoma     H/O stage I clear cell carcinoma of the right kidney, status post partial nephrectomy, nephron-sparing surgery by Dr. Ganesh Lopez    Left shoulder pain     Myeloproliferative disorder     With polycythemia vera, JAK2 mutation positive requiring periodic phlebotomies (hematocrit above 47).    Polymyalgia rheumatica     Premature atrial contraction     Prostate cancer     Raynaud disease     Shoulder injury, left, sequela        Past Surgical History:   Procedure Laterality Date    CIRCUMCISION N/A 12/17/2018    Procedure: CIRCUMCISION;  Surgeon: Gallo Lopez MD;  Location: [facility];  Service: Urology    COLONOSCOPY  2010    Documented a tubulovillous adenom that was removed completely. Colonoscopy in 2014 was unremarkable.    COLONOSCOPY N/A  2/20/2020    Procedure: COLONOSCOPY;  Surgeon: Claire Galo MD;  Location: [facility];  Service: Gastroenterology;  Laterality: N/A;  diverticulosis    HERNIA REPAIR      H/O multiple hernia repairs including right inguinal hernia repair in 1981, 2003, and double hernia repair in 2013    KIDNEY SURGERY      PROSTATE BIOPSY      Showed features consistent with prostate cancer    SHOULDER SURGERY  1995    Shoulder repair    THYROIDECTOMY, PARTIAL  1983    For malignant tumor    TOOTH EXTRACTION         Allergies   Allergen Reactions    Diphenhydramine Rash     HEART PALPITATIONS, RASH    Statins Myalgia     MUSCLE ACHES    Amlodipine Other (See Comments)     PT has swelling in both ankles    Aspirin Unknown - Low Severity     PT CANNOT REMEMBER    Latex Rash    Peanut Oil Unknown - Low Severity     PT CANNOT REMEMBER    Shellfish-Derived Products Unknown - Low Severity     PT CANNOT REMEMBER         Current Outpatient Medications:     ALPRAZolam (XANAX) 0.5 MG tablet, Take 1 tablet by mouth At Night As Needed., Disp: , Rfl:     amLODIPine (NORVASC) 5 MG tablet, Take 1 tablet by mouth Daily., Disp: , Rfl:     B Complex Vitamins (VITAMIN B-COMPLEX PO), Take 1 tablet by mouth Daily., Disp: , Rfl:     fluticasone (FLONASE) 50 MCG/ACT nasal spray, 1-2 sprays into the nostril(s) as directed by provider Daily., Disp: , Rfl:     levocetirizine (XYZAL) 5 MG tablet, Take 1 tablet by mouth Every Evening., Disp: , Rfl:     levothyroxine (SYNTHROID, LEVOTHROID) 100 MCG tablet, Take 1 tablet by mouth daily., Disp: 30 tablet, Rfl: 6    losartan (Cozaar) 25 MG tablet, Take 1 tablet by mouth Daily., Disp: 90 tablet, Rfl: 3    montelukast (SINGULAIR) 10 MG tablet, Take 1 tablet by mouth Every Night., Disp: , Rfl:     predniSONE (DELTASONE) 1 MG tablet, Daily., Disp: , Rfl:     zolpidem (AMBIEN) 10 MG tablet, TAKE ONE-HALF TO ONE TABLET BY MOUTH ONCE NIGHTLY AS NEEDED FOR SLEEP  **MUST LAST 30 DAYS, Disp: , Rfl:     Social History      Socioeconomic History    Marital status:     Years of education: College   Tobacco Use    Smoking status: Never    Smokeless tobacco: Never    Tobacco comments:     caffeine use    Vaping Use    Vaping Use: Never used   Substance and Sexual Activity    Alcohol use: Yes     Alcohol/week: 1.0 - 3.0 standard drink of alcohol     Types: 1 - 3 Glasses of wine per week     Comment: 1-3 glasses of wine a week    Drug use: No    Sexual activity: Defer       Family History   Problem Relation Age of Onset    No Known Problems Mother     Heart attack Father     Heart disease Father     Hypertension Father     Diabetes Father     Tuberculosis Maternal Grandmother     No Known Problems Son     Drug abuse Son     Malig Hyperthermia Neg Hx            Review of Systems:  Review of Systems   Constitutional:  Negative for activity change and fatigue.   HENT:  Negative for dental problem and hearing loss.    Eyes: Negative.    Respiratory:  Negative for chest tightness and shortness of breath.    Cardiovascular:  Negative for palpitations and leg swelling.        Left upper chest/side discomfort   Gastrointestinal:  Negative for diarrhea, nausea and vomiting.   Endocrine: Negative for polydipsia, polyphagia and polyuria.   Genitourinary:  Negative for difficulty urinating, frequency and urgency.   Musculoskeletal:  Positive for arthralgias. Negative for myalgias.   Skin:  Negative for rash and wound.   Allergic/Immunologic: Negative.    Neurological:  Negative for dizziness, seizures, syncope and numbness.   Hematological: Negative.    Psychiatric/Behavioral: Negative.         Physical Exam:    Vital Signs:  Weight: 72.6 kg (160 lb)   Body mass index is 24.33 kg/mý.  Temp: 97.7 øF (36.5 øC)   Heart Rate: 75   BP: 124/80     Physical Exam  Vitals reviewed.   Constitutional:       General: He is not in acute distress.     Appearance: Normal appearance. He is not ill-appearing or toxic-appearing.   HENT:      Head:  Normocephalic and atraumatic.      Nose: Nose normal. No congestion or rhinorrhea.      Mouth/Throat:      Mouth: Mucous membranes are moist.      Pharynx: Oropharynx is clear.   Eyes:      Pupils: Pupils are equal, round, and reactive to light.   Cardiovascular:      Rate and Rhythm: Normal rate and regular rhythm.      Pulses: Normal pulses.      Heart sounds: Normal heart sounds. No murmur heard.  Pulmonary:      Effort: Pulmonary effort is normal.      Breath sounds: Normal breath sounds.   Abdominal:      General: Bowel sounds are normal.      Palpations: Abdomen is soft.   Musculoskeletal:         General: Normal range of motion.      Cervical back: Normal range of motion and neck supple.      Right lower leg: No edema.      Left lower leg: No edema.   Skin:     General: Skin is warm and dry.      Capillary Refill: Capillary refill takes less than 2 seconds.      Coloration: Skin is not pale.      Findings: No bruising, erythema, lesion or rash.   Neurological:      General: No focal deficit present.      Mental Status: He is alert and oriented to person, place, and time. Mental status is at baseline.   Psychiatric:         Mood and Affect: Mood normal.         Behavior: Behavior normal.         Thought Content: Thought content normal.         Judgment: Judgment normal.        Assessment:   - ascending aortic ectasia--stable at 4.2cm  - aortic root dilation with mild AI per echo in 2021--dilation stable at 4.2-4.3 cm  - hypertension--well controlled; follows with Dr. Roberts  - right renal cell carcinoma s/p nephrectomy--follows with Dr. Damon  - polymyalgia rheumatica--followed by rheumatology; on chronic steroids  - myeloproliferative disorder/polycythemia vera/MAI-2 mutation--management per hematology    Recommendation/Plan:     Continued risk factor modification of hypertension with a goal blood pressure less than 130/80, hyperlipidemia optimization, and continued avoidance of tobacco/nicotine  products.    Continuation of low aerobic exercise is indicated to help reduce body habitus, assist with blood pressure and cholesterol control.       Although risk of rupture is low, emergency department presentation is warranted for acute chest, back, or abdominal pain, syncope, or stroke like symptoms.    Follow up in [facility] in 1 year(s) with CT scan of chest without contrast. No murmur on exam. Will defer timing/need of repeat echocardiogram to cardiology. He is also inquiring about evaluation of his coronaries given his family history. He has an upcoming appointment with cardiology, and I recommended that he discuss this with Dr. Roberts.    I spent approximately 25 minutes total in evaluating the data, examining the patient, discussing the options and counseling.  Independent interpretation of imaging.     Thank you for allowing us to participate in his care.    Regards,    ALETHA Pedraza    **All problems and history are new to this examiner.  Extensive review of records prior to and during patient visit

## 2023-10-18 ENCOUNTER — LAB (OUTPATIENT)
Dept: LAB | Facility: HOSPITAL | Age: 78
End: 2023-10-18
Payer: MEDICARE

## 2023-10-18 ENCOUNTER — APPOINTMENT (OUTPATIENT)
Dept: ONCOLOGY | Facility: HOSPITAL | Age: 78
End: 2023-10-18
Payer: MEDICARE

## 2023-10-18 ENCOUNTER — OFFICE VISIT (OUTPATIENT)
Dept: ONCOLOGY | Facility: CLINIC | Age: 78
End: 2023-10-18
Payer: MEDICARE

## 2023-10-18 VITALS
BODY MASS INDEX: 24.89 KG/M2 | TEMPERATURE: 98.2 F | SYSTOLIC BLOOD PRESSURE: 111 MMHG | DIASTOLIC BLOOD PRESSURE: 67 MMHG | HEART RATE: 69 BPM | OXYGEN SATURATION: 98 % | WEIGHT: 164.2 LBS | HEIGHT: 68 IN

## 2023-10-18 DIAGNOSIS — C64.1 CANCER OF RIGHT KIDNEY: ICD-10-CM

## 2023-10-18 DIAGNOSIS — D75.1 ERYTHROCYTOSIS: ICD-10-CM

## 2023-10-18 DIAGNOSIS — D47.1 MYELOPROLIFERATIVE DISORDER: ICD-10-CM

## 2023-10-18 DIAGNOSIS — D75.1 ERYTHROCYTOSIS: Primary | ICD-10-CM

## 2023-10-18 LAB
BASOPHILS # BLD AUTO: 0.07 10*3/MM3 (ref 0–0.2)
BASOPHILS NFR BLD AUTO: 0.8 % (ref 0–1.5)
DEPRECATED RDW RBC AUTO: 45.3 FL (ref 37–54)
EOSINOPHIL # BLD AUTO: 0.29 10*3/MM3 (ref 0–0.4)
EOSINOPHIL NFR BLD AUTO: 3.2 % (ref 0.3–6.2)
ERYTHROCYTE [DISTWIDTH] IN BLOOD BY AUTOMATED COUNT: 14.4 % (ref 12.3–15.4)
HCT VFR BLD AUTO: 47.3 % (ref 37.5–51)
HGB BLD-MCNC: 15.5 G/DL (ref 13–17.7)
IMM GRANULOCYTES # BLD AUTO: 0.03 10*3/MM3 (ref 0–0.05)
IMM GRANULOCYTES NFR BLD AUTO: 0.3 % (ref 0–0.5)
LYMPHOCYTES # BLD AUTO: 1.58 10*3/MM3 (ref 0.7–3.1)
LYMPHOCYTES NFR BLD AUTO: 17.3 % (ref 19.6–45.3)
MCH RBC QN AUTO: 28.1 PG (ref 26.6–33)
MCHC RBC AUTO-ENTMCNC: 32.8 G/DL (ref 31.5–35.7)
MCV RBC AUTO: 85.8 FL (ref 79–97)
MONOCYTES # BLD AUTO: 0.94 10*3/MM3 (ref 0.1–0.9)
MONOCYTES NFR BLD AUTO: 10.3 % (ref 5–12)
NEUTROPHILS NFR BLD AUTO: 6.22 10*3/MM3 (ref 1.7–7)
NEUTROPHILS NFR BLD AUTO: 68.1 % (ref 42.7–76)
NRBC BLD AUTO-RTO: 0 /100 WBC (ref 0–0.2)
PLATELET # BLD AUTO: 234 10*3/MM3 (ref 140–450)
PMV BLD AUTO: 9.6 FL (ref 6–12)
RBC # BLD AUTO: 5.51 10*6/MM3 (ref 4.14–5.8)
WBC NRBC COR # BLD: 9.13 10*3/MM3 (ref 3.4–10.8)

## 2023-10-18 PROCEDURE — 85025 COMPLETE CBC W/AUTO DIFF WBC: CPT

## 2023-10-18 PROCEDURE — 36415 COLL VENOUS BLD VENIPUNCTURE: CPT

## 2023-10-18 NOTE — PROGRESS NOTES
REASONS FOR FOLLOWUP:       1. Myeloproliferative disorder with polycythemia vera, MAI-2 mutation positive requiring periodic phlebotomies whenever hematocrit is above 45.      2. His  tory of stage I clear cell carcinoma of the right kidney status post partial nephrectomy, nephron sparing surgery by Dr. Ganesh Lopez with no issues or consequences.               3. Patient has history of polymyalgia rheumatica.  He IS ON  prednisone LOW DOSE          HISTORY OF PRESENT ILLNESS  On 10/18/2023, this 78-year-old male returns to the office for follow-up. He is here today to follow up his essential erythrocytosis, undergoing phlebotomy periodically. His white count and platelet count have remained stable. In the interim, he has had successful assessment of his aortic aneurysm and his prostate and kidney cancers in the past. No recurrences have been found. Physically he feels well. He is playing tennis at least twice a week and he is still playing with the [employer]. He has excellent level of energy, normal appetite, stable weight, normal bowel function, normal urination. His polymyalgia is treated with a minimal dose of prednisone 1 mg a day.          Past Medical History:   Diagnosis Date    Acquired hypothyroidism     Anxiety     Aortic aneurysm     Arthritis     Asthma     Hutton's cyst of knee, right     Disease of thyroid gland     Gout     H/O Muscle pain     Right shoulder and neck area    H/O Radiation treatment     For tonsillar inflammation and infection when he was a child and this led him to develop thyroid cancer.    History of colon polyps     Hypertension     Hyperthyroidism     Hypothyroidism     Kidney carcinoma     H/O stage I clear cell carcinoma of the right kidney, status post partial nephrectomy, nephron-sparing surgery by Dr. Ganesh Lopez    Left shoulder pain     Myeloproliferative disorder     With polycythemia vera, JAK2 mutation positive requiring periodic  phlebotomies (hematocrit above 47).    Polymyalgia rheumatica     Premature atrial contraction     Prostate cancer     Raynaud disease     Shoulder injury, left, sequela      Past Surgical History:   Procedure Laterality Date    CIRCUMCISION N/A 12/17/2018    Procedure: CIRCUMCISION;  Surgeon: Gallo Lopez MD;  Location: [facility];  Service: Urology    COLONOSCOPY  2010    Documented a tubulovillous adenom that was removed completely. Colonoscopy in 2014 was unremarkable.    COLONOSCOPY N/A 2/20/2020    Procedure: COLONOSCOPY;  Surgeon: Claire Galo MD;  Location: [facility];  Service: Gastroenterology;  Laterality: N/A;  diverticulosis    HERNIA REPAIR      H/O multiple hernia repairs including right inguinal hernia repair in 1981, 2003, and double hernia repair in 2013    KIDNEY SURGERY      PROSTATE BIOPSY      Showed features consistent with prostate cancer    SHOULDER SURGERY  1995    Shoulder repair    THYROIDECTOMY, PARTIAL  1983    For malignant tumor    TOOTH EXTRACTION       Social History     Social History Narrative    He is a very avid  and plays violin in an orchestra. He does not smoke but had a lot of second-hand smoke through his life.     Family History   Problem Relation Age of Onset    No Known Problems Mother     Heart attack Father     Heart disease Father     Hypertension Father     Diabetes Father     Tuberculosis Maternal Grandmother     No Known Problems Son     Drug abuse Son     Malig Hyperthermia Neg Hx      Allergies   Allergen Reactions    Diphenhydramine Rash     HEART PALPITATIONS, RASH    Statins Myalgia     MUSCLE ACHES    Amlodipine Other (See Comments)     PT has swelling in both ankles    Aspirin Unknown - Low Severity     PT CANNOT REMEMBER    Latex Rash    Peanut Oil Unknown - Low Severity     PT CANNOT REMEMBER    Shellfish-Derived Products Unknown - Low Severity     PT CANNOT REMEMBER     Current Outpatient Medications on File Prior to Visit  "  Medication Sig Dispense Refill    ALPRAZolam (XANAX) 0.5 MG tablet Take 1 tablet by mouth At Night As Needed.      amLODIPine (NORVASC) 5 MG tablet Take 1 tablet by mouth Daily.      B Complex Vitamins (VITAMIN B-COMPLEX PO) Take 1 tablet by mouth Daily.      fluticasone (FLONASE) 50 MCG/ACT nasal spray 1-2 sprays into the nostril(s) as directed by provider Daily.      levocetirizine (XYZAL) 5 MG tablet Take 1 tablet by mouth Every Evening.      levothyroxine (SYNTHROID, LEVOTHROID) 100 MCG tablet Take 1 tablet by mouth daily. 30 tablet 6    losartan (Cozaar) 25 MG tablet Take 1 tablet by mouth Daily. 90 tablet 3    montelukast (SINGULAIR) 10 MG tablet Take 1 tablet by mouth Every Night.      predniSONE (DELTASONE) 1 MG tablet Daily.      zolpidem (AMBIEN) 10 MG tablet TAKE ONE-HALF TO ONE TABLET BY MOUTH ONCE NIGHTLY AS NEEDED FOR SLEEP  **MUST LAST 30 DAYS       No current facility-administered medications on file prior to visit.               VITAL SIGNS:   Vitals:    10/18/23 1323   BP: 111/67   Pulse: 69   Temp: 98.2 °F (36.8 °C)   TempSrc: Temporal   SpO2: 98%   Weight: 74.5 kg (164 lb 3.2 oz)   Height: 172.7 cm (67.99\")              PHYSICAL EXAMINATION:           GENERAL:  Well-developed, Patient  in no acute distress.   SKIN:  Warm, dry ,NO purpura ,no rash.  HEENT:  Pupils were equal and reactive to light and accomodation, conjunctivae noninjected,  normal visual acuity.   NECK:  Supple with good range of motion; no thyromegaly , no JVD or bruits,.No carotid artery pain, no carotid abnormal pulsation   LYMPHATICS:  No cervical, NO supraclavicular, NO axillary, NO inguinal adenopathies.  CARDIAC   normal rate , regular rhythm, without murmur,NO rubs NO S3 NO S4   LUNGS: normal breath sounds bilateral, no wheezing, NO rhonchi, NO crackles ,NO rubs.  VASCULAR VENOUS: no cyanosis, NO collateral circulation, NO varicosities, NO edema, NO palpable cords, NO pain,NO erythema, NO pigmentation of the " skin.  ABDOMEN:  Soft, NO pain,no hepatomegaly, no splenomegaly,no masses, no ascites, no collateral circulation,no distention.  EXTREMITIES  AND SPINE:  No clubbing, no cyanosis ,no deformities , no pain .No kyphosis,  no pain in spine, no pain in ribs , no pain in pelvic bone.  NEUROLOGICAL:  Patient was awake, alert, oriented to time, person and place.                LABORATORY DATA:    Lab Results   Component Value Date    WBC 9.13 10/18/2023    HGB 15.5 10/18/2023    HCT 47.3 10/18/2023    MCV 85.8 10/18/2023     10/18/2023                           ASSESSMENT/PLAN: 1. This patient has history of polycythemia vera JAK2 mutation positive. He has had multiple phlebotomies in the past and this has produced a drop in the MCV. Now the patient is not requiring phlebotomies as often as before and is related to the mild iron deficiency that has been produced by the phlebotomy    I reviewed the patient on 08/24/2021. He has no symptoms pertinent to polycythemia, his hematocrit is 46 but I think it is okay to let it go until he has to come back in a couple of months to have another CBC and possibly he will require phlebotomy then. Otherwise I will review him back in 4 months.   I reviewed the patient on 12/15/2021. Since the previous visit he has not had any episodes of bleeding or thrombosis. His hematocrit today is 47 and he will proceed with a phlebotomy today of 500 mL. He will return in 2 and 4 months for CBC and phlebotomy if hematocrit is above 45. I will review him back in 4 months.  The patient on 04/19/2022 has no symptoms related to his polycythemia vera. His hematocrit is 47. I think it will be okay to forego any phlebotomy today. I would like for him to return every 3 months for a CBC and phlebotomy if hematocrit is above 49. The patient's white count is normal, the patient's platelet count is normal. He is not requiring any medicine otherwise to control this issue and he has taken Hydrea in the past.  He refused to further continue taking this medicine.   On 10/04/2022 the patient's hematocrit is normal, white count is normal, platelet count is normal. He has no obvious clinical splenomegaly. He will remain in observation trying to keep a hematocrit below 47 all of the time. For this reason he was scheduled to have a repeat visit in 3 months with a CBC, CMP, RN and phlebotomy if necessary and repeat the same analysis in 6 month visit with me at that point. He does not need to be on any medication to decrease production of blood cells at this time. He is taking a baby aspirin.   On 04/12/2023 the patient has no need for phlebotomy today. Hematocrit is 48. I think his threshold will be 50 from now on. He has not had any vascular events. His white count and platelet count remain normal. He has no clinical palpable splenomegaly and he has no other new health issues.    On 10/18/2023, he has no symptoms related to erythrocytosis. Hematocrit is 47. Find no need for phlebotomy at this time. Further phlebotomies will be done if hematocrit is above 50.     He will return to the office in 3 months and 6 months for review and CBC.            2.The patient has minimal leukocytosis with a normal white count differential. This has been present since the time of the diagnosis of his myeloproliferative disorder, he is BCR/ABL negative. This will be watched in absence of any other intervention.     On 04/18/2022 the patient's white count is normal, the white count differential is normal. No issues pertinent to this at this point.   On 10/04/2022 his white count remains stable in comparison with previous assessment. No issues in regard to anything to this. This is reflection of his myeloproliferative disorder and does not need to be treated at this point.   On 04/12/2023 his white count is stable. White count differential is normal.    On 10/18/2023, his white count remains stable. No recent infection.          3.During the  previous visit the patient was very concerned about the possibility of prostate cancer. He was seen by Dr. Ganesh Lopez. An MRI of the prostate was performed that disclosed no abnormalities. No biopsy was necessary and he will return to see Dr. Lopez again in 6 months.  On 04/19/2022 the patient's PSA remains at 11. He has had further radiological assessment by Ganesh Lopez MD, no prostate cancer has been found. He is not going to have any prostate biopsies unless the PSA further changes. He has no major symptoms related to an elevated PSA besides some prostatism.   On 10/04/2022 the patient continues having PSA elevation of 10. Dr. Lopez has done a prostate massage recently, urine specimen obtained after that to further refine molecular testing on this. He is going to discuss this further with Dr. Lopez. The question was raised in regard what to do. Likely he will recommend radiation therapy.   On 04/12/2023 he continues seeing Dr. Lopez in regard to his prostate tissues and his previous kidney cancer. Radiological assessment in this regard has not shown any evidence of recurrent disease. Clinically he has no symptoms pertinent to this.    On 10/18/2023, no issues pertinent to this. His PSA has been remeasured by his urologist, being fine.          4.The patient has history of very early Stage I kidney cancer status post Nephron-sparing partial nephrectomy. The patient has not had any issues pertinent to this. He never required any form of adjuvant therapy and he will remain to be watched in absence of any other intervention.  The relationship with his previous history of kidney cancer, the patient has continued follow up with Ganesh Lopez MD who has monitored him on clinical grounds and radiologically. Today on 04/19/2022 he has no symptoms or signs of kidney cancer recurrence.   On 10/04/2022 the patient already has seen Dr. Lopez in regard to his previous history of kidney cancer. His CT scan  remains quiet with no evidence of recurrent disease.   On 10/18/2023, no issues pertinent to his previous kidney cancer. Recent radiological assessment has not produced any abnormalities.      PLAN:  The patient will return for CBC in 3 and 6 months and phlebotomy if hematocrit is above or equal to 50. We will review him back in 6 months.

## 2023-11-08 ENCOUNTER — TELEPHONE (OUTPATIENT)
Dept: ONCOLOGY | Facility: CLINIC | Age: 78
End: 2023-11-08
Payer: MEDICARE

## 2023-11-08 NOTE — TELEPHONE ENCOUNTER
"  Caller: Scott Murphy \"MEGAN\"    Relationship: Self    Best call back number: [phone]    What is the best time to reach you: ANY    Who are you requesting to speak with (clinical staff, provider,  specific staff member): CLINICAL     What was the call regarding: SCOTT IS WANTING TO SCHEDULE A PHLEBOTOMY ON 11-16 IN THE AFTERNOON     Is it okay if the provider responds through MyChart: NO        "

## 2023-11-08 NOTE — TELEPHONE ENCOUNTER
Called the patient back to make sure he was feeling okay and he stated he felt fine but that he went to the PCP and his hct was 50.1% and he would rather come in next week to see if he needed a phlebotomy and stay on top of it. I let him know that I would have scheduling look into this and get back to him. He v/u.

## 2023-11-16 ENCOUNTER — INFUSION (OUTPATIENT)
Dept: ONCOLOGY | Facility: HOSPITAL | Age: 78
End: 2023-11-16
Payer: MEDICARE

## 2023-11-16 ENCOUNTER — LAB (OUTPATIENT)
Dept: LAB | Facility: HOSPITAL | Age: 78
End: 2023-11-16
Payer: MEDICARE

## 2023-11-16 VITALS
WEIGHT: 165.2 LBS | HEART RATE: 66 BPM | DIASTOLIC BLOOD PRESSURE: 80 MMHG | SYSTOLIC BLOOD PRESSURE: 145 MMHG | BODY MASS INDEX: 25.12 KG/M2 | RESPIRATION RATE: 16 BRPM | TEMPERATURE: 97.8 F | OXYGEN SATURATION: 97 %

## 2023-11-16 DIAGNOSIS — D47.1 MYELOPROLIFERATIVE DISORDER: Primary | ICD-10-CM

## 2023-11-16 DIAGNOSIS — D75.1 ERYTHROCYTOSIS: Primary | ICD-10-CM

## 2023-11-16 LAB
BASOPHILS # BLD AUTO: 0.14 10*3/MM3 (ref 0–0.2)
BASOPHILS NFR BLD AUTO: 1.3 % (ref 0–1.5)
DEPRECATED RDW RBC AUTO: 46.1 FL (ref 37–54)
EOSINOPHIL # BLD AUTO: 0.27 10*3/MM3 (ref 0–0.4)
EOSINOPHIL NFR BLD AUTO: 2.5 % (ref 0.3–6.2)
ERYTHROCYTE [DISTWIDTH] IN BLOOD BY AUTOMATED COUNT: 15 % (ref 12.3–15.4)
HCT VFR BLD AUTO: 52.8 % (ref 37.5–51)
HGB BLD-MCNC: 17.1 G/DL (ref 13–17.7)
IMM GRANULOCYTES # BLD AUTO: 0.1 10*3/MM3 (ref 0–0.05)
IMM GRANULOCYTES NFR BLD AUTO: 0.9 % (ref 0–0.5)
LYMPHOCYTES # BLD AUTO: 1.72 10*3/MM3 (ref 0.7–3.1)
LYMPHOCYTES NFR BLD AUTO: 16.1 % (ref 19.6–45.3)
MCH RBC QN AUTO: 27.6 PG (ref 26.6–33)
MCHC RBC AUTO-ENTMCNC: 32.4 G/DL (ref 31.5–35.7)
MCV RBC AUTO: 85.2 FL (ref 79–97)
MONOCYTES # BLD AUTO: 1.03 10*3/MM3 (ref 0.1–0.9)
MONOCYTES NFR BLD AUTO: 9.6 % (ref 5–12)
NEUTROPHILS NFR BLD AUTO: 69.6 % (ref 42.7–76)
NEUTROPHILS NFR BLD AUTO: 7.44 10*3/MM3 (ref 1.7–7)
NRBC BLD AUTO-RTO: 0 /100 WBC (ref 0–0.2)
PLATELET # BLD AUTO: 196 10*3/MM3 (ref 140–450)
PMV BLD AUTO: 10.2 FL (ref 6–12)
RBC # BLD AUTO: 6.2 10*6/MM3 (ref 4.14–5.8)
WBC NRBC COR # BLD AUTO: 10.7 10*3/MM3 (ref 3.4–10.8)

## 2023-11-16 PROCEDURE — 85025 COMPLETE CBC W/AUTO DIFF WBC: CPT

## 2023-11-16 PROCEDURE — 99195 PHLEBOTOMY: CPT

## 2023-11-16 RX ORDER — SODIUM CHLORIDE 9 MG/ML
250 INJECTION, SOLUTION INTRAVENOUS ONCE
Status: DISCONTINUED | OUTPATIENT
Start: 2023-11-16 | End: 2023-11-16 | Stop reason: HOSPADM

## 2023-11-16 RX ORDER — SODIUM CHLORIDE 9 MG/ML
250 INJECTION, SOLUTION INTRAVENOUS ONCE
OUTPATIENT
Start: 2023-11-16

## 2023-11-16 NOTE — NURSING NOTE
Confirmed with Dr Damon, pt to have phlebo today for hct >50%  Lab Results   Component Value Date    WBC 10.70 11/16/2023    HGB 17.1 11/16/2023    HCT 52.8 (H) 11/16/2023    MCV 85.2 11/16/2023     11/16/2023

## 2023-12-21 ENCOUNTER — OFFICE VISIT (OUTPATIENT)
Age: 78
End: 2023-12-21
Payer: MEDICARE

## 2023-12-21 VITALS
DIASTOLIC BLOOD PRESSURE: 70 MMHG | BODY MASS INDEX: 25.04 KG/M2 | WEIGHT: 165.2 LBS | HEIGHT: 68 IN | HEART RATE: 57 BPM | OXYGEN SATURATION: 99 % | SYSTOLIC BLOOD PRESSURE: 119 MMHG

## 2023-12-21 DIAGNOSIS — I10 HYPERTENSION, UNSPECIFIED TYPE: ICD-10-CM

## 2023-12-21 DIAGNOSIS — E78.5 HYPERLIPIDEMIA, UNSPECIFIED HYPERLIPIDEMIA TYPE: ICD-10-CM

## 2023-12-21 DIAGNOSIS — I71.20 THORACIC AORTIC ANEURYSM WITHOUT RUPTURE, UNSPECIFIED PART: Primary | ICD-10-CM

## 2023-12-21 DIAGNOSIS — M35.3 PMR (POLYMYALGIA RHEUMATICA): ICD-10-CM

## 2023-12-21 DIAGNOSIS — C64.1 CANCER OF RIGHT KIDNEY: ICD-10-CM

## 2023-12-21 DIAGNOSIS — E03.9 ACQUIRED HYPOTHYROIDISM: ICD-10-CM

## 2023-12-21 DIAGNOSIS — I73.00 RAYNAUD'S DISEASE WITHOUT GANGRENE: ICD-10-CM

## 2023-12-21 DIAGNOSIS — D47.1 MYELOPROLIFERATIVE DISORDER: ICD-10-CM

## 2023-12-21 DIAGNOSIS — I45.10 RBBB: ICD-10-CM

## 2023-12-21 PROCEDURE — 3074F SYST BP LT 130 MM HG: CPT | Performed by: INTERNAL MEDICINE

## 2023-12-21 PROCEDURE — 1159F MED LIST DOCD IN RCRD: CPT | Performed by: INTERNAL MEDICINE

## 2023-12-21 PROCEDURE — 93000 ELECTROCARDIOGRAM COMPLETE: CPT | Performed by: INTERNAL MEDICINE

## 2023-12-21 PROCEDURE — 1160F RVW MEDS BY RX/DR IN RCRD: CPT | Performed by: INTERNAL MEDICINE

## 2023-12-21 PROCEDURE — 99214 OFFICE O/P EST MOD 30 MIN: CPT | Performed by: INTERNAL MEDICINE

## 2023-12-21 PROCEDURE — 3078F DIAST BP <80 MM HG: CPT | Performed by: INTERNAL MEDICINE

## 2023-12-21 NOTE — PROGRESS NOTES
Subjective:     Encounter Date:12/21/2023      Patient ID: Scott Murphy is a 78 y.o. male.    Chief Complaint:  History of Present Illness    This is a 78-year-old with polycythemia vera, polymyalgia rheumatica, history of renal cell carcinoma status post partial nephrectomy, hypertension, hypothyroidism, Raynaud's disease, asthma, thoracic aortic aneurysm, who presents for follow up.        In 9/2022 when I saw him last he was doing well.  He had followed up in the aorta clinic by ALETHA Krishnamurthy on 8/9/2022 and appeared to be doing well.  His aneurysm appeared stable.      He was seen in urgent follow up by ALETHA Lerma in 4/2023.  Prior to that office visit he developed swelling so his amlodipine 10 mg was discontinued and he was advised to increase his losartan to 50 mg.  However he developed issues with low blood pressures and he decreased the losartan to 25 mg.  He then developed some issues with mildly elevated blood pressures and as result was taking an additional dosage of losartan as needed.  At that office visit is recommended that he resume the amlodipine at 5 mg and continue losartan at 25 mg.  The patient also reported an isolated episode of bradycardia noted by his watch in the middle the night.  It was recommended just monitor for further episodes at the time.    Patient reports that his blood pressures are doing better at this time.  He is currently taking amlodipine 7.5 mg daily.  He reports that his Raynaud's is not quite as well-controlled with this dosage but only reports mild symptoms at times.  However he admits that he has not really gotten very cold yet.    He denies any chest pain, shortness of breath, orthopnea, near-syncope or syncope or lower extremity swelling.  He reports that he plays tennis on a regular basis without any significant issues.  He reports that his heart rates often go up into the 140s while he is playing tennis and he feels fine with  this.  On one occasion he had to exert himself little bit more than normal while playing and his heart rates went up into the 160s briefly.  This was associated with some lightheadedness and fatigue.  He did not feel well with his heart rate elevation but his symptoms improved when his heart rate came right back down.     Prior History:  I saw the patient initially in 1/2018 when he presented for evaluation of an abnormal EKG.  Prior to that office visit the patient underwent his yearly Medicare physical with Dr. Delgado at which time he had a routine EKG performed that showed a right bundle branch block that was felt to be new.  Based on this finding he was sent here for further evaluation.  Ten years prior he was evaluated by Dr. Jarrett complaints of chest pain and underwent an echocardiogram, stress test, and a cardiac catheterization that were all reportedly unremarkable (records are not available from that workup).  At his initial office visit with me he denied any symptoms and was active playing tennis about 2 or 3 times a week.    At that office visit I noted that he had a similar appearing right bundle branch block on EKG from 1/2014.  Recommended proceeding with an echocardiogram to look for any structural heart disease with his right bundle branch block.  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  I was out of the office at the time that the echocardiogram was performed and resulted and these results were communicated to the patient at that time however it does not appear that he was told about the aortic dilatation.     More recently the patient has been having issues with shortness of breath  and cough.  He ended up undergoing a chest x-ray was ordered by Dr. Delgado that showed no acute pulmonary disease but did show evidence of aortic arterial sclerosis.  He saw Dr. Damon in routine follow-up and mention these findings to him and with his recent dyspnea symptoms and these findings he ordered a CT of the chest.  This was performed on 8/23/2019 and it showed mild aneurysmal dilatation of the ascending aorta measuring 4.2 x 4 cm compared to a measurement of 4 x 3.9 cm in 2/2018.       He was seen by ALETHA Hu in 3/2020.  He presented with complaints of atypical sounding chest pain.  Blood pressures were noted to be elevated during that office visit.  His amlodipine was resumed which is tolerated well.     An echocardiogram in 2020 showed mild to moderate dilatation of his aortic root and ascending aorta measuring about 4.3 cm.  Remainder of his echocardiogram showed normal left ventricular systolic function wall motion with an EF of 65% grade 1 diastolic dysfunction and no significant valvular disease.     Repeat in 9/2021 showed stable appearing size of his aortic root and ascending aorta measuring about 4.2 cm.  Remainder of his echocardiogram is unremarkable and stable.     Patient had some issues with left-sided sharp chest pain.  Dr. Damon recommended proceeding with a CT angiogram of the chest which was performed on 2/10/2021.  This showed no evidence of pulmonary embolism and no significant change in the approximately 4.4 cm dilatation of the aortic root or 4 cm dilatation of the ascending aorta.      Review of Systems   Constitutional: Positive for malaise/fatigue.   HENT:  Negative for hearing loss, hoarse voice, nosebleeds and sore throat.    Eyes:  Negative for pain.   Cardiovascular:  Negative for chest pain, claudication, cyanosis, dyspnea on exertion, irregular heartbeat, leg swelling, near-syncope, orthopnea, palpitations, paroxysmal nocturnal dyspnea and syncope.   Respiratory:   Negative for shortness of breath and snoring.    Endocrine: Negative for cold intolerance, heat intolerance, polydipsia, polyphagia and polyuria.   Skin:  Negative for itching and rash.   Musculoskeletal:  Negative for arthritis, falls, joint pain, joint swelling, muscle cramps, muscle weakness and myalgias.   Gastrointestinal:  Negative for constipation, diarrhea, dysphagia, heartburn, hematemesis, hematochezia, melena, nausea and vomiting.   Genitourinary:  Negative for frequency, hematuria and hesitancy.   Neurological:  Positive for light-headedness. Negative for excessive daytime sleepiness, dizziness, headaches, numbness and weakness.   Psychiatric/Behavioral:  Negative for depression. The patient is not nervous/anxious.          Current Outpatient Medications:     ALPRAZolam (XANAX) 0.5 MG tablet, Take 1 tablet by mouth At Night As Needed., Disp: , Rfl:     amLODIPine (NORVASC) 5 MG tablet, Take 1.5 tablets by mouth Daily., Disp: , Rfl:     B Complex Vitamins (VITAMIN B-COMPLEX PO), Take 1 tablet by mouth Daily., Disp: , Rfl:     fluticasone (FLONASE) 50 MCG/ACT nasal spray, 1-2 sprays into the nostril(s) as directed by provider Daily., Disp: , Rfl:     levocetirizine (XYZAL) 5 MG tablet, Take 1 tablet by mouth Every Evening., Disp: , Rfl:     levothyroxine (SYNTHROID, LEVOTHROID) 100 MCG tablet, Take 1 tablet by mouth daily., Disp: 30 tablet, Rfl: 6    losartan (Cozaar) 25 MG tablet, Take 1 tablet by mouth Daily., Disp: 90 tablet, Rfl: 3    montelukast (SINGULAIR) 10 MG tablet, Take 1 tablet by mouth Every Night., Disp: , Rfl:     predniSONE (DELTASONE) 1 MG tablet, Daily., Disp: , Rfl:     zolpidem (AMBIEN) 10 MG tablet, TAKE ONE-HALF TO ONE TABLET BY MOUTH ONCE NIGHTLY AS NEEDED FOR SLEEP  **MUST LAST 30 DAYS, Disp: , Rfl:     Past Medical History:   Diagnosis Date    Acquired hypothyroidism     Anxiety     Aortic aneurysm     Arthritis     Asthma     Hutton's cyst of knee, right     Disease of thyroid  gland     Gout     H/O Muscle pain     Right shoulder and neck area    H/O Radiation treatment     For tonsillar inflammation and infection when he was a child and this led him to develop thyroid cancer.    History of colon polyps     Hypertension     Hyperthyroidism     Hypothyroidism     Kidney carcinoma     H/O stage I clear cell carcinoma of the right kidney, status post partial nephrectomy, nephron-sparing surgery by Dr. Ganesh Lopez    Left shoulder pain     Myeloproliferative disorder     With polycythemia vera, JAK2 mutation positive requiring periodic phlebotomies (hematocrit above 47).    Polymyalgia rheumatica     Premature atrial contraction     Prostate cancer     Raynaud disease     Shoulder injury, left, sequela        Past Surgical History:   Procedure Laterality Date    CIRCUMCISION N/A 12/17/2018    Procedure: CIRCUMCISION;  Surgeon: Gallo Lopez MD;  Location: [facility];  Service: Urology    COLONOSCOPY  2010    Documented a tubulovillous adenom that was removed completely. Colonoscopy in 2014 was unremarkable.    COLONOSCOPY N/A 2/20/2020    Procedure: COLONOSCOPY;  Surgeon: Claire Galo MD;  Location: [facility] OR;  Service: Gastroenterology;  Laterality: N/A;  diverticulosis    HERNIA REPAIR      H/O multiple hernia repairs including right inguinal hernia repair in 1981, 2003, and double hernia repair in 2013    KIDNEY SURGERY      PROSTATE BIOPSY      Showed features consistent with prostate cancer    SHOULDER SURGERY  1995    Shoulder repair    THYROIDECTOMY, PARTIAL  1983    For malignant tumor    TOOTH EXTRACTION         Family History   Problem Relation Age of Onset    No Known Problems Mother     Heart attack Father     Heart disease Father     Hypertension Father     Diabetes Father     Tuberculosis Maternal Grandmother     No Known Problems Son     Drug abuse Son     Malig Hyperthermia Neg Hx        Social History     Tobacco Use    Smoking status: Never    Smokeless  "tobacco: Never    Tobacco comments:     caffeine use    Vaping Use    Vaping Use: Never used   Substance Use Topics    Alcohol use: Yes     Alcohol/week: 1.0 - 3.0 standard drink of alcohol     Types: 1 - 3 Glasses of wine per week     Comment: 1 MONTHLY    Drug use: No         ECG 12 Lead    Date/Time: 12/21/2023 2:40 PM  Performed by: Di Roberts MD    Authorized by: Di Roberts MD  Comparison: compared with previous ECG   Similar to previous ECG  Rhythm: sinus rhythm  Ectopy: atrial premature contractions  Conduction: right bundle branch block             Objective:     Visit Vitals  /70 (BP Location: Left arm, Patient Position: Sitting, Cuff Size: Adult)   Pulse 57   Ht 172.7 cm (68\")   Wt 74.9 kg (165 lb 3.2 oz)   SpO2 99%   BMI 25.12 kg/m²         Constitutional:       Appearance: Normal appearance. Well-developed.   HENT:      Head: Normocephalic and atraumatic.   Neck:      Vascular: No carotid bruit or JVD.   Pulmonary:      Effort: Pulmonary effort is normal.      Breath sounds: Normal breath sounds.   Cardiovascular:      Normal rate. Regular rhythm.      No gallop.    Pulses:     Radial: 2+ bilaterally.  Edema:     Peripheral edema absent.   Abdominal:      Palpations: Abdomen is soft.   Skin:     General: Skin is warm and dry.   Neurological:      Mental Status: Alert and oriented to person, place, and time.           Assessment:          Diagnosis Plan   1. Thoracic aortic aneurysm without rupture, unspecified part        2. Hyperlipidemia, unspecified hyperlipidemia type        3. Hypertension, unspecified type        4. Raynaud's disease without gangrene        5. RBBB        6. Acquired hypothyroidism        7. Cancer of right kidney        8. Myeloproliferative disorder        9. PMR (polymyalgia rheumatica)               Plan:         1.  Hypertension.  Well-controlled on current regimen medications.  Continue the same.  2.  Raynaud's disease.  Fairly well-controlled on his current " dose of amlodipine.  If his symptoms worsen especially when the temperatures get colder I think we can consider increasing the amlodipine to 10 mg at least over the winter.  Asked him to notify me if he would like to do this.  3.  Thoracic aortic aneurysm.  Stable.  Continue routine follow-up in the aorta clinic.  4.  Hyperlipidemia.  5.  Hypothyroidism  6.  Polymyalgia rheumatica    Will plan on seeing the patient back again in 1 year.

## 2024-01-10 ENCOUNTER — LAB (OUTPATIENT)
Dept: LAB | Facility: HOSPITAL | Age: 79
End: 2024-01-10
Payer: MEDICARE

## 2024-01-10 ENCOUNTER — INFUSION (OUTPATIENT)
Dept: ONCOLOGY | Facility: HOSPITAL | Age: 79
End: 2024-01-10
Payer: MEDICARE

## 2024-01-10 DIAGNOSIS — D75.1 ERYTHROCYTOSIS: ICD-10-CM

## 2024-01-10 LAB
BASOPHILS # BLD AUTO: 0.1 10*3/MM3 (ref 0–0.2)
BASOPHILS NFR BLD AUTO: 1.3 % (ref 0–1.5)
DEPRECATED RDW RBC AUTO: 45.9 FL (ref 37–54)
EOSINOPHIL # BLD AUTO: 0.33 10*3/MM3 (ref 0–0.4)
EOSINOPHIL NFR BLD AUTO: 4.4 % (ref 0.3–6.2)
ERYTHROCYTE [DISTWIDTH] IN BLOOD BY AUTOMATED COUNT: 14.6 % (ref 12.3–15.4)
HCT VFR BLD AUTO: 45.7 % (ref 37.5–51)
HGB BLD-MCNC: 14.8 G/DL (ref 13–17.7)
IMM GRANULOCYTES # BLD AUTO: 0.07 10*3/MM3 (ref 0–0.05)
IMM GRANULOCYTES NFR BLD AUTO: 0.9 % (ref 0–0.5)
LYMPHOCYTES # BLD AUTO: 1.61 10*3/MM3 (ref 0.7–3.1)
LYMPHOCYTES NFR BLD AUTO: 21.2 % (ref 19.6–45.3)
MCH RBC QN AUTO: 27.7 PG (ref 26.6–33)
MCHC RBC AUTO-ENTMCNC: 32.4 G/DL (ref 31.5–35.7)
MCV RBC AUTO: 85.6 FL (ref 79–97)
MONOCYTES # BLD AUTO: 0.8 10*3/MM3 (ref 0.1–0.9)
MONOCYTES NFR BLD AUTO: 10.6 % (ref 5–12)
NEUTROPHILS NFR BLD AUTO: 4.67 10*3/MM3 (ref 1.7–7)
NEUTROPHILS NFR BLD AUTO: 61.6 % (ref 42.7–76)
NRBC BLD AUTO-RTO: 0 /100 WBC (ref 0–0.2)
PLATELET # BLD AUTO: 261 10*3/MM3 (ref 140–450)
PMV BLD AUTO: 9.4 FL (ref 6–12)
RBC # BLD AUTO: 5.34 10*6/MM3 (ref 4.14–5.8)
WBC NRBC COR # BLD AUTO: 7.58 10*3/MM3 (ref 3.4–10.8)

## 2024-01-10 PROCEDURE — 85025 COMPLETE CBC W/AUTO DIFF WBC: CPT

## 2024-01-10 PROCEDURE — G0463 HOSPITAL OUTPT CLINIC VISIT: HCPCS

## 2024-01-10 PROCEDURE — 36415 COLL VENOUS BLD VENIPUNCTURE: CPT

## 2024-01-10 NOTE — NURSING NOTE
Pt here for phlebo for Hct > 50.    Lab Results   Component Value Date    WBC 7.58 01/10/2024    HGB 14.8 01/10/2024    HCT 45.7 01/10/2024    MCV 85.6 01/10/2024     01/10/2024   Hct 45.7.  No complaints.  Discharged ambulating.  Copy of lab results given to patient.

## 2024-04-09 ENCOUNTER — OFFICE VISIT (OUTPATIENT)
Dept: ONCOLOGY | Facility: CLINIC | Age: 79
End: 2024-04-09
Payer: MEDICARE

## 2024-04-09 ENCOUNTER — LAB (OUTPATIENT)
Dept: LAB | Facility: HOSPITAL | Age: 79
End: 2024-04-09
Payer: MEDICARE

## 2024-04-09 ENCOUNTER — APPOINTMENT (OUTPATIENT)
Dept: ONCOLOGY | Facility: HOSPITAL | Age: 79
End: 2024-04-09
Payer: MEDICARE

## 2024-04-09 VITALS
TEMPERATURE: 98.4 F | DIASTOLIC BLOOD PRESSURE: 74 MMHG | WEIGHT: 166.7 LBS | SYSTOLIC BLOOD PRESSURE: 129 MMHG | HEIGHT: 68 IN | BODY MASS INDEX: 25.26 KG/M2 | HEART RATE: 57 BPM | RESPIRATION RATE: 16 BRPM | OXYGEN SATURATION: 98 %

## 2024-04-09 DIAGNOSIS — D75.1 ERYTHROCYTOSIS: ICD-10-CM

## 2024-04-09 DIAGNOSIS — C61 PROSTATE CANCER: ICD-10-CM

## 2024-04-09 DIAGNOSIS — C64.1 CANCER OF RIGHT KIDNEY: Primary | ICD-10-CM

## 2024-04-09 LAB
ALBUMIN SERPL-MCNC: 3.9 G/DL (ref 3.5–5.2)
ALBUMIN/GLOB SERPL: 1.6 G/DL
ALP SERPL-CCNC: 76 U/L (ref 39–117)
ALT SERPL W P-5'-P-CCNC: 8 U/L (ref 1–41)
ANION GAP SERPL CALCULATED.3IONS-SCNC: 9.5 MMOL/L (ref 5–15)
AST SERPL-CCNC: 23 U/L (ref 1–40)
BASOPHILS # BLD AUTO: 0.11 10*3/MM3 (ref 0–0.2)
BASOPHILS NFR BLD AUTO: 1.3 % (ref 0–1.5)
BILIRUB SERPL-MCNC: 1.2 MG/DL (ref 0–1.2)
BUN SERPL-MCNC: 16 MG/DL (ref 8–23)
BUN/CREAT SERPL: 11.5 (ref 7–25)
CALCIUM SPEC-SCNC: 9.3 MG/DL (ref 8.6–10.5)
CHLORIDE SERPL-SCNC: 105 MMOL/L (ref 98–107)
CO2 SERPL-SCNC: 26.5 MMOL/L (ref 22–29)
CREAT SERPL-MCNC: 1.39 MG/DL (ref 0.76–1.27)
DEPRECATED RDW RBC AUTO: 52.7 FL (ref 37–54)
EGFRCR SERPLBLD CKD-EPI 2021: 51.9 ML/MIN/1.73
EOSINOPHIL # BLD AUTO: 0.19 10*3/MM3 (ref 0–0.4)
EOSINOPHIL NFR BLD AUTO: 2.2 % (ref 0.3–6.2)
ERYTHROCYTE [DISTWIDTH] IN BLOOD BY AUTOMATED COUNT: 16.6 % (ref 12.3–15.4)
GLOBULIN UR ELPH-MCNC: 2.4 GM/DL
GLUCOSE SERPL-MCNC: 116 MG/DL (ref 65–99)
HCT VFR BLD AUTO: 49.8 % (ref 37.5–51)
HGB BLD-MCNC: 16.1 G/DL (ref 13–17.7)
IMM GRANULOCYTES # BLD AUTO: 0.03 10*3/MM3 (ref 0–0.05)
IMM GRANULOCYTES NFR BLD AUTO: 0.4 % (ref 0–0.5)
LYMPHOCYTES # BLD AUTO: 1.43 10*3/MM3 (ref 0.7–3.1)
LYMPHOCYTES NFR BLD AUTO: 16.7 % (ref 19.6–45.3)
MCH RBC QN AUTO: 28.3 PG (ref 26.6–33)
MCHC RBC AUTO-ENTMCNC: 32.3 G/DL (ref 31.5–35.7)
MCV RBC AUTO: 87.7 FL (ref 79–97)
MONOCYTES # BLD AUTO: 0.68 10*3/MM3 (ref 0.1–0.9)
MONOCYTES NFR BLD AUTO: 7.9 % (ref 5–12)
NEUTROPHILS NFR BLD AUTO: 6.13 10*3/MM3 (ref 1.7–7)
NEUTROPHILS NFR BLD AUTO: 71.5 % (ref 42.7–76)
NRBC BLD AUTO-RTO: 0 /100 WBC (ref 0–0.2)
PLATELET # BLD AUTO: 231 10*3/MM3 (ref 140–450)
PMV BLD AUTO: 9.1 FL (ref 6–12)
POTASSIUM SERPL-SCNC: 5.4 MMOL/L (ref 3.5–5.2)
PROT SERPL-MCNC: 6.3 G/DL (ref 6–8.5)
RBC # BLD AUTO: 5.68 10*6/MM3 (ref 4.14–5.8)
SODIUM SERPL-SCNC: 141 MMOL/L (ref 136–145)
WBC NRBC COR # BLD AUTO: 8.57 10*3/MM3 (ref 3.4–10.8)

## 2024-04-09 PROCEDURE — 85025 COMPLETE CBC W/AUTO DIFF WBC: CPT

## 2024-04-09 PROCEDURE — 36415 COLL VENOUS BLD VENIPUNCTURE: CPT

## 2024-04-09 NOTE — PROGRESS NOTES
REASONS FOR FOLLOWUP:       1. Myeloproliferative disorder with polycythemia vera, MAI-2 mutation positive requiring periodic phlebotomies whenever hematocrit is above 45.      2. His  tory of stage I clear cell carcinoma of the right kidney status post partial nephrectomy, nephron sparing surgery by Dr. Ganesh Lopez with no issues or consequences.               3. Patient has history of polymyalgia rheumatica.  He IS ON  prednisone LOW DOSE          HISTORY OF PRESENT ILLNESS    On 04/09/2024, this 79-year-old male returns to the office for follow-up in regard to his polycythemia vera undergoing periodic phlebotomies. Typically with hematocrits above 50 that this goes on. At this time the patient had an accidental fall, scratching his forehead, right elbow, right lower extremity. He has not had any consequences from this. He also had cataract surgery. His vision is dramatically better. Appetite and weight are stable. Bowel function and urination are normal. No new cardiac or respiratory issues.          Past Medical History:   Diagnosis Date    Acquired hypothyroidism     Anxiety     Aortic aneurysm     Arthritis     Asthma     Hutton's cyst of knee, right     Disease of thyroid gland     Gout     H/O Muscle pain     Right shoulder and neck area    H/O Radiation treatment     For tonsillar inflammation and infection when he was a child and this led him to develop thyroid cancer.    History of colon polyps     Hypertension     Hyperthyroidism     Hypothyroidism     Kidney carcinoma     H/O stage I clear cell carcinoma of the right kidney, status post partial nephrectomy, nephron-sparing surgery by Dr. Ganesh Lopez    Left shoulder pain     Myeloproliferative disorder     With polycythemia vera, JAK2 mutation positive requiring periodic phlebotomies (hematocrit above 47).    Polymyalgia rheumatica     Premature atrial contraction     Prostate cancer     Raynaud disease     Shoulder injury, left,  sequela      Past Surgical History:   Procedure Laterality Date    CIRCUMCISION N/A 12/17/2018    Procedure: CIRCUMCISION;  Surgeon: Gallo Lopez MD;  Location: [facility] OR;  Service: Urology    COLONOSCOPY  2010    Documented a tubulovillous adenom that was removed completely. Colonoscopy in 2014 was unremarkable.    COLONOSCOPY N/A 2/20/2020    Procedure: COLONOSCOPY;  Surgeon: Claire Galo MD;  Location: [facility];  Service: Gastroenterology;  Laterality: N/A;  diverticulosis    HERNIA REPAIR      H/O multiple hernia repairs including right inguinal hernia repair in 1981, 2003, and double hernia repair in 2013    KIDNEY SURGERY      PROSTATE BIOPSY      Showed features consistent with prostate cancer    SHOULDER SURGERY  1995    Shoulder repair    THYROIDECTOMY, PARTIAL  1983    For malignant tumor    TOOTH EXTRACTION       Social History     Social History Narrative    He is a very avid  and plays violin in an orchestra. He does not smoke but had a lot of second-hand smoke through his life.     Family History   Problem Relation Age of Onset    No Known Problems Mother     Heart attack Father     Heart disease Father     Hypertension Father     Diabetes Father     Tuberculosis Maternal Grandmother     No Known Problems Son     Drug abuse Son     Malig Hyperthermia Neg Hx      Allergies   Allergen Reactions    Diphenhydramine Rash     HEART PALPITATIONS, RASH    Statins Myalgia     MUSCLE ACHES    Amlodipine Other (See Comments)     PT has swelling in both ankles    Aspirin Unknown - Low Severity     PT CANNOT REMEMBER    Latex Rash    Peanut Oil Unknown - Low Severity     PT CANNOT REMEMBER    Shellfish-Derived Products Unknown - Low Severity     PT CANNOT REMEMBER     Current Outpatient Medications on File Prior to Visit   Medication Sig Dispense Refill    ALPRAZolam (XANAX) 0.5 MG tablet Take 1 tablet by mouth At Night As Needed.      amLODIPine (NORVASC) 5 MG tablet Take 1.5  "tablets by mouth Daily.      B Complex Vitamins (VITAMIN B-COMPLEX PO) Take 1 tablet by mouth Daily.      fluticasone (FLONASE) 50 MCG/ACT nasal spray 1-2 sprays into the nostril(s) as directed by provider Daily.      levocetirizine (XYZAL) 5 MG tablet Take 1 tablet by mouth Every Evening.      levothyroxine (SYNTHROID, LEVOTHROID) 100 MCG tablet Take 1 tablet by mouth daily. 30 tablet 6    losartan (Cozaar) 25 MG tablet Take 1 tablet by mouth Daily. 90 tablet 3    montelukast (SINGULAIR) 10 MG tablet Take 1 tablet by mouth Every Night.      predniSONE (DELTASONE) 1 MG tablet Daily.      zolpidem (AMBIEN) 10 MG tablet TAKE ONE-HALF TO ONE TABLET BY MOUTH ONCE NIGHTLY AS NEEDED FOR SLEEP  **MUST LAST 30 DAYS       No current facility-administered medications on file prior to visit.               VITAL SIGNS:   Vitals:    04/09/24 1432   BP: 129/74   Pulse: 57   Resp: 16   Temp: 98.4 °F (36.9 °C)   TempSrc: Temporal   SpO2: 98%   Weight: 75.6 kg (166 lb 11.2 oz)   Height: 172.7 cm (68\")              PHYSICAL EXAMINATION:           GENERAL:  Well-developed, Patient  in no acute distress.   SKIN:  Warm, dry ,NO purpura ,no rash.  HEENT:  Pupils were equal and reactive to light and accomodation, conjunctivae noninjected,  normal visual acuity.   NECK:  Supple with good range of motion; no thyromegaly , no JVD or bruits,.No carotid artery pain, no carotid abnormal pulsation   LYMPHATICS:  No cervical, NO supraclavicular, NO axillary, NO inguinal adenopathies.  CARDIAC   normal rate , regular rhythm, without murmur,NO rubs NO S3 NO S4   LUNGS: normal breath sounds bilateral, no wheezing, NO rhonchi, NO crackles ,NO rubs.  VASCULAR VENOUS: no cyanosis, NO collateral circulation, NO varicosities, NO edema, NO palpable cords, NO pain,NO erythema, NO pigmentation of the skin.  ABDOMEN:  Soft, NO pain,no hepatomegaly, no splenomegaly,no masses, no ascites, no collateral circulation,no distention.  EXTREMITIES  AND SPINE:  No " clubbing, no cyanosis ,no deformities , no pain .No kyphosis,  no pain in spine, no pain in ribs , no pain in pelvic bone.  NEUROLOGICAL:  Patient was awake, alert, oriented to time, person and place.              LABORATORY DATA:    Lab Results   Component Value Date    WBC 8.57 04/09/2024    HGB 16.1 04/09/2024    HCT 49.8 04/09/2024    MCV 87.7 04/09/2024     04/09/2024                           ASSESSMENT/PLAN: 1. This patient has history of polycythemia vera JAK2 mutation positive. He has had multiple phlebotomies in the past and this has produced a drop in the MCV. Now the patient is not requiring phlebotomies as often as before and is related to the mild iron deficiency that has been produced by the phlebotomy    I reviewed the patient on 08/24/2021. He has no symptoms pertinent to polycythemia, his hematocrit is 46 but I think it is okay to let it go until he has to come back in a couple of months to have another CBC and possibly he will require phlebotomy then. Otherwise I will review him back in 4 months.   I reviewed the patient on 12/15/2021. Since the previous visit he has not had any episodes of bleeding or thrombosis. His hematocrit today is 47 and he will proceed with a phlebotomy today of 500 mL. He will return in 2 and 4 months for CBC and phlebotomy if hematocrit is above 45. I will review him back in 4 months.  The patient on 04/19/2022 has no symptoms related to his polycythemia vera. His hematocrit is 47. I think it will be okay to forego any phlebotomy today. I would like for him to return every 3 months for a CBC and phlebotomy if hematocrit is above 49. The patient's white count is normal, the patient's platelet count is normal. He is not requiring any medicine otherwise to control this issue and he has taken Hydrea in the past. He refused to further continue taking this medicine.   On 10/04/2022 the patient's hematocrit is normal, white count is normal, platelet count is normal. He  has no obvious clinical splenomegaly. He will remain in observation trying to keep a hematocrit below 47 all of the time. For this reason he was scheduled to have a repeat visit in 3 months with a CBC, CMP, RN and phlebotomy if necessary and repeat the same analysis in 6 month visit with me at that point. He does not need to be on any medication to decrease production of blood cells at this time. He is taking a baby aspirin.   On 04/12/2023 the patient has no need for phlebotomy today. Hematocrit is 48. I think his threshold will be 50 from now on. He has not had any vascular events. His white count and platelet count remain normal. He has no clinical palpable splenomegaly and he has no other new health issues.    On 10/18/2023, he has no symptoms related to erythrocytosis. Hematocrit is 47. Find no need for phlebotomy at this time. Further phlebotomies will be done if hematocrit is above 50.     He will return to the office in 3 months and 6 months for review and CBC.    On 04/09/2024, hematocrit is 49. No need for phlebotomy at this time. Phlebotomy will be continued if hematocrit is equal or above 50. He will return for blood work in 2, 4 and 6 months and similar analysis. His white count and platelet count are normal. He has no palpable splenomegaly.            2.The patient has minimal leukocytosis with a normal white count differential. This has been present since the time of the diagnosis of his myeloproliferative disorder, he is BCR/ABL negative. This will be watched in absence of any other intervention.     On 04/18/2022 the patient's white count is normal, the white count differential is normal. No issues pertinent to this at this point.   On 10/04/2022 his white count remains stable in comparison with previous assessment. No issues in regard to anything to this. This is reflection of his myeloproliferative disorder and does not need to be treated at this point.   On 04/12/2023 his white count is stable.  White count differential is normal.    On 10/18/2023, his white count remains stable. No recent infection.    On 04/09/2024, white count remains normal. White count differential remains normal.          3.During the previous visit the patient was very concerned about the possibility of prostate cancer. He was seen by Dr. Ganesh Lopez. An MRI of the prostate was performed that disclosed no abnormalities. No biopsy was necessary and he will return to see Dr. Lopez again in 6 months.  On 04/19/2022 the patient's PSA remains at 11. He has had further radiological assessment by Ganesh Lopez MD, no prostate cancer has been found. He is not going to have any prostate biopsies unless the PSA further changes. He has no major symptoms related to an elevated PSA besides some prostatism.   On 10/04/2022 the patient continues having PSA elevation of 10. Dr. Lopez has done a prostate massage recently, urine specimen obtained after that to further refine molecular testing on this. He is going to discuss this further with Dr. Lopez. The question was raised in regard what to do. Likely he will recommend radiation therapy.   On 04/12/2023 he continues seeing Dr. Lopez in regard to his prostate tissues and his previous kidney cancer. Radiological assessment in this regard has not shown any evidence of recurrent disease. Clinically he has no symptoms pertinent to this.    On 10/18/2023, no issues pertinent to this. His PSA has been remeasured by his urologist, being fine.    On 04/09/2024, he has his follow-up with [facility] in 05/2024 in regard to his kidney cancer and prostate cancer.          4.The patient has history of very early Stage I kidney cancer status post Nephron-sparing partial nephrectomy. The patient has not had any issues pertinent to this. He never required any form of adjuvant therapy and he will remain to be watched in absence of any other intervention.  The relationship with his previous history of  kidney cancer, the patient has continued follow up with Ganesh Lopez MD who has monitored him on clinical grounds and radiologically. Today on 04/19/2022 he has no symptoms or signs of kidney cancer recurrence.   On 10/04/2022 the patient already has seen Dr. Lopez in regard to his previous history of kidney cancer. His CT scan remains quiet with no evidence of recurrent disease.   On 10/18/2023, no issues pertinent to his previous kidney cancer. Recent radiological assessment has not produced any abnormalities.    On 04/09/2024, radiological assessment is planned by [facility] in 05/2024.      PLAN:   On 04/09/2024, the plan of care is clearly established above.

## 2024-04-22 RX ORDER — LOSARTAN POTASSIUM 25 MG/1
25 TABLET ORAL DAILY
Qty: 90 TABLET | Refills: 2 | Status: SHIPPED | OUTPATIENT
Start: 2024-04-22

## 2024-06-06 ENCOUNTER — INFUSION (OUTPATIENT)
Dept: ONCOLOGY | Facility: HOSPITAL | Age: 79
End: 2024-06-06
Payer: MEDICARE

## 2024-06-06 ENCOUNTER — LAB (OUTPATIENT)
Dept: LAB | Facility: HOSPITAL | Age: 79
End: 2024-06-06
Payer: MEDICARE

## 2024-06-06 VITALS
WEIGHT: 163.6 LBS | DIASTOLIC BLOOD PRESSURE: 77 MMHG | RESPIRATION RATE: 16 BRPM | HEART RATE: 62 BPM | SYSTOLIC BLOOD PRESSURE: 127 MMHG | BODY MASS INDEX: 24.88 KG/M2 | OXYGEN SATURATION: 98 % | TEMPERATURE: 97.5 F

## 2024-06-06 DIAGNOSIS — D75.1 ERYTHROCYTOSIS: Primary | ICD-10-CM

## 2024-06-06 LAB
BASOPHILS # BLD AUTO: 0.09 10*3/MM3 (ref 0–0.2)
BASOPHILS NFR BLD AUTO: 1 % (ref 0–1.5)
DEPRECATED RDW RBC AUTO: 48.9 FL (ref 37–54)
EOSINOPHIL # BLD AUTO: 0.29 10*3/MM3 (ref 0–0.4)
EOSINOPHIL NFR BLD AUTO: 3.2 % (ref 0.3–6.2)
ERYTHROCYTE [DISTWIDTH] IN BLOOD BY AUTOMATED COUNT: 15.8 % (ref 12.3–15.4)
HCT VFR BLD AUTO: 53.1 % (ref 37.5–51)
HGB BLD-MCNC: 17.8 G/DL (ref 13–17.7)
IMM GRANULOCYTES # BLD AUTO: 0.04 10*3/MM3 (ref 0–0.05)
IMM GRANULOCYTES NFR BLD AUTO: 0.4 % (ref 0–0.5)
LYMPHOCYTES # BLD AUTO: 1.97 10*3/MM3 (ref 0.7–3.1)
LYMPHOCYTES NFR BLD AUTO: 21.8 % (ref 19.6–45.3)
MCH RBC QN AUTO: 29.2 PG (ref 26.6–33)
MCHC RBC AUTO-ENTMCNC: 33.5 G/DL (ref 31.5–35.7)
MCV RBC AUTO: 87 FL (ref 79–97)
MONOCYTES # BLD AUTO: 0.96 10*3/MM3 (ref 0.1–0.9)
MONOCYTES NFR BLD AUTO: 10.6 % (ref 5–12)
NEUTROPHILS NFR BLD AUTO: 5.69 10*3/MM3 (ref 1.7–7)
NEUTROPHILS NFR BLD AUTO: 63 % (ref 42.7–76)
NRBC BLD AUTO-RTO: 0 /100 WBC (ref 0–0.2)
PLATELET # BLD AUTO: 227 10*3/MM3 (ref 140–450)
PMV BLD AUTO: 9.9 FL (ref 6–12)
RBC # BLD AUTO: 6.1 10*6/MM3 (ref 4.14–5.8)
WBC NRBC COR # BLD AUTO: 9.04 10*3/MM3 (ref 3.4–10.8)

## 2024-06-06 PROCEDURE — 36415 COLL VENOUS BLD VENIPUNCTURE: CPT

## 2024-06-06 PROCEDURE — 99195 PHLEBOTOMY: CPT

## 2024-06-06 PROCEDURE — 85025 COMPLETE CBC W/AUTO DIFF WBC: CPT

## 2024-06-06 RX ORDER — SODIUM CHLORIDE 9 MG/ML
250 INJECTION, SOLUTION INTRAVENOUS ONCE
OUTPATIENT
Start: 2024-06-06

## 2024-06-23 PROBLEM — G47.00 INSOMNIA: Status: ACTIVE | Noted: 2021-05-04

## 2024-06-23 PROBLEM — M94.0 COSTOCHONDRITIS: Status: ACTIVE | Noted: 2022-05-17

## 2024-06-23 PROBLEM — M17.11 OSTEOARTHRITIS OF RIGHT KNEE: Status: ACTIVE | Noted: 2021-11-18

## 2024-06-23 PROBLEM — M19.012 LOCALIZED OSTEOARTHRITIS OF LEFT SHOULDER: Status: ACTIVE | Noted: 2021-07-23

## 2024-06-23 PROBLEM — M25.512 LEFT SHOULDER PAIN: Status: ACTIVE | Noted: 2017-12-20

## 2024-06-26 ENCOUNTER — TELEPHONE (OUTPATIENT)
Dept: ONCOLOGY | Facility: CLINIC | Age: 79
End: 2024-06-26
Payer: MEDICARE

## 2024-06-26 NOTE — TELEPHONE ENCOUNTER
"  Caller: Scott Murphy \"MEGAN\"    Relationship: Self    Best call back number: [phone]    What was the call regarding: MEGAN CALLED REGARDING A SURGERY CLEARANCE THAT WAS SENT FROM [facility] ON 05/28. HE SAYS THEY HAVE NOT RECEIVED ANYTHING BACK, AND HE IS NEEDING AN UPDATE ON THAT.    "

## 2024-06-26 NOTE — TELEPHONE ENCOUNTER
Called back the patient to let him know that we have not received a clearance form for surgery.  I gave him our fax number and the patient said he would call [facility] back.

## 2024-08-01 ENCOUNTER — LAB (OUTPATIENT)
Dept: LAB | Facility: HOSPITAL | Age: 79
End: 2024-08-01
Payer: MEDICARE

## 2024-08-01 ENCOUNTER — INFUSION (OUTPATIENT)
Dept: ONCOLOGY | Facility: HOSPITAL | Age: 79
End: 2024-08-01
Payer: MEDICARE

## 2024-08-01 VITALS
DIASTOLIC BLOOD PRESSURE: 77 MMHG | OXYGEN SATURATION: 98 % | RESPIRATION RATE: 18 BRPM | SYSTOLIC BLOOD PRESSURE: 136 MMHG | BODY MASS INDEX: 24.45 KG/M2 | TEMPERATURE: 97.3 F | WEIGHT: 160.8 LBS | HEART RATE: 54 BPM

## 2024-08-01 DIAGNOSIS — D75.1 ERYTHROCYTOSIS: ICD-10-CM

## 2024-08-01 DIAGNOSIS — D75.1 ERYTHROCYTOSIS: Primary | ICD-10-CM

## 2024-08-01 DIAGNOSIS — C61 PROSTATE CANCER: ICD-10-CM

## 2024-08-01 DIAGNOSIS — C64.1 CANCER OF RIGHT KIDNEY: ICD-10-CM

## 2024-08-01 LAB
BASOPHILS # BLD AUTO: 0.08 10*3/MM3 (ref 0–0.2)
BASOPHILS NFR BLD AUTO: 0.9 % (ref 0–1.5)
DEPRECATED RDW RBC AUTO: 46.2 FL (ref 37–54)
EOSINOPHIL # BLD AUTO: 0.24 10*3/MM3 (ref 0–0.4)
EOSINOPHIL NFR BLD AUTO: 2.6 % (ref 0.3–6.2)
ERYTHROCYTE [DISTWIDTH] IN BLOOD BY AUTOMATED COUNT: 14.3 % (ref 12.3–15.4)
HCT VFR BLD AUTO: 50.3 % (ref 37.5–51)
HGB BLD-MCNC: 16.9 G/DL (ref 13–17.7)
IMM GRANULOCYTES # BLD AUTO: 0.05 10*3/MM3 (ref 0–0.05)
IMM GRANULOCYTES NFR BLD AUTO: 0.5 % (ref 0–0.5)
LYMPHOCYTES # BLD AUTO: 1.7 10*3/MM3 (ref 0.7–3.1)
LYMPHOCYTES NFR BLD AUTO: 18.1 % (ref 19.6–45.3)
MCH RBC QN AUTO: 30 PG (ref 26.6–33)
MCHC RBC AUTO-ENTMCNC: 33.6 G/DL (ref 31.5–35.7)
MCV RBC AUTO: 89.2 FL (ref 79–97)
MONOCYTES # BLD AUTO: 0.92 10*3/MM3 (ref 0.1–0.9)
MONOCYTES NFR BLD AUTO: 9.8 % (ref 5–12)
NEUTROPHILS NFR BLD AUTO: 6.42 10*3/MM3 (ref 1.7–7)
NEUTROPHILS NFR BLD AUTO: 68.1 % (ref 42.7–76)
NRBC BLD AUTO-RTO: 0 /100 WBC (ref 0–0.2)
PLATELET # BLD AUTO: 217 10*3/MM3 (ref 140–450)
PMV BLD AUTO: 9.2 FL (ref 6–12)
RBC # BLD AUTO: 5.64 10*6/MM3 (ref 4.14–5.8)
WBC NRBC COR # BLD AUTO: 9.41 10*3/MM3 (ref 3.4–10.8)

## 2024-08-01 PROCEDURE — 36415 COLL VENOUS BLD VENIPUNCTURE: CPT

## 2024-08-01 PROCEDURE — 85025 COMPLETE CBC W/AUTO DIFF WBC: CPT

## 2024-08-01 PROCEDURE — 99195 PHLEBOTOMY: CPT

## 2024-08-01 RX ORDER — SODIUM CHLORIDE 9 MG/ML
250 INJECTION, SOLUTION INTRAVENOUS ONCE
OUTPATIENT
Start: 2024-08-01

## 2024-08-27 DIAGNOSIS — I77.810 DILATED AORTIC ROOT: Primary | ICD-10-CM

## 2024-10-17 ENCOUNTER — OFFICE VISIT (OUTPATIENT)
Dept: ONCOLOGY | Facility: CLINIC | Age: 79
End: 2024-10-17
Payer: MEDICARE

## 2024-10-17 ENCOUNTER — LAB (OUTPATIENT)
Dept: LAB | Facility: HOSPITAL | Age: 79
End: 2024-10-17
Payer: MEDICARE

## 2024-10-17 ENCOUNTER — APPOINTMENT (OUTPATIENT)
Dept: ONCOLOGY | Facility: HOSPITAL | Age: 79
End: 2024-10-17
Payer: MEDICARE

## 2024-10-17 VITALS
OXYGEN SATURATION: 97 % | WEIGHT: 154.9 LBS | RESPIRATION RATE: 16 BRPM | HEART RATE: 54 BPM | DIASTOLIC BLOOD PRESSURE: 83 MMHG | TEMPERATURE: 98 F | HEIGHT: 68 IN | SYSTOLIC BLOOD PRESSURE: 149 MMHG | BODY MASS INDEX: 23.48 KG/M2

## 2024-10-17 DIAGNOSIS — C64.1 CANCER OF RIGHT KIDNEY: ICD-10-CM

## 2024-10-17 DIAGNOSIS — D75.1 ERYTHROCYTOSIS: ICD-10-CM

## 2024-10-17 DIAGNOSIS — C61 PROSTATE CANCER: ICD-10-CM

## 2024-10-17 DIAGNOSIS — D45 JAK2 POSITIVE POLYCYTHEMIA VERA: Primary | ICD-10-CM

## 2024-10-17 LAB
ALBUMIN SERPL-MCNC: 3.7 G/DL (ref 3.5–5.2)
ALBUMIN/GLOB SERPL: 1.3 G/DL
ALP SERPL-CCNC: 90 U/L (ref 39–117)
ALT SERPL W P-5'-P-CCNC: 9 U/L (ref 1–41)
ANION GAP SERPL CALCULATED.3IONS-SCNC: 9.8 MMOL/L (ref 5–15)
AST SERPL-CCNC: 18 U/L (ref 1–40)
BASOPHILS # BLD AUTO: 0.11 10*3/MM3 (ref 0–0.2)
BASOPHILS NFR BLD AUTO: 0.9 % (ref 0–1.5)
BILIRUB SERPL-MCNC: 0.8 MG/DL (ref 0–1.2)
BUN SERPL-MCNC: 17 MG/DL (ref 8–23)
BUN/CREAT SERPL: 12.8 (ref 7–25)
CALCIUM SPEC-SCNC: 9 MG/DL (ref 8.6–10.5)
CHLORIDE SERPL-SCNC: 105 MMOL/L (ref 98–107)
CO2 SERPL-SCNC: 25.2 MMOL/L (ref 22–29)
CREAT SERPL-MCNC: 1.33 MG/DL (ref 0.76–1.27)
DEPRECATED RDW RBC AUTO: 43.8 FL (ref 37–54)
EGFRCR SERPLBLD CKD-EPI 2021: 54.7 ML/MIN/1.73
EOSINOPHIL # BLD AUTO: 0.26 10*3/MM3 (ref 0–0.4)
EOSINOPHIL NFR BLD AUTO: 2.2 % (ref 0.3–6.2)
ERYTHROCYTE [DISTWIDTH] IN BLOOD BY AUTOMATED COUNT: 13.4 % (ref 12.3–15.4)
GLOBULIN UR ELPH-MCNC: 2.8 GM/DL
GLUCOSE SERPL-MCNC: 90 MG/DL (ref 65–99)
HCT VFR BLD AUTO: 44.1 % (ref 37.5–51)
HGB BLD-MCNC: 13.8 G/DL (ref 13–17.7)
IMM GRANULOCYTES # BLD AUTO: 0.05 10*3/MM3 (ref 0–0.05)
IMM GRANULOCYTES NFR BLD AUTO: 0.4 % (ref 0–0.5)
LYMPHOCYTES # BLD AUTO: 1.65 10*3/MM3 (ref 0.7–3.1)
LYMPHOCYTES NFR BLD AUTO: 13.8 % (ref 19.6–45.3)
MCH RBC QN AUTO: 28.1 PG (ref 26.6–33)
MCHC RBC AUTO-ENTMCNC: 31.3 G/DL (ref 31.5–35.7)
MCV RBC AUTO: 89.8 FL (ref 79–97)
MONOCYTES # BLD AUTO: 1.09 10*3/MM3 (ref 0.1–0.9)
MONOCYTES NFR BLD AUTO: 9.1 % (ref 5–12)
NEUTROPHILS NFR BLD AUTO: 73.6 % (ref 42.7–76)
NEUTROPHILS NFR BLD AUTO: 8.83 10*3/MM3 (ref 1.7–7)
NRBC BLD AUTO-RTO: 0 /100 WBC (ref 0–0.2)
PLATELET # BLD AUTO: 309 10*3/MM3 (ref 140–450)
PMV BLD AUTO: 9.2 FL (ref 6–12)
POTASSIUM SERPL-SCNC: 4.4 MMOL/L (ref 3.5–5.2)
PROT SERPL-MCNC: 6.5 G/DL (ref 6–8.5)
RBC # BLD AUTO: 4.91 10*6/MM3 (ref 4.14–5.8)
SODIUM SERPL-SCNC: 140 MMOL/L (ref 136–145)
WBC NRBC COR # BLD AUTO: 11.99 10*3/MM3 (ref 3.4–10.8)

## 2024-10-17 PROCEDURE — 36415 COLL VENOUS BLD VENIPUNCTURE: CPT

## 2024-10-17 PROCEDURE — 80053 COMPREHEN METABOLIC PANEL: CPT

## 2024-10-17 PROCEDURE — 85025 COMPLETE CBC W/AUTO DIFF WBC: CPT

## 2024-10-17 NOTE — PROGRESS NOTES
"[facility] GROUP OUTPATIENT FOLLOW UP CLINIC VISIT    REASON FOR FOLLOW-UP:    Proliferative disorder with polycythemia vera, JAK2 positive  History of stage I clear-cell carcinoma of the right kidney status post partial nephrectomy    HISTORY OF PRESENT ILLNESS:  Scott Murphy is a 79 y.o. male who returns today for follow up of the above issue.      The patient returns the office today for every 2-month follow-up and possible phlebotomy.  He continues to undergo therapeutic phlebotomy to maintain hematocrit less than 50% for his myeloproliferative disorder.  He reports he is overall feeling very well.  He has no abdominal pain.  He has no fevers or chills, signs or symptoms of infection.  He does follow-up with urology including Dr. Lopez regularly regarding his previous clear-cell carcinoma.  He undergoes annual surveillance imaging through [facility].    REVIEW OF SYSTEMS:  ROS as per HPI    PHYSICAL EXAMINATION:    Vitals:    10/17/24 1301   BP: 149/83   Pulse: 54   Resp: 16   Temp: 98 °F (36.7 °C)   TempSrc: Oral   SpO2: 97%   Weight: 70.3 kg (154 lb 14.4 oz)   Height: 172.7 cm (67.99\")   PainSc:   4   PainLoc: Hip       PHYSICAL EXAM  General:  No acute distress, awake, alert and oriented  Skin:  Warm and dry, no visible rash  HEENT:  Normocephalic/atraumatic.  Wearing a face mask.  Chest:  Normal respiratory effort  Extremities:  No visible clubbing, cyanosis, or edema  Neuro/psych:  Grossly nonfocal.  Normal mood and affect.        DIAGNOSTIC DATA:  Results from last 7 days   Lab Units 10/17/24  1249   WBC 10*3/mm3 11.99*   NEUTROS ABS 10*3/mm3 8.83*   HEMOGLOBIN g/dL 13.8   HEMATOCRIT % 44.1   PLATELETS 10*3/mm3 309     Results from last 7 days   Lab Units 10/17/24  1249   SODIUM mmol/L 140   POTASSIUM mmol/L 4.4   CHLORIDE mmol/L 105   CO2 mmol/L 25.2   BUN mg/dL 17   CREATININE mg/dL 1.33*   CALCIUM mg/dL 9.0   ALBUMIN g/dL 3.7   BILIRUBIN mg/dL 0.8   ALK PHOS U/L 90   ALT (SGPT) U/L 9 "   AST (SGOT) U/L 18   GLUCOSE mg/dL 90             IMAGING:    None reviewed.     ASSESSMENT:  This is a 79 y.o. male with:    *Polycythemia vera, JAK2 mutation positive  He does have associated leukocytosis which is minimal.  BCR/ABL negative  Per Dr. Damon's note, the patient has previously trialed Hydrea with intolerance and declines taking this  Continues to undergo therapeutic phlebotomy to maintain hematocrit less than 50%  Today, 10/17/2024 minimal leukocytosis at 11.99, hemoglobin 13.8, hematocrit 44.1%, platelets 309,000.  The patient will not require phlebotomy.  Will continue to monitor every 3 months.    *Elevated PSA  Followed by [facility], Dr. Ganesh Lopez.  Of the prostate performed and disclosed no abnormalities, no biopsy was thought to be necessary  He continues to follow with urology every 6 months who is monitoring his PSA  Most recent imaging with MRI 5/17/2024 no suspicious lesions per PI-RADS criteria.  Marked BPH has increased from prior exam    *History of stage I cell carcinoma of the kidney  That is post partial nephron sparing nephrectomy.  He required no adjuvant therapy  He continues to follow with Dr. Lopez with annual CT scans for surveillance      PLAN:  The patient not require phlebotomy today with hematocrit of 44%  Return in 3 months for CBC and possible phlebotomy if hematocrit is over 50%  Continue to follow-up with urology with surveillance scans for previous clear-cell carcinoma and PSA monitoring  In 6 months, CBC, CMP, MD follow-up transitioning care to Dr. Walter for further management of his care in light of Dr. Damon's penitentiary    Scarlet Diallo, APRN  10/17/2024

## 2024-11-19 ENCOUNTER — HOSPITAL ENCOUNTER (OUTPATIENT)
Dept: CT IMAGING | Facility: HOSPITAL | Age: 79
Discharge: HOME | End: 2024-11-19
Admitting: NURSE PRACTITIONER
Payer: MEDICARE

## 2024-11-19 DIAGNOSIS — I77.810 DILATED AORTIC ROOT: ICD-10-CM

## 2024-11-19 PROCEDURE — 71250 CT THORAX DX C-: CPT

## 2024-12-23 ENCOUNTER — OFFICE VISIT (OUTPATIENT)
Dept: CARDIOLOGY | Facility: CLINIC | Age: 79
End: 2024-12-23
Payer: MEDICARE

## 2024-12-23 VITALS
DIASTOLIC BLOOD PRESSURE: 72 MMHG | HEART RATE: 94 BPM | BODY MASS INDEX: 24.25 KG/M2 | WEIGHT: 160 LBS | HEIGHT: 68 IN | SYSTOLIC BLOOD PRESSURE: 110 MMHG

## 2024-12-23 DIAGNOSIS — I73.00 RAYNAUD'S DISEASE WITHOUT GANGRENE: ICD-10-CM

## 2024-12-23 DIAGNOSIS — I71.20 THORACIC AORTIC ANEURYSM WITHOUT RUPTURE, UNSPECIFIED PART: Primary | ICD-10-CM

## 2024-12-23 DIAGNOSIS — I77.810 DILATED AORTIC ROOT: ICD-10-CM

## 2024-12-23 DIAGNOSIS — I45.10 RBBB: ICD-10-CM

## 2024-12-23 DIAGNOSIS — I10 PRIMARY HYPERTENSION: ICD-10-CM

## 2024-12-23 DIAGNOSIS — E78.5 HYPERLIPIDEMIA, UNSPECIFIED HYPERLIPIDEMIA TYPE: ICD-10-CM

## 2024-12-23 DIAGNOSIS — R01.1 HEART MURMUR: ICD-10-CM

## 2024-12-23 RX ORDER — AMLODIPINE BESYLATE 10 MG/1
10 TABLET ORAL DAILY
Start: 2024-12-23

## 2024-12-23 NOTE — PROGRESS NOTES
Subjective:     Encounter Date:12/23/2024      Patient ID: Scott Murphy is a 79 y.o. male.    Chief Complaint:follow up HTN  History of Present Illness  This is a 78 y/o man who follows with Dr. Roberts and is known to me. He has a pmhx of polycythemia vera, polymyalgia rheumatica, renal cell carcinoma s/p partial nephrectomy, hypertension, hypothyroidism, Raynaud's disease, asthma and thoracic aortic aneurysm.     He is here today for a follow-up visit.  He tells me he has been doing fairly well from a cardiac standpoint since he saw Dr. Roberts last.  He did undergo hip replacement surgery with Dr. Osuna in September.  Since that time he has developed a lot of issues with his knee and feels like he is healing slowly.  He usually plays quite a bit of tennis but has not been able to do so since his surgery.  Blood pressure has been well-controlled.  He has not been having any complaints of chest pain or shortness of breath.  He has occasional palpitations that is not new for him.  No swelling in his legs, orthopnea or PND.  He is taken a higher dose of the amlodipine currently for his Raynaud's.  He denies seeing any increased swelling in his ankle since taking the higher dose.    Prior history:  Dr. Roberts began following the patient in 2018 when he was referred to her for an abnormal EKG which showed a right bundle branch block. He underwent an echocardiogram that showed normal left ventricular systolic function and wall motion with an EF of 66%, grade 1 diastolic dysfunction, mild mitral and tricuspid regurgitation, right ventricular systolic pressure of 30 mmHg, and moderate dilatation of the sinus of Valsalva and mild dilatation of the ascending aorta and aortic arch. He then underwent a CT of th chest in August 2019 for complaints of dyspnea and it showed mild aneurysmal dilatation of the ascending aorta measuring 4.2 x 4 cm compared to a measurement of 4 x 3.9 cm in 2/2018. Follow up imaging of  his aneurysmal dilatation has showed stable measurements.     He was last seen in the office by Dr. Roberts in September 2022 and was doing well. No changes were made and he was to follow up in one year. He contacted the office yesterday with complaints of labile blood pressure. He recently saw his rheumatologist and was having problems with ankle swelling and constipation. He was taken off amlodipine 10mg daily. He was then instructed to increase losartan to 25 mg BID. He tried this, however, he began having issues with his blood pressure dropping. So, he changed to one a day. His blood pressure then went into the 140-150s. Yesterday, his blood pressure was 146/76 so he took losartan 25 mg. Last night his BP was 124/71, so he took half a tablet. He denies any chest pain, shortness of breath, palpitations, dizziness or syncope. The swelling in his lower extremities resolved after stopping amlodipine. He denies any orthopnea or PND. Also, of note, he had an alert from his watch notifying him that his heart rate was under 40 while sleeping. He was asymptomatic with this. He is very active and plays tennis and walks. He says his heart rate increases with activity. He denies any daytime fatigue, difficulty going to sleep or staying asleep. He does snore. He only takes Ambien about once a week to sleep.    I have reviewed and updated as appropriate allergies, current medications, past family history, past medical history, past surgical history and problem list.    Review of Systems   Constitutional: Negative for fever, malaise/fatigue, weight gain and weight loss.   HENT:  Negative for congestion, hoarse voice and sore throat.    Eyes:  Negative for blurred vision and double vision.   Cardiovascular:  Positive for palpitations. Negative for chest pain, dyspnea on exertion, leg swelling, orthopnea and syncope.   Respiratory:  Negative for cough, shortness of breath and wheezing.    Gastrointestinal:  Negative for abdominal  pain, hematemesis, hematochezia and melena.   Genitourinary:  Negative for dysuria and hematuria.   Neurological:  Negative for dizziness, headaches, light-headedness and numbness.   Psychiatric/Behavioral:  Negative for depression. The patient is not nervous/anxious.          Current Outpatient Medications:     ALPRAZolam (XANAX) 0.5 MG tablet, Take 1 tablet by mouth At Night As Needed., Disp: , Rfl:     amLODIPine (NORVASC) 5 MG tablet, Take 1.5 tablets by mouth Daily. (Patient taking differently: Take 1.5 tablets by mouth Daily. Take 2 tablets ( 10 mg ) daily), Disp: , Rfl:     B Complex Vitamins (VITAMIN B-COMPLEX PO), Take 1 tablet by mouth Daily., Disp: , Rfl:     levocetirizine (XYZAL) 5 MG tablet, Take 1 tablet by mouth Every Evening., Disp: , Rfl:     levothyroxine (SYNTHROID, LEVOTHROID) 100 MCG tablet, Take 1 tablet by mouth daily., Disp: 30 tablet, Rfl: 6    losartan (COZAAR) 25 MG tablet, Take 1 tablet by mouth Daily., Disp: 90 tablet, Rfl: 2    montelukast (SINGULAIR) 10 MG tablet, Take 1 tablet by mouth Every Night., Disp: , Rfl:     predniSONE (DELTASONE) 1 MG tablet, Daily., Disp: , Rfl:     fluticasone (FLONASE) 50 MCG/ACT nasal spray, Administer 1-2 sprays into the nostril(s) as directed by provider Daily. (Patient not taking: Reported on 12/23/2024), Disp: , Rfl:     Past Medical History:   Diagnosis Date    Acquired hypothyroidism     Anxiety     Aortic aneurysm     Arthritis     Asthma     Hutton's cyst of knee, right     Disease of thyroid gland     Gout     H/O Muscle pain     Right shoulder and neck area    H/O Radiation treatment     For tonsillar inflammation and infection when he was a child and this led him to develop thyroid cancer.    History of colon polyps     Hypertension     Hyperthyroidism     Hypothyroidism     Kidney carcinoma     H/O stage I clear cell carcinoma of the right kidney, status post partial nephrectomy, nephron-sparing surgery by Dr. Ganesh Lopez    Left shoulder  pain     Myeloproliferative disorder     With polycythemia vera, JAK2 mutation positive requiring periodic phlebotomies (hematocrit above 47).    Polymyalgia rheumatica     Premature atrial contraction     Prostate cancer     Raynaud disease     Shoulder injury, left, sequela        Past Surgical History:   Procedure Laterality Date    CIRCUMCISION N/A 12/17/2018    Procedure: CIRCUMCISION;  Surgeon: Gallo Lopez MD;  Location: [facility] OR;  Service: Urology    COLONOSCOPY  2010    Documented a tubulovillous adenom that was removed completely. Colonoscopy in 2014 was unremarkable.    COLONOSCOPY N/A 2/20/2020    Procedure: COLONOSCOPY;  Surgeon: Claire Galo MD;  Location: [facility] OR;  Service: Gastroenterology;  Laterality: N/A;  diverticulosis    HERNIA REPAIR      H/O multiple hernia repairs including right inguinal hernia repair in 1981, 2003, and double hernia repair in 2013    KIDNEY SURGERY      PROSTATE BIOPSY      Showed features consistent with prostate cancer    SHOULDER SURGERY  1995    Shoulder repair    THYROIDECTOMY, PARTIAL  1983    For malignant tumor    TOOTH EXTRACTION         Family History   Problem Relation Age of Onset    No Known Problems Mother     Heart attack Father     Heart disease Father     Hypertension Father     Diabetes Father     Tuberculosis Maternal Grandmother     No Known Problems Son     Drug abuse Son     Malig Hyperthermia Neg Hx        Social History     Tobacco Use    Smoking status: Never    Smokeless tobacco: Never    Tobacco comments:     caffeine use    Vaping Use    Vaping status: Never Used   Substance Use Topics    Alcohol use: Yes     Alcohol/week: 1.0 - 3.0 standard drink of alcohol     Types: 1 - 3 Glasses of wine per week     Comment: 1 MONTHLY    Drug use: No         ECG 12 Lead    Date/Time: 12/23/2024 10:50 AM  Performed by: Janey Martino APRN    Authorized by: Janey Martino APRN  Comparison: compared with previous ECG from  "12/21/2023  Similar to previous ECG  Rhythm: sinus rhythm  Ectopy: atrial premature contractions  Conduction: right bundle branch block             Objective:     Visit Vitals  /72   Pulse 94   Ht 172.7 cm (67.99\")   Wt 72.6 kg (160 lb)   BMI 24.33 kg/m²             Physical Exam  Constitutional:       Appearance: Normal appearance. He is normal weight.   HENT:      Head: Normocephalic.   Neck:      Vascular: No carotid bruit.   Cardiovascular:      Rate and Rhythm: Normal rate and regular rhythm.      Chest Wall: PMI is not displaced.      Pulses: Normal pulses.           Radial pulses are 2+ on the right side and 2+ on the left side.        Posterior tibial pulses are 2+ on the right side and 2+ on the left side.      Heart sounds: Murmur heard.      Systolic murmur is present with a grade of 2/6.      No friction rub. No gallop.   Pulmonary:      Effort: Pulmonary effort is normal.      Breath sounds: Normal breath sounds.   Abdominal:      General: Bowel sounds are normal. There is no distension.      Palpations: Abdomen is soft.   Musculoskeletal:      Right lower leg: No edema.      Left lower leg: No edema.   Skin:     General: Skin is warm and dry.      Capillary Refill: Capillary refill takes less than 2 seconds.   Neurological:      Mental Status: He is alert and oriented to person, place, and time.   Psychiatric:         Mood and Affect: Mood normal.         Behavior: Behavior normal.         Thought Content: Thought content normal.          Lab Review:         Cardiac Procedures:       Assessment:         Diagnoses and all orders for this visit:    1. Thoracic aortic aneurysm without rupture, unspecified part (Primary)    2. RBBB    3. Raynaud's disease without gangrene    4. Primary hypertension    5. Hyperlipidemia, unspecified hyperlipidemia type    6. Dilated aortic root            Plan:       Heart murmur: New murmur auscultated on exam today.  He says that his PCP recently mentioned this to " him as well.  He has never been told prior to that that he had had a murmur in the past.  He has not had an echocardiogram since 2021.  Will set him up for echo to evaluate for any new valvular heart disease.  Hypertension: Blood pressure well-controlled.  No changes  Chronic RBBB  Thoracic aortic aneurysm: Follows with CT surgery.  CT of the chest in November 2024 showed stable aneurysm.  He has a follow-up appointment with surgery in January.  Raynaud's disease: On amlodipine    Thank you for allowing me to participate in this patient's care. Please call with any questions or concerns. Mr. Murphy will follow up with Dr. Roberts in 1 year.          Your medication list            Accurate as of December 23, 2024  9:34 AM. If you have any questions, ask your nurse or doctor.                CHANGE how you take these medications        Instructions Last Dose Given Next Dose Due   amLODIPine 5 MG tablet  Commonly known as: NORVASC  What changed: additional instructions      Take 1.5 tablets by mouth Daily.              CONTINUE taking these medications        Instructions Last Dose Given Next Dose Due   ALPRAZolam 0.5 MG tablet  Commonly known as: XANAX      Take 1 tablet by mouth At Night As Needed.       fluticasone 50 MCG/ACT nasal spray  Commonly known as: FLONASE      Administer 1-2 sprays into the nostril(s) as directed by provider Daily.       levocetirizine 5 MG tablet  Commonly known as: XYZAL      Take 1 tablet by mouth Every Evening.       levothyroxine 100 MCG tablet  Commonly known as: SYNTHROID, LEVOTHROID      Take 1 tablet by mouth daily.       losartan 25 MG tablet  Commonly known as: COZAAR      Take 1 tablet by mouth Daily.       montelukast 10 MG tablet  Commonly known as: SINGULAIR      Take 1 tablet by mouth Every Night.       predniSONE 1 MG tablet  Commonly known as: DELTASONE      Daily.       VITAMIN B-COMPLEX PO      Take 1 tablet by mouth Daily.                  Janey Martino,  APRN  12/23/24  8:38 AM EDT

## 2024-12-23 NOTE — H&P (VIEW-ONLY)
Subjective:     Encounter Date:12/23/2024      Patient ID: Scott Murphy is a 79 y.o. male.    Chief Complaint:follow up HTN  History of Present Illness  This is a 80 y/o man who follows with Dr. Roberts and is known to me. He has a pmhx of polycythemia vera, polymyalgia rheumatica, renal cell carcinoma s/p partial nephrectomy, hypertension, hypothyroidism, Raynaud's disease, asthma and thoracic aortic aneurysm.     He is here today for a follow-up visit.  He tells me he has been doing fairly well from a cardiac standpoint since he saw Dr. Roberts last.  He did undergo hip replacement surgery with Dr. Osuna in September.  Since that time he has developed a lot of issues with his knee and feels like he is healing slowly.  He usually plays quite a bit of tennis but has not been able to do so since his surgery.  Blood pressure has been well-controlled.  He has not been having any complaints of chest pain or shortness of breath.  He has occasional palpitations that is not new for him.  No swelling in his legs, orthopnea or PND.  He is taken a higher dose of the amlodipine currently for his Raynaud's.  He denies seeing any increased swelling in his ankle since taking the higher dose.    Prior history:  Dr. Roberts began following the patient in 2018 when he was referred to her for an abnormal EKG which showed a right bundle branch block. He underwent an echocardiogram that showed normal left ventricular systolic function and wall motion with an EF of 66%, grade 1 diastolic dysfunction, mild mitral and tricuspid regurgitation, right ventricular systolic pressure of 30 mmHg, and moderate dilatation of the sinus of Valsalva and mild dilatation of the ascending aorta and aortic arch. He then underwent a CT of th chest in August 2019 for complaints of dyspnea and it showed mild aneurysmal dilatation of the ascending aorta measuring 4.2 x 4 cm compared to a measurement of 4 x 3.9 cm in 2/2018. Follow up imaging of  his aneurysmal dilatation has showed stable measurements.     He was last seen in the office by Dr. Roberts in September 2022 and was doing well. No changes were made and he was to follow up in one year. He contacted the office yesterday with complaints of labile blood pressure. He recently saw his rheumatologist and was having problems with ankle swelling and constipation. He was taken off amlodipine 10mg daily. He was then instructed to increase losartan to 25 mg BID. He tried this, however, he began having issues with his blood pressure dropping. So, he changed to one a day. His blood pressure then went into the 140-150s. Yesterday, his blood pressure was 146/76 so he took losartan 25 mg. Last night his BP was 124/71, so he took half a tablet. He denies any chest pain, shortness of breath, palpitations, dizziness or syncope. The swelling in his lower extremities resolved after stopping amlodipine. He denies any orthopnea or PND. Also, of note, he had an alert from his watch notifying him that his heart rate was under 40 while sleeping. He was asymptomatic with this. He is very active and plays tennis and walks. He says his heart rate increases with activity. He denies any daytime fatigue, difficulty going to sleep or staying asleep. He does snore. He only takes Ambien about once a week to sleep.    I have reviewed and updated as appropriate allergies, current medications, past family history, past medical history, past surgical history and problem list.    Review of Systems   Constitutional: Negative for fever, malaise/fatigue, weight gain and weight loss.   HENT:  Negative for congestion, hoarse voice and sore throat.    Eyes:  Negative for blurred vision and double vision.   Cardiovascular:  Positive for palpitations. Negative for chest pain, dyspnea on exertion, leg swelling, orthopnea and syncope.   Respiratory:  Negative for cough, shortness of breath and wheezing.    Gastrointestinal:  Negative for abdominal  pain, hematemesis, hematochezia and melena.   Genitourinary:  Negative for dysuria and hematuria.   Neurological:  Negative for dizziness, headaches, light-headedness and numbness.   Psychiatric/Behavioral:  Negative for depression. The patient is not nervous/anxious.          Current Outpatient Medications:     ALPRAZolam (XANAX) 0.5 MG tablet, Take 1 tablet by mouth At Night As Needed., Disp: , Rfl:     amLODIPine (NORVASC) 5 MG tablet, Take 1.5 tablets by mouth Daily. (Patient taking differently: Take 1.5 tablets by mouth Daily. Take 2 tablets ( 10 mg ) daily), Disp: , Rfl:     B Complex Vitamins (VITAMIN B-COMPLEX PO), Take 1 tablet by mouth Daily., Disp: , Rfl:     levocetirizine (XYZAL) 5 MG tablet, Take 1 tablet by mouth Every Evening., Disp: , Rfl:     levothyroxine (SYNTHROID, LEVOTHROID) 100 MCG tablet, Take 1 tablet by mouth daily., Disp: 30 tablet, Rfl: 6    losartan (COZAAR) 25 MG tablet, Take 1 tablet by mouth Daily., Disp: 90 tablet, Rfl: 2    montelukast (SINGULAIR) 10 MG tablet, Take 1 tablet by mouth Every Night., Disp: , Rfl:     predniSONE (DELTASONE) 1 MG tablet, Daily., Disp: , Rfl:     fluticasone (FLONASE) 50 MCG/ACT nasal spray, Administer 1-2 sprays into the nostril(s) as directed by provider Daily. (Patient not taking: Reported on 12/23/2024), Disp: , Rfl:     Past Medical History:   Diagnosis Date    Acquired hypothyroidism     Anxiety     Aortic aneurysm     Arthritis     Asthma     Hutton's cyst of knee, right     Disease of thyroid gland     Gout     H/O Muscle pain     Right shoulder and neck area    H/O Radiation treatment     For tonsillar inflammation and infection when he was a child and this led him to develop thyroid cancer.    History of colon polyps     Hypertension     Hyperthyroidism     Hypothyroidism     Kidney carcinoma     H/O stage I clear cell carcinoma of the right kidney, status post partial nephrectomy, nephron-sparing surgery by Dr. Ganesh Lopez    Left shoulder  pain     Myeloproliferative disorder     With polycythemia vera, JAK2 mutation positive requiring periodic phlebotomies (hematocrit above 47).    Polymyalgia rheumatica     Premature atrial contraction     Prostate cancer     Raynaud disease     Shoulder injury, left, sequela        Past Surgical History:   Procedure Laterality Date    CIRCUMCISION N/A 12/17/2018    Procedure: CIRCUMCISION;  Surgeon: Gallo Lopez MD;  Location: [facility] OR;  Service: Urology    COLONOSCOPY  2010    Documented a tubulovillous adenom that was removed completely. Colonoscopy in 2014 was unremarkable.    COLONOSCOPY N/A 2/20/2020    Procedure: COLONOSCOPY;  Surgeon: Claire Galo MD;  Location: [facility] OR;  Service: Gastroenterology;  Laterality: N/A;  diverticulosis    HERNIA REPAIR      H/O multiple hernia repairs including right inguinal hernia repair in 1981, 2003, and double hernia repair in 2013    KIDNEY SURGERY      PROSTATE BIOPSY      Showed features consistent with prostate cancer    SHOULDER SURGERY  1995    Shoulder repair    THYROIDECTOMY, PARTIAL  1983    For malignant tumor    TOOTH EXTRACTION         Family History   Problem Relation Age of Onset    No Known Problems Mother     Heart attack Father     Heart disease Father     Hypertension Father     Diabetes Father     Tuberculosis Maternal Grandmother     No Known Problems Son     Drug abuse Son     Malig Hyperthermia Neg Hx        Social History     Tobacco Use    Smoking status: Never    Smokeless tobacco: Never    Tobacco comments:     caffeine use    Vaping Use    Vaping status: Never Used   Substance Use Topics    Alcohol use: Yes     Alcohol/week: 1.0 - 3.0 standard drink of alcohol     Types: 1 - 3 Glasses of wine per week     Comment: 1 MONTHLY    Drug use: No         ECG 12 Lead    Date/Time: 12/23/2024 10:50 AM  Performed by: Janey Martino APRN    Authorized by: Janey Martino APRN  Comparison: compared with previous ECG from  "12/21/2023  Similar to previous ECG  Rhythm: sinus rhythm  Ectopy: atrial premature contractions  Conduction: right bundle branch block             Objective:     Visit Vitals  /72   Pulse 94   Ht 172.7 cm (67.99\")   Wt 72.6 kg (160 lb)   BMI 24.33 kg/m²             Physical Exam  Constitutional:       Appearance: Normal appearance. He is normal weight.   HENT:      Head: Normocephalic.   Neck:      Vascular: No carotid bruit.   Cardiovascular:      Rate and Rhythm: Normal rate and regular rhythm.      Chest Wall: PMI is not displaced.      Pulses: Normal pulses.           Radial pulses are 2+ on the right side and 2+ on the left side.        Posterior tibial pulses are 2+ on the right side and 2+ on the left side.      Heart sounds: Murmur heard.      Systolic murmur is present with a grade of 2/6.      No friction rub. No gallop.   Pulmonary:      Effort: Pulmonary effort is normal.      Breath sounds: Normal breath sounds.   Abdominal:      General: Bowel sounds are normal. There is no distension.      Palpations: Abdomen is soft.   Musculoskeletal:      Right lower leg: No edema.      Left lower leg: No edema.   Skin:     General: Skin is warm and dry.      Capillary Refill: Capillary refill takes less than 2 seconds.   Neurological:      Mental Status: He is alert and oriented to person, place, and time.   Psychiatric:         Mood and Affect: Mood normal.         Behavior: Behavior normal.         Thought Content: Thought content normal.          Lab Review:         Cardiac Procedures:       Assessment:         Diagnoses and all orders for this visit:    1. Thoracic aortic aneurysm without rupture, unspecified part (Primary)    2. RBBB    3. Raynaud's disease without gangrene    4. Primary hypertension    5. Hyperlipidemia, unspecified hyperlipidemia type    6. Dilated aortic root            Plan:       Heart murmur: New murmur auscultated on exam today.  He says that his PCP recently mentioned this to " him as well.  He has never been told prior to that that he had had a murmur in the past.  He has not had an echocardiogram since 2021.  Will set him up for echo to evaluate for any new valvular heart disease.  Hypertension: Blood pressure well-controlled.  No changes  Chronic RBBB  Thoracic aortic aneurysm: Follows with CT surgery.  CT of the chest in November 2024 showed stable aneurysm.  He has a follow-up appointment with surgery in January.  Raynaud's disease: On amlodipine    Thank you for allowing me to participate in this patient's care. Please call with any questions or concerns. Mr. Murphy will follow up with Dr. Roberts in 1 year.          Your medication list            Accurate as of December 23, 2024  9:34 AM. If you have any questions, ask your nurse or doctor.                CHANGE how you take these medications        Instructions Last Dose Given Next Dose Due   amLODIPine 5 MG tablet  Commonly known as: NORVASC  What changed: additional instructions      Take 1.5 tablets by mouth Daily.              CONTINUE taking these medications        Instructions Last Dose Given Next Dose Due   ALPRAZolam 0.5 MG tablet  Commonly known as: XANAX      Take 1 tablet by mouth At Night As Needed.       fluticasone 50 MCG/ACT nasal spray  Commonly known as: FLONASE      Administer 1-2 sprays into the nostril(s) as directed by provider Daily.       levocetirizine 5 MG tablet  Commonly known as: XYZAL      Take 1 tablet by mouth Every Evening.       levothyroxine 100 MCG tablet  Commonly known as: SYNTHROID, LEVOTHROID      Take 1 tablet by mouth daily.       losartan 25 MG tablet  Commonly known as: COZAAR      Take 1 tablet by mouth Daily.       montelukast 10 MG tablet  Commonly known as: SINGULAIR      Take 1 tablet by mouth Every Night.       predniSONE 1 MG tablet  Commonly known as: DELTASONE      Daily.       VITAMIN B-COMPLEX PO      Take 1 tablet by mouth Daily.                  Janey Martino,  APRN  12/23/24  8:38 AM EDT

## 2024-12-26 ENCOUNTER — HOSPITAL ENCOUNTER (OUTPATIENT)
Dept: CARDIOLOGY | Facility: HOSPITAL | Age: 79
Discharge: HOME | End: 2024-12-26
Admitting: NURSE PRACTITIONER
Payer: MEDICARE

## 2024-12-26 ENCOUNTER — TELEPHONE (OUTPATIENT)
Age: 79
End: 2024-12-26
Payer: MEDICARE

## 2024-12-26 ENCOUNTER — TELEPHONE (OUTPATIENT)
Dept: CARDIOLOGY | Facility: CLINIC | Age: 79
End: 2024-12-26
Payer: MEDICARE

## 2024-12-26 VITALS
DIASTOLIC BLOOD PRESSURE: 62 MMHG | SYSTOLIC BLOOD PRESSURE: 125 MMHG | BODY MASS INDEX: 24.25 KG/M2 | HEIGHT: 68 IN | HEART RATE: 65 BPM | WEIGHT: 160 LBS

## 2024-12-26 DIAGNOSIS — I34.0 MITRAL VALVE INSUFFICIENCY, UNSPECIFIED ETIOLOGY: Primary | ICD-10-CM

## 2024-12-26 DIAGNOSIS — R01.1 HEART MURMUR: ICD-10-CM

## 2024-12-26 LAB
AORTIC ARCH: 3.1 CM
ASCENDING AORTA: 4.3 CM
AV MEAN PRESS GRAD SYS DOP V1V2: 4.1 MMHG
AV VMAX SYS DOP: 140.5 CM/SEC
BH CV ECHO MEAS - ACS: 2.5 CM
BH CV ECHO MEAS - AO MAX PG: 7.9 MMHG
BH CV ECHO MEAS - AO ROOT DIAM: 4.4 CM
BH CV ECHO MEAS - AO V2 VTI: 29.9 CM
BH CV ECHO MEAS - AVA(I,D): 2.21 CM2
BH CV ECHO MEAS - EDV(CUBED): 97.3 ML
BH CV ECHO MEAS - EDV(MOD-SP2): 63 ML
BH CV ECHO MEAS - EDV(MOD-SP4): 69 ML
BH CV ECHO MEAS - EF(MOD-SP2): 49.2 %
BH CV ECHO MEAS - EF(MOD-SP4): 59.4 %
BH CV ECHO MEAS - ESV(CUBED): 30.3 ML
BH CV ECHO MEAS - ESV(MOD-SP2): 32 ML
BH CV ECHO MEAS - ESV(MOD-SP4): 28 ML
BH CV ECHO MEAS - FS: 32.2 %
BH CV ECHO MEAS - IVS/LVPW: 1 CM
BH CV ECHO MEAS - IVSD: 1.1 CM
BH CV ECHO MEAS - LAT PEAK E' VEL: 14.6 CM/SEC
BH CV ECHO MEAS - LV DIASTOLIC VOL/BSA (35-75): 37.1 CM2
BH CV ECHO MEAS - LV MASS(C)D: 181.2 GRAMS
BH CV ECHO MEAS - LV MAX PG: 4.2 MMHG
BH CV ECHO MEAS - LV MEAN PG: 1.75 MMHG
BH CV ECHO MEAS - LV SYSTOLIC VOL/BSA (12-30): 15.1 CM2
BH CV ECHO MEAS - LV V1 MAX: 102.2 CM/SEC
BH CV ECHO MEAS - LV V1 VTI: 17.1 CM
BH CV ECHO MEAS - LVIDD: 4.6 CM
BH CV ECHO MEAS - LVIDS: 3.1 CM
BH CV ECHO MEAS - LVOT AREA: 3.9 CM2
BH CV ECHO MEAS - LVOT DIAM: 2.22 CM
BH CV ECHO MEAS - LVPWD: 1.1 CM
BH CV ECHO MEAS - MED PEAK E' VEL: 9.6 CM/SEC
BH CV ECHO MEAS - MR MAX PG: 123.1 MMHG
BH CV ECHO MEAS - MR MAX VEL: 554.8 CM/SEC
BH CV ECHO MEAS - MV A DUR: 0.11 SEC
BH CV ECHO MEAS - MV A MAX VEL: 71.3 CM/SEC
BH CV ECHO MEAS - MV DEC SLOPE: 340.8 CM/SEC2
BH CV ECHO MEAS - MV DEC TIME: 0.19 SEC
BH CV ECHO MEAS - MV E MAX VEL: 96.4 CM/SEC
BH CV ECHO MEAS - MV E/A: 1.35
BH CV ECHO MEAS - MV MAX PG: 3.6 MMHG
BH CV ECHO MEAS - MV MEAN PG: 1.21 MMHG
BH CV ECHO MEAS - MV P1/2T: 82.2 MSEC
BH CV ECHO MEAS - MV V2 VTI: 34.4 CM
BH CV ECHO MEAS - MVA(P1/2T): 2.7 CM2
BH CV ECHO MEAS - MVA(VTI): 1.92 CM2
BH CV ECHO MEAS - PA ACC TIME: 0.11 SEC
BH CV ECHO MEAS - PA V2 MAX: 89.1 CM/SEC
BH CV ECHO MEAS - PULM A REVS DUR: 0.11 SEC
BH CV ECHO MEAS - PULM A REVS VEL: 30.9 CM/SEC
BH CV ECHO MEAS - PULM DIAS VEL: 57.6 CM/SEC
BH CV ECHO MEAS - PULM S/D: 1.45
BH CV ECHO MEAS - PULM SYS VEL: 83.8 CM/SEC
BH CV ECHO MEAS - QP/QS: 0.76
BH CV ECHO MEAS - RAP SYSTOLE: 3 MMHG
BH CV ECHO MEAS - RV MAX PG: 1.91 MMHG
BH CV ECHO MEAS - RV V1 MAX: 69.2 CM/SEC
BH CV ECHO MEAS - RV V1 VTI: 14.3 CM
BH CV ECHO MEAS - RVOT DIAM: 2.11 CM
BH CV ECHO MEAS - RVSP: 30 MMHG
BH CV ECHO MEAS - SUP REN AO DIAM: 2.2 CM
BH CV ECHO MEAS - SV(LVOT): 66 ML
BH CV ECHO MEAS - SV(MOD-SP2): 31 ML
BH CV ECHO MEAS - SV(MOD-SP4): 41 ML
BH CV ECHO MEAS - SV(RVOT): 50.1 ML
BH CV ECHO MEAS - SVI(LVOT): 35.5 ML/M2
BH CV ECHO MEAS - SVI(MOD-SP2): 16.7 ML/M2
BH CV ECHO MEAS - SVI(MOD-SP4): 22.1 ML/M2
BH CV ECHO MEAS - TAPSE (>1.6): 2.48 CM
BH CV ECHO MEAS - TR MAX PG: 26.7 MMHG
BH CV ECHO MEAS - TR MAX VEL: 258.5 CM/SEC
BH CV ECHO MEASUREMENTS AVERAGE E/E' RATIO: 7.97
BH CV XLRA - RV BASE: 3 CM
BH CV XLRA - RV LENGTH: 6 CM
BH CV XLRA - RV MID: 2.9 CM
BH CV XLRA - TDI S': 11.5 CM/SEC
LEFT ATRIUM VOLUME INDEX: 27.9 ML/M2
LV EF BIPLANE MOD: 56 %
SINUS: 4.4 CM
STJ: 3.9 CM

## 2024-12-26 PROCEDURE — 93306 TTE W/DOPPLER COMPLETE: CPT

## 2024-12-26 NOTE — TELEPHONE ENCOUNTER
Called and went over the results of the patient's echocardiogram showing mitral valve prolapse and severe mitral regurgitation which is a new finding.  He continues to deny any symptoms including shortness of breath.  I recommend further evaluation with a REJI and a right and left cardiac catheterization.  I did discuss the possibility of  the procedures and starting with a REJI first to confirm the mitral valve regurgitation before performing the cardiac catheterization.  The patient would like to proceed with all of the procedures in 1 day.  I discussed the procedure, risk and benefits of both procedures with the patient.  He would like to proceed.  Explained that once we know the findings that I would likely have Dr. Alcantara evaluate everything further since he is already following him for his thoracic aneurysm.    Please see that this patient is scheduled for a REJI with one of the noninvasive cardiologist followed by a right and left cardiac catheterization with me.  Thanks

## 2024-12-26 NOTE — TELEPHONE ENCOUNTER
From: Di Roebrts MD  Sent: 12/26/2024   2:04 PM EST  To: ALETHA Aragon; ALETHA Mckee; *    I actually would hold off on a structural heart team referral.  I would recommend starting with a REJI and right and left cardiac catheterization for further evaluation first.  I have already gone ahead and called the patient and discussed my recommendations with him.  Thanks!

## 2024-12-26 NOTE — TELEPHONE ENCOUNTER
He was recently in the office.  He reported chronic palpitations.  He denies shortness of breath or chest pain.  I reviewed his previous echocardiogram from September 2021 which showed mild mitral regurgitation.  He has known ascending aorta dilation.    This echo shows normal LVEF 56%, mitral valve prolapse, severe mitral regurgitation, mild tricuspid regurgitation, aortic root dilation and ascending aorta dilation.  I would recommend referral to the structural heart team.  I will send them a message.    I left a message for patient to return my call.

## 2024-12-26 NOTE — PROGRESS NOTES
I am covering Kely in basket today.    He was recently in the office.  He reported chronic palpitations.  He denies shortness of breath or chest pain.  I reviewed his previous echocardiogram from September 2021 which showed mild mitral regurgitation.  He has known ascending aorta dilation.    This echo shows normal LVEF 56%, mitral valve prolapse, severe mitral regurgitation, mild tricuspid regurgitation, aortic root dilation and ascending aorta dilation.  I would recommend referral to the structural heart team.  I will send them a message.    I left a message for patient to return my call.

## 2025-01-08 ENCOUNTER — HOSPITAL ENCOUNTER (OUTPATIENT)
Dept: POSTOP/PACU | Facility: HOSPITAL | Age: 80
Discharge: HOME | End: 2025-01-08
Payer: MEDICARE

## 2025-01-08 ENCOUNTER — ANESTHESIA EVENT (OUTPATIENT)
Dept: POSTOP/PACU | Facility: HOSPITAL | Age: 80
End: 2025-01-08
Payer: MEDICARE

## 2025-01-08 ENCOUNTER — ANESTHESIA (OUTPATIENT)
Dept: POSTOP/PACU | Facility: HOSPITAL | Age: 80
End: 2025-01-08
Payer: MEDICARE

## 2025-01-08 ENCOUNTER — HOSPITAL ENCOUNTER (OUTPATIENT)
Facility: HOSPITAL | Age: 80
Setting detail: HOSPITAL OUTPATIENT SURGERY
Discharge: HOME | End: 2025-01-08
Attending: INTERNAL MEDICINE | Admitting: INTERNAL MEDICINE
Payer: MEDICARE

## 2025-01-08 VITALS
OXYGEN SATURATION: 96 % | DIASTOLIC BLOOD PRESSURE: 72 MMHG | RESPIRATION RATE: 16 BRPM | SYSTOLIC BLOOD PRESSURE: 122 MMHG | HEART RATE: 62 BPM

## 2025-01-08 VITALS
DIASTOLIC BLOOD PRESSURE: 58 MMHG | TEMPERATURE: 98 F | RESPIRATION RATE: 22 BRPM | OXYGEN SATURATION: 98 % | HEART RATE: 63 BPM | SYSTOLIC BLOOD PRESSURE: 85 MMHG

## 2025-01-08 VITALS — OXYGEN SATURATION: 99 % | DIASTOLIC BLOOD PRESSURE: 51 MMHG | SYSTOLIC BLOOD PRESSURE: 89 MMHG | HEART RATE: 55 BPM

## 2025-01-08 DIAGNOSIS — I34.0 MITRAL VALVE INSUFFICIENCY, UNSPECIFIED ETIOLOGY: ICD-10-CM

## 2025-01-08 LAB
BH CV ECHO MEAS - RAP SYSTOLE: 3 MMHG
BH CV ECHO MEAS - RVSP: 33 MMHG
BH CV ECHO MEAS - TR MAX PG: 29.5 MMHG
BH CV ECHO MEAS - TR MAX VEL: 271.7 CM/SEC
BH CV ECHO SHUNT ASSESSMENT PERFORMED (HIDDEN SCRIPTING): 1
HCT VFR BLDA CALC: 40 % (ref 38–51)
HCT VFR BLDA CALC: 41 % (ref 38–51)
HGB BLDA-MCNC: 13.6 G/DL (ref 12–17)
HGB BLDA-MCNC: 13.9 G/DL (ref 12–17)
SAO2 % BLDA: 73 % (ref 95–98)
SAO2 % BLDA: 93 % (ref 95–98)

## 2025-01-08 PROCEDURE — 93460 R&L HRT ART/VENTRICLE ANGIO: CPT | Performed by: INTERNAL MEDICINE

## 2025-01-08 PROCEDURE — 93320 DOPPLER ECHO COMPLETE: CPT

## 2025-01-08 PROCEDURE — 25510000001 IOPAMIDOL PER 1 ML: Performed by: INTERNAL MEDICINE

## 2025-01-08 PROCEDURE — C1894 INTRO/SHEATH, NON-LASER: HCPCS | Performed by: INTERNAL MEDICINE

## 2025-01-08 PROCEDURE — 93312 ECHO TRANSESOPHAGEAL: CPT

## 2025-01-08 PROCEDURE — 25010000002 FENTANYL CITRATE (PF) 50 MCG/ML SOLUTION: Performed by: INTERNAL MEDICINE

## 2025-01-08 PROCEDURE — 85014 HEMATOCRIT: CPT

## 2025-01-08 PROCEDURE — 85018 HEMOGLOBIN: CPT

## 2025-01-08 PROCEDURE — 25010000002 PROPOFOL 10 MG/ML EMULSION: Performed by: NURSE ANESTHETIST, CERTIFIED REGISTERED

## 2025-01-08 PROCEDURE — 25010000002 HEPARIN (PORCINE) PER 1000 UNITS: Performed by: INTERNAL MEDICINE

## 2025-01-08 PROCEDURE — 25010000002 MIDAZOLAM PER 1 MG: Performed by: INTERNAL MEDICINE

## 2025-01-08 PROCEDURE — 93325 DOPPLER ECHO COLOR FLOW MAPG: CPT

## 2025-01-08 PROCEDURE — 25010000002 PHENYLEPHRINE 10 MG/ML SOLUTION: Performed by: NURSE ANESTHETIST, CERTIFIED REGISTERED

## 2025-01-08 PROCEDURE — 25010000002 LIDOCAINE 1 % SOLUTION: Performed by: NURSE ANESTHETIST, CERTIFIED REGISTERED

## 2025-01-08 PROCEDURE — C1769 GUIDE WIRE: HCPCS | Performed by: INTERNAL MEDICINE

## 2025-01-08 PROCEDURE — 25010000002 LIDOCAINE 2% SOLUTION: Performed by: INTERNAL MEDICINE

## 2025-01-08 PROCEDURE — 82810 BLOOD GASES O2 SAT ONLY: CPT

## 2025-01-08 RX ORDER — ATROPINE SULFATE 0.4 MG/ML
0.4 INJECTION, SOLUTION INTRAMUSCULAR; INTRAVENOUS; SUBCUTANEOUS ONCE AS NEEDED
Status: DISCONTINUED | OUTPATIENT
Start: 2025-01-08 | End: 2025-01-09 | Stop reason: HOSPADM

## 2025-01-08 RX ORDER — SODIUM CHLORIDE 9 MG/ML
40 INJECTION, SOLUTION INTRAVENOUS AS NEEDED
Status: DISCONTINUED | OUTPATIENT
Start: 2025-01-08 | End: 2025-01-08 | Stop reason: HOSPADM

## 2025-01-08 RX ORDER — HYDRALAZINE HYDROCHLORIDE 20 MG/ML
5 INJECTION INTRAMUSCULAR; INTRAVENOUS
Status: DISCONTINUED | OUTPATIENT
Start: 2025-01-08 | End: 2025-01-09 | Stop reason: HOSPADM

## 2025-01-08 RX ORDER — MIDAZOLAM HYDROCHLORIDE 1 MG/ML
INJECTION, SOLUTION INTRAMUSCULAR; INTRAVENOUS
Status: DISCONTINUED | OUTPATIENT
Start: 2025-01-08 | End: 2025-01-08 | Stop reason: HOSPADM

## 2025-01-08 RX ORDER — IPRATROPIUM BROMIDE AND ALBUTEROL SULFATE 2.5; .5 MG/3ML; MG/3ML
3 SOLUTION RESPIRATORY (INHALATION) ONCE AS NEEDED
Status: DISCONTINUED | OUTPATIENT
Start: 2025-01-08 | End: 2025-01-09 | Stop reason: HOSPADM

## 2025-01-08 RX ORDER — IOPAMIDOL 755 MG/ML
INJECTION, SOLUTION INTRAVASCULAR
Status: DISCONTINUED | OUTPATIENT
Start: 2025-01-08 | End: 2025-01-08 | Stop reason: HOSPADM

## 2025-01-08 RX ORDER — EPHEDRINE SULFATE 50 MG/ML
5 INJECTION, SOLUTION INTRAVENOUS ONCE AS NEEDED
Status: DISCONTINUED | OUTPATIENT
Start: 2025-01-08 | End: 2025-01-09 | Stop reason: HOSPADM

## 2025-01-08 RX ORDER — PROMETHAZINE HYDROCHLORIDE 25 MG/1
25 TABLET ORAL ONCE AS NEEDED
Status: DISCONTINUED | OUTPATIENT
Start: 2025-01-08 | End: 2025-01-09 | Stop reason: HOSPADM

## 2025-01-08 RX ORDER — ASPIRIN 81 MG/1
81 TABLET ORAL DAILY
Start: 2025-01-08

## 2025-01-08 RX ORDER — LIDOCAINE HYDROCHLORIDE 20 MG/ML
INJECTION, SOLUTION INFILTRATION; PERINEURAL
Status: DISCONTINUED | OUTPATIENT
Start: 2025-01-08 | End: 2025-01-08 | Stop reason: HOSPADM

## 2025-01-08 RX ORDER — FLUMAZENIL 0.1 MG/ML
0.2 INJECTION INTRAVENOUS AS NEEDED
Status: DISCONTINUED | OUTPATIENT
Start: 2025-01-08 | End: 2025-01-09 | Stop reason: HOSPADM

## 2025-01-08 RX ORDER — PROMETHAZINE HYDROCHLORIDE 25 MG/1
25 SUPPOSITORY RECTAL ONCE AS NEEDED
Status: DISCONTINUED | OUTPATIENT
Start: 2025-01-08 | End: 2025-01-09 | Stop reason: HOSPADM

## 2025-01-08 RX ORDER — FENTANYL CITRATE 50 UG/ML
INJECTION, SOLUTION INTRAMUSCULAR; INTRAVENOUS
Status: DISCONTINUED | OUTPATIENT
Start: 2025-01-08 | End: 2025-01-08 | Stop reason: HOSPADM

## 2025-01-08 RX ORDER — VERAPAMIL HYDROCHLORIDE 2.5 MG/ML
INJECTION, SOLUTION INTRAVENOUS
Status: DISCONTINUED | OUTPATIENT
Start: 2025-01-08 | End: 2025-01-08 | Stop reason: HOSPADM

## 2025-01-08 RX ORDER — SODIUM CHLORIDE 0.9 % (FLUSH) 0.9 %
10 SYRINGE (ML) INJECTION AS NEEDED
Status: DISCONTINUED | OUTPATIENT
Start: 2025-01-08 | End: 2025-01-08 | Stop reason: HOSPADM

## 2025-01-08 RX ORDER — LABETALOL HYDROCHLORIDE 5 MG/ML
5 INJECTION, SOLUTION INTRAVENOUS
Status: DISCONTINUED | OUTPATIENT
Start: 2025-01-08 | End: 2025-01-09 | Stop reason: HOSPADM

## 2025-01-08 RX ORDER — PROPOFOL 10 MG/ML
VIAL (ML) INTRAVENOUS AS NEEDED
Status: DISCONTINUED | OUTPATIENT
Start: 2025-01-08 | End: 2025-01-08 | Stop reason: SURG

## 2025-01-08 RX ORDER — ONDANSETRON 2 MG/ML
4 INJECTION INTRAMUSCULAR; INTRAVENOUS ONCE AS NEEDED
Status: DISCONTINUED | OUTPATIENT
Start: 2025-01-08 | End: 2025-01-09 | Stop reason: HOSPADM

## 2025-01-08 RX ORDER — HYDROCODONE BITARTRATE AND ACETAMINOPHEN 7.5; 325 MG/1; MG/1
1 TABLET ORAL EVERY 4 HOURS PRN
Status: DISCONTINUED | OUTPATIENT
Start: 2025-01-08 | End: 2025-01-09 | Stop reason: HOSPADM

## 2025-01-08 RX ORDER — SODIUM CHLORIDE 0.9 % (FLUSH) 0.9 %
10 SYRINGE (ML) INJECTION EVERY 12 HOURS SCHEDULED
Status: DISCONTINUED | OUTPATIENT
Start: 2025-01-08 | End: 2025-01-08 | Stop reason: HOSPADM

## 2025-01-08 RX ORDER — FENTANYL CITRATE 50 UG/ML
25 INJECTION, SOLUTION INTRAMUSCULAR; INTRAVENOUS
Status: DISCONTINUED | OUTPATIENT
Start: 2025-01-08 | End: 2025-01-09 | Stop reason: HOSPADM

## 2025-01-08 RX ORDER — PHENYLEPHRINE HYDROCHLORIDE 10 MG/ML
INJECTION INTRAVENOUS AS NEEDED
Status: DISCONTINUED | OUTPATIENT
Start: 2025-01-08 | End: 2025-01-08 | Stop reason: SURG

## 2025-01-08 RX ORDER — HYDROCODONE BITARTRATE AND ACETAMINOPHEN 5; 325 MG/1; MG/1
1 TABLET ORAL ONCE AS NEEDED
Status: DISCONTINUED | OUTPATIENT
Start: 2025-01-08 | End: 2025-01-09 | Stop reason: HOSPADM

## 2025-01-08 RX ORDER — HEPARIN SODIUM 1000 [USP'U]/ML
INJECTION, SOLUTION INTRAVENOUS; SUBCUTANEOUS
Status: DISCONTINUED | OUTPATIENT
Start: 2025-01-08 | End: 2025-01-08 | Stop reason: HOSPADM

## 2025-01-08 RX ORDER — ATORVASTATIN CALCIUM 10 MG/1
10 TABLET, FILM COATED ORAL DAILY
Qty: 30 TABLET | Refills: 5 | Status: SHIPPED | OUTPATIENT
Start: 2025-01-08

## 2025-01-08 RX ORDER — SODIUM CHLORIDE 9 MG/ML
INJECTION, SOLUTION INTRAVENOUS CONTINUOUS PRN
Status: DISCONTINUED | OUTPATIENT
Start: 2025-01-08 | End: 2025-01-08 | Stop reason: SURG

## 2025-01-08 RX ORDER — HYDROMORPHONE HYDROCHLORIDE 1 MG/ML
0.25 INJECTION, SOLUTION INTRAMUSCULAR; INTRAVENOUS; SUBCUTANEOUS
Status: DISCONTINUED | OUTPATIENT
Start: 2025-01-08 | End: 2025-01-09 | Stop reason: HOSPADM

## 2025-01-08 RX ORDER — LIDOCAINE HYDROCHLORIDE 10 MG/ML
INJECTION, SOLUTION INFILTRATION; PERINEURAL AS NEEDED
Status: DISCONTINUED | OUTPATIENT
Start: 2025-01-08 | End: 2025-01-08 | Stop reason: SURG

## 2025-01-08 RX ORDER — ACETAMINOPHEN 325 MG/1
650 TABLET ORAL EVERY 4 HOURS PRN
Status: DISCONTINUED | OUTPATIENT
Start: 2025-01-08 | End: 2025-01-08 | Stop reason: HOSPADM

## 2025-01-08 RX ORDER — NALOXONE HCL 0.4 MG/ML
0.2 VIAL (ML) INJECTION AS NEEDED
Status: DISCONTINUED | OUTPATIENT
Start: 2025-01-08 | End: 2025-01-09 | Stop reason: HOSPADM

## 2025-01-08 RX ADMIN — PHENYLEPHRINE HYDROCHLORIDE 200 MCG: 10 INJECTION INTRAVENOUS at 07:59

## 2025-01-08 RX ADMIN — PHENYLEPHRINE HYDROCHLORIDE 100 MCG: 10 INJECTION INTRAVENOUS at 07:57

## 2025-01-08 RX ADMIN — PHENYLEPHRINE HYDROCHLORIDE 200 MCG: 10 INJECTION INTRAVENOUS at 07:54

## 2025-01-08 RX ADMIN — PROPOFOL 120 MCG/KG/MIN: 10 INJECTION, EMULSION INTRAVENOUS at 07:47

## 2025-01-08 RX ADMIN — PROPOFOL 80 MG: 10 INJECTION, EMULSION INTRAVENOUS at 07:47

## 2025-01-08 RX ADMIN — LIDOCAINE HYDROCHLORIDE 60 ML: 10 INJECTION, SOLUTION INFILTRATION; PERINEURAL at 07:47

## 2025-01-08 RX ADMIN — SODIUM CHLORIDE: 9 INJECTION, SOLUTION INTRAVENOUS at 07:35

## 2025-01-08 NOTE — Clinical Note
Hemostasis started on the right radial artery. R-Band was used in achieving hemostasis. Radial compression device applied to vessel. Hemostasis achieved successfully. Closure device additional comment: Tape

## 2025-01-08 NOTE — Clinical Note
Hemostasis started on the right brachial vein. Manual pressure applied to vessel. Manual pressure was held by KRISTYN ZHAO. Manual pressure was held for 5 min. Hemostasis achieved successfully.

## 2025-01-08 NOTE — DISCHARGE INSTRUCTIONS
[facility]  [address] [place] [address]    Coronary Angiogram (Radial/Ulnar Approach) After Care    Refer to this sheet in the next few weeks. These instructions provide you with information on caring for yourself after your procedure. Your caregiver may also give you more specific instructions. Your treatment has been planned according to current medical practices, but problems sometimes occur. Call your caregiver if you have any problems or questions after your procedure.    Home Care Instructions:  You may shower the day after the procedure. Remove the bandage (dressing) and gently wash the site with plain soap and water. Gently pat the site dry. You may apply a band aid daily for 2 days if desired.    Do not apply powder or lotion to the site.  Do not submerge the affected site in water for 3 to 5 days or until the site is completely healed.   Do not lift, push or pull anything over 5 pounds for 5 days after your procedure or as directed by your physician.  As a reference, a gallon of milk weighs 8 pounds.   Inspect the site at least twice daily. You may notice some bruising at the site and it may be tender for 1 to 2 weeks.     Increase your fluid intake for the next 2 days.    Keep arm elevated for 24 hours. For the remainder of the day, keep your arm in “Pledge of Allegiance” position when up and about.     You may drive 24 hours after the procedure unless otherwise instructed by your caregiver.  Do not operate machinery or power tools for 24 hours.  A responsible adult should be with you for the first 24 hours after you arrive home. Do not make any important legal decisions or sign legal papers for 24 hours.  Do not drink alcohol for 24 hours.    Metformin or any medications containing Metformin should not be taken for 48 hours after your procedure.      Call Your Doctor if:   You have unusual pain at the radial/ulnar (wrist) site.  You have redness, warmth, swelling, or pain at the  radial/ulnar (wrist) site.  You have drainage (other than a small amount of blood on the dressing).  `You have chills or a fever > 101.  Your arm becomes pale or dark, cool, tingly, or numb.  You develop chest pain, shortness of breath, feel faint or pass out.    You have heavy bleeding from the site, hold pressure on the site for 20 minutes.  If the bleeding stops, apply a fresh bandage and call your cardiologist.  However, if you        continue to have bleeding, call 911 and continue to apply pressure to the site.   You have any symptoms of a stroke.  Remember BE FAST  B-balance. Sudden trouble walking or loss of balance.  E-eyes.  Sudden changes in how you see or a sudden onset of a very bad headache.   F-face. Sudden weakness or loss of feeling of the face or facial droop on one side.   A-arms Sudden weakness or numbness in one arm.  One arm drifts down if they are both held out in front of you. This happens suddenly and usually on one side of the body.   S-speech.  Sudden trouble speaking, slurred speech or trouble understanding what are saying.   T-time  Time to call emergency services.  Write down the symptoms and the time they started.

## 2025-01-08 NOTE — ANESTHESIA PREPROCEDURE EVALUATION
Anesthesia Evaluation     NPO Solid Status: > 8 hours             Airway   Mallampati: II  Dental      Pulmonary    (+) asthma,  (-) sleep apnea, lung cancer    ROS comment: Negative patient screen for LASHAY    Cardiovascular     (+) hypertension, valvular problems/murmurs MR      Neuro/Psych  GI/Hepatic/Renal/Endo    (+) thyroid problem hypothyroidism    Musculoskeletal     Abdominal    Substance History      OB/GYN          Other                      Anesthesia Plan    ASA 3     MAC       Anesthetic plan, risks, benefits, and alternatives have been provided, discussed and informed consent has been obtained with: patient.    CODE STATUS:

## 2025-01-08 NOTE — INTERVAL H&P NOTE
H&P updated. The patient was examined and the following changes are noted:  Noted to have a new murmur at this office visit.  Transthoracic echocardiogram was ordered which showed evidence of new severe mitral valve regurgitation.  I called and spoke with the patient he did not really report any symptoms.  We discussed further workup and he was scheduled for a REJI followed by a right and left cardiac catheterization.  He underwent REJI earlier this morning which did confirm evidence of severe mitral valve regurgitation due to a flail posterior leaflet due to at least 1 torn chordae.  Will proceed with right and left cardiac catheterization today followed by consultation with Dr. Alcantara.

## 2025-01-08 NOTE — ANESTHESIA POSTPROCEDURE EVALUATION
Patient: Scott Murphy    Procedure Summary       Date: 01/08/25 Room / Location: [facility] PACU    Anesthesia Start: 0737 Anesthesia Stop: 0826    Procedure: ADULT TRANSESOPHAGEAL ECHO (REJI) W/ CONT IF NECESSARY PER PROTOCOL Diagnosis:       Mitral valve insufficiency, unspecified etiology      (Valvular Disease)      (Mitral Valve Assessment)    Scheduled Providers: Gilberto Estrada MD Provider: Eladio Moore MD    Anesthesia Type: MAC ASA Status: 3            Anesthesia Type: MAC    Vitals  Vitals Value Taken Time   BP 85/58 01/08/25 0830   Temp     Pulse 63 01/08/25 0830   Resp 22 01/08/25 0830   SpO2 98 % 01/08/25 0830           Post Anesthesia Care and Evaluation    Pain management: adequate    Airway patency: patent  Anesthetic complications: No anesthetic complications    Cardiovascular status: acceptable  Respiratory status: acceptable  Hydration status: acceptable    Comments: BP (!) 85/58   Pulse 63   Temp 36.7 °C (98 °F) (Oral)   Resp 22   SpO2 98%

## 2025-01-08 NOTE — CONSULTS
[facility] Cardiac Surgery      Patient Care Team:  Mariah Delgado MD as PCP - General (Family Medicine)  Consuelo Adorno MD as Consulting Physician (Hematology and Oncology)  John Damon MD as Consulting Physician (Hematology and Oncology)  Mariah Delgado MD as Referring Physician (Family Medicine)    Chief complaint  Mitral valve regurgitation    Subjective     History of Present Illness  Patient is a 79 y.o. male who is well known to out service as he follows in our aneurysm clinic, I am meeting him for the first time.  He follows with Dr. Roberts and presented for follow up.  A murmur was noted which was new.  He underwent an echo which revealed mitral valve Prolase with severe mitral regurgitation. has a past medical history of known aortic dilatation ascending aorta 4.2cm (last CT scan 11/2024), hyperlipidemia, hx of renal cell carcinoma s/p partial nephrectomy, hypertension, myeloproliferative disorder, hypothyroidism, polymyalgia rheumatica and recent hip replacement.  He presented today for cardiac catheterization which revealed severe multivessel CAD.  We were consulted for cardiac surgery evaluation.    Review of Systems   Constitutional:  Positive for activity change and fatigue.   Respiratory:  Positive for shortness of breath.    All other systems reviewed and are negative.       Past Medical History:   Diagnosis Date    Acquired hypothyroidism     Anxiety     Aortic aneurysm     Arthritis     Asthma     Hutton's cyst of knee, right     Disease of thyroid gland     Gout     H/O Muscle pain     Right shoulder and neck area    H/O Radiation treatment     For tonsillar inflammation and infection when he was a child and this led him to develop thyroid cancer.    History of colon polyps     Hypertension     Hyperthyroidism     Hypothyroidism     Kidney carcinoma     H/O stage I clear cell carcinoma of the right kidney, status post partial nephrectomy, nephron-sparing surgery by Dr. Ganesh Lopez     Left shoulder pain     Myeloproliferative disorder     With polycythemia vera, JAK2 mutation positive requiring periodic phlebotomies (hematocrit above 47).    Polymyalgia rheumatica     Premature atrial contraction     Prostate cancer     Raynaud disease     Shoulder injury, left, sequela      Past Surgical History:   Procedure Laterality Date    CIRCUMCISION N/A 12/17/2018    Procedure: CIRCUMCISION;  Surgeon: Gallo Lopez MD;  Location: [facility] OR;  Service: Urology    COLONOSCOPY  2010    Documented a tubulovillous adenom that was removed completely. Colonoscopy in 2014 was unremarkable.    COLONOSCOPY N/A 2/20/2020    Procedure: COLONOSCOPY;  Surgeon: Claire Galo MD;  Location: [facility] OR;  Service: Gastroenterology;  Laterality: N/A;  diverticulosis    HERNIA REPAIR      H/O multiple hernia repairs including right inguinal hernia repair in 1981, 2003, and double hernia repair in 2013    KIDNEY SURGERY      PROSTATE BIOPSY      Showed features consistent with prostate cancer    SHOULDER SURGERY  1995    Shoulder repair    THYROIDECTOMY, PARTIAL  1983    For malignant tumor    TOOTH EXTRACTION       Family History   Problem Relation Age of Onset    No Known Problems Mother     Heart attack Father     Heart disease Father     Hypertension Father     Diabetes Father     Tuberculosis Maternal Grandmother     No Known Problems Son     Drug abuse Son     Malig Hyperthermia Neg Hx      Social History     Tobacco Use    Smoking status: Never    Smokeless tobacco: Never    Tobacco comments:     caffeine use    Vaping Use    Vaping status: Never Used   Substance Use Topics    Alcohol use: Yes     Alcohol/week: 1.0 - 3.0 standard drink of alcohol     Types: 1 - 3 Glasses of wine per week     Comment: 1 MONTHLY    Drug use: No     Medications Prior to Admission   Medication Sig Dispense Refill Last Dose/Taking    ALPRAZolam (XANAX) 0.5 MG tablet Take 1 tablet by mouth At Night As Needed.       amLODIPine  (NORVASC) 10 MG tablet Take 1 tablet by mouth Daily.       B Complex Vitamins (VITAMIN B-COMPLEX PO) Take 1 tablet by mouth Daily.       fluticasone (FLONASE) 50 MCG/ACT nasal spray Administer 1-2 sprays into the nostril(s) as directed by provider Daily. (Patient not taking: Reported on 12/23/2024)       levocetirizine (XYZAL) 5 MG tablet Take 1 tablet by mouth Every Evening.       levothyroxine (SYNTHROID, LEVOTHROID) 100 MCG tablet Take 1 tablet by mouth daily. 30 tablet 6     losartan (COZAAR) 25 MG tablet Take 1 tablet by mouth Daily. 90 tablet 2     montelukast (SINGULAIR) 10 MG tablet Take 1 tablet by mouth Every Night.       predniSONE (DELTASONE) 1 MG tablet Daily.        sodium chloride, 10 mL, Intravenous, Q12H      Allergies:  Diphenhydramine, Statins, Cephalexin, Amlodipine, Aspirin, Latex, Peanut oil, and Shellfish-derived products    Objective      Vital Signs  Temp:  [98 °F (36.7 °C)] 98 °F (36.7 °C)  Heart Rate:  [53-84] 63  Resp:  [14-22] 16  BP: ()/(47-96) 85/58           Physical Exam  Constitutional:       General: He is not in acute distress.     Appearance: Normal appearance. He is normal weight. He is not ill-appearing.   HENT:      Head: Normocephalic and atraumatic.      Nose: Nose normal.   Eyes:      General: No scleral icterus.     Pupils: Pupils are equal, round, and reactive to light.   Cardiovascular:      Heart sounds: Murmur heard.   Pulmonary:      Effort: Pulmonary effort is normal. No respiratory distress.      Breath sounds: Normal breath sounds. No rhonchi.   Abdominal:      General: There is no distension.      Tenderness: There is no guarding.   Neurological:      General: No focal deficit present.      Mental Status: He is alert and oriented to person, place, and time.         Results Review:   Lab Results (last 24 hours)       ** No results found for the last 24 hours. **                Assessment & Plan       Mitral valve insufficiency      Assessment &  Plan    -Mitral valve regurgitation  -Multivessel CAD  -ascending aortic ectasia 4.2cm unchanged per last CT 11/2024  -hyperlipidemia  -renal cell carcinoma s/p partial nephrectomy-- creatinine 1.3-1.5  -polymyalgia rheumatica  -myeloproliferative disorder/polycythemia vera-- follows with hematology  -hypertension  -raynaud's    Dr. Alcantara reviewed REJI and cardiac catheterization and recommends surgical revascularization and mitral valve repair.   His aortic dilatation was stable 4.2cm per his last CT scan in November of 2024, he will review and if any intervention needed with aorta or aortic root he will provide further recommendations.    Will have our office contact for surgical timing.     Thank you for allowing us to participate in the care of this patient.      ALETHA Cavazos  01/08/25  11:01 EST

## 2025-01-09 ENCOUNTER — PREP FOR SURGERY (OUTPATIENT)
Dept: OTHER | Facility: HOSPITAL | Age: 80
End: 2025-01-09
Payer: MEDICARE

## 2025-01-09 ENCOUNTER — TELEPHONE (OUTPATIENT)
Dept: ONCOLOGY | Facility: CLINIC | Age: 80
End: 2025-01-09
Payer: MEDICARE

## 2025-01-09 DIAGNOSIS — R79.9 ABNORMAL FINDING OF BLOOD CHEMISTRY, UNSPECIFIED: ICD-10-CM

## 2025-01-09 DIAGNOSIS — I34.0 MITRAL VALVE INSUFFICIENCY, UNSPECIFIED ETIOLOGY: Primary | ICD-10-CM

## 2025-01-09 DIAGNOSIS — I25.84 CORONARY ATHEROSCLEROSIS DUE TO CALCIFIED CORONARY LESION (CODE): ICD-10-CM

## 2025-01-09 DIAGNOSIS — I11.0 HYPERTENSIVE HEART DISEASE WITH HEART FAILURE: ICD-10-CM

## 2025-01-09 DIAGNOSIS — I79.8 OTHER DISORDERS OF ARTERIES, ARTERIOLES AND CAPILLARIES IN DISEASES CLASSIFIED ELSEWHERE: ICD-10-CM

## 2025-01-09 DIAGNOSIS — R79.1 ABNORMAL COAGULATION PROFILE: ICD-10-CM

## 2025-01-09 RX ORDER — CHLORHEXIDINE GLUCONATE 500 MG/1
1 CLOTH TOPICAL EVERY 12 HOURS PRN
OUTPATIENT
Start: 2025-01-09

## 2025-01-09 RX ORDER — CHLORHEXIDINE GLUCONATE ORAL RINSE 1.2 MG/ML
15 SOLUTION DENTAL ONCE
OUTPATIENT
Start: 2025-01-09 | End: 2025-01-09

## 2025-01-09 RX ORDER — METOPROLOL TARTRATE 25 MG/1
12.5 TABLET, FILM COATED ORAL
OUTPATIENT
Start: 2025-01-10 | End: 2025-01-11

## 2025-01-09 RX ORDER — CHLORHEXIDINE GLUCONATE ORAL RINSE 1.2 MG/ML
15 SOLUTION DENTAL EVERY 12 HOURS
OUTPATIENT
Start: 2025-01-09 | End: 2025-01-10

## 2025-01-09 NOTE — TELEPHONE ENCOUNTER
"Former Damon patient calling. Patient has not been seen by Dr Walter, who is currently out [place] the [place]. Reached out to Scarlet BORRERO who saw patient. As per Scarlet:  \" No contraindication from our perspective.    Thanks    Scarlet    \"  Patient made aware and v/u. Pt is currently scheduled for f/u on 4/16/25 as discussion with Scarlet BORRERO on last office visit. Dr Walter will be updated.    "

## 2025-01-09 NOTE — TELEPHONE ENCOUNTER
"  Caller: Scott Murphy \"MEGAN\"    Relationship: Self    Best call back number: [phone]    What is the best time to reach you: ANY    Who are you requesting to speak with (clinical staff, provider,  specific staff member): CLINICAL     What was the call regarding: MEGAN IS CALLING TO LET DR BAXTER KNOW HE IS HAVING TRIPLE BPAS AND A MITRAL VALVE REPAIR ON 2-10 BY DR LAND      PLEASE ADVISE       "

## 2025-01-13 ENCOUNTER — TELEPHONE (OUTPATIENT)
Dept: CARDIAC SURGERY | Facility: CLINIC | Age: 80
End: 2025-01-13
Payer: MEDICARE

## 2025-01-13 NOTE — TELEPHONE ENCOUNTER
Spoke to patient. PAT is on 2-7-2025 at 0800 with any testing to follow. Surgery is on 2- at 0730, arrival time is 0500. He expressed a verbal understanding of these instructions. Was instructed to call back with any further questions.

## 2025-01-13 NOTE — TELEPHONE ENCOUNTER
"Called patient but no answer. LVM per Dr Walter:     \"Natan Walter MD Guevara, Erin NOBLE RN  Cc: Migel Alcantara MD  Caller: Unspecified (4 days ago, 10:45 AM)  Please inform patient that due to his underlying polycythemia vera (JAK2+), patient is at high risk for both venous & arterial thrombosis. He should avoid long breaks in aspirin and receive adequate DVT prophylaxis naren-operatively.    I am copying  on this message as well.    Thank you \"    Gave call back number.      01/14/25 Patient called back and made aware of Dr Walter's message. He requested that this information be also relayed to Dr Roberts. Coordinating accordingly.  "

## 2025-01-20 NOTE — TELEPHONE ENCOUNTER
"Called patient to update him that we have notified Dr Alcantara and Dr Roberts's office.   \"Thank you!! I have notified our NPs and Dr. Alcantara. They will make sure it is addressed.    PAVEL De La Garza\"  "

## 2025-02-03 RX ORDER — LOSARTAN POTASSIUM 25 MG/1
25 TABLET ORAL DAILY
Qty: 90 TABLET | Refills: 2 | Status: ON HOLD | OUTPATIENT
Start: 2025-02-03

## 2025-02-07 ENCOUNTER — HOSPITAL ENCOUNTER (OUTPATIENT)
Facility: HOSPITAL | Age: 80
Discharge: HOME | End: 2025-02-07
Payer: MEDICARE

## 2025-02-07 ENCOUNTER — ANESTHESIA EVENT (OUTPATIENT)
Dept: PERIOP | Facility: HOSPITAL | Age: 80
End: 2025-02-07
Payer: MEDICARE

## 2025-02-07 ENCOUNTER — HOSPITAL ENCOUNTER (OUTPATIENT)
Dept: GENERAL RADIOLOGY | Facility: HOSPITAL | Age: 80
Discharge: HOME | End: 2025-02-07
Payer: MEDICARE

## 2025-02-07 ENCOUNTER — PRE-ADMISSION TESTING (OUTPATIENT)
Dept: PREADMISSION TESTING | Facility: HOSPITAL | Age: 80
DRG: 219 | End: 2025-02-07
Payer: MEDICARE

## 2025-02-07 VITALS
DIASTOLIC BLOOD PRESSURE: 72 MMHG | WEIGHT: 155 LBS | RESPIRATION RATE: 18 BRPM | HEART RATE: 68 BPM | TEMPERATURE: 96.7 F | HEIGHT: 68 IN | BODY MASS INDEX: 23.49 KG/M2 | OXYGEN SATURATION: 99 % | SYSTOLIC BLOOD PRESSURE: 134 MMHG

## 2025-02-07 DIAGNOSIS — I34.0 MITRAL VALVE INSUFFICIENCY, UNSPECIFIED ETIOLOGY: ICD-10-CM

## 2025-02-07 DIAGNOSIS — I11.0 HYPERTENSIVE HEART DISEASE WITH HEART FAILURE: ICD-10-CM

## 2025-02-07 DIAGNOSIS — I79.8 OTHER DISORDERS OF ARTERIES, ARTERIOLES AND CAPILLARIES IN DISEASES CLASSIFIED ELSEWHERE: ICD-10-CM

## 2025-02-07 DIAGNOSIS — R79.9 ABNORMAL FINDING OF BLOOD CHEMISTRY, UNSPECIFIED: ICD-10-CM

## 2025-02-07 DIAGNOSIS — I25.84 CORONARY ATHEROSCLEROSIS DUE TO CALCIFIED CORONARY LESION (CODE): ICD-10-CM

## 2025-02-07 DIAGNOSIS — R79.1 ABNORMAL COAGULATION PROFILE: ICD-10-CM

## 2025-02-07 LAB
ABO GROUP BLD: NORMAL
ALBUMIN SERPL-MCNC: 4.4 G/DL (ref 3.5–5.2)
ALBUMIN/GLOB SERPL: 1.6 G/DL
ALP SERPL-CCNC: 90 U/L (ref 39–117)
ALT SERPL W P-5'-P-CCNC: 22 U/L (ref 1–41)
ANION GAP SERPL CALCULATED.3IONS-SCNC: 10 MMOL/L (ref 5–15)
APTT PPP: 33.7 SECONDS (ref 22.7–35.4)
ARTERIAL PATENCY WRIST A: ABNORMAL
AST SERPL-CCNC: 28 U/L (ref 1–40)
ATMOSPHERIC PRESS: 756.6 MMHG
BASE EXCESS BLDA CALC-SCNC: 1.8 MMOL/L (ref 0–2)
BASOPHILS # BLD AUTO: 0.06 10*3/MM3 (ref 0–0.2)
BASOPHILS NFR BLD AUTO: 0.7 % (ref 0–1.5)
BDY SITE: ABNORMAL
BH CV XLRA MEAS - DIST GSV CALF DIST LEFT: 0.23 CM
BH CV XLRA MEAS - DIST GSV CALF DIST RIGHT: 0.29 CM
BH CV XLRA MEAS - DIST GSV THIGH DIST LEFT: 0.32 CM
BH CV XLRA MEAS - DIST GSV THIGH DIST RIGHT: 0.23 CM
BH CV XLRA MEAS - DIST LSV CALF DIST LEFT: 0.21 CM
BH CV XLRA MEAS - DIST LSV CALF DIST RIGHT: 0.25 CM
BH CV XLRA MEAS - GSV KNEE DIST LEFT: 0.32 CM
BH CV XLRA MEAS - GSV KNEE DIST RIGHT: 0.24 CM
BH CV XLRA MEAS - GSV ORIGIN DIST LEFT: 0.54 CM
BH CV XLRA MEAS - GSV ORIGIN DIST RIGHT: 0.99 CM
BH CV XLRA MEAS - MID GSV CALF LEFT: 0.26 CM
BH CV XLRA MEAS - MID GSV CALF RIGHT: 0.21 CM
BH CV XLRA MEAS - MID GSV THIGH  LEFT: 0.35 CM
BH CV XLRA MEAS - MID GSV THIGH  RIGHT: 0.3 CM
BH CV XLRA MEAS - MID LSV CALF DIST LEFT: 0.2 CM
BH CV XLRA MEAS - MID LSV CALF DIST RIGHT: 0.26 CM
BH CV XLRA MEAS - PROX GSV CALF DIST LEFT: 0.24 CM
BH CV XLRA MEAS - PROX GSV CALF DIST RIGHT: 0.23 CM
BH CV XLRA MEAS - PROX GSV THIGH  LEFT: 0.5 CM
BH CV XLRA MEAS - PROX GSV THIGH  RIGHT: 0.39 CM
BH CV XLRA MEAS - PROX LSV CALF DIST LEFT: 0.19 CM
BH CV XLRA MEAS - PROX LSV CALF DIST RIGHT: 0.33 CM
BH CV XLRA MEAS LEFT CAROTID BULB EDV: -16.2 CM/SEC
BH CV XLRA MEAS LEFT CAROTID BULB PSV: -61.9 CM/SEC
BH CV XLRA MEAS LEFT DIST CCA EDV: -18.7 CM/SEC
BH CV XLRA MEAS LEFT DIST CCA PSV: -80.6 CM/SEC
BH CV XLRA MEAS LEFT DIST ICA EDV: -36.4 CM/SEC
BH CV XLRA MEAS LEFT DIST ICA PSV: -131.7 CM/SEC
BH CV XLRA MEAS LEFT ICA/CCA RATIO: 1.63
BH CV XLRA MEAS LEFT MID CCA EDV: 28 CM/SEC
BH CV XLRA MEAS LEFT MID CCA PSV: 100.9 CM/SEC
BH CV XLRA MEAS LEFT MID ICA EDV: -25.2 CM/SEC
BH CV XLRA MEAS LEFT MID ICA PSV: -86.2 CM/SEC
BH CV XLRA MEAS LEFT PROX CCA EDV: 18.9 CM/SEC
BH CV XLRA MEAS LEFT PROX CCA PSV: 95.3 CM/SEC
BH CV XLRA MEAS LEFT PROX ECA EDV: -12.7 CM/SEC
BH CV XLRA MEAS LEFT PROX ECA PSV: -68 CM/SEC
BH CV XLRA MEAS LEFT PROX ICA EDV: -16.9 CM/SEC
BH CV XLRA MEAS LEFT PROX ICA PSV: -51.3 CM/SEC
BH CV XLRA MEAS LEFT PROX SCLA PSV: 152.4 CM/SEC
BH CV XLRA MEAS LEFT VERTEBRAL A EDV: -11 CM/SEC
BH CV XLRA MEAS LEFT VERTEBRAL A PSV: -49.6 CM/SEC
BH CV XLRA MEAS RIGHT CAROTID BULB EDV: -18.7 CM/SEC
BH CV XLRA MEAS RIGHT CAROTID BULB PSV: -65.4 CM/SEC
BH CV XLRA MEAS RIGHT DIST CCA EDV: -22.5 CM/SEC
BH CV XLRA MEAS RIGHT DIST CCA PSV: -88.4 CM/SEC
BH CV XLRA MEAS RIGHT DIST ICA EDV: -22.6 CM/SEC
BH CV XLRA MEAS RIGHT DIST ICA PSV: -76.7 CM/SEC
BH CV XLRA MEAS RIGHT ICA/CCA RATIO: 0.9
BH CV XLRA MEAS RIGHT MID CCA EDV: 24.9 CM/SEC
BH CV XLRA MEAS RIGHT MID CCA PSV: 110.6 CM/SEC
BH CV XLRA MEAS RIGHT MID ICA EDV: -21.1 CM/SEC
BH CV XLRA MEAS RIGHT MID ICA PSV: -70.7 CM/SEC
BH CV XLRA MEAS RIGHT PROX CCA EDV: 22.4 CM/SEC
BH CV XLRA MEAS RIGHT PROX CCA PSV: 92.6 CM/SEC
BH CV XLRA MEAS RIGHT PROX ECA EDV: -18.2 CM/SEC
BH CV XLRA MEAS RIGHT PROX ECA PSV: -98.1 CM/SEC
BH CV XLRA MEAS RIGHT PROX ICA EDV: -19.2 CM/SEC
BH CV XLRA MEAS RIGHT PROX ICA PSV: -79.6 CM/SEC
BH CV XLRA MEAS RIGHT PROX SCLA PSV: 153.7 CM/SEC
BH CV XLRA MEAS RIGHT VERTEBRAL A EDV: -22.5 CM/SEC
BH CV XLRA MEAS RIGHT VERTEBRAL A PSV: -80.1 CM/SEC
BILIRUB SERPL-MCNC: 1.3 MG/DL (ref 0–1.2)
BILIRUB UR QL STRIP: NEGATIVE
BLD GP AB SCN SERPL QL: NEGATIVE
BUN SERPL-MCNC: 20 MG/DL (ref 8–23)
BUN/CREAT SERPL: 14.9 (ref 7–25)
CALCIUM SPEC-SCNC: 9.4 MG/DL (ref 8.6–10.5)
CHLORIDE SERPL-SCNC: 104 MMOL/L (ref 98–107)
CHOLEST SERPL-MCNC: 142 MG/DL (ref 0–200)
CLARITY UR: CLEAR
CLOSE TME COLL+ADP + EPINEP PNL BLD: 96 % (ref 86–100)
CO2 BLDA-SCNC: 26.3 MMOL/L (ref 23–27)
CO2 SERPL-SCNC: 26 MMOL/L (ref 22–29)
COLOR UR: ABNORMAL
CREAT SERPL-MCNC: 1.34 MG/DL (ref 0.76–1.27)
DEPRECATED RDW RBC AUTO: 46.7 FL (ref 37–54)
EGFRCR SERPLBLD CKD-EPI 2021: 54.2 ML/MIN/1.73
EOSINOPHIL # BLD AUTO: 0.32 10*3/MM3 (ref 0–0.4)
EOSINOPHIL NFR BLD AUTO: 3.6 % (ref 0.3–6.2)
ERYTHROCYTE [DISTWIDTH] IN BLOOD BY AUTOMATED COUNT: 15.9 % (ref 12.3–15.4)
GLOBULIN UR ELPH-MCNC: 2.7 GM/DL
GLUCOSE SERPL-MCNC: 90 MG/DL (ref 65–99)
GLUCOSE UR STRIP-MCNC: NEGATIVE MG/DL
HBA1C MFR BLD: 5.5 % (ref 4.8–5.6)
HCO3 BLDA-SCNC: 25.2 MMOL/L (ref 22–28)
HCT VFR BLD AUTO: 48.1 % (ref 37.5–51)
HDLC SERPL-MCNC: 57 MG/DL (ref 40–60)
HEMODILUTION: NO
HGB BLD-MCNC: 15.8 G/DL (ref 13–17.7)
HGB UR QL STRIP.AUTO: NEGATIVE
IMM GRANULOCYTES # BLD AUTO: 0.03 10*3/MM3 (ref 0–0.05)
IMM GRANULOCYTES NFR BLD AUTO: 0.3 % (ref 0–0.5)
INR PPP: 1.02 (ref 0.9–1.1)
KETONES UR QL STRIP: NEGATIVE
LDLC SERPL CALC-MCNC: 72 MG/DL (ref 0–100)
LDLC/HDLC SERPL: 1.28 {RATIO}
LEFT ARM BP: NORMAL MMHG
LEUKOCYTE ESTERASE UR QL STRIP.AUTO: NEGATIVE
LYMPHOCYTES # BLD AUTO: 1.59 10*3/MM3 (ref 0.7–3.1)
LYMPHOCYTES NFR BLD AUTO: 18.1 % (ref 19.6–45.3)
MAGNESIUM SERPL-MCNC: 2.4 MG/DL (ref 1.6–2.4)
MCH RBC QN AUTO: 27.6 PG (ref 26.6–33)
MCHC RBC AUTO-ENTMCNC: 32.8 G/DL (ref 31.5–35.7)
MCV RBC AUTO: 84.1 FL (ref 79–97)
MODALITY: ABNORMAL
MONOCYTES # BLD AUTO: 1.03 10*3/MM3 (ref 0.1–0.9)
MONOCYTES NFR BLD AUTO: 11.7 % (ref 5–12)
NEUTROPHILS NFR BLD AUTO: 5.76 10*3/MM3 (ref 1.7–7)
NEUTROPHILS NFR BLD AUTO: 65.6 % (ref 42.7–76)
NITRITE UR QL STRIP: NEGATIVE
NRBC BLD AUTO-RTO: 0 /100 WBC (ref 0–0.2)
NT-PROBNP SERPL-MCNC: 118 PG/ML (ref 0–1800)
PCO2 BLDA: 35.2 MM HG (ref 35–45)
PH BLDA: 7.46 PH UNITS (ref 7.35–7.45)
PH UR STRIP.AUTO: 6 [PH] (ref 5–8)
PLATELET # BLD AUTO: 226 10*3/MM3 (ref 140–450)
PMV BLD AUTO: 9.1 FL (ref 6–12)
PO2 BLDA: 112.9 MM HG (ref 80–100)
POTASSIUM SERPL-SCNC: 4.6 MMOL/L (ref 3.5–5.2)
PROT SERPL-MCNC: 7.1 G/DL (ref 6–8.5)
PROT UR QL STRIP: NEGATIVE
PROTHROMBIN TIME: 13.3 SECONDS (ref 11.7–14.2)
QT INTERVAL: 418 MS
QTC INTERVAL: 425 MS
RBC # BLD AUTO: 5.72 10*6/MM3 (ref 4.14–5.8)
RH BLD: POSITIVE
RIGHT ARM BP: NORMAL MMHG
SAO2 % BLDCOA: 98.7 % (ref 92–98.5)
SODIUM SERPL-SCNC: 140 MMOL/L (ref 136–145)
SP GR UR STRIP: 1.02 (ref 1–1.03)
T&S EXPIRATION DATE: NORMAL
TOTAL RATE: 18 BREATHS/MINUTE
TRIGL SERPL-MCNC: 61 MG/DL (ref 0–150)
UROBILINOGEN UR QL STRIP: ABNORMAL
VLDLC SERPL-MCNC: 13 MG/DL (ref 5–40)
WBC NRBC COR # BLD AUTO: 8.79 10*3/MM3 (ref 3.4–10.8)

## 2025-02-07 PROCEDURE — 93880 EXTRACRANIAL BILAT STUDY: CPT

## 2025-02-07 PROCEDURE — 86920 COMPATIBILITY TEST SPIN: CPT

## 2025-02-07 PROCEDURE — 80061 LIPID PANEL: CPT

## 2025-02-07 PROCEDURE — 83880 ASSAY OF NATRIURETIC PEPTIDE: CPT

## 2025-02-07 PROCEDURE — 86900 BLOOD TYPING SEROLOGIC ABO: CPT

## 2025-02-07 PROCEDURE — 93970 EXTREMITY STUDY: CPT

## 2025-02-07 PROCEDURE — 85025 COMPLETE CBC W/AUTO DIFF WBC: CPT

## 2025-02-07 PROCEDURE — 85730 THROMBOPLASTIN TIME PARTIAL: CPT

## 2025-02-07 PROCEDURE — 85576 BLOOD PLATELET AGGREGATION: CPT

## 2025-02-07 PROCEDURE — 86850 RBC ANTIBODY SCREEN: CPT

## 2025-02-07 PROCEDURE — 36600 WITHDRAWAL OF ARTERIAL BLOOD: CPT | Performed by: THORACIC SURGERY (CARDIOTHORACIC VASCULAR SURGERY)

## 2025-02-07 PROCEDURE — 80053 COMPREHEN METABOLIC PANEL: CPT

## 2025-02-07 PROCEDURE — 83735 ASSAY OF MAGNESIUM: CPT

## 2025-02-07 PROCEDURE — 82803 BLOOD GASES ANY COMBINATION: CPT | Performed by: THORACIC SURGERY (CARDIOTHORACIC VASCULAR SURGERY)

## 2025-02-07 PROCEDURE — 93005 ELECTROCARDIOGRAM TRACING: CPT

## 2025-02-07 PROCEDURE — 36415 COLL VENOUS BLD VENIPUNCTURE: CPT

## 2025-02-07 PROCEDURE — 81003 URINALYSIS AUTO W/O SCOPE: CPT

## 2025-02-07 PROCEDURE — 93010 ELECTROCARDIOGRAM REPORT: CPT | Performed by: INTERNAL MEDICINE

## 2025-02-07 PROCEDURE — S0260 H&P FOR SURGERY: HCPCS | Performed by: THORACIC SURGERY (CARDIOTHORACIC VASCULAR SURGERY)

## 2025-02-07 PROCEDURE — 83036 HEMOGLOBIN GLYCOSYLATED A1C: CPT

## 2025-02-07 PROCEDURE — 86901 BLOOD TYPING SEROLOGIC RH(D): CPT

## 2025-02-07 PROCEDURE — 85610 PROTHROMBIN TIME: CPT

## 2025-02-07 PROCEDURE — 71046 X-RAY EXAM CHEST 2 VIEWS: CPT

## 2025-02-07 RX ORDER — CHLORHEXIDINE GLUCONATE ORAL RINSE 1.2 MG/ML
15 SOLUTION DENTAL EVERY 12 HOURS
Status: DISPENSED | OUTPATIENT
Start: 2025-02-07 | End: 2025-02-08

## 2025-02-07 RX ORDER — ZOLPIDEM TARTRATE 10 MG/1
1 TABLET ORAL NIGHTLY PRN
COMMUNITY
Start: 2024-10-28

## 2025-02-07 NOTE — DISCHARGE INSTRUCTIONS
Take the following medications the morning of surgery with a small sip of water:    As directed per cardiac nurse    If you are on prescription narcotic pain medication to control your pain you may also take that medication the morning of surgery.    General Instructions:  Do not eat or drink anything after midnight the night before surgery.  Infants may have breast milk up to four hours before surgery.  Infants drinking formula may drink formula up to six hours before surgery.   Patients who avoid smoking, chewing tobacco and alcohol for 4 weeks prior to surgery have a reduced risk of post-operative complications.  Quit smoking as many days before surgery as you can.  Do not smoke, use chewing tobacco or drink alcohol the day of surgery.   If applicable bring your C-PAP/ BI-PAP machine in with you to preop day of surgery.  Bring any papers given to you in the doctor’s office.  Wear clean comfortable clothes.  Do not wear contact lenses, false eyelashes or make-up.  Bring a case for your glasses.   Bring crutches or walker if applicable.  Remove all piercings.  Leave jewelry and any other valuables at home.  Hair extensions with metal clips must be removed prior to surgery.  The Pre-Admission Testing nurse will instruct you to bring medications if unable to obtain an accurate list in Pre-Admission Testing.        If you were given a blood bank ID arm band remember to bring it with you the day of surgery.    Preventing a Surgical Site Infection:  For 2 to 3 days before surgery, avoid shaving with a razor because the razor can irritate skin and make it easier to develop an infection.    Any areas of open skin can increase the risk of a post-operative wound infection by allowing bacteria to enter and travel throughout the body.  Notify your surgeon if you have any skin wounds / rashes even if it is not near the expected surgical site.  The area will need assessed to determine if surgery should be delayed until it is  healed.  The night prior to surgery shower using a fresh bar of anti-bacterial soap (such as Dial) and clean washcloth.  Sleep in a clean bed with clean clothing.  Do not allow pets to sleep with you.  Shower on the morning of surgery using a fresh bar of anti-bacterial soap (such as Dial) and clean washcloth.  Dry with a clean towel and dress in clean clothing.  Ask your surgeon if you will be receiving antibiotics prior to surgery.  Make sure you, your family, and all healthcare providers clean their hands with soap and water or an alcohol based hand  before caring for you or your wound.    Day of surgery:  Your arrival time is approximately two hours before your scheduled surgery time.  Please note if you have an early arrival time the surgery doors do not open before 5:00 AM.  Upon arrival, a Pre-op nurse and Anesthesiologist will review your health history, obtain vital signs, and answer questions you may have.  The only belongings needed at this time will be your home medications and if applicable your C-PAP/BI-PAP machine.  A Pre-op nurse will start an IV and you may receive medication in preparation for surgery, including something to help you relax.      Please be aware that surgery does come with discomfort.  We want to make every effort to control your discomfort so please discuss any uncontrolled symptoms with your nurse.   Your doctor will most likely have prescribed pain medications.      If you are going home after surgery you will receive individualized written care instructions before being discharged.  A responsible adult must drive you to and from the hospital on the day of your surgery and ideally stay with you through the night.  Discharge prescriptions can be filled by the hospital pharmacy during regular pharmacy hours.  If you are having surgery late in the day/evening your prescription may be e-prescribed to your pharmacy.  Please verify your pharmacy hours or chose a 24 hour  pharmacy to avoid not having access to your prescription because your pharmacy has closed for the day.    If you are staying overnight following surgery, you will be transported to your hospital room following the recovery period.  [facility] has all private rooms.    If you have any questions please call Pre-Admission Testing at [phone].  Deductibles and co-payments are collected on the day of service. Please be prepared to pay the required co-pay, deductible or deposit on the day of service as defined by your plan.    Call your surgeon immediately if you experience any of the following symptoms:  Sore Throat  Shortness of Breath or difficulty breathing  Cough  Chills  Body soreness or muscle pain  Headache  Fever  New loss of taste or smell  Do not arrive for your surgery ill.  Your procedure will need to be rescheduled to another time.  You will need to call your physician before the day of surgery to avoid any unnecessary exposure to hospital staff as well as other patients.  CHLORHEXIDINE CLOTH INSTRUCTIONS  The morning of surgery follow these instructions using the Chlorhexidine cloths you've been given.  These steps reduce bacteria on the body.  Do not use the cloths near your eyes, ears mouth, genitalia or on open wounds.  Throw the cloths away after use but do not try to flush them down a toilet.      Open and remove one cloth at a time from the package.    Leave the cloth unfolded and begin the bathing.  Massage the skin with the cloths using gentle pressure to remove bacteria.  Do not scrub harshly.   Follow the steps below with one 2% CHG cloth per area (6 total cloths).  One cloth for neck, shoulders and chest.  One cloth for both arms, hands, fingers and underarms (do underarms last).  One cloth for the abdomen followed by groin.  One cloth for right leg and foot including between the toes.  One cloth for left leg and foot including between the toes.  The last cloth is to be used  for the back of the neck, back and buttocks.    Allow the CHG to air dry 3 minutes on the skin which will give it time to work and decrease the chance of irritation.  The skin may feel sticky until it is dry.  Do not rinse with water or any other liquid or you will lose the beneficial effects of the CHG.  If mild skin irritation occurs, do rinse the skin to remove the CHG.  Report this to the nurse at time of admission.  Do not apply lotions, creams, ointments, deodorants or perfumes after using the clothes. Dress in clean clothes before coming to the hospital.    BACTROBAN NASAL OINTMENT  There are many germs normally in your nose. Bactroban is an ointment that will help reduce these germs. Please follow these instructions for Bactroban use:    ____The day before surgery in the evening              Date________    ____The day of surgery in the morning    Date________    **Squirt ½ package of Bactroban Ointment onto a cotton applicator and apply to inside of 1st nostril.  Squirt the remaining Bactroban and apply to the inside of the other nostril.    PERIDEX- ORAL:  Use only if your surgeon has ordered  Use the night before and morning of surgery - Swish, gargle, and spit - do not swallow.

## 2025-02-07 NOTE — ANESTHESIA PREPROCEDURE EVALUATION
Anesthesia Evaluation     no history of anesthetic complications:                Airway   Mallampati: II  TM distance: >3 FB  Dental    (+) partials    Pulmonary    (+) asthma,shortness of breath  (-) recent URI, sleep apnea, lung cancer    ROS comment: Negative patient screen for LASHAY    Cardiovascular     (+) hypertension, valvular problems/murmurs MR, CAD  (-) dysrhythmias    ROS comment: Echo    ·  The mitral valve leaflets are thickened and myxomatous. There is a torn cord attached to the posterior mitral valve leaflet. The posterior leaflet is partially flail and severely prolapses.  ·  There is severe eccentric and anteriorly directed mitral regurgitation. There is flow reversal in at least 1 pulmonary vein  ·  Left ventricular systolic function is normal. Left ventricular ejection fraction appears to be 61 - 65%.  ·  Left ventricular wall thickness is consistent with mild concentric hypertrophy.  ·  Left ventricular diastolic function is consistent with (grade I) impaired relaxation.  ·  Normal right ventricular cavity size and systolic function noted.  ·  No evidence of a patent foramen ovale. Saline test results are negative.  ·  Mild tricuspid valve regurgitation is present  ·  Calculated right ventricular systolic pressure from tricuspid regurgitation is 33 mmHg.  ·  The aortic root is moderately dilated at 4.5 cm. Ascending aorta is mildly dilated at 3.8 cm.  ·  There is no evidence of pericardial effusion.      Neuro/Psych  GI/Hepatic/Renal/Endo    (+) renal disease-, thyroid problem hypothyroidism    ROS Comment: 2015 partial nephrectomy for CA    Musculoskeletal     Abdominal    Substance History      OB/GYN          Other   arthritis, autoimmune disease ,   history of cancer      Other Comment: PMR  Raynauds                    Anesthesia Plan    ASA 3     general, [place] and PAC     intravenous induction     Anesthetic plan, risks, benefits, and alternatives have been provided, discussed and informed  FATHER CALLED TO INQUIRE APPT TIME FOR GRABIEL.   consent has been obtained with: patient.    Use of blood products discussed with patient  Consented to blood products.      CODE STATUS:

## 2025-02-08 NOTE — H&P
ADDENDUM:        Signed       Expand All Collapse All[]Expand All by Default    [facility] Cardiac Surgery        Patient Care Team:  Mariah Delgado MD as PCP - General (Family Medicine)  Consuelo Adorno MD as Consulting Physician (Hematology and Oncology)  John Damon MD as Consulting Physician (Hematology and Oncology)  Mariah Delgado MD as Referring Physician (Family Medicine)     Chief complaint  Mitral valve regurgitation        Subjective  History of Present Illness  Patient is a 79 y.o. male who is well known to out service as he follows in our aneurysm clinic, I am meeting him for the first time.  He follows with Dr. Roberts and presented for follow up.  A murmur was noted which was new.  He underwent an echo which revealed mitral valve Prolase with severe mitral regurgitation. has a past medical history of known aortic dilatation ascending aorta 4.2cm (last CT scan 11/2024), hyperlipidemia, hx of renal cell carcinoma s/p partial nephrectomy, hypertension, myeloproliferative disorder, hypothyroidism, polymyalgia rheumatica and recent hip replacement.  He presented today for cardiac catheterization which revealed severe multivessel CAD.  We were consulted for cardiac surgery evaluation.     Review of Systems   Constitutional:  Positive for activity change and fatigue.   Respiratory:  Positive for shortness of breath.    All other systems reviewed and are negative.        Medical History        Past Medical History:   Diagnosis Date    Acquired hypothyroidism      Anxiety      Aortic aneurysm      Arthritis      Asthma      Baker's cyst of knee, right      Disease of thyroid gland      Gout      H/O Muscle pain       Right shoulder and neck area    H/O Radiation treatment       For tonsillar inflammation and infection when he was a child and this led him to develop thyroid cancer.    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 Ganesh Lopez    Left shoulder pain      Myeloproliferative disorder       With polycythemia vera, JAK2 mutation positive requiring periodic phlebotomies (hematocrit above 47).    Polymyalgia rheumatica      Premature atrial contraction      Prostate cancer      Raynaud disease      Shoulder injury, left, sequela           Surgical History         Past Surgical History:   Procedure Laterality Date    CIRCUMCISION N/A 12/17/2018     Procedure: CIRCUMCISION;  Surgeon: Gallo Lopez MD;  Location: [facility];  Service: Urology    COLONOSCOPY   2010     Documented a tubulovillous adenom that was removed completely. Colonoscopy in 2014 was unremarkable.    COLONOSCOPY N/A 2/20/2020     Procedure: COLONOSCOPY;  Surgeon: Claire Galo MD;  Location: [facility];  Service: Gastroenterology;  Laterality: N/A;  diverticulosis    HERNIA REPAIR         H/O multiple hernia repairs including right inguinal hernia repair in 1981, 2003, and double hernia repair in 2013    KIDNEY SURGERY        PROSTATE BIOPSY         Showed features consistent with prostate cancer    SHOULDER SURGERY   1995     Shoulder repair    THYROIDECTOMY, PARTIAL   1983     For malignant tumor    TOOTH EXTRACTION                   Family History   Problem Relation Age of Onset    No Known Problems Mother      Heart attack Father      Heart disease Father      Hypertension Father      Diabetes Father      Tuberculosis Maternal Grandmother      No Known Problems Son      Drug abuse Son      Malig Hyperthermia Neg Hx        Social History   Social History            Tobacco Use    Smoking status: Never    Smokeless tobacco: Never    Tobacco comments:       caffeine use    Vaping Use    Vaping status: Never Used   Substance Use Topics    Alcohol use: Yes       Alcohol/week: 1.0 - 3.0 standard drink of alcohol       Types: 1 - 3 Glasses of wine  per week       Comment: 1 MONTHLY    Drug use: No         Prescriptions Prior to Admission           Medications Prior to Admission   Medication Sig Dispense Refill Last Dose/Taking    ALPRAZolam (XANAX) 0.5 MG tablet Take 1 tablet by mouth At Night As Needed.          amLODIPine (NORVASC) 10 MG tablet Take 1 tablet by mouth Daily.          B Complex Vitamins (VITAMIN B-COMPLEX PO) Take 1 tablet by mouth Daily.          fluticasone (FLONASE) 50 MCG/ACT nasal spray Administer 1-2 sprays into the nostril(s) as directed by provider Daily. (Patient not taking: Reported on 12/23/2024)          levocetirizine (XYZAL) 5 MG tablet Take 1 tablet by mouth Every Evening.          levothyroxine (SYNTHROID, LEVOTHROID) 100 MCG tablet Take 1 tablet by mouth daily. 30 tablet 6      losartan (COZAAR) 25 MG tablet Take 1 tablet by mouth Daily. 90 tablet 2      montelukast (SINGULAIR) 10 MG tablet Take 1 tablet by mouth Every Night.          predniSONE (DELTASONE) 1 MG tablet Daily.                 Scheduled Medication   sodium chloride, 10 mL, Intravenous, Q12H         Allergies:  Diphenhydramine, Statins, Cephalexin, Amlodipine, Aspirin, Latex, Peanut oil, and Shellfish-derived products        Objective  Vital Signs  Temp:  [98 °F (36.7 °C)] 98 °F (36.7 °C)  Heart Rate:  [53-84] 63  Resp:  [14-22] 16  BP: ()/(47-96) 85/58           Physical Exam  Constitutional:       General: He is not in acute distress.     Appearance: Normal appearance. He is normal weight. He is not ill-appearing.   HENT:      Head: Normocephalic and atraumatic.      Nose: Nose normal.   Eyes:      General: No scleral icterus.     Pupils: Pupils are equal, round, and reactive to light.   Cardiovascular:      Heart sounds: Murmur heard.   Pulmonary:      Effort: Pulmonary effort is normal. No respiratory distress.      Breath sounds: Normal breath sounds. No rhonchi.   Abdominal:      General: There is no distension.      Tenderness: There is no guarding.    Neurological:      General: No focal deficit present.      Mental Status: He is alert and oriented to person, place, and time.            Results Review:   Lab Results (last 24 hours)         ** No results found for the last 24 hours. **                            Assessment & Plan    Mitral valve insufficiency        Assessment & Plan     -Mitral valve regurgitation  -Multivessel CAD  -ascending aortic ectasia 4.2cm unchanged per last CT 11/2024  -hyperlipidemia  -renal cell carcinoma s/p partial nephrectomy-- creatinine 1.3-1.5  -polymyalgia rheumatica  -myeloproliferative disorder/polycythemia vera-- follows with hematology  -hypertension  -raynaud's     Dr. Alcantara reviewed REJI and cardiac catheterization and recommends surgical revascularization and mitral valve repair.   His aortic dilatation was stable 4.2cm per his last CT scan in November of 2024, he will review and if any intervention needed with aorta or aortic root he will provide further recommendations.    Will have our office contact for surgical timing.      Thank you for allowing us to participate in the care of this patient.       ALETHA Cavazos  01/08/25  11:01 EST                                ADDENDUM:     Patient seen and examined in PAT today in preparation for upcoming open heart surgery.  He denies any changes to above H&P.  Denies any changes to home medications.  He has never smoked and rarely drinks alcohol.  Denies any dental issues.  Denies any recent fever or illness.    Plan for CABG/mitral valve repair versus replacement/possible aortic root replacement on 2/10/25 with Dr. Alcantara.  Labs and diagnostics reviewed.  Vein mapping showed adequate conduit noted bilaterally.  Carotid duplex showed less than 50% stenosis bilaterally.  Serum labs were unremarkable.  Chest x-ray with no acute findings.  Patient was instructed not to eat or drink anything after midnight the night before surgery or take any medications the morning of  surgery.  I spent a significant amount of time discussing the expected postoperative course and addressing all questions and concerns the patient had.    Electronically signed by ALETHA Patel, 02/07/25, 7:22 PM EST.

## 2025-02-10 ENCOUNTER — APPOINTMENT (OUTPATIENT)
Dept: GENERAL RADIOLOGY | Facility: HOSPITAL | Age: 80
DRG: 219 | End: 2025-02-10
Payer: MEDICARE

## 2025-02-10 ENCOUNTER — ANCILLARY PROCEDURE (OUTPATIENT)
Dept: PERIOP | Facility: HOSPITAL | Age: 80
DRG: 219 | End: 2025-02-10
Payer: MEDICARE

## 2025-02-10 ENCOUNTER — HOSPITAL ENCOUNTER (INPATIENT)
Facility: HOSPITAL | Age: 80
LOS: 7 days | Discharge: HOME HEALTH | DRG: 219 | End: 2025-02-17
Attending: THORACIC SURGERY (CARDIOTHORACIC VASCULAR SURGERY) | Admitting: THORACIC SURGERY (CARDIOTHORACIC VASCULAR SURGERY)
Payer: MEDICARE

## 2025-02-10 ENCOUNTER — ANESTHESIA (OUTPATIENT)
Dept: PERIOP | Facility: HOSPITAL | Age: 80
End: 2025-02-10
Payer: MEDICARE

## 2025-02-10 DIAGNOSIS — R09.02 HYPOXIA: ICD-10-CM

## 2025-02-10 DIAGNOSIS — Z95.1 S/P CABG (CORONARY ARTERY BYPASS GRAFT): Primary | ICD-10-CM

## 2025-02-10 DIAGNOSIS — I34.0 MITRAL VALVE INSUFFICIENCY, UNSPECIFIED ETIOLOGY: ICD-10-CM

## 2025-02-10 DIAGNOSIS — Z98.890 POSTOPERATIVE HYPOXIA: ICD-10-CM

## 2025-02-10 DIAGNOSIS — Z98.890 S/P MVR (MITRAL VALVE REPAIR): ICD-10-CM

## 2025-02-10 DIAGNOSIS — R09.02 POSTOPERATIVE HYPOXIA: ICD-10-CM

## 2025-02-10 PROBLEM — I35.0 AORTIC STENOSIS: Status: ACTIVE | Noted: 2025-02-10

## 2025-02-10 LAB
ACT BLD: 112 SECONDS (ref 82–152)
ACT BLD: 124 SECONDS (ref 82–152)
ACT BLD: 377 SECONDS (ref 82–152)
ACT BLD: 383 SECONDS (ref 82–152)
ACT BLD: 446 SECONDS (ref 82–152)
ACT BLD: 487 SECONDS (ref 82–152)
ACT BLD: 498 SECONDS (ref 82–152)
ALBUMIN SERPL-MCNC: 3.1 G/DL (ref 3.5–5.2)
ALBUMIN SERPL-MCNC: 3.8 G/DL (ref 3.5–5.2)
ANION GAP SERPL CALCULATED.3IONS-SCNC: 13.5 MMOL/L (ref 5–15)
ANION GAP SERPL CALCULATED.3IONS-SCNC: 15.7 MMOL/L (ref 5–15)
APTT PPP: 30.4 SECONDS (ref 22.7–35.4)
APTT PPP: 32.3 SECONDS (ref 22.7–35.4)
ARTERIAL PATENCY WRIST A: ABNORMAL
ATMOSPHERIC PRESS: 758.6 MMHG
ATMOSPHERIC PRESS: 759.7 MMHG
ATMOSPHERIC PRESS: 760.1 MMHG
ATMOSPHERIC PRESS: 761.1 MMHG
B-OH-BUTYR SERPL-SCNC: 1.92 MMOL/L (ref 0.02–0.27)
BASE EXCESS BLDA CALC-SCNC: -3 MMOL/L (ref -5–5)
BASE EXCESS BLDA CALC-SCNC: -4 MMOL/L (ref -5–5)
BASE EXCESS BLDA CALC-SCNC: -4 MMOL/L (ref 0–2)
BASE EXCESS BLDA CALC-SCNC: -4.7 MMOL/L (ref 0–2)
BASE EXCESS BLDA CALC-SCNC: -5 MMOL/L (ref -5–5)
BASE EXCESS BLDA CALC-SCNC: -5 MMOL/L (ref -5–5)
BASE EXCESS BLDA CALC-SCNC: -6 MMOL/L (ref 0–2)
BASE EXCESS BLDA CALC-SCNC: -7.1 MMOL/L (ref 0–2)
BASE EXCESS BLDA CALC-SCNC: 0 MMOL/L (ref -5–5)
BASOPHILS # BLD AUTO: 0.05 10*3/MM3 (ref 0–0.2)
BASOPHILS NFR BLD AUTO: 0.4 % (ref 0–1.5)
BDY SITE: ABNORMAL
BUN SERPL-MCNC: 18 MG/DL (ref 8–23)
BUN SERPL-MCNC: 18 MG/DL (ref 8–23)
BUN/CREAT SERPL: 13.1 (ref 7–25)
BUN/CREAT SERPL: 13.5 (ref 7–25)
CA-I SERPL ISE-MCNC: 1.3 MMOL/L (ref 1.15–1.35)
CALCIUM SPEC-SCNC: 8.9 MG/DL (ref 8.6–10.5)
CALCIUM SPEC-SCNC: 9.6 MG/DL (ref 8.6–10.5)
CHLORIDE SERPL-SCNC: 109 MMOL/L (ref 98–107)
CHLORIDE SERPL-SCNC: 110 MMOL/L (ref 98–107)
CO2 BLDA-SCNC: 19.2 MMOL/L (ref 23–27)
CO2 BLDA-SCNC: 19.3 MMOL/L (ref 23–27)
CO2 BLDA-SCNC: 19.3 MMOL/L (ref 23–27)
CO2 BLDA-SCNC: 21 MMOL/L (ref 24–29)
CO2 BLDA-SCNC: 22 MMOL/L (ref 23–27)
CO2 BLDA-SCNC: 22 MMOL/L (ref 24–29)
CO2 BLDA-SCNC: 22 MMOL/L (ref 24–29)
CO2 BLDA-SCNC: 24 MMOL/L (ref 24–29)
CO2 BLDA-SCNC: 24 MMOL/L (ref 24–29)
CO2 BLDA-SCNC: 25 MMOL/L (ref 24–29)
CO2 BLDA-SCNC: 28 MMOL/L (ref 24–29)
CO2 SERPL-SCNC: 17.5 MMOL/L (ref 22–29)
CO2 SERPL-SCNC: 19.3 MMOL/L (ref 22–29)
CREAT SERPL-MCNC: 1.33 MG/DL (ref 0.76–1.27)
CREAT SERPL-MCNC: 1.37 MG/DL (ref 0.76–1.27)
DEPRECATED RDW RBC AUTO: 45.9 FL (ref 37–54)
DEPRECATED RDW RBC AUTO: 46.1 FL (ref 37–54)
DEPRECATED RDW RBC AUTO: 47.5 FL (ref 37–54)
EGFRCR SERPLBLD CKD-EPI 2021: 52.8 ML/MIN/1.73
EGFRCR SERPLBLD CKD-EPI 2021: 54.7 ML/MIN/1.73
EOSINOPHIL # BLD AUTO: 0.13 10*3/MM3 (ref 0–0.4)
EOSINOPHIL NFR BLD AUTO: 1 % (ref 0.3–6.2)
ERYTHROCYTE [DISTWIDTH] IN BLOOD BY AUTOMATED COUNT: 15.3 % (ref 12.3–15.4)
ERYTHROCYTE [DISTWIDTH] IN BLOOD BY AUTOMATED COUNT: 15.5 % (ref 12.3–15.4)
ERYTHROCYTE [DISTWIDTH] IN BLOOD BY AUTOMATED COUNT: 15.7 % (ref 12.3–15.4)
FIBRINOGEN PPP-MCNC: 300 MG/DL (ref 219–464)
FIBRINOGEN PPP-MCNC: 318 MG/DL (ref 219–464)
FIBRINOGEN PPP-MCNC: 336 MG/DL (ref 219–464)
GLUCOSE BLDC GLUCOMTR-MCNC: 103 MG/DL (ref 70–130)
GLUCOSE BLDC GLUCOMTR-MCNC: 103 MG/DL (ref 70–130)
GLUCOSE BLDC GLUCOMTR-MCNC: 106 MG/DL (ref 70–130)
GLUCOSE BLDC GLUCOMTR-MCNC: 110 MG/DL (ref 70–130)
GLUCOSE BLDC GLUCOMTR-MCNC: 112 MG/DL (ref 70–130)
GLUCOSE BLDC GLUCOMTR-MCNC: 113 MG/DL (ref 70–130)
GLUCOSE BLDC GLUCOMTR-MCNC: 146 MG/DL (ref 70–130)
GLUCOSE BLDC GLUCOMTR-MCNC: 195 MG/DL (ref 70–130)
GLUCOSE BLDC GLUCOMTR-MCNC: 215 MG/DL (ref 70–130)
GLUCOSE BLDC GLUCOMTR-MCNC: 218 MG/DL (ref 70–130)
GLUCOSE BLDC GLUCOMTR-MCNC: 83 MG/DL (ref 70–130)
GLUCOSE BLDC GLUCOMTR-MCNC: 98 MG/DL (ref 70–130)
GLUCOSE BLDC GLUCOMTR-MCNC: 98 MG/DL (ref 70–130)
GLUCOSE BLDC GLUCOMTR-MCNC: 99 MG/DL (ref 70–130)
GLUCOSE SERPL-MCNC: 112 MG/DL (ref 65–99)
GLUCOSE SERPL-MCNC: 119 MG/DL (ref 65–99)
HCO3 BLDA-SCNC: 18.2 MMOL/L (ref 22–28)
HCO3 BLDA-SCNC: 18.3 MMOL/L (ref 22–28)
HCO3 BLDA-SCNC: 18.5 MMOL/L (ref 22–28)
HCO3 BLDA-SCNC: 20.2 MMOL/L (ref 22–26)
HCO3 BLDA-SCNC: 20.9 MMOL/L (ref 22–28)
HCO3 BLDA-SCNC: 21 MMOL/L (ref 22–26)
HCO3 BLDA-SCNC: 21.2 MMOL/L (ref 22–26)
HCO3 BLDA-SCNC: 22.8 MMOL/L (ref 22–26)
HCO3 BLDA-SCNC: 22.9 MMOL/L (ref 22–26)
HCO3 BLDA-SCNC: 23.7 MMOL/L (ref 22–26)
HCO3 BLDA-SCNC: 26 MMOL/L (ref 22–26)
HCT VFR BLD AUTO: 30 % (ref 37.5–51)
HCT VFR BLD AUTO: 31.4 % (ref 37.5–51)
HCT VFR BLD AUTO: 31.8 % (ref 37.5–51)
HCT VFR BLDA CALC: 28 % (ref 38–51)
HCT VFR BLDA CALC: 31 % (ref 38–51)
HCT VFR BLDA CALC: 33 % (ref 38–51)
HCT VFR BLDA CALC: 34 % (ref 38–51)
HCT VFR BLDA CALC: 41 % (ref 38–51)
HEMODILUTION: NO
HGB BLD-MCNC: 10.3 G/DL (ref 13–17.7)
HGB BLD-MCNC: 10.5 G/DL (ref 13–17.7)
HGB BLD-MCNC: 9.8 G/DL (ref 13–17.7)
HGB BLDA-MCNC: 10.5 G/DL (ref 12–17)
HGB BLDA-MCNC: 11.2 G/DL (ref 12–17)
HGB BLDA-MCNC: 11.6 G/DL (ref 12–17)
HGB BLDA-MCNC: 13.9 G/DL (ref 12–17)
HGB BLDA-MCNC: 9.5 G/DL (ref 12–17)
IMM GRANULOCYTES # BLD AUTO: 0.08 10*3/MM3 (ref 0–0.05)
IMM GRANULOCYTES NFR BLD AUTO: 0.6 % (ref 0–0.5)
INHALED O2 CONCENTRATION: 100 %
INHALED O2 CONCENTRATION: 40 %
INR PPP: 1.51 (ref 0.9–1.1)
INR PPP: 1.72 (ref 0.9–1.1)
LYMPHOCYTES # BLD AUTO: 0.61 10*3/MM3 (ref 0.7–3.1)
LYMPHOCYTES NFR BLD AUTO: 4.5 % (ref 19.6–45.3)
MAGNESIUM SERPL-MCNC: 2.9 MG/DL (ref 1.6–2.4)
MAGNESIUM SERPL-MCNC: 3 MG/DL (ref 1.6–2.4)
MCH RBC QN AUTO: 27.2 PG (ref 26.6–33)
MCH RBC QN AUTO: 27.5 PG (ref 26.6–33)
MCH RBC QN AUTO: 27.7 PG (ref 26.6–33)
MCHC RBC AUTO-ENTMCNC: 32.7 G/DL (ref 31.5–35.7)
MCHC RBC AUTO-ENTMCNC: 32.8 G/DL (ref 31.5–35.7)
MCHC RBC AUTO-ENTMCNC: 33 G/DL (ref 31.5–35.7)
MCV RBC AUTO: 83.1 FL (ref 79–97)
MCV RBC AUTO: 83.9 FL (ref 79–97)
MCV RBC AUTO: 84 FL (ref 79–97)
MODALITY: ABNORMAL
MONOCYTES # BLD AUTO: 0.15 10*3/MM3 (ref 0.1–0.9)
MONOCYTES NFR BLD AUTO: 1.1 % (ref 5–12)
NEUTROPHILS NFR BLD AUTO: 12.5 10*3/MM3 (ref 1.7–7)
NEUTROPHILS NFR BLD AUTO: 92.4 % (ref 42.7–76)
NRBC BLD AUTO-RTO: 0 /100 WBC (ref 0–0.2)
O2 A-A PPRESDIFF RESPIRATORY: 0.5 MMHG
O2 A-A PPRESDIFF RESPIRATORY: 0.7 MMHG
PCO2 BLDA: 27.5 MM HG (ref 35–45)
PCO2 BLDA: 31.5 MM HG (ref 35–45)
PCO2 BLDA: 34.8 MM HG (ref 35–45)
PCO2 BLDA: 35.5 MM HG (ref 35–45)
PCO2 BLDA: 36.4 MM HG (ref 35–45)
PCO2 BLDA: 38.3 MM HG (ref 35–45)
PCO2 BLDA: 38.6 MM HG (ref 35–45)
PCO2 BLDA: 47.9 MM HG (ref 35–45)
PCO2 BLDA: 48.3 MM HG (ref 35–45)
PCO2 BLDA: 49.7 MM HG (ref 35–45)
PCO2 BLDA: 51 MM HG (ref 35–45)
PEEP RESPIRATORY: 5 CM[H2O]
PH BLDA: 7.3 PH UNITS (ref 7.35–7.6)
PH BLDA: 7.31 PH UNITS (ref 7.35–7.6)
PH BLDA: 7.31 PH UNITS (ref 7.35–7.6)
PH BLDA: 7.33 PH UNITS (ref 7.35–7.45)
PH BLDA: 7.34 PH UNITS (ref 7.35–7.6)
PH BLDA: 7.36 PH UNITS (ref 7.35–7.6)
PH BLDA: 7.37 PH UNITS (ref 7.35–7.45)
PH BLDA: 7.37 PH UNITS (ref 7.35–7.45)
PH BLDA: 7.37 PH UNITS (ref 7.35–7.6)
PH BLDA: 7.37 PH UNITS (ref 7.35–7.6)
PH BLDA: 7.43 PH UNITS (ref 7.35–7.45)
PHOSPHATE SERPL-MCNC: 2.9 MG/DL (ref 2.5–4.5)
PHOSPHATE SERPL-MCNC: 3.7 MG/DL (ref 2.5–4.5)
PLATELET # BLD AUTO: 104 10*3/MM3 (ref 140–450)
PLATELET # BLD AUTO: 104 10*3/MM3 (ref 140–450)
PLATELET # BLD AUTO: 113 10*3/MM3 (ref 140–450)
PLATELET # BLD AUTO: 144 10*3/MM3 (ref 140–450)
PLATELET # BLD AUTO: 145 10*3/MM3 (ref 140–450)
PMV BLD AUTO: 8.9 FL (ref 6–12)
PMV BLD AUTO: 9.4 FL (ref 6–12)
PMV BLD AUTO: 9.5 FL (ref 6–12)
PO2 BLD: 331 MM[HG] (ref 0–500)
PO2 BLD: 344 MM[HG] (ref 0–500)
PO2 BLD: 351 MM[HG] (ref 0–500)
PO2 BLD: 518 MM[HG] (ref 0–500)
PO2 BLDA: 132.3 MM HG (ref 80–100)
PO2 BLDA: 137.7 MM HG (ref 80–100)
PO2 BLDA: 140.3 MM HG (ref 80–100)
PO2 BLDA: 357 MMHG (ref 80–105)
PO2 BLDA: 384 MMHG (ref 80–105)
PO2 BLDA: 402 MMHG (ref 80–105)
PO2 BLDA: 434 MMHG (ref 80–105)
PO2 BLDA: 465 MMHG (ref 80–105)
PO2 BLDA: 470 MMHG (ref 80–105)
PO2 BLDA: 517.7 MM HG (ref 80–100)
PO2 BLDA: 68 MMHG (ref 80–105)
POTASSIUM BLDA-SCNC: 3.9 MMOL/L (ref 3.5–4.9)
POTASSIUM BLDA-SCNC: 4.9 MMOL/L (ref 3.5–4.9)
POTASSIUM BLDA-SCNC: 4.9 MMOL/L (ref 3.5–4.9)
POTASSIUM BLDA-SCNC: 5.1 MMOL/L (ref 3.5–4.9)
POTASSIUM BLDA-SCNC: 5.3 MMOL/L (ref 3.5–4.9)
POTASSIUM BLDA-SCNC: 5.5 MMOL/L (ref 3.5–4.9)
POTASSIUM BLDA-SCNC: 5.9 MMOL/L (ref 3.5–4.9)
POTASSIUM SERPL-SCNC: 4.3 MMOL/L (ref 3.5–5.2)
POTASSIUM SERPL-SCNC: 4.9 MMOL/L (ref 3.5–5.2)
PROTHROMBIN TIME: 18.2 SECONDS (ref 11.7–14.2)
PROTHROMBIN TIME: 20.3 SECONDS (ref 11.7–14.2)
PSV: 5 CMH2O
RBC # BLD AUTO: 3.57 10*6/MM3 (ref 4.14–5.8)
RBC # BLD AUTO: 3.78 10*6/MM3 (ref 4.14–5.8)
RBC # BLD AUTO: 3.79 10*6/MM3 (ref 4.14–5.8)
SAO2 % BLDA: 100 % (ref 95–98)
SAO2 % BLDA: 90 % (ref 95–98)
SAO2 % BLDCOA: 100 % (ref 92–98.5)
SAO2 % BLDCOA: 98.9 % (ref 92–98.5)
SAO2 % BLDCOA: 99 % (ref 92–98.5)
SAO2 % BLDCOA: 99.1 % (ref 92–98.5)
SET MECH RESP RATE: 16
SET MECH RESP RATE: 16
SET MECH RESP RATE: 20
SODIUM SERPL-SCNC: 141 MMOL/L (ref 136–145)
SODIUM SERPL-SCNC: 144 MMOL/L (ref 136–145)
TOTAL RATE: 16 BREATHS/MINUTE
TOTAL RATE: 16 BREATHS/MINUTE
TOTAL RATE: 20 BREATHS/MINUTE
TOTAL RATE: 20 BREATHS/MINUTE
VENTILATOR MODE: ABNORMAL
VENTILATOR MODE: AC
VT ON VENT VENT: 580 ML
VT ON VENT VENT: 580 ML
VT ON VENT VENT: 600 ML
VT ON VENT VENT: 659 ML
WBC NRBC COR # BLD AUTO: 13.24 10*3/MM3 (ref 3.4–10.8)
WBC NRBC COR # BLD AUTO: 13.52 10*3/MM3 (ref 3.4–10.8)
WBC NRBC COR # BLD AUTO: 19.1 10*3/MM3 (ref 3.4–10.8)

## 2025-02-10 PROCEDURE — 85027 COMPLETE CBC AUTOMATED: CPT | Performed by: NURSE PRACTITIONER

## 2025-02-10 PROCEDURE — 85018 HEMOGLOBIN: CPT

## 2025-02-10 PROCEDURE — 25010000002 PAPAVERINE PER 60 MG: Performed by: THORACIC SURGERY (CARDIOTHORACIC VASCULAR SURGERY)

## 2025-02-10 PROCEDURE — 94760 N-INVAS EAR/PLS OXIMETRY 1: CPT

## 2025-02-10 PROCEDURE — 25010000002 MORPHINE PER 10 MG: Performed by: NURSE PRACTITIONER

## 2025-02-10 PROCEDURE — 25010000002 THIAMINE HCL 200 MG/2ML SOLUTION 2 ML VIAL: Performed by: ANESTHESIOLOGY

## 2025-02-10 PROCEDURE — 83735 ASSAY OF MAGNESIUM: CPT | Performed by: NURSE PRACTITIONER

## 2025-02-10 PROCEDURE — 06BP4ZZ EXCISION OF RIGHT SAPHENOUS VEIN, PERCUTANEOUS ENDOSCOPIC APPROACH: ICD-10-PCS | Performed by: THORACIC SURGERY (CARDIOTHORACIC VASCULAR SURGERY)

## 2025-02-10 PROCEDURE — C1713 ANCHOR/SCREW BN/BN,TIS/BN: HCPCS | Performed by: THORACIC SURGERY (CARDIOTHORACIC VASCULAR SURGERY)

## 2025-02-10 PROCEDURE — 93010 ELECTROCARDIOGRAM REPORT: CPT | Performed by: INTERNAL MEDICINE

## 2025-02-10 PROCEDURE — A4648 IMPLANTABLE TISSUE MARKER: HCPCS | Performed by: THORACIC SURGERY (CARDIOTHORACIC VASCULAR SURGERY)

## 2025-02-10 PROCEDURE — 02L70CK OCCLUSION OF LEFT ATRIAL APPENDAGE WITH EXTRALUMINAL DEVICE, OPEN APPROACH: ICD-10-PCS | Performed by: THORACIC SURGERY (CARDIOTHORACIC VASCULAR SURGERY)

## 2025-02-10 PROCEDURE — 85610 PROTHROMBIN TIME: CPT | Performed by: THORACIC SURGERY (CARDIOTHORACIC VASCULAR SURGERY)

## 2025-02-10 PROCEDURE — 5A1221Z PERFORMANCE OF CARDIAC OUTPUT, CONTINUOUS: ICD-10-PCS | Performed by: THORACIC SURGERY (CARDIOTHORACIC VASCULAR SURGERY)

## 2025-02-10 PROCEDURE — 25810000003 SODIUM CHLORIDE 0.9 % SOLUTION 250 ML FLEX CONT: Performed by: STUDENT IN AN ORGANIZED HEALTH CARE EDUCATION/TRAINING PROGRAM

## 2025-02-10 PROCEDURE — 25010000002 MAGNESIUM SULFATE IN D5W 1G/100ML (PREMIX) 1-5 GM/100ML-% SOLUTION: Performed by: NURSE PRACTITIONER

## 2025-02-10 PROCEDURE — 36430 TRANSFUSION BLD/BLD COMPNT: CPT

## 2025-02-10 PROCEDURE — 33533 CABG ARTERIAL SINGLE: CPT | Performed by: THORACIC SURGERY (CARDIOTHORACIC VASCULAR SURGERY)

## 2025-02-10 PROCEDURE — 33508 ENDOSCOPIC VEIN HARVEST: CPT | Performed by: THORACIC SURGERY (CARDIOTHORACIC VASCULAR SURGERY)

## 2025-02-10 PROCEDURE — 82803 BLOOD GASES ANY COMBINATION: CPT

## 2025-02-10 PROCEDURE — 85384 FIBRINOGEN ACTIVITY: CPT | Performed by: NURSE PRACTITIONER

## 2025-02-10 PROCEDURE — 25010000002 MILRINONE LACTATE 10 MG/10ML SOLUTION 10 ML VIAL: Performed by: STUDENT IN AN ORGANIZED HEALTH CARE EDUCATION/TRAINING PROGRAM

## 2025-02-10 PROCEDURE — 25010000002 PROPOFOL 10 MG/ML EMULSION: Performed by: STUDENT IN AN ORGANIZED HEALTH CARE EDUCATION/TRAINING PROGRAM

## 2025-02-10 PROCEDURE — 94799 UNLISTED PULMONARY SVC/PX: CPT

## 2025-02-10 PROCEDURE — 25010000002 HEPARIN (PORCINE) PER 1000 UNITS: Performed by: THORACIC SURGERY (CARDIOTHORACIC VASCULAR SURGERY)

## 2025-02-10 PROCEDURE — P9035 PLATELET PHERES LEUKOREDUCED: HCPCS

## 2025-02-10 PROCEDURE — 25010000002 CEFAZOLIN PER 500 MG: Performed by: PHYSICIAN ASSISTANT

## 2025-02-10 PROCEDURE — 80069 RENAL FUNCTION PANEL: CPT | Performed by: NURSE PRACTITIONER

## 2025-02-10 PROCEDURE — 93005 ELECTROCARDIOGRAM TRACING: CPT | Performed by: NURSE PRACTITIONER

## 2025-02-10 PROCEDURE — 93318 ECHO TRANSESOPHAGEAL INTRAOP: CPT | Performed by: STUDENT IN AN ORGANIZED HEALTH CARE EDUCATION/TRAINING PROGRAM

## 2025-02-10 PROCEDURE — C1751 CATH, INF, PER/CENT/MIDLINE: HCPCS | Performed by: STUDENT IN AN ORGANIZED HEALTH CARE EDUCATION/TRAINING PROGRAM

## 2025-02-10 PROCEDURE — P9041 ALBUMIN (HUMAN),5%, 50ML: HCPCS | Performed by: NURSE PRACTITIONER

## 2025-02-10 PROCEDURE — 02100Z9 BYPASS CORONARY ARTERY, ONE ARTERY FROM LEFT INTERNAL MAMMARY, OPEN APPROACH: ICD-10-PCS | Performed by: THORACIC SURGERY (CARDIOTHORACIC VASCULAR SURGERY)

## 2025-02-10 PROCEDURE — 82330 ASSAY OF CALCIUM: CPT | Performed by: NURSE PRACTITIONER

## 2025-02-10 PROCEDURE — 86900 BLOOD TYPING SEROLOGIC ABO: CPT

## 2025-02-10 PROCEDURE — 94002 VENT MGMT INPAT INIT DAY: CPT

## 2025-02-10 PROCEDURE — P9041 ALBUMIN (HUMAN),5%, 50ML: HCPCS | Performed by: STUDENT IN AN ORGANIZED HEALTH CARE EDUCATION/TRAINING PROGRAM

## 2025-02-10 PROCEDURE — 85347 COAGULATION TIME ACTIVATED: CPT

## 2025-02-10 PROCEDURE — C1889 IMPLANT/INSERT DEVICE, NOC: HCPCS | Performed by: THORACIC SURGERY (CARDIOTHORACIC VASCULAR SURGERY)

## 2025-02-10 PROCEDURE — 02UG0JZ SUPPLEMENT MITRAL VALVE WITH SYNTHETIC SUBSTITUTE, OPEN APPROACH: ICD-10-PCS | Performed by: THORACIC SURGERY (CARDIOTHORACIC VASCULAR SURGERY)

## 2025-02-10 PROCEDURE — 82948 REAGENT STRIP/BLOOD GLUCOSE: CPT

## 2025-02-10 PROCEDURE — P9100 PATHOGEN TEST FOR PLATELETS: HCPCS

## 2025-02-10 PROCEDURE — 25010000002 VANCOMYCIN 10 G RECONSTITUTED SOLUTION: Performed by: THORACIC SURGERY (CARDIOTHORACIC VASCULAR SURGERY)

## 2025-02-10 PROCEDURE — 85384 FIBRINOGEN ACTIVITY: CPT | Performed by: THORACIC SURGERY (CARDIOTHORACIC VASCULAR SURGERY)

## 2025-02-10 PROCEDURE — 021109W BYPASS CORONARY ARTERY, TWO ARTERIES FROM AORTA WITH AUTOLOGOUS VENOUS TISSUE, OPEN APPROACH: ICD-10-PCS | Performed by: THORACIC SURGERY (CARDIOTHORACIC VASCULAR SURGERY)

## 2025-02-10 PROCEDURE — 85730 THROMBOPLASTIN TIME PARTIAL: CPT | Performed by: THORACIC SURGERY (CARDIOTHORACIC VASCULAR SURGERY)

## 2025-02-10 PROCEDURE — 25010000002 MAGNESIUM SULFATE PER 500 MG OF MAGNESIUM: Performed by: STUDENT IN AN ORGANIZED HEALTH CARE EDUCATION/TRAINING PROGRAM

## 2025-02-10 PROCEDURE — C1729 CATH, DRAINAGE: HCPCS | Performed by: THORACIC SURGERY (CARDIOTHORACIC VASCULAR SURGERY)

## 2025-02-10 PROCEDURE — 33518 CABG ARTERY-VEIN TWO: CPT | Performed by: THORACIC SURGERY (CARDIOTHORACIC VASCULAR SURGERY)

## 2025-02-10 PROCEDURE — 25010000002 BUPIVACAINE LIPOSOME 1.3 % SUSPENSION: Performed by: THORACIC SURGERY (CARDIOTHORACIC VASCULAR SURGERY)

## 2025-02-10 PROCEDURE — 33427 REPAIR OF MITRAL VALVE: CPT | Performed by: THORACIC SURGERY (CARDIOTHORACIC VASCULAR SURGERY)

## 2025-02-10 PROCEDURE — 99291 CRITICAL CARE FIRST HOUR: CPT | Performed by: ANESTHESIOLOGY

## 2025-02-10 PROCEDURE — 25010000002 ACETAMINOPHEN 10 MG/ML SOLUTION: Performed by: NURSE PRACTITIONER

## 2025-02-10 PROCEDURE — 86901 BLOOD TYPING SEROLOGIC RH(D): CPT

## 2025-02-10 PROCEDURE — 25810000003 SODIUM CHLORIDE 0.9 % SOLUTION: Performed by: THORACIC SURGERY (CARDIOTHORACIC VASCULAR SURGERY)

## 2025-02-10 PROCEDURE — 25010000002 PROTAMINE SULFATE PER 10 MG: Performed by: STUDENT IN AN ORGANIZED HEALTH CARE EDUCATION/TRAINING PROGRAM

## 2025-02-10 PROCEDURE — 94761 N-INVAS EAR/PLS OXIMETRY MLT: CPT

## 2025-02-10 PROCEDURE — 85610 PROTHROMBIN TIME: CPT | Performed by: NURSE PRACTITIONER

## 2025-02-10 PROCEDURE — 85027 COMPLETE CBC AUTOMATED: CPT | Performed by: THORACIC SURGERY (CARDIOTHORACIC VASCULAR SURGERY)

## 2025-02-10 PROCEDURE — 85730 THROMBOPLASTIN TIME PARTIAL: CPT | Performed by: NURSE PRACTITIONER

## 2025-02-10 PROCEDURE — 85025 COMPLETE CBC W/AUTO DIFF WBC: CPT | Performed by: NURSE PRACTITIONER

## 2025-02-10 PROCEDURE — 25010000002 EPINEPHRINE PER 0.1 MG: Performed by: STUDENT IN AN ORGANIZED HEALTH CARE EDUCATION/TRAINING PROGRAM

## 2025-02-10 PROCEDURE — 82803 BLOOD GASES ANY COMBINATION: CPT | Performed by: NURSE PRACTITIONER

## 2025-02-10 PROCEDURE — 85014 HEMATOCRIT: CPT

## 2025-02-10 PROCEDURE — B24BZZ4 ULTRASONOGRAPHY OF HEART WITH AORTA, TRANSESOPHAGEAL: ICD-10-PCS | Performed by: STUDENT IN AN ORGANIZED HEALTH CARE EDUCATION/TRAINING PROGRAM

## 2025-02-10 PROCEDURE — 25010000002 METOCLOPRAMIDE PER 10 MG: Performed by: NURSE PRACTITIONER

## 2025-02-10 PROCEDURE — 25010000002 ALBUMIN HUMAN 5% PER 50 ML: Performed by: STUDENT IN AN ORGANIZED HEALTH CARE EDUCATION/TRAINING PROGRAM

## 2025-02-10 PROCEDURE — 25010000002 VASOPRESSIN 20 UNIT/ML SOLUTION: Performed by: ANESTHESIOLOGY

## 2025-02-10 PROCEDURE — 82947 ASSAY GLUCOSE BLOOD QUANT: CPT

## 2025-02-10 PROCEDURE — 25010000002 MIDAZOLAM PER 1 MG: Performed by: STUDENT IN AN ORGANIZED HEALTH CARE EDUCATION/TRAINING PROGRAM

## 2025-02-10 PROCEDURE — 25010000002 HYDROCORTISONE SOD SUC (PF) 100 MG RECONSTITUTED SOLUTION: Performed by: ANESTHESIOLOGY

## 2025-02-10 PROCEDURE — 25010000002 FENTANYL CITRATE (PF) 250 MCG/5ML SOLUTION: Performed by: STUDENT IN AN ORGANIZED HEALTH CARE EDUCATION/TRAINING PROGRAM

## 2025-02-10 PROCEDURE — 82010 KETONE BODYS QUAN: CPT | Performed by: ANESTHESIOLOGY

## 2025-02-10 PROCEDURE — 25010000002 ALBUMIN HUMAN 5% PER 50 ML: Performed by: NURSE PRACTITIONER

## 2025-02-10 PROCEDURE — 85049 AUTOMATED PLATELET COUNT: CPT | Performed by: THORACIC SURGERY (CARDIOTHORACIC VASCULAR SURGERY)

## 2025-02-10 PROCEDURE — 25010000002 HEPARIN (PORCINE) PER 1000 UNITS: Performed by: STUDENT IN AN ORGANIZED HEALTH CARE EDUCATION/TRAINING PROGRAM

## 2025-02-10 PROCEDURE — 71045 X-RAY EXAM CHEST 1 VIEW: CPT

## 2025-02-10 PROCEDURE — 25010000002 LIDOCAINE 2% SOLUTION: Performed by: STUDENT IN AN ORGANIZED HEALTH CARE EDUCATION/TRAINING PROGRAM

## 2025-02-10 DEVICE — SS SUTURE, 3 PER SLEEVE
Type: IMPLANTABLE DEVICE | Site: STERNUM | Status: FUNCTIONAL
Brand: MYO/WIRE II

## 2025-02-10 DEVICE — BND ANULPLSTY SIMUPLUS 34MM: Type: IMPLANTABLE DEVICE | Site: HEART | Status: FUNCTIONAL

## 2025-02-10 DEVICE — APPL CLIP LAA ATRICLIP FLXV 45MM: Type: IMPLANTABLE DEVICE | Site: HEART | Status: FUNCTIONAL

## 2025-02-10 DEVICE — COR-KNOT MINI® COMBO KITBASE PACKAGE TYPE - KITEACH STERILE PACKAGE KIT CONTAINS (2) SINGLE PATIENT USE COR-KNOT MINI® DEVICES AND (12) COR-KNOT® QUICK LOADS®.
Type: IMPLANTABLE DEVICE | Site: HEART | Status: FUNCTIONAL
Brand: COR-KNOT MINI®

## 2025-02-10 DEVICE — SS SUTURE, 4 PER SLEEVE
Type: IMPLANTABLE DEVICE | Site: STERNUM | Status: FUNCTIONAL
Brand: MYO/WIRE II

## 2025-02-10 RX ORDER — METOCLOPRAMIDE HYDROCHLORIDE 5 MG/ML
10 INJECTION INTRAMUSCULAR; INTRAVENOUS EVERY 6 HOURS
Status: DISPENSED | OUTPATIENT
Start: 2025-02-10 | End: 2025-02-11

## 2025-02-10 RX ORDER — CYCLOBENZAPRINE HCL 10 MG
10 TABLET ORAL EVERY 8 HOURS PRN
Status: DISCONTINUED | OUTPATIENT
Start: 2025-02-11 | End: 2025-02-10

## 2025-02-10 RX ORDER — NOREPINEPHRINE BITARTRATE 1 MG/ML
INJECTION, SOLUTION INTRAVENOUS AS NEEDED
Status: DISCONTINUED | OUTPATIENT
Start: 2025-02-10 | End: 2025-02-10 | Stop reason: SURG

## 2025-02-10 RX ORDER — PHENYLEPHRINE HCL IN 0.9% NACL 0.5 MG/5ML
.2-2 SYRINGE (ML) INTRAVENOUS CONTINUOUS PRN
Status: DISCONTINUED | OUTPATIENT
Start: 2025-02-10 | End: 2025-02-11

## 2025-02-10 RX ORDER — MAGNESIUM HYDROXIDE 1200 MG/15ML
LIQUID ORAL AS NEEDED
Status: DISCONTINUED | OUTPATIENT
Start: 2025-02-10 | End: 2025-02-10 | Stop reason: HOSPADM

## 2025-02-10 RX ORDER — MAGNESIUM SULFATE HEPTAHYDRATE 500 MG/ML
INJECTION, SOLUTION INTRAMUSCULAR; INTRAVENOUS AS NEEDED
Status: DISCONTINUED | OUTPATIENT
Start: 2025-02-10 | End: 2025-02-10 | Stop reason: SURG

## 2025-02-10 RX ORDER — PANTOPRAZOLE SODIUM 40 MG/10ML
40 INJECTION, POWDER, LYOPHILIZED, FOR SOLUTION INTRAVENOUS ONCE
Status: COMPLETED | OUTPATIENT
Start: 2025-02-10 | End: 2025-02-10

## 2025-02-10 RX ORDER — NOREPINEPHRINE BITARTRATE 1 MG/ML
INJECTION, SOLUTION INTRAVENOUS CONTINUOUS PRN
Status: DISCONTINUED | OUTPATIENT
Start: 2025-02-10 | End: 2025-02-10

## 2025-02-10 RX ORDER — ACETAMINOPHEN 160 MG/5ML
650 SOLUTION ORAL EVERY 4 HOURS
Status: ACTIVE | OUTPATIENT
Start: 2025-02-10 | End: 2025-02-11

## 2025-02-10 RX ORDER — ACETAMINOPHEN 650 MG/1
650 SUPPOSITORY RECTAL EVERY 4 HOURS PRN
Status: DISCONTINUED | OUTPATIENT
Start: 2025-02-11 | End: 2025-02-17 | Stop reason: HOSPADM

## 2025-02-10 RX ORDER — NITROGLYCERIN 0.4 MG/1
0.4 TABLET SUBLINGUAL
Status: DISCONTINUED | OUTPATIENT
Start: 2025-02-10 | End: 2025-02-17 | Stop reason: HOSPADM

## 2025-02-10 RX ORDER — MIDAZOLAM HYDROCHLORIDE 1 MG/ML
INJECTION, SOLUTION INTRAMUSCULAR; INTRAVENOUS AS NEEDED
Status: DISCONTINUED | OUTPATIENT
Start: 2025-02-10 | End: 2025-02-10 | Stop reason: SURG

## 2025-02-10 RX ORDER — BISACODYL 5 MG/1
10 TABLET, DELAYED RELEASE ORAL DAILY PRN
Status: DISCONTINUED | OUTPATIENT
Start: 2025-02-10 | End: 2025-02-17 | Stop reason: HOSPADM

## 2025-02-10 RX ORDER — FAMOTIDINE 10 MG/ML
20 INJECTION, SOLUTION INTRAVENOUS ONCE
Status: COMPLETED | OUTPATIENT
Start: 2025-02-10 | End: 2025-02-10

## 2025-02-10 RX ORDER — DEXTROSE MONOHYDRATE 25 G/50ML
10-50 INJECTION, SOLUTION INTRAVENOUS
Status: DISCONTINUED | OUTPATIENT
Start: 2025-02-10 | End: 2025-02-11

## 2025-02-10 RX ORDER — CHLORHEXIDINE GLUCONATE 500 MG/1
1 CLOTH TOPICAL EVERY 12 HOURS PRN
Status: DISCONTINUED | OUTPATIENT
Start: 2025-02-10 | End: 2025-02-17 | Stop reason: HOSPADM

## 2025-02-10 RX ORDER — SODIUM CHLORIDE 9 MG/ML
INJECTION, SOLUTION INTRAVENOUS CONTINUOUS PRN
Status: DISCONTINUED | OUTPATIENT
Start: 2025-02-10 | End: 2025-02-10 | Stop reason: SURG

## 2025-02-10 RX ORDER — MEPERIDINE HYDROCHLORIDE 25 MG/ML
25 INJECTION INTRAMUSCULAR; INTRAVENOUS; SUBCUTANEOUS EVERY 4 HOURS PRN
Status: DISCONTINUED | OUTPATIENT
Start: 2025-02-10 | End: 2025-02-10

## 2025-02-10 RX ORDER — MILRINONE LACTATE 0.2 MG/ML
.25-.375 INJECTION, SOLUTION INTRAVENOUS CONTINUOUS PRN
Status: DISCONTINUED | OUTPATIENT
Start: 2025-02-10 | End: 2025-02-12

## 2025-02-10 RX ORDER — METOPROLOL TARTRATE 25 MG/1
12.5 TABLET, FILM COATED ORAL
Status: COMPLETED | OUTPATIENT
Start: 2025-02-10 | End: 2025-02-10

## 2025-02-10 RX ORDER — DEXMEDETOMIDINE HYDROCHLORIDE 4 UG/ML
.2-1.5 INJECTION, SOLUTION INTRAVENOUS
Status: DISCONTINUED | OUTPATIENT
Start: 2025-02-10 | End: 2025-02-11

## 2025-02-10 RX ORDER — PANTOPRAZOLE SODIUM 40 MG/1
40 TABLET, DELAYED RELEASE ORAL EVERY MORNING
Status: DISCONTINUED | OUTPATIENT
Start: 2025-02-11 | End: 2025-02-17 | Stop reason: HOSPADM

## 2025-02-10 RX ORDER — LIDOCAINE HYDROCHLORIDE 20 MG/ML
INJECTION, SOLUTION INFILTRATION; PERINEURAL AS NEEDED
Status: DISCONTINUED | OUTPATIENT
Start: 2025-02-10 | End: 2025-02-10 | Stop reason: SURG

## 2025-02-10 RX ORDER — VANCOMYCIN/0.9 % SOD CHLORIDE 1.5G/250ML
20 PLASTIC BAG, INJECTION (ML) INTRAVENOUS ONCE
Status: DISCONTINUED | OUTPATIENT
Start: 2025-02-10 | End: 2025-02-10

## 2025-02-10 RX ORDER — ACETAMINOPHEN 160 MG/5ML
650 SOLUTION ORAL EVERY 4 HOURS PRN
Status: DISCONTINUED | OUTPATIENT
Start: 2025-02-11 | End: 2025-02-17 | Stop reason: HOSPADM

## 2025-02-10 RX ORDER — CHLORHEXIDINE GLUCONATE ORAL RINSE 1.2 MG/ML
15 SOLUTION DENTAL EVERY 12 HOURS
Status: DISCONTINUED | OUTPATIENT
Start: 2025-02-10 | End: 2025-02-17 | Stop reason: HOSPADM

## 2025-02-10 RX ORDER — DEXTROSE MONOHYDRATE 100 MG/ML
30 INJECTION, SOLUTION INTRAVENOUS CONTINUOUS
Status: ACTIVE | OUTPATIENT
Start: 2025-02-10 | End: 2025-02-12

## 2025-02-10 RX ORDER — NALOXONE HCL 0.4 MG/ML
0.4 VIAL (ML) INJECTION
Status: DISCONTINUED | OUTPATIENT
Start: 2025-02-10 | End: 2025-02-17 | Stop reason: HOSPADM

## 2025-02-10 RX ORDER — NOREPINEPHRINE BITARTRATE 0.03 MG/ML
.02-.2 INJECTION, SOLUTION INTRAVENOUS CONTINUOUS PRN
Status: DISCONTINUED | OUTPATIENT
Start: 2025-02-10 | End: 2025-02-12

## 2025-02-10 RX ORDER — POLYETHYLENE GLYCOL 3350 17 G/17G
17 POWDER, FOR SOLUTION ORAL DAILY PRN
Status: DISCONTINUED | OUTPATIENT
Start: 2025-02-10 | End: 2025-02-17 | Stop reason: HOSPADM

## 2025-02-10 RX ORDER — NITROGLYCERIN 20 MG/100ML
5-200 INJECTION INTRAVENOUS
Status: DISCONTINUED | OUTPATIENT
Start: 2025-02-10 | End: 2025-02-11

## 2025-02-10 RX ORDER — ALPRAZOLAM 0.25 MG/1
0.25 TABLET ORAL EVERY 8 HOURS PRN
Status: DISCONTINUED | OUTPATIENT
Start: 2025-02-10 | End: 2025-02-17 | Stop reason: HOSPADM

## 2025-02-10 RX ORDER — OXYCODONE HYDROCHLORIDE 10 MG/1
10 TABLET ORAL EVERY 4 HOURS PRN
Status: DISCONTINUED | OUTPATIENT
Start: 2025-02-10 | End: 2025-02-11

## 2025-02-10 RX ORDER — CHLORHEXIDINE GLUCONATE 500 MG/1
1 CLOTH TOPICAL EVERY 12 HOURS PRN
Status: DISCONTINUED | OUTPATIENT
Start: 2025-02-10 | End: 2025-02-10

## 2025-02-10 RX ORDER — ALBUMIN HUMAN 50 G/1000ML
SOLUTION INTRAVENOUS CONTINUOUS PRN
Status: DISCONTINUED | OUTPATIENT
Start: 2025-02-10 | End: 2025-02-10 | Stop reason: SURG

## 2025-02-10 RX ORDER — CHLORHEXIDINE GLUCONATE ORAL RINSE 1.2 MG/ML
15 SOLUTION DENTAL ONCE
Status: COMPLETED | OUTPATIENT
Start: 2025-02-10 | End: 2025-02-10

## 2025-02-10 RX ORDER — MORPHINE SULFATE 2 MG/ML
4 INJECTION, SOLUTION INTRAMUSCULAR; INTRAVENOUS
Status: DISCONTINUED | OUTPATIENT
Start: 2025-02-10 | End: 2025-02-11

## 2025-02-10 RX ORDER — AMINOCAPROIC ACID 250 MG/ML
INJECTION, SOLUTION INTRAVENOUS AS NEEDED
Status: DISCONTINUED | OUTPATIENT
Start: 2025-02-10 | End: 2025-02-10 | Stop reason: SURG

## 2025-02-10 RX ORDER — ACETAMINOPHEN 325 MG/1
650 TABLET ORAL EVERY 4 HOURS PRN
Status: DISCONTINUED | OUTPATIENT
Start: 2025-02-11 | End: 2025-02-17 | Stop reason: HOSPADM

## 2025-02-10 RX ORDER — PROPOFOL 10 MG/ML
VIAL (ML) INTRAVENOUS AS NEEDED
Status: DISCONTINUED | OUTPATIENT
Start: 2025-02-10 | End: 2025-02-10 | Stop reason: SURG

## 2025-02-10 RX ORDER — DOPAMINE HYDROCHLORIDE 160 MG/100ML
2-20 INJECTION, SOLUTION INTRAVENOUS CONTINUOUS PRN
Status: DISCONTINUED | OUTPATIENT
Start: 2025-02-10 | End: 2025-02-11

## 2025-02-10 RX ORDER — AMOXICILLIN 250 MG
2 CAPSULE ORAL NIGHTLY
Status: DISCONTINUED | OUTPATIENT
Start: 2025-02-11 | End: 2025-02-17 | Stop reason: HOSPADM

## 2025-02-10 RX ORDER — HEPARIN SODIUM 1000 [USP'U]/ML
INJECTION, SOLUTION INTRAVENOUS; SUBCUTANEOUS AS NEEDED
Status: DISCONTINUED | OUTPATIENT
Start: 2025-02-10 | End: 2025-02-10 | Stop reason: SURG

## 2025-02-10 RX ORDER — ACETAMINOPHEN 10 MG/ML
1000 INJECTION, SOLUTION INTRAVENOUS EVERY 8 HOURS
Status: DISPENSED | OUTPATIENT
Start: 2025-02-10 | End: 2025-02-11

## 2025-02-10 RX ORDER — HYDROCORTISONE SODIUM SUCCINATE 100 MG/2ML
50 INJECTION INTRAMUSCULAR; INTRAVENOUS EVERY 6 HOURS
Status: DISCONTINUED | OUTPATIENT
Start: 2025-02-10 | End: 2025-02-11

## 2025-02-10 RX ORDER — PROTAMINE SULFATE 10 MG/ML
INJECTION, SOLUTION INTRAVENOUS AS NEEDED
Status: DISCONTINUED | OUTPATIENT
Start: 2025-02-10 | End: 2025-02-10 | Stop reason: SURG

## 2025-02-10 RX ORDER — IBUPROFEN 600 MG/1
1 TABLET ORAL
Status: DISCONTINUED | OUTPATIENT
Start: 2025-02-10 | End: 2025-02-11

## 2025-02-10 RX ORDER — METOPROLOL TARTRATE 25 MG/1
12.5 TABLET, FILM COATED ORAL EVERY 12 HOURS SCHEDULED
Status: DISCONTINUED | OUTPATIENT
Start: 2025-02-10 | End: 2025-02-14

## 2025-02-10 RX ORDER — MAGNESIUM SULFATE 1 G/100ML
2 INJECTION INTRAVENOUS EVERY 8 HOURS
Status: DISPENSED | OUTPATIENT
Start: 2025-02-10 | End: 2025-02-11

## 2025-02-10 RX ORDER — MIDAZOLAM HYDROCHLORIDE 1 MG/ML
2 INJECTION, SOLUTION INTRAMUSCULAR; INTRAVENOUS
Status: DISCONTINUED | OUTPATIENT
Start: 2025-02-10 | End: 2025-02-11

## 2025-02-10 RX ORDER — MORPHINE SULFATE 2 MG/ML
1 INJECTION, SOLUTION INTRAMUSCULAR; INTRAVENOUS EVERY 4 HOURS PRN
Status: DISPENSED | OUTPATIENT
Start: 2025-02-10 | End: 2025-02-17

## 2025-02-10 RX ORDER — NICOTINE POLACRILEX 4 MG
15 LOZENGE BUCCAL
Status: DISCONTINUED | OUTPATIENT
Start: 2025-02-10 | End: 2025-02-11

## 2025-02-10 RX ORDER — ATORVASTATIN CALCIUM 20 MG/1
40 TABLET, FILM COATED ORAL NIGHTLY
Status: DISCONTINUED | OUTPATIENT
Start: 2025-02-10 | End: 2025-02-17 | Stop reason: HOSPADM

## 2025-02-10 RX ORDER — ONDANSETRON 2 MG/ML
4 INJECTION INTRAMUSCULAR; INTRAVENOUS EVERY 6 HOURS PRN
Status: DISCONTINUED | OUTPATIENT
Start: 2025-02-10 | End: 2025-02-17 | Stop reason: HOSPADM

## 2025-02-10 RX ORDER — FENTANYL CITRATE 50 UG/ML
INJECTION, SOLUTION INTRAMUSCULAR; INTRAVENOUS AS NEEDED
Status: DISCONTINUED | OUTPATIENT
Start: 2025-02-10 | End: 2025-02-10 | Stop reason: SURG

## 2025-02-10 RX ORDER — HYDROCORTISONE SODIUM SUCCINATE 100 MG/2ML
100 INJECTION INTRAMUSCULAR; INTRAVENOUS ONCE
Status: COMPLETED | OUTPATIENT
Start: 2025-02-10 | End: 2025-02-10

## 2025-02-10 RX ORDER — HYDROCODONE BITARTRATE AND ACETAMINOPHEN 5; 325 MG/1; MG/1
2 TABLET ORAL EVERY 4 HOURS PRN
Status: DISCONTINUED | OUTPATIENT
Start: 2025-02-10 | End: 2025-02-17 | Stop reason: HOSPADM

## 2025-02-10 RX ORDER — ASPIRIN 81 MG/1
81 TABLET ORAL DAILY
Status: DISCONTINUED | OUTPATIENT
Start: 2025-02-11 | End: 2025-02-17 | Stop reason: HOSPADM

## 2025-02-10 RX ORDER — ACETAMINOPHEN 325 MG/1
650 TABLET ORAL EVERY 4 HOURS
Status: ACTIVE | OUTPATIENT
Start: 2025-02-10 | End: 2025-02-11

## 2025-02-10 RX ORDER — VANCOMYCIN/0.9 % SOD CHLORIDE 1.5G/250ML
20 PLASTIC BAG, INJECTION (ML) INTRAVENOUS ONCE
Status: COMPLETED | OUTPATIENT
Start: 2025-02-10 | End: 2025-02-10

## 2025-02-10 RX ORDER — BISACODYL 10 MG
10 SUPPOSITORY, RECTAL RECTAL DAILY PRN
Status: DISCONTINUED | OUTPATIENT
Start: 2025-02-11 | End: 2025-02-17 | Stop reason: HOSPADM

## 2025-02-10 RX ORDER — KETAMINE HCL IN NACL, ISO-OSM 100MG/10ML
SYRINGE (ML) INJECTION AS NEEDED
Status: DISCONTINUED | OUTPATIENT
Start: 2025-02-10 | End: 2025-02-10 | Stop reason: SURG

## 2025-02-10 RX ORDER — MONTELUKAST SODIUM 10 MG/1
10 TABLET ORAL NIGHTLY
Status: DISCONTINUED | OUTPATIENT
Start: 2025-02-11 | End: 2025-02-17 | Stop reason: HOSPADM

## 2025-02-10 RX ORDER — ENOXAPARIN SODIUM 100 MG/ML
40 INJECTION SUBCUTANEOUS DAILY
Status: DISCONTINUED | OUTPATIENT
Start: 2025-02-11 | End: 2025-02-17 | Stop reason: HOSPADM

## 2025-02-10 RX ORDER — ALBUMIN HUMAN 50 G/1000ML
1500 SOLUTION INTRAVENOUS AS NEEDED
Status: DISPENSED | OUTPATIENT
Start: 2025-02-10 | End: 2025-02-11

## 2025-02-10 RX ORDER — LEVOTHYROXINE SODIUM 100 UG/1
100 TABLET ORAL DAILY
Status: DISCONTINUED | OUTPATIENT
Start: 2025-02-11 | End: 2025-02-17 | Stop reason: HOSPADM

## 2025-02-10 RX ORDER — ROCURONIUM BROMIDE 10 MG/ML
INJECTION, SOLUTION INTRAVENOUS AS NEEDED
Status: DISCONTINUED | OUTPATIENT
Start: 2025-02-10 | End: 2025-02-10 | Stop reason: SURG

## 2025-02-10 RX ORDER — PAPAVERINE HYDROCHLORIDE 30 MG/ML
INJECTION INTRAMUSCULAR; INTRAVENOUS AS NEEDED
Status: DISCONTINUED | OUTPATIENT
Start: 2025-02-10 | End: 2025-02-10 | Stop reason: HOSPADM

## 2025-02-10 RX ORDER — ACETAMINOPHEN 650 MG/1
650 SUPPOSITORY RECTAL EVERY 4 HOURS
Status: ACTIVE | OUTPATIENT
Start: 2025-02-10 | End: 2025-02-11

## 2025-02-10 RX ADMIN — ALBUMIN (HUMAN) 250 ML: 12.5 INJECTION, SOLUTION INTRAVENOUS at 13:01

## 2025-02-10 RX ADMIN — SODIUM CHLORIDE 2000 MG: 900 INJECTION INTRAVENOUS at 07:36

## 2025-02-10 RX ADMIN — VANCOMYCIN HYDROCHLORIDE 1500 MG: 10 INJECTION, POWDER, LYOPHILIZED, FOR SOLUTION INTRAVENOUS at 14:54

## 2025-02-10 RX ADMIN — SODIUM BICARBONATE 50 MEQ: 84 INJECTION INTRAVENOUS at 14:00

## 2025-02-10 RX ADMIN — FENTANYL CITRATE 100 MCG: 50 INJECTION, SOLUTION INTRAMUSCULAR; INTRAVENOUS at 08:50

## 2025-02-10 RX ADMIN — MUPIROCIN 1 APPLICATION: 20 OINTMENT TOPICAL at 12:16

## 2025-02-10 RX ADMIN — PROTAMINE SULFATE 250 MG: 10 INJECTION, SOLUTION INTRAVENOUS at 10:38

## 2025-02-10 RX ADMIN — FENTANYL CITRATE 100 MCG: 50 INJECTION, SOLUTION INTRAMUSCULAR; INTRAVENOUS at 07:57

## 2025-02-10 RX ADMIN — Medication 15 MG: at 07:08

## 2025-02-10 RX ADMIN — Medication 10 MG: at 07:06

## 2025-02-10 RX ADMIN — ALBUMIN (HUMAN): 12.5 INJECTION, SOLUTION INTRAVENOUS at 11:00

## 2025-02-10 RX ADMIN — ROCURONIUM BROMIDE 20 MG: 10 INJECTION, SOLUTION INTRAVENOUS at 10:46

## 2025-02-10 RX ADMIN — FAMOTIDINE 20 MG: 10 INJECTION INTRAVENOUS at 05:59

## 2025-02-10 RX ADMIN — MORPHINE SULFATE 2 MG: 2 INJECTION, SOLUTION INTRAMUSCULAR; INTRAVENOUS at 19:55

## 2025-02-10 RX ADMIN — HYDROCORTISONE SODIUM SUCCINATE 100 MG: 100 INJECTION, POWDER, FOR SOLUTION INTRAMUSCULAR; INTRAVENOUS at 16:28

## 2025-02-10 RX ADMIN — METOPROLOL TARTRATE 12.5 MG: 25 TABLET, FILM COATED ORAL at 05:59

## 2025-02-10 RX ADMIN — HEPARIN SODIUM 22000 UNITS: 1000 INJECTION, SOLUTION INTRAVENOUS; SUBCUTANEOUS at 08:30

## 2025-02-10 RX ADMIN — MAGNESIUM SULFATE HEPTAHYDRATE 2 G: 500 INJECTION, SOLUTION INTRAMUSCULAR; INTRAVENOUS at 10:28

## 2025-02-10 RX ADMIN — ROCURONIUM BROMIDE 30 MG: 10 INJECTION, SOLUTION INTRAVENOUS at 09:10

## 2025-02-10 RX ADMIN — PANTOPRAZOLE SODIUM 40 MG: 40 INJECTION, POWDER, FOR SOLUTION INTRAVENOUS at 12:16

## 2025-02-10 RX ADMIN — MUPIROCIN 1 APPLICATION: 20 OINTMENT TOPICAL at 21:14

## 2025-02-10 RX ADMIN — THIAMINE HYDROCHLORIDE 500 MG: 100 INJECTION, SOLUTION INTRAMUSCULAR; INTRAVENOUS at 18:41

## 2025-02-10 RX ADMIN — CHLORHEXIDINE GLUCONATE 15 ML: 1.2 RINSE ORAL at 05:59

## 2025-02-10 RX ADMIN — ATORVASTATIN CALCIUM 40 MG: 20 TABLET, FILM COATED ORAL at 21:00

## 2025-02-10 RX ADMIN — SODIUM CHLORIDE 0.5 MCG/KG/HR: 9 INJECTION, SOLUTION INTRAVENOUS at 07:36

## 2025-02-10 RX ADMIN — SODIUM CHLORIDE 0.25 MCG/KG/MIN: 0.9 INJECTION, SOLUTION INTRAVENOUS at 10:14

## 2025-02-10 RX ADMIN — EPINEPHRINE 0.02 MCG/KG/MIN: 1 INJECTION, SOLUTION, CONCENTRATE INTRAVENOUS at 10:33

## 2025-02-10 RX ADMIN — ROCURONIUM BROMIDE 40 MG: 10 INJECTION, SOLUTION INTRAVENOUS at 07:57

## 2025-02-10 RX ADMIN — SODIUM CHLORIDE 2000 MG: 900 INJECTION INTRAVENOUS at 10:45

## 2025-02-10 RX ADMIN — INSULIN HUMAN 3.1 UNITS/HR: 1 INJECTION, SOLUTION INTRAVENOUS at 22:08

## 2025-02-10 RX ADMIN — SODIUM CHLORIDE: 9 INJECTION, SOLUTION INTRAVENOUS at 06:59

## 2025-02-10 RX ADMIN — FAMOTIDINE 20 MG: 10 INJECTION INTRAVENOUS at 16:28

## 2025-02-10 RX ADMIN — LIDOCAINE HYDROCHLORIDE 100 MG: 20 INJECTION, SOLUTION INFILTRATION; PERINEURAL at 10:27

## 2025-02-10 RX ADMIN — PROPOFOL 40 MG: 10 INJECTION, EMULSION INTRAVENOUS at 07:08

## 2025-02-10 RX ADMIN — ACETAMINOPHEN 1000 MG: 10 INJECTION INTRAVENOUS at 12:16

## 2025-02-10 RX ADMIN — FENTANYL CITRATE 50 MCG: 50 INJECTION, SOLUTION INTRAMUSCULAR; INTRAVENOUS at 07:03

## 2025-02-10 RX ADMIN — ALBUMIN (HUMAN) 250 ML: 12.5 INJECTION, SOLUTION INTRAVENOUS at 14:35

## 2025-02-10 RX ADMIN — NOREPINEPHRINE BITARTRATE 0.02 MCG/KG/MIN: 1 SOLUTION INTRAVENOUS at 11:23

## 2025-02-10 RX ADMIN — ALBUMIN (HUMAN) 250 ML: 12.5 INJECTION, SOLUTION INTRAVENOUS at 12:42

## 2025-02-10 RX ADMIN — Medication 25 MG: at 08:50

## 2025-02-10 RX ADMIN — ACETAMINOPHEN 1000 MG: 10 INJECTION INTRAVENOUS at 19:18

## 2025-02-10 RX ADMIN — LIDOCAINE HYDROCHLORIDE 60 MG: 20 INJECTION, SOLUTION INFILTRATION; PERINEURAL at 07:08

## 2025-02-10 RX ADMIN — MIDAZOLAM 2 MG: 1 INJECTION INTRAMUSCULAR; INTRAVENOUS at 06:54

## 2025-02-10 RX ADMIN — ROCURONIUM BROMIDE 60 MG: 10 INJECTION, SOLUTION INTRAVENOUS at 07:08

## 2025-02-10 RX ADMIN — AMINOCAPROIC ACID 10 G: 250 INJECTION, SOLUTION INTRAVENOUS at 10:48

## 2025-02-10 RX ADMIN — AMINOCAPROIC ACID 10 G: 250 INJECTION, SOLUTION INTRAVENOUS at 08:30

## 2025-02-10 RX ADMIN — 0.12% CHLORHEXIDINE GLUCONATE 15 ML: 1.2 RINSE ORAL at 12:16

## 2025-02-10 RX ADMIN — MAGNESIUM SULFATE HEPTAHYDRATE 2 G: 1 INJECTION, SOLUTION INTRAVENOUS at 12:16

## 2025-02-10 RX ADMIN — VASOPRESSIN 0.03 UNITS/MIN: 20 INJECTION INTRAVENOUS at 15:19

## 2025-02-10 RX ADMIN — METOCLOPRAMIDE 10 MG: 5 INJECTION, SOLUTION INTRAMUSCULAR; INTRAVENOUS at 12:16

## 2025-02-10 RX ADMIN — PROPOFOL 25 MCG/KG/MIN: 10 INJECTION, EMULSION INTRAVENOUS at 07:36

## 2025-02-10 RX ADMIN — HYDROCODONE BITARTRATE AND ACETAMINOPHEN 2 TABLET: 5; 325 TABLET ORAL at 21:00

## 2025-02-10 RX ADMIN — DEXTROSE MONOHYDRATE 30 ML/HR: 100 INJECTION, SOLUTION INTRAVENOUS at 14:27

## 2025-02-10 NOTE — ANESTHESIA PROCEDURE NOTES
Airway  Urgency: elective    Date/Time: 2/10/2025 7:13 AM  Airway not difficult    General Information and Staff    Patient location during procedure: OR  Anesthesiologist: Uli Irving MD    Indications and Patient Condition  Indications for airway management: airway protection    Preoxygenated: yes  MILS maintained throughout  Mask difficulty assessment: 2 - vent by mask + OA or adjuvant +/- NMBA    Final Airway Details  Final airway type: endotracheal airway      Successful airway: ETT  Cuffed: yes   Successful intubation technique: direct laryngoscopy  Endotracheal tube insertion site: oral  Blade: Camille  Blade size: D  ETT size (mm): 8.0  Cormack-Lehane Classification: grade IIa - partial view of glottis  Placement verified by: chest auscultation and capnometry   Measured from: teeth  ETT/EBT  to teeth (cm): 23  Number of attempts at approach: 1  Assessment: lips, teeth, and gum same as pre-op and atraumatic intubation

## 2025-02-10 NOTE — ANESTHESIA PROCEDURE NOTES
PAC      Patient reassessed immediately prior to procedure    Patient location during procedure: OR  Start time: 2/10/2025 7:30 AM  Stop Time:2/10/2025 7:35 AM  Indications: central pressure monitoring  Staff  Anesthesiologist: Uli Irving MD  Preanesthetic Checklist  Completed: patient identified, IV checked, site marked, risks and benefits discussed, surgical consent, monitors and equipment checked, pre-op evaluation and timeout performed  Central Line Prep  Sterile Tech:cap, gloves, gown, mask and sterile barriers  Prep: chloraprep  Patient monitoring: blood pressure monitoring, continuous pulse oximetry and EKG  Central Line Procedure  Laterality:right  Location:internal jugular  Catheter Type:[place]-GanzImages: still images obtained, printed/placed on chart  Assessment  Post procedure:biopatch applied, line sutured and occlusive dressing applied  Assessement:blood return through all ports, free fluid flow, no pneumothorax on x-ray and placement verified by x-ray  Complications:no  Patient Tolerance:patient tolerated the procedure well with no apparent complications  Additional Notes  Attempted latex free PAC but balloon popped  Given patient's mild symptoms with latex in the past, placed regular PAC

## 2025-02-10 NOTE — ANESTHESIA POSTPROCEDURE EVALUATION
Patient: Scott Murphy    Procedure Summary       Date: 02/10/25 Room / Location: [place] [id number] / [facility] CARDIOVASCULAR OPERATING ROOM    Anesthesia Start: 0645 Anesthesia Stop: 1153    Procedure: STERNOTOMY,CORONARY ARTERY BYPASS  X 3,WITH INTERNAL MAMMARY ARTERY GRAFT AND ENDOSCOPICLY HARVESTED RIGHT SAPHENOUS VEIN MITRAL VALVE REPAIR, LEFT ATRIAL APPENDAGE EXCLUSION, PRP  TRANSESOPHAGEAL ECHOCARDIOGRAM WITH ANESTHESIA (Chest) Diagnosis:       Mitral valve insufficiency, unspecified etiology      (Mitral valve insufficiency, unspecified etiology [I34.0])    Surgeons: Migel Alcantara MD Provider: Uli Irving MD    Anesthesia Type: general, [place], PAC ASA Status: 3            Anesthesia Type: general, [place], PAC    Vitals  Vitals Value Taken Time   BP 89/62 02/10/25 1530   Temp 36.7 °C (98.06 °F) 02/10/25 1540   Pulse 89 02/10/25 1540   Resp     SpO2 100 % 02/10/25 1540   Vitals shown include unfiled device data.        Post Anesthesia Care and Evaluation    Patient location during evaluation: bedside  Patient participation: complete - patient cannot participate  Level of consciousness: obtunded/minimal responses  Pain management: adequate    Airway patency: Intubated.  Anesthetic complications: No anesthetic complications  PONV Status: controlled  Cardiovascular status: acceptable  Respiratory status: ETT  Hydration status: acceptable

## 2025-02-10 NOTE — OP NOTE
Operative Note    Date of Dictation: 02/10/25    Date of Procedure: Same    Referring Physician: Michelle Roberts MD    Preoperative diagnosis:  1.  Multivessel coronary artery disease  2.  Severe mitral regurgitation  3.  Degenerative mitral valve disease with prolapse of the posterior leaflet  4.  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  CABG x 3 with a LIMA to the mid LAD and reversed individual saphenous vein graft to the PDA and to the first marginal  2.  Mitral valve repair with a 34 mm Medtronic flexible band posterior annuloplasty and chordal repair of P2-P3 (4-0 [place]-Jatinder x 2) and cleftoplasty of P2 and P3  3.  Endoscopic vein harvesting of the right lower extremity  4.  Epicardial closure of the left atrial appendage with 45 mm atrial clip device       Surgeon: Migel Alcantara MD     Assistants: Assistant: Leah Soria CSA was responsible for performing the following activities: Retraction, Suction, Irrigation, Suturing, Closing, Placing Dressing, Harvesting of Vessels, Bypass Grafting, and all aspects of the complex cardiac case  and their skilled assistance was necessary for the success of this case.     Anesthesia: General endotracheal anesthesia and REJI    Findings:  The saphenous vein was harvested endoscopically form the right leg. The vein had a diameter of 3.5 mm and was of good quality. The coronaries had diameters between 2 and 2.25 mm.    Blood loss: Approximately 400 cc, most of it recovered with a Cell Saver device and cardiotomy suckers and was retransfused to the patient    Description of the procedure:     The patient was placed supine on the operative table. Anesthesia was given and lines placed. The patient was prepped and draped using the usual sterile technique. A median sternotomy was performed with a scalpel and the layers carried down to the sternum using the electrocautery. The sternum was splited in the midline  using a vertical oscillating saw. Hemostasis was achieved. The LIMA was harvested as a pedicle and prepared with papaverine. It was of good quality. 300 units/kg of IV heparin was given to an ACT over 400. A Luis Alberto retractor was placed and the mediastinum exposed, the pericardium was opened and the edges tacked to the wound. Cannulation sutures were placed in the ascending aorta and right atrium. Small cannulas were placed and aorto right atrial cardiopulmonary bypass was started. Cardioplegia cannulas were placed and then the ascending aorta was clamped. One liter of cold blood cardioplegia was given in an antegrade and retrograde fashion to achieved diastolic arrest and further doses every 15 minutes thereafter. Constructions of grafts were done in the sequence distal - proximal between the reversed saphenous vein graft and each coronary targets. Grafts were constructed to the PDA and obtuse marginal 1 coronary arteries and plegia given between sequences after de-airing the aortic root.  Following, a 45 mm atrial clip device was deployed at the base of the left atrial appendage with good occlusion.  Then, the LIMA was anastomosed to the mid LAD without difficulty and anastomosis was checked.  I used a 7.0 the Prolene continuous suture in end-to-side fashion.  After confirmation of good flow and no leakage then the bulldog was reapplied to the pedicle.    Following, a left atriotomy was performed through the right interatrial groove. The left atrial cavity and the mitral valve were exposed using self retracted blades attached to the retractor. The valve was examined thoroughly.  MV REPAIR:  The mitral valve was systematically evaluated to identify the etiology using valve hooks. The problem was prolapse of the medial part of P2 and lateral part of P3 with ruptured cord.  I used 4-0 [place]chard-Jatinder pledgeted sutures which were anchored in the posterior belly of the posterior medial papillary muscle and then the free  limbs attached to the medial side of the free edge of P2 and close lateral P3.  The cleft between both scallops was closed with interrupted 5-0 Prolene sutures.  The length of the suture was adjusted and the sutures were tied with 10 kn.. Then, 2.0 Ethibond suture were placed in a plicating fashion from trigone to trigone posteriorly around the annulus. A ring was selected by measuring the inter-trigonal distance and the AP distance of the anterior leaflet. The sutures were passed through the 34 mm Medtronic flexible band and the knots secured. The valve was tested by injecting saline and no residual leak was seen. Then the atrium was closed using a 3.0 prolene running sutures, the chamber de-aired and suture tied down.  Of note, the core knot device was used to secure the ring.  The patient was systemically rewarmed, the 1 dose of cardioplegia was given and the following the aortic clamp was removed as well as the LIMA bulldog clamp. All anastomoses were checked and had good flow and morphology. The suture lines were checked for hemostasis as well. The left pleural space was suctioned and the lungs ventilated. The heart was paced till regular atrial rhythm resumed. I allowed the heart to eject and the left heart chambers were de-aired using the standard techniques under REJI guidance.  Low-dose epinephrine Primacor was used to enhance hemodynamics.  The right side of the ventricle was dysfunctional initially due to the passage of air and eventually improved.  Once hemodynamics were acceptable, then the CPB was discontinued and the venous and cardioplegia cannulas removed. The matching dose of protamine was given and the aortic cannula removed as well. AV temporary wires and pleural and mediastinal chest tubes were placed and the wound sprayed with platelet rich plasma.  The perioperative REJI showed the repaired mitral valve fully competent without prolapse and no leakage.  The sternum was closed with single and  double wires and soft tissue in layers of reabsorbable material. The wounds were covered with sterile dressings.    Specimen removed: None    CPB time: 110 minutes    Aortic clamp time: 94 minutes       Complications: None           Disposition: Cardiovascular recovery room           Condition: Critical but stable.

## 2025-02-10 NOTE — PROGRESS NOTES
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 Alcantara. REJI showed LVEF 45%, mild RV dysfunction, hypokinesis of intero/anteroseptal wall, dilated aortic root 4.4cm, severe MR --> mild MR. He came off of CPB on milrinone/epi and had some worsened RV dysfunction related to air that improved quickly, epi was d/c'd. He received 1unit platelets and 550ml cellsaver, 3 shocks. He was brought to the CVICU on milrinone .25, norepi, sedation and insulin. Initial CI 2.1 with CVP 7.    Hospital Course: n/a    Daily Assessment and Plan 2/10: CI improved to 2.4 after 1L of volume resuscitation, however he still remains in cardiogenic shock on escalating pressors. Metabolic acidosis that is mixed gap/non-gap --> BHOB elevated at 1.9 indicative of starvation ketosis. 1 amp bicarb given in setting of worsening hypotension and D10 @30 infusing in addition to insulin drip to address. Will start vaso in addition to norepi to maintain MAP >65 and continue gentle volume resuscitation. Maintain milrinone support for now 2/2 RV dysfunction seen in OR, may be able to wean slightly to address vasodilation if CI remains >2.2. If pressor requirement continues to increase, will consider stress dose steroids for his h/o steroid use for PMR, though he has not taken in >3 months per pre-op visit. Chest tube output minimal       Relevant Physical Exam:  Clear lung sounds bilaterally  Distal extremities warm and well perfused, 1+ pulses  bilaterally    Neuro: propofol/precedex for sedation in immediate post-op period. Wean when hemodynamically appropriate for SBT. Pain control w/ morphine vs. Apap vs. Oxy prn    CV: severe MR and CAD now s/p mitral repair and CABG. ASA + statin, no BB 2/2 pressors   Rhythm: NSR in the 80s, epicardial wires in place if needed    Pulm: maintain mechanical ventilation for now, adjust vent settings for appropriate oxygenation/ventilation.    Renal: CKD s/p partial nephrectomy, baseline Cr ~1.3. Appropriate UOP at this time, continue volume resuscitation as needed. Replete electrolytes. Metabolic acidosis w/ starvation ketosis    GI: start bowel regimen to avoid constipation/ileus. NPO for now, advance diet once appropriate. PPI for prophy.    Endo: stress induced hyperglycemia, A1C 5.5. Insulin drip for goal euglycemia, transition when appropriate. Synthroid for hypothyroidism    ID: periop cefazolin for prophylaxis. He does have allergies listed to keflex, however received intra-op cefazolin without issue.    Heme: acute blood loss anemia, received 1 plts and 550 cellsaver in OR. Follow chest tube output closely, transfuse as necessary. Start Lovenox POD#1 for prophy    acetaminophen, 1,000 mg, Intravenous, Q8H  acetaminophen, 650 mg, Oral, Q4H   Or  acetaminophen, 650 mg, Oral, Q4H   Or  acetaminophen, 650 mg, Rectal, Q4H  [START ON 2/11/2025] aspirin, 81 mg, Oral, Daily  atorvastatin, 40 mg, Oral, Nightly  chlorhexidine, 15 mL, Mouth/Throat, Q12H  [START ON 2/11/2025] enoxaparin, 40 mg, Subcutaneous, Daily  [START ON 2/11/2025] levothyroxine, 100 mcg, Oral, Daily  magnesium sulfate, 2 g, Intravenous, Q8H  metoclopramide, 10 mg, Intravenous, Q6H  metoprolol tartrate, 12.5 mg, Oral, Q12H  [START ON 2/11/2025] montelukast, 10 mg, Oral, Nightly  mupirocin, 1 Application, Each Nare, BID  [START ON 2/11/2025] pantoprazole, 40 mg, Oral, QAM  [START ON 2/11/2025] senna-docusate sodium, 2 tablet, Oral, Nightly  [START ON  2/11/2025] vancomycin, 1,000 mg, Intravenous, Q24H  vancomycin, 20 mg/kg, Intravenous, Once    ------------------------------------------------------------------------------------------------------------------------------------------------------  Prophy: HOB elevated + oral care + scds + funez stat lock + PPI + subglottic suction + no chemical DVT prophy 2/2 risk for bleeding  Code Status: full      Critical Care time: 41 mins on 2/10/25 by Anisa Tripathi MD for the assessment and treatment of: interpretation of serial cardiac output measurements, evaluation of CXR, interpretation of serial blood gases, ventilator management, cardiogenic shock, hypotension

## 2025-02-10 NOTE — PROGRESS NOTES
[facility] Clinical Pharmacy Services: Vancomycin Pharmacokinetic Initial Consult Note    Scott Murphy is a 79 y.o. male who is on day 1 of pharmacy to dose vancomycin.    Indication: Surgical Prophylaxis  Consulting Provider: Francesca Jordan  Planned Duration of Therapy: 2 days  Loading Dose Ordered or Given: 1500 mg on 2/10 at 1330(ordered)  MRSA PCR performed: no; Result: n/a  Culture/Source: n/a  Target: Dose by Levels  Pertinent Vanc Dosing History: n/a  Other Antimicrobials: n/a    Vitals/Labs  Ht:  ; Wt:    Temp Readings from Last 1 Encounters:   02/10/25 98.2 °F (36.8 °C) (Oral)    Estimated Creatinine Clearance: 45.5 mL/min (A) (by C-G formula based on SCr of 1.33 mg/dL (H)).       Results from last 7 days   Lab Units 02/10/25  1144 02/10/25  1056 02/07/25  0758   CREATININE mg/dL 1.33*  --  1.34*   WBC 10*3/mm3 13.52* 13.24* 8.79     Assessment/Plan:    Vancomycin Dose:   load with 20 mg/kg x 1 (not admin yet) iv followed by 1000 mg iv q24 (to start tomorrow) x 2 doses to complete regimen.  No trough levels unless scr worsens or therapy is extended.      Pharmacy will follow patient's kidney function and will adjust doses and obtain levels as necessary. Thank you for involving pharmacy in this patient's care. Please contact pharmacy with any questions or concerns.                           Massimo Blackmon [facility]  Clinical Pharmacist

## 2025-02-10 NOTE — ANESTHESIA PROCEDURE NOTES
Arterial Line      Patient reassessed immediately prior to procedure    Patient location during procedure: OR  Start time: 2/10/2025 6:58 AM  Stop Time:2/10/2025 7:02 AM       Line placed for hemodynamic monitoring and ABGs/Labs/ISTAT.  Performed By   Anesthesiologist: Uli Irving MD   Preanesthetic Checklist  Completed: patient identified, IV checked, site marked, risks and benefits discussed, surgical consent, monitors and equipment checked, pre-op evaluation and timeout performed  Arterial Line Prep    Sterile Tech: gloves, mask, cap and sterile barriers  Prep: ChloraPrep  Patient monitoring: blood pressure monitoring, continuous pulse oximetry and EKG  Arterial Line Procedure   Laterality:left  Location:  radial artery  Catheter size: 20 G   Guidance: ultrasound guided  PROCEDURE NOTE/ULTRASOUND INTERPRETATION.  Using ultrasound guidance the potential vascular sites for insertion of the catheter were visualized to determine the patency of the vessel to be used for vascular access.  After selecting the appropriate site for insertion, the needle was visualized under ultrasound being inserted into the radial artery, followed by ultrasound confirmation of wire and catheter placement. There were no abnormalities seen on ultrasound; an image was taken; and the patient tolerated the procedure with no complications.   Number of attempts: 1  Successful placement: yes   Post Assessment   Dressing Type: occlusive dressing applied, secured with tape and wrist guard applied.   Complications no  Circ/Move/Sens Assessment: unchanged.   Patient Tolerance: patient tolerated the procedure well with no apparent complications

## 2025-02-10 NOTE — PROGRESS NOTES
[facility] Clinical Pharmacy Services: Vancomycin Pharmacokinetic Initial Consult Note    Scott Murphy is a 79 y.o. male who is on day 2 of pharmacy to dose vancomycin.    Indication: Surgical Prophylaxis  Consulting Provider: Francesca Jordan  Planned Duration of Therapy: 2 days  Loading Dose Ordered or Given: 1500 mg on 2/10 at 1150 (ordered)  MRSA PCR performed: no; Result: n/a  Culture/Source: n/a  Target: Dose by Levels  Pertinent Vanc Dosing History: n/a  Other Antimicrobials: n/a    Vitals/Labs  Ht:  ; Wt:    Temp Readings from Last 1 Encounters:   02/10/25 98.2 °F (36.8 °C) (Oral)    Estimated Creatinine Clearance: 45.2 mL/min (A) (by C-G formula based on SCr of 1.34 mg/dL (H)).       Results from last 7 days   Lab Units 02/10/25  1056 02/07/25  0758   CREATININE mg/dL  --  1.34*   WBC 10*3/mm3 13.24* 8.79     Assessment/Plan:    Vancomycin Dose:   load with 20 mg/kg x 1 iv now. When labs are back will start scheduled regimen.      Pharmacy will follow patient's kidney function and will adjust doses and obtain levels as necessary. Thank you for involving pharmacy in this patient's care. Please contact pharmacy with any questions or concerns.                           Massimo Blackmon [facility]  Clinical Pharmacist

## 2025-02-10 NOTE — PLAN OF CARE
Goal Outcome Evaluation:              Outcome Evaluation: POD0, CABG x3, MVR, SUDHIR. Out at 1145. hypotension requiring high doses of pressors. started on dextrose drip for stavation acidosis. working towards extubation, will continue plan of care.

## 2025-02-10 NOTE — ANESTHESIA PROCEDURE NOTES
Central Line      Patient reassessed immediately prior to procedure    Patient location during procedure: OR  Start time: 2/10/2025 7:20 AM  Stop Time:2/10/2025 7:30 AM  Indications: vascular access  Staff  Anesthesiologist: Uli Irving MD  Preanesthetic Checklist  Completed: patient identified, IV checked, site marked, risks and benefits discussed, surgical consent, monitors and equipment checked, pre-op evaluation and timeout performed  Central Line Prep  Sterile Tech:cap, gloves, gown, mask and sterile barriers  Prep: chloraprep  Patient monitoring: blood pressure monitoring, continuous pulse oximetry and EKG  Central Line Procedure  Laterality:right  Location:internal jugular  Catheter Type:MAC and double lumen  Catheter Size:9 Fr  Guidance:ultrasound guided  PROCEDURE NOTE/ULTRASOUND INTERPRETATION.  Using ultrasound guidance the potential vascular sites for insertion of the catheter were visualized to determine the patency of the vessel to be used for vascular access.  After selecting the appropriate site for insertion, the needle was visualized under ultrasound being inserted into the internal jugular vein, followed by ultrasound confirmation of wire and catheter placement. There were no abnormalities seen on ultrasound; an image was taken; and the patient tolerated the procedure with no complications. Images: still images obtained, printed/placed on chart  Assessment  Post procedure:biopatch applied, line sutured and occlusive dressing applied  Assessement:blood return through all ports, free fluid flow, no pneumothorax on x-ray and placement verified by x-ray  Complications:no  Patient Tolerance:patient tolerated the procedure well with no apparent complications

## 2025-02-10 NOTE — ANESTHESIA PROCEDURE NOTES
Diagnostic IntraOp Lang    Procedure Performed: Diagnostic IntraOp Lang       Start Time:        End Time:      Preanesthesia Checklist:  Patient identified, IV assessed, risks and benefits discussed, monitors and equipment assessed, procedure being performed at surgeon's request and anesthesia consent obtained.    General Procedure Information  Diagnostic Indications for Echo:  assessment of ascending aorta, assessment of surgical repair, defect repair evaluation and hemodynamic monitoring  Physician Requesting Echo: Migel Alcantara MD  Location performed:  OR  Intubated  Bite block placed  Heart visualized  Probe Insertion:  Easy  Probe Type:  Multiplane  Modalities:  2D only, color flow mapping, continuous wave Doppler and pulse wave Doppler    Echocardiographic and Doppler Measurements    Ventricles    Right Ventricle:  Cavity size normal.  Global function mildly impaired.    Left Ventricle:  Cavity size normal.  Global Function mildly impaired.  Ejection Fraction 45%.      Ventricular Regional Function:    Wall Motion Comments:       Hypokinetic infero/anteroseptum    Valves    Aortic Valve:  Annulus normal.  Stenosis not present.  Regurgitation mild.  Leaflets normal.      Mitral Valve:  Annulus normal.  Regurgitation severe.  Leaflet motions prolapsed and flail.  Specific leaflet segments with abnormal motions are described in the following comments:       P2    Tricuspid Valve:  Annulus normal.  Regurgitation mild.    Pulmonic Valve:  Annulus normal.  Regurgitation trace.        Aorta    Ascending Aorta:  Size dilated.  Diameter 3.8 cm.  Dissection not present.    Aortic Arch:  Size normal.  Dissection not present.    Descending Aorta:  Size normal.  Dissection not present.    Other Aortic Findings:       Dilated aortic root measuring 4.4 cm    Atria    Right Atrium:  Size normal.    Left Atrium:  Size dilated.  Left atrial appendage normal.              Other Findings  Pericardium:  normal  Pleural  Effusion:  none  Pulmonary Arteries:  normal  Pulmonary Venous Flow:  blunted (decreased) systolic flow    Anesthesia Information  Performed Personally  Anesthesiologist:  Uli Irving MD      Echocardiogram Comments:       79 year old male with multivessel CAD, severe MR and dilated aortic root.  - Non-dilated LV, +LVH with mildly reduced LV function (hypokinetic inferoseptum, anteroseptum)  - Normal RV size, mildly reduced RV function  - Severe eccentric MR; anteriorly directed jet r/t prolapsed posterior leaflet (major component appears to be P2 with some P3 involvement, ?flail component); blunted PV flow, +dilated LA  - Mild central AI, mild TR  - Dilated aortic root measuring 4.4 cm  - SUDHIR without thrombus  - No intracardiac shunt  - No pericardial/pleural effusion  - No aortic dissection    S/p CABG, MV repair, SUDHIR clip:  - Normal LV function  - RV dysfunction initially r/t air but normal prior to closing chest  - Complex MV repair; mild MR remains, mean gradient 1 mmHg  - SUDHIR appears clipped  - No aortic dissection    Findings discussed with surgical team

## 2025-02-11 ENCOUNTER — APPOINTMENT (OUTPATIENT)
Dept: GENERAL RADIOLOGY | Facility: HOSPITAL | Age: 80
DRG: 219 | End: 2025-02-11
Payer: MEDICARE

## 2025-02-11 LAB
ALBUMIN SERPL-MCNC: 3.8 G/DL (ref 3.5–5.2)
ANION GAP SERPL CALCULATED.3IONS-SCNC: 8 MMOL/L (ref 5–15)
ARTERIAL PATENCY WRIST A: ABNORMAL
ATMOSPHERIC PRESS: 754.9 MMHG
B-OH-BUTYR SERPL-SCNC: 0.56 MMOL/L (ref 0.02–0.27)
BASE EXCESS BLDA CALC-SCNC: -2.8 MMOL/L (ref 0–2)
BASOPHILS # BLD AUTO: 0.03 10*3/MM3 (ref 0–0.2)
BASOPHILS NFR BLD AUTO: 0.2 % (ref 0–1.5)
BDY SITE: ABNORMAL
BH BB BLOOD EXPIRATION DATE: NORMAL
BH BB BLOOD TYPE BARCODE: 7300
BH BB DISPENSE STATUS: NORMAL
BH BB PRODUCT CODE: NORMAL
BH BB UNIT NUMBER: NORMAL
BUN SERPL-MCNC: 16 MG/DL (ref 8–23)
BUN/CREAT SERPL: 13.4 (ref 7–25)
CA-I SERPL ISE-MCNC: 1.2 MMOL/L (ref 1.15–1.35)
CALCIUM SPEC-SCNC: 8.6 MG/DL (ref 8.6–10.5)
CHLORIDE SERPL-SCNC: 113 MMOL/L (ref 98–107)
CO2 BLDA-SCNC: 23.2 MMOL/L (ref 23–27)
CO2 SERPL-SCNC: 20 MMOL/L (ref 22–29)
CREAT SERPL-MCNC: 1.19 MG/DL (ref 0.76–1.27)
DEPRECATED RDW RBC AUTO: 49.5 FL (ref 37–54)
EGFRCR SERPLBLD CKD-EPI 2021: 62.5 ML/MIN/1.73
EOSINOPHIL # BLD AUTO: 0 10*3/MM3 (ref 0–0.4)
EOSINOPHIL NFR BLD AUTO: 0 % (ref 0.3–6.2)
ERYTHROCYTE [DISTWIDTH] IN BLOOD BY AUTOMATED COUNT: 15.8 % (ref 12.3–15.4)
GAS FLOW AIRWAY: 2 LPM
GLUCOSE BLDC GLUCOMTR-MCNC: 118 MG/DL (ref 70–130)
GLUCOSE BLDC GLUCOMTR-MCNC: 133 MG/DL (ref 70–130)
GLUCOSE BLDC GLUCOMTR-MCNC: 134 MG/DL (ref 70–130)
GLUCOSE BLDC GLUCOMTR-MCNC: 134 MG/DL (ref 70–130)
GLUCOSE BLDC GLUCOMTR-MCNC: 135 MG/DL (ref 70–130)
GLUCOSE BLDC GLUCOMTR-MCNC: 139 MG/DL (ref 70–130)
GLUCOSE BLDC GLUCOMTR-MCNC: 148 MG/DL (ref 70–130)
GLUCOSE BLDC GLUCOMTR-MCNC: 149 MG/DL (ref 70–130)
GLUCOSE BLDC GLUCOMTR-MCNC: 152 MG/DL (ref 70–130)
GLUCOSE BLDC GLUCOMTR-MCNC: 172 MG/DL (ref 70–130)
GLUCOSE SERPL-MCNC: 136 MG/DL (ref 65–99)
HCO3 BLDA-SCNC: 22 MMOL/L (ref 22–28)
HCT VFR BLD AUTO: 32.5 % (ref 37.5–51)
HEMODILUTION: NO
HGB BLD-MCNC: 10 G/DL (ref 13–17.7)
IMM GRANULOCYTES # BLD AUTO: 0.06 10*3/MM3 (ref 0–0.05)
IMM GRANULOCYTES NFR BLD AUTO: 0.4 % (ref 0–0.5)
INR PPP: 1.36 (ref 0.9–1.1)
LYMPHOCYTES # BLD AUTO: 0.53 10*3/MM3 (ref 0.7–3.1)
LYMPHOCYTES NFR BLD AUTO: 3.9 % (ref 19.6–45.3)
MAGNESIUM SERPL-MCNC: 2.5 MG/DL (ref 1.6–2.4)
MCH RBC QN AUTO: 26.5 PG (ref 26.6–33)
MCHC RBC AUTO-ENTMCNC: 30.8 G/DL (ref 31.5–35.7)
MCV RBC AUTO: 86 FL (ref 79–97)
MODALITY: ABNORMAL
MONOCYTES # BLD AUTO: 1.11 10*3/MM3 (ref 0.1–0.9)
MONOCYTES NFR BLD AUTO: 8.2 % (ref 5–12)
NEUTROPHILS NFR BLD AUTO: 11.88 10*3/MM3 (ref 1.7–7)
NEUTROPHILS NFR BLD AUTO: 87.3 % (ref 42.7–76)
PCO2 BLDA: 37.3 MM HG (ref 35–45)
PH BLDA: 7.38 PH UNITS (ref 7.35–7.45)
PHOSPHATE SERPL-MCNC: 4.6 MG/DL (ref 2.5–4.5)
PLATELET # BLD AUTO: 108 10*3/MM3 (ref 140–450)
PMV BLD AUTO: 10.2 FL (ref 6–12)
PO2 BLDA: 85.9 MM HG (ref 80–100)
POTASSIUM SERPL-SCNC: 4.4 MMOL/L (ref 3.5–5.2)
PROTHROMBIN TIME: 16.8 SECONDS (ref 11.7–14.2)
QT INTERVAL: 377 MS
QT INTERVAL: 412 MS
QTC INTERVAL: 391 MS
QTC INTERVAL: 451 MS
RBC # BLD AUTO: 3.78 10*6/MM3 (ref 4.14–5.8)
SAO2 % BLDCOA: 96.4 % (ref 92–98.5)
SODIUM SERPL-SCNC: 141 MMOL/L (ref 136–145)
TOTAL RATE: 20 BREATHS/MINUTE
UNIT  ABO: NORMAL
UNIT  RH: NORMAL
WBC NRBC COR # BLD AUTO: 13.61 10*3/MM3 (ref 3.4–10.8)

## 2025-02-11 PROCEDURE — 99222 1ST HOSP IP/OBS MODERATE 55: CPT | Performed by: STUDENT IN AN ORGANIZED HEALTH CARE EDUCATION/TRAINING PROGRAM

## 2025-02-11 PROCEDURE — 80069 RENAL FUNCTION PANEL: CPT | Performed by: NURSE PRACTITIONER

## 2025-02-11 PROCEDURE — 25810000003 SODIUM CHLORIDE 0.9 % SOLUTION 250 ML FLEX CONT: Performed by: NURSE PRACTITIONER

## 2025-02-11 PROCEDURE — 25010000002 HYDROCORTISONE SOD SUC (PF) 100 MG RECONSTITUTED SOLUTION: Performed by: ANESTHESIOLOGY

## 2025-02-11 PROCEDURE — 97162 PT EVAL MOD COMPLEX 30 MIN: CPT

## 2025-02-11 PROCEDURE — 93010 ELECTROCARDIOGRAM REPORT: CPT | Performed by: INTERNAL MEDICINE

## 2025-02-11 PROCEDURE — 25010000002 VANCOMYCIN 1 G RECONSTITUTED SOLUTION 1 EACH VIAL: Performed by: NURSE PRACTITIONER

## 2025-02-11 PROCEDURE — 25010000002 ENOXAPARIN PER 10 MG: Performed by: NURSE PRACTITIONER

## 2025-02-11 PROCEDURE — 85025 COMPLETE CBC W/AUTO DIFF WBC: CPT | Performed by: NURSE PRACTITIONER

## 2025-02-11 PROCEDURE — 99024 POSTOP FOLLOW-UP VISIT: CPT | Performed by: NURSE PRACTITIONER

## 2025-02-11 PROCEDURE — 25010000002 METOCLOPRAMIDE PER 10 MG: Performed by: NURSE PRACTITIONER

## 2025-02-11 PROCEDURE — 93005 ELECTROCARDIOGRAM TRACING: CPT | Performed by: NURSE PRACTITIONER

## 2025-02-11 PROCEDURE — 82948 REAGENT STRIP/BLOOD GLUCOSE: CPT

## 2025-02-11 PROCEDURE — 25010000002 MAGNESIUM SULFATE IN D5W 1G/100ML (PREMIX) 1-5 GM/100ML-% SOLUTION: Performed by: NURSE PRACTITIONER

## 2025-02-11 PROCEDURE — 25010000002 CALCIUM GLUCONATE 2-0.675 GM/100ML-% SOLUTION: Performed by: NURSE PRACTITIONER

## 2025-02-11 PROCEDURE — 83735 ASSAY OF MAGNESIUM: CPT | Performed by: NURSE PRACTITIONER

## 2025-02-11 PROCEDURE — 71045 X-RAY EXAM CHEST 1 VIEW: CPT

## 2025-02-11 PROCEDURE — 97530 THERAPEUTIC ACTIVITIES: CPT

## 2025-02-11 PROCEDURE — 25010000002 FUROSEMIDE PER 20 MG: Performed by: NURSE PRACTITIONER

## 2025-02-11 PROCEDURE — 82010 KETONE BODYS QUAN: CPT | Performed by: ANESTHESIOLOGY

## 2025-02-11 PROCEDURE — 82330 ASSAY OF CALCIUM: CPT | Performed by: NURSE PRACTITIONER

## 2025-02-11 PROCEDURE — 25010000002 THIAMINE HCL 200 MG/2ML SOLUTION 2 ML VIAL: Performed by: ANESTHESIOLOGY

## 2025-02-11 PROCEDURE — 85610 PROTHROMBIN TIME: CPT | Performed by: NURSE PRACTITIONER

## 2025-02-11 PROCEDURE — 82803 BLOOD GASES ANY COMBINATION: CPT

## 2025-02-11 RX ORDER — IBUPROFEN 600 MG/1
1 TABLET ORAL
Status: DISCONTINUED | OUTPATIENT
Start: 2025-02-11 | End: 2025-02-16

## 2025-02-11 RX ORDER — OXYCODONE HYDROCHLORIDE 5 MG/1
5 TABLET ORAL EVERY 4 HOURS PRN
Status: DISCONTINUED | OUTPATIENT
Start: 2025-02-11 | End: 2025-02-17 | Stop reason: HOSPADM

## 2025-02-11 RX ORDER — DEXTROSE MONOHYDRATE 25 G/50ML
25 INJECTION, SOLUTION INTRAVENOUS
Status: DISCONTINUED | OUTPATIENT
Start: 2025-02-11 | End: 2025-02-16

## 2025-02-11 RX ORDER — NICOTINE POLACRILEX 4 MG
15 LOZENGE BUCCAL
Status: DISCONTINUED | OUTPATIENT
Start: 2025-02-11 | End: 2025-02-16

## 2025-02-11 RX ORDER — CALCIUM GLUCONATE 20 MG/ML
2000 INJECTION, SOLUTION INTRAVENOUS ONCE
Status: COMPLETED | OUTPATIENT
Start: 2025-02-11 | End: 2025-02-11

## 2025-02-11 RX ORDER — INSULIN LISPRO 100 [IU]/ML
2-9 INJECTION, SOLUTION INTRAVENOUS; SUBCUTANEOUS
Status: DISCONTINUED | OUTPATIENT
Start: 2025-02-11 | End: 2025-02-16

## 2025-02-11 RX ORDER — FUROSEMIDE 10 MG/ML
40 INJECTION INTRAMUSCULAR; INTRAVENOUS ONCE
Status: COMPLETED | OUTPATIENT
Start: 2025-02-11 | End: 2025-02-11

## 2025-02-11 RX ORDER — POLYETHYLENE GLYCOL 3350 17 G/17G
17 POWDER, FOR SOLUTION ORAL DAILY
Status: DISCONTINUED | OUTPATIENT
Start: 2025-02-12 | End: 2025-02-17 | Stop reason: HOSPADM

## 2025-02-11 RX ORDER — GABAPENTIN 100 MG/1
100 CAPSULE ORAL EVERY 12 HOURS SCHEDULED
Status: DISCONTINUED | OUTPATIENT
Start: 2025-02-11 | End: 2025-02-17 | Stop reason: HOSPADM

## 2025-02-11 RX ORDER — HYDROCORTISONE SODIUM SUCCINATE 100 MG/2ML
50 INJECTION INTRAMUSCULAR; INTRAVENOUS EVERY 12 HOURS
Status: COMPLETED | OUTPATIENT
Start: 2025-02-11 | End: 2025-02-12

## 2025-02-11 RX ORDER — GUAIFENESIN 600 MG/1
1200 TABLET, EXTENDED RELEASE ORAL EVERY 12 HOURS SCHEDULED
Status: DISCONTINUED | OUTPATIENT
Start: 2025-02-11 | End: 2025-02-17 | Stop reason: HOSPADM

## 2025-02-11 RX ORDER — DOCUSATE SODIUM 100 MG/1
100 CAPSULE, LIQUID FILLED ORAL 2 TIMES DAILY
Status: DISCONTINUED | OUTPATIENT
Start: 2025-02-12 | End: 2025-02-17 | Stop reason: HOSPADM

## 2025-02-11 RX ORDER — POTASSIUM CHLORIDE 750 MG/1
20 TABLET, EXTENDED RELEASE ORAL ONCE
Status: COMPLETED | OUTPATIENT
Start: 2025-02-11 | End: 2025-02-11

## 2025-02-11 RX ORDER — COLCHICINE 0.6 MG/1
0.6 TABLET ORAL DAILY
Status: DISCONTINUED | OUTPATIENT
Start: 2025-02-11 | End: 2025-02-17 | Stop reason: HOSPADM

## 2025-02-11 RX ADMIN — CALCIUM GLUCONATE 2000 MG: 20 INJECTION, SOLUTION INTRAVENOUS at 08:00

## 2025-02-11 RX ADMIN — ASPIRIN 81 MG: 81 TABLET, COATED ORAL at 08:40

## 2025-02-11 RX ADMIN — COLCHICINE 0.6 MG: 0.6 TABLET ORAL at 08:40

## 2025-02-11 RX ADMIN — HYDROCODONE BITARTRATE AND ACETAMINOPHEN 2 TABLET: 5; 325 TABLET ORAL at 21:42

## 2025-02-11 RX ADMIN — THIAMINE HYDROCHLORIDE 500 MG: 100 INJECTION, SOLUTION INTRAMUSCULAR; INTRAVENOUS at 01:09

## 2025-02-11 RX ADMIN — PANTOPRAZOLE SODIUM 40 MG: 40 TABLET, DELAYED RELEASE ORAL at 08:00

## 2025-02-11 RX ADMIN — MUPIROCIN 1 APPLICATION: 20 OINTMENT TOPICAL at 21:44

## 2025-02-11 RX ADMIN — MAGNESIUM SULFATE HEPTAHYDRATE 2 G: 1 INJECTION, SOLUTION INTRAVENOUS at 03:20

## 2025-02-11 RX ADMIN — GABAPENTIN 100 MG: 100 CAPSULE ORAL at 08:40

## 2025-02-11 RX ADMIN — ENOXAPARIN SODIUM 40 MG: 100 INJECTION SUBCUTANEOUS at 16:11

## 2025-02-11 RX ADMIN — POTASSIUM CHLORIDE 20 MEQ: 750 TABLET, EXTENDED RELEASE ORAL at 08:40

## 2025-02-11 RX ADMIN — HYDROCORTISONE SODIUM SUCCINATE 50 MG: 100 INJECTION, POWDER, FOR SOLUTION INTRAMUSCULAR; INTRAVENOUS at 16:11

## 2025-02-11 RX ADMIN — MONTELUKAST 10 MG: 10 TABLET, FILM COATED ORAL at 21:44

## 2025-02-11 RX ADMIN — HYDROCORTISONE SODIUM SUCCINATE 50 MG: 100 INJECTION, POWDER, FOR SOLUTION INTRAMUSCULAR; INTRAVENOUS at 04:56

## 2025-02-11 RX ADMIN — METOCLOPRAMIDE 10 MG: 5 INJECTION, SOLUTION INTRAMUSCULAR; INTRAVENOUS at 04:56

## 2025-02-11 RX ADMIN — HYDROCORTISONE SODIUM SUCCINATE 50 MG: 100 INJECTION, POWDER, FOR SOLUTION INTRAMUSCULAR; INTRAVENOUS at 00:01

## 2025-02-11 RX ADMIN — 0.12% CHLORHEXIDINE GLUCONATE 15 ML: 1.2 RINSE ORAL at 00:01

## 2025-02-11 RX ADMIN — HYDROCODONE BITARTRATE AND ACETAMINOPHEN 2 TABLET: 5; 325 TABLET ORAL at 01:08

## 2025-02-11 RX ADMIN — 0.12% CHLORHEXIDINE GLUCONATE 15 ML: 1.2 RINSE ORAL at 11:45

## 2025-02-11 RX ADMIN — MUPIROCIN 1 APPLICATION: 20 OINTMENT TOPICAL at 08:40

## 2025-02-11 RX ADMIN — THIAMINE HYDROCHLORIDE 500 MG: 100 INJECTION, SOLUTION INTRAMUSCULAR; INTRAVENOUS at 08:40

## 2025-02-11 RX ADMIN — GUAIFENESIN 1200 MG: 600 TABLET, MULTILAYER, EXTENDED RELEASE ORAL at 08:40

## 2025-02-11 RX ADMIN — FUROSEMIDE 40 MG: 10 INJECTION, SOLUTION INTRAMUSCULAR; INTRAVENOUS at 08:40

## 2025-02-11 RX ADMIN — METOCLOPRAMIDE 10 MG: 5 INJECTION, SOLUTION INTRAMUSCULAR; INTRAVENOUS at 00:01

## 2025-02-11 RX ADMIN — GUAIFENESIN 1200 MG: 600 TABLET, MULTILAYER, EXTENDED RELEASE ORAL at 21:44

## 2025-02-11 RX ADMIN — GABAPENTIN 100 MG: 100 CAPSULE ORAL at 21:44

## 2025-02-11 RX ADMIN — HYDROCODONE BITARTRATE AND ACETAMINOPHEN 2 TABLET: 5; 325 TABLET ORAL at 12:44

## 2025-02-11 RX ADMIN — SENNOSIDES AND DOCUSATE SODIUM 2 TABLET: 50; 8.6 TABLET ORAL at 21:44

## 2025-02-11 RX ADMIN — LEVOTHYROXINE SODIUM 100 MCG: 100 TABLET ORAL at 08:40

## 2025-02-11 RX ADMIN — HYDROCODONE BITARTRATE AND ACETAMINOPHEN 2 TABLET: 5; 325 TABLET ORAL at 05:24

## 2025-02-11 RX ADMIN — VANCOMYCIN HYDROCHLORIDE 1000 MG: 1 INJECTION, POWDER, LYOPHILIZED, FOR SOLUTION INTRAVENOUS at 10:56

## 2025-02-11 RX ADMIN — ATORVASTATIN CALCIUM 40 MG: 20 TABLET, FILM COATED ORAL at 21:44

## 2025-02-11 NOTE — CONSULTS
Date of Hospital Visit: 25  Encounter Provider: Thee Mustafa MD  Place of Service: [facility] CARDIOLOGY  Patient Name: Scott Murphy  :1945  Referral Provider: Dimitri Vaughn PA-C    Chief complaint  Status post CABG x 3, mitral valve repair    History of Present Illness  79-year-old man, followed by Dr. Roberts, with polymyalgia rheumatica, polycythemia vera, renal cell carcinoma status post partial nephrectomy, hypertension, hypothyroidism, Raynaud's disease, thoracic aortic aneurysm, recently diagnosed with severe mitral regurgitation secondary to mitral valve prolapse as well as multivessel CAD who presented for and underwent elective CABG x 3 (LIMA to mid LAD, SVG to PDA, SVG to OM1) mitral valve repair with a 34 mm Medtronic flexible band posterior annuloplasty and chordal repair of P2-P3/cleftoplasty of P2/P3, left atrial appendage closure on 2/10/2025 with Dr. Alcantara.  We have been consulted for postoperative management.    Past Medical History:   Diagnosis Date    Acquired hypothyroidism     Anxiety     Aortic aneurysm     Aortic valve regurgitation     Arthritis     Asthma     Hutton's cyst of knee, right     BBB (bundle branch block)     Coronary artery disease     Disease of thyroid gland     Gout     H/O Muscle pain     Right shoulder and neck area    H/O Radiation treatment     For tonsillar inflammation and infection when he was a child and this led him to develop thyroid cancer.    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 Ganesh Lopez    Left shoulder pain     Myeloproliferative disorder     With polycythemia vera, JAK2 mutation positive requiring periodic phlebotomies (hematocrit above 47).    Polymyalgia rheumatica     Premature atrial  contraction     Prostate cancer     Raynaud disease     Shoulder injury, left, sequela     SOB (shortness of breath) on exertion        Past Surgical History:   Procedure Laterality Date    CARDIAC CATHETERIZATION N/A 1/8/2025    Procedure: Coronary angiography;  Surgeon: Di Roberts MD;  [facility] C[facility];  Service: Cardiovascular;  Laterality: N/A;    CARDIAC CATHETERIZATION N/A 1/8/2025    Procedure: Left heart cath;  Surgeon: Di Roberts MD;  Location:  [facility] CATH INVASIVE LOCATION;  Service: Cardiovascular;  Laterality: N/A;    CARDIAC CATHETERIZATION N/A 1/8/2025    Procedure: Right Heart Cath;  Surgeon: Di Roberts MD;  Location:  [facility] CATH INVASIVE LOCATION;  Service: Cardiovascular;  Laterality: N/A;    CIRCUMCISION N/A 12/17/2018    Procedure: CIRCUMCISION;  Surgeon: Gallo Lopez MD;  Location: [facility] MAIN OR;  Service: Urology    COLONOSCOPY  2010    Documented a tubulovillous adenom that was removed completely. Colonoscopy in 2014 was unremarkable.    COLONOSCOPY N/A 2/20/2020    Procedure: COLONOSCOPY;  Surgeon: Claire Galo MD;  Location: [facility] OR;  Service: Gastroenterology;  Laterality: N/A;  diverticulosis    HERNIA REPAIR      H/O multiple hernia repairs including right inguinal hernia repair in 1981, 2003, and double hernia repair in 2013    KIDNEY SURGERY      PROSTATE BIOPSY      Showed features consistent with prostate cancer    SHOULDER SURGERY  1995    Shoulder repair    THYROIDECTOMY, PARTIAL  1983    For malignant tumor    TOOTH EXTRACTION         Medications Prior to Admission   Medication Sig Dispense Refill Last Dose/Taking    ALPRAZolam (XANAX) 0.5 MG tablet Take 0.5 tablets by mouth At Night As Needed.   Past Week    amLODIPine (NORVASC) 10 MG tablet Take 1 tablet by mouth Daily.   2/9/2025    aspirin 81 MG EC tablet Take 1 tablet by mouth Daily. (Patient taking differently: Take 1 tablet by mouth Daily. Instructed pt to follow md  instructions)   Past Week    atorvastatin (LIPITOR) 10 MG tablet Take 1 tablet by mouth Daily. 30 tablet 5 2/9/2025    B Complex Vitamins (VITAMIN B-COMPLEX PO) Take 1 tablet by mouth Daily. HOLD PRIOR TO SURG   Past Week    levocetirizine (XYZAL) 5 MG tablet Take 1 tablet by mouth Every Evening.   2/9/2025    levothyroxine (SYNTHROID, LEVOTHROID) 100 MCG tablet Take 1 tablet by mouth daily. 30 tablet 6 2/9/2025    losartan (COZAAR) 25 MG tablet TAKE 1 TABLET BY MOUTH DAILY (Patient taking differently: Take 1 tablet by mouth Every Night. Hold night dose on 2/9/2025) 90 tablet 2 Past Week    montelukast (SINGULAIR) 10 MG tablet Take 1 tablet by mouth Every Night.   2/9/2025    zolpidem (AMBIEN) 10 MG tablet Take 1 tablet by mouth At Night As Needed.   Past Week    fluticasone (FLONASE) 50 MCG/ACT nasal spray Administer 1-2 sprays into the nostril(s) as directed by provider Daily. (Patient not taking: Reported on 12/23/2024)   More than a month       Current Meds  Scheduled Meds:acetaminophen, 1,000 mg, Intravenous, Q8H  aspirin, 81 mg, Oral, Daily  atorvastatin, 40 mg, Oral, Nightly  chlorhexidine, 15 mL, Mouth/Throat, Q12H  colchicine, 0.6 mg, Oral, Daily  [START ON 2/12/2025] docusate sodium, 100 mg, Oral, BID  enoxaparin, 40 mg, Subcutaneous, Daily  furosemide, 40 mg, Intravenous, Once  gabapentin, 100 mg, Oral, Q12H  guaiFENesin, 1,200 mg, Oral, Q12H  hydrocortisone sodium succinate, 50 mg, Intravenous, Q6H  insulin lispro, 2-9 Units, Subcutaneous, 4x Daily AC & at Bedtime  levothyroxine, 100 mcg, Oral, Daily  magnesium sulfate, 2 g, Intravenous, Q8H  metoclopramide, 10 mg, Intravenous, Q6H  [Held by provider] metoprolol tartrate, 12.5 mg, Oral, Q12H  montelukast, 10 mg, Oral, Nightly  mupirocin, 1 Application, Each Nare, BID  pantoprazole, 40 mg, Oral, QAM  [START ON 2/12/2025] polyethylene glycol, 17 g, Oral, Daily  potassium chloride, 20 mEq, Oral, Once  senna-docusate sodium, 2 tablet, Oral, Nightly  thiamine  (B-1) IV, 500 mg, Intravenous, Q8H  vancomycin, 1,000 mg, Intravenous, Q24H      Continuous Infusions:dextrose, 30 mL/hr, Last Rate: Stopped (02/10/25 2310)  milrinone, 0.25-0.375 mcg/kg/min, Last Rate: Stopped (02/10/25 1707)  norepinephrine, 0.02-0.2 mcg/kg/min, Last Rate: Stopped (02/10/25 2135)  Pharmacy to dose vancomycin,       PRN Meds:.  acetaminophen **OR** acetaminophen **OR** acetaminophen    albumin human    ALPRAZolam    bisacodyl    bisacodyl    Chlorhexidine Gluconate Cloth    dextrose    dextrose    glucagon (human recombinant)    HYDROcodone-acetaminophen    magnesium hydroxide    methylene blue 105 mg in dextrose (D5W) 5 % 71 mL IVPB    milrinone    Morphine **AND** naloxone    nitroglycerin    norepinephrine    ondansetron    oxyCODONE    Pharmacy to dose vancomycin    polyethylene glycol    Potassium Replacement - Follow Nurse / BPA Driven Protocol    Allergies as of 01/09/2025 - Reviewed 01/08/2025   Allergen Reaction Noted    Diphenhydramine Rash 05/16/2016    Statins Myalgia 05/16/2016    Cephalexin Other (See Comments) 06/23/2024    Amlodipine Other (See Comments) 05/16/2016    Aspirin Unknown - Low Severity 05/16/2016    Latex Rash 05/16/2016    Peanut oil Unknown - Low Severity 05/16/2016    Shellfish-derived products Unknown - Low Severity 05/16/2016       Social History     Socioeconomic History    Marital status:     Years of education: College   Tobacco Use    Smoking status: Never    Smokeless tobacco: Never    Tobacco comments:     caffeine use    Vaping Use    Vaping status: Never Used   Substance and Sexual Activity    Alcohol use: Yes     Alcohol/week: 1.0 - 3.0 standard drink of alcohol     Types: 1 - 3 Glasses of wine per week     Comment: 1 MONTHLY    Drug use: No    Sexual activity: Defer       Family History   Problem Relation Age of Onset    No Known Problems Mother     Heart attack Father     Heart disease Father     Hypertension Father     Diabetes Father      Tuberculosis Maternal Grandmother     No Known Problems Son     Drug abuse Son     Malig Hyperthermia Neg Hx        REVIEW OF SYSTEMS:   12 point ROS was performed and is negative except as outlined in HPI          Objective:   Temp:  [95.9 °F (35.5 °C)-98.2 °F (36.8 °C)] 98.2 °F (36.8 °C)  Heart Rate:  [82-92] 92  Resp:  [18-20] 18  BP: ()/(55-87) 104/76  Arterial Line BP: ()/(50-67) 123/66  FiO2 (%):  [39 %-98 %] 39 %  Body mass index is 24.81 kg/m².  Flowsheet Rows      Flowsheet Row First Filed Value   Admission Height --   Admission Weight 74 kg (163 lb 2.3 oz) Documented at 02/11/2025 0650          Vitals:    02/11/25 0700   BP:    Pulse: 92   Resp:    Temp: 98.2 °F (36.8 °C)   SpO2: 95%       GEN: no distress, alert and oriented  HEENT: NACT, EOMI, moist mucous membranes  Lungs: CTAB, no wheezes, rales or rhonchi  CV: normal rate, regular rhythm, normal S1, S2, no murmurs, +2 radial pulses b/l, no carotid bruit  Abdomen: soft, nontender, nondistended, NABS  Extremities: no edema  Skin: no rash, warm, dry  Heme/Lymph: no bruising  Psych: organized thought, normal behavior and affect      Lab Review:   Results from last 7 days   Lab Units 02/11/25  0215 02/10/25  1144 02/07/25  0758   SODIUM mmol/L 141   < > 140   POTASSIUM mmol/L 4.4   < > 4.6   CHLORIDE mmol/L 113*   < > 104   CO2 mmol/L 20.0*   < > 26.0   BUN mg/dL 16   < > 20   CREATININE mg/dL 1.19   < > 1.34*   CALCIUM mg/dL 8.6   < > 9.4   BILIRUBIN mg/dL  --   --  1.3*   ALK PHOS U/L  --   --  90   ALT (SGPT) U/L  --   --  22   AST (SGOT) U/L  --   --  28   GLUCOSE mg/dL 136*   < > 90    < > = values in this interval not displayed.         Results from last 7 days   Lab Units 02/11/25  0215 02/10/25  1453 02/10/25  1144   WBC 10*3/mm3 13.61* 19.10* 13.52*   HEMOGLOBIN g/dL 10.0* 10.5* 10.3*   HEMATOCRIT % 32.5* 31.8* 31.4*   PLATELETS 10*3/mm3 108* 113* 144     Results from last 7 days   Lab Units 02/11/25  0215 02/10/25  1144 02/10/25  1056  02/07/25  0758   INR  1.36* 1.51* 1.72* 1.02   APTT seconds  --  32.3 30.4 33.7     Results from last 7 days   Lab Units 02/11/25  0215   MAGNESIUM mg/dL 2.5*         I personally viewed and interpreted the patient's EKG/Telemetry data)      Mitral valve insufficiency    Aortic stenosis    Assessment and Plan:  #Severe MR s/p repair  #Multivessel CAD s/p CABG  #HTN  #hypothyroidism  #Raynaud phenomenon  #RCC s/p partial nephrectomy    79-year-old man, followed by Dr. Roberts, with polymyalgia rheumatica, polycythemia vera, renal cell carcinoma status post partial nephrectomy, hypertension, hypothyroidism, Raynaud's disease, thoracic aortic aneurysm, recently diagnosed with severe mitral regurgitation secondary to mitral valve prolapse as well as multivessel CAD who presented for and underwent elective CABG x 3 (LIMA to mid LAD, SVG to PDA, SVG to OM1) mitral valve repair with a 34 mm Medtronic flexible band posterior annuloplasty and chordal repair of P2-P3/cleftoplasty of P2/P3, left atrial appendage closure on 2/10/2025 with Dr. Alcantara.    He is doing great. No pressors/inotropes. He has minimal chest discomfort.    CVP 10-12    - agree with diuresis today  - BB currently on hold as he is paced and underlying is junctional    Thee Harper MD, [facility], [facility]  02/11/25  08:36 EST.

## 2025-02-11 NOTE — PLAN OF CARE
Goal Outcome Evaluation:  Plan of Care Reviewed With: (P) patient        Progress: (P) improving  Outcome Evaluation: (P) Pt seen for PT eval this AM. He is POD1 CABGx3, MVR, and SUDHIR clip. He has a past medical history of HTN, HL, severe MR, hypothyroidism, cancer in 2015, Raynaud disease, and aortic root aneurysm. Pt states that he has 3 steps entering the home and lives with his wife. At baseline, pt is independent with all mobility and ADLs. Upon entering, pt was upright in bed side chair on 2L NC. He is doing well and rating his pain a 1/10. After managing lines and tubes, Pt performed a STS with min A/HHA x2. He then ambulated 80ft with min A/HHA x2 and demos a slow and steady pace. Upon returning the the room, pt sat down in the bedside chair and rated his pain a 4/10. Nsg arrived and gave pt scheduled pain med as PT was leaving. He tolerated PT well today and will benefit from skilled PT interventions to improve mobility, strength, and endurance. Pt plans to DC home/HHPT.    Anticipated Discharge Disposition (PT): (P) home with assist, home, home with home health

## 2025-02-11 NOTE — THERAPY EVALUATION
Patient Name: Scott Murphy  : 1945    MRN: [id number]                              Today's Date: 2025       Admit Date: 2/10/2025    Visit Dx:     ICD-10-CM ICD-9-CM   1. S/P CABG (coronary artery bypass graft)  Z95.1 V45.81   2. Mitral valve insufficiency, unspecified etiology  I34.0 424.0   3. S/P MVR (mitral valve repair)  Z98.890 V45.89     Patient Active Problem List   Diagnosis    Primary hypertension    Erythrocytosis    Cancer of right kidney    Prostate cancer    RBBB    PMR (polymyalgia rheumatica)    Episodic lightheadedness    Raynaud's disease without gangrene    Myeloproliferative disorder    Thoracic aortic aneurysm without rupture    Colon cancer screening    Right shoulder pain    Right shoulder injury    Hyperlipidemia    Hyperbilirubinemia    Dilated aortic root    Cough    Asthma    Anxiety    Malignant neoplasm of kidney    Erythrocytosis    Acquired hypothyroidism    Neck pain    Hypertension    Acute URI    Hyperkalemia    Costochondritis    Insomnia    Localized osteoarthritis of left shoulder    Osteoarthritis of right knee    Left shoulder pain    Mitral valve insufficiency    Aortic stenosis     Past Medical History:   Diagnosis Date    Acquired hypothyroidism     Anxiety     Aortic aneurysm     Aortic valve regurgitation     Arthritis     Asthma     Hutton's cyst of knee, right     BBB (bundle branch block)     Coronary artery disease     Disease of thyroid gland     Gout     H/O Muscle pain     Right shoulder and neck area    H/O Radiation treatment     For tonsillar inflammation and infection when he was a child and this led him to develop thyroid cancer.    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 Ganesh Lopez    Left shoulder pain     Myeloproliferative  disorder     With polycythemia vera, JAK2 mutation positive requiring periodic phlebotomies (hematocrit above 47).    Polymyalgia rheumatica     Premature atrial contraction     Prostate cancer     Raynaud disease     Shoulder injury, left, sequela     SOB (shortness of breath) on exertion      Past Surgical History:   Procedure Laterality Date    CARDIAC CATHETERIZATION N/A 1/8/2025    Procedure: Coronary angiography;  Surgeon: Di Roberts MD;  Location: [facility] CATH INVASIVE LOCATION;  Service: Cardiovascular;  Laterality: N/A;    CARDIAC CATHETERIZATION N/A 1/8/2025    Procedure: Left heart cath;  Surgeon: Di Roberts MD;  Location: [facility] CATH INVASIVE LOCATION;  Service: Cardiovascular;  Laterality: N/A;    CARDIAC CATHETERIZATION N/A 1/8/2025    Procedure: Right Heart Cath;  Surgeon: Di Roberts MD;  Location: [facility] CATH INVASIVE LOCATION;  Service: Cardiovascular;  Laterality: N/A;    CIRCUMCISION N/A 12/17/2018    Procedure: CIRCUMCISION;  Surgeon: Gallo Lopez MD;  Location: [facility] OR;  Service: Urology    COLONOSCOPY  2010    Documented a tubulovillous adenom that was removed completely. Colonoscopy in 2014 was unremarkable.    COLONOSCOPY N/A 2/20/2020    Procedure: COLONOSCOPY;  Surgeon: Claire Galo MD;  Location: [facility];  Service: Gastroenterology;  Laterality: N/A;  diverticulosis    CORONARY ARTERY BYPASS GRAFT WITH AORTIC AND MITRAL VALVE REPAIR/REPLACEMENT N/A 2/10/2025    Procedure: STERNOTOMY,CORONARY ARTERY BYPASS  X 3,WITH INTERNAL MAMMARY ARTERY GRAFT AND ENDOSCOPICLY HARVESTED RIGHT SAPHENOUS VEIN MITRAL VALVE REPAIR, LEFT ATRIAL APPENDAGE EXCLUSION, PRP  TRANSESOPHAGEAL ECHOCARDIOGRAM WITH ANESTHESIA;  Surgeon: Migel Alcantara MD;  Location: [facility] CVOR;  Service: Cardiothoracic;  Laterality: N/A;    HERNIA REPAIR      H/O multiple hernia repairs including right inguinal hernia repair in 1981, 2003, and double hernia repair in 2013    KIDNEY SURGERY       PROSTATE BIOPSY      Showed features consistent with prostate cancer    SHOULDER SURGERY  1995    Shoulder repair    THYROIDECTOMY, PARTIAL  1983    For malignant tumor    TOOTH EXTRACTION        General Information       Row Name 02/11/25 1018          Physical Therapy Time and Intention    Document Type evaluation (P)   -BB     Mode of Treatment physical therapy (P)   -BB       Row Name 02/11/25 1018          General Information    Patient Profile Reviewed yes (P)   -BB     Prior Level of Function independent:;all household mobility;community mobility;ADL's (P)   -BB     Existing Precautions/Restrictions cardiac;fall;sternal (P)   -BB     Barriers to Rehab none identified (P)   -BB       Row Name 02/11/25 1018          Living Environment    People in Home spouse (P)   -BB       [facility] Name 02/11/25 1018          Home Main Entrance    Number of Stairs, Main Entrance three (P)   -BB     Stair Railings, Main Entrance railings safe and in good condition (P)   -BB       [facility] Name 02/11/25 1018          Cognition    Orientation Status (Cognition) oriented x 4 (P)   -BB       Row Name 02/11/25 1018          Safety Issues/Impairments Affecting Functional Mobility    Impairments Affecting Function (Mobility) balance;coordination;pain;range of motion (ROM);strength;endurance/activity tolerance (P)   -BB               User Key  (r) = Recorded By, (t) = Taken By, (c) = Cosigned By      Initials Name Provider Type    Rah Gorman, PT Student PT Student                   Mobility       Row Name 02/11/25 1020          Bed Mobility    Comment, (Bed Mobility) up in chair (P)   -BB       Row Name 02/11/25 1020          Sit-Stand Transfer    Sit-Stand Kay (Transfers) minimum assist (75% patient effort);2 person assist;1 person to manage equipment;verbal cues (P)   HHA X2  -BB     Comment, (Sit-Stand Transfer) Slight unsteadiness with STS for the first few seconds. STS min A/HHA x 2 (P)   -BB       Row Name 02/11/25 1020           Gait/Stairs (Locomotion)    [place] Level (Gait) minimum assist (75% patient effort);2 person assist;1 person to manage equipment;verbal cues (P)   HHAx 2  -BB     Assistive Device (Gait) -- (P)   HHA x2  -BB     Distance in Feet (Gait) 80 (P)   -BB     Deviations/Abnormal Patterns (Gait) nichole decreased;gait speed decreased;stride length decreased (P)   -BB     Bilateral Gait Deviations forward flexed posture;decreased arm swing;heel strike decreased (P)   -BB     Comment, (Gait/Stairs) Pt demos slow steady pace with ambulation- required min A/ HHAx2. (P)   -BB               User Key  (r) = Recorded By, (t) = Taken By, (c) = Cosigned By      Initials Name Provider Type    Rah Gorman, PT Student PT Student                   Obj/Interventions       Row Name 02/11/25 1024          Range of Motion Comprehensive    General Range of Motion no range of motion deficits identified (P)   -BB       Row Name 02/11/25 1024          Strength Comprehensive (MMT)    Comment, General Manual Muscle Testing (MMT) Assessment generalized post-op weakness (P)   -BB       Row Name 02/11/25 1024          Motor Skills    Therapeutic Exercise -- (P)   Cardiac protocol x 5 reps  -BB       Row Name 02/11/25 1024          Balance    Balance Assessment sitting static balance;standing static balance;standing dynamic balance (P)   -BB     Static Sitting Balance contact guard;verbal cues (P)   -BB     Position, Sitting Balance sitting in chair (P)   -BB     Static Standing Balance minimal assist;2-person assist;verbal cues (P)   HHA x 2  -BB     Dynamic Standing Balance verbal cues;2-person assist;minimal assist (P)   HHA x 2  -BB     Position/Device Used, Standing Balance -- (P)   HHA x 2  -BB     Comment, Balance Pt required min A/HHA x 2 with standing activity. (P)   -BB               User Key  (r) = Recorded By, (t) = Taken By, (c) = Cosigned By      Initials Name Provider Type    Rah Gorman, PT Student PT Student                    Goals/Plan       Row Name 02/11/25 1041          Problem Specific Goal 1 (PT)    Problem Specific Goal 1 (PT) Cardiac level 3 (P)   -BB     Time Frame (Problem Specific Goal 1, PT) 2 weeks (P)   -BB       Row Name 02/11/25 1041          Therapy Assessment/Plan (PT)    Planned Therapy Interventions (PT) balance training;gait training;home exercise program;bed mobility training;patient/family education;ROM (range of motion);stair training;strengthening;stretching;transfer training (P)   -BB               User Key  (r) = Recorded By, (t) = Taken By, (c) = Cosigned By      Initials Name Provider Type    BB Rah Ugalde, PT Student PT Student                   Clinical Impression       Row Name 02/11/25 1026          Pain    Pretreatment Pain Rating 1/10 (P)   -BB     Posttreatment Pain Rating 4/10 (P)   -BB     Pain Location chest (P)   -BB     Pain Side/Orientation generalized (P)   -BB     Pain Management Interventions activity modification encouraged (P)   -BB     Response to Pain Interventions activity participation with increased pain (P)   -BB     Pre/Posttreatment Pain Comment Nsg gave pt scheduled pain med as PT was leaving. (P)   -BB       Row Name 02/11/25 1026          Plan of Care Review    Plan of Care Reviewed With patient (P)   -BB     Progress improving (P)   -BB     Outcome Evaluation Pt seen for PT eval this AM. He is POD1 CABGx3, MVR, and SUDHIR clip. He has a past medical history of HTN, HL, severe MR, hypothyroidism, cancer in 2015, Raynaud disease, and aortic root aneurysm. Pt states that he has 3 steps entering the home and lives with his wife. At baseline, pt is independent with all mobility and ADLs. Upon entering, pt was upright in bed side chair on 2L NC. He is doing well and rating his pain a 1/10. After managing lines and tubes, Pt performed a STS with min A/HHA x2. He then ambulated 80ft with min A/HHA x2 and demos a slow and steady pace. Upon returning the the room, pt sat down  in the bedside chair and rated his pain a 4/10. Nsg arrived and gave pt scheduled pain med as PT was leaving. He tolerated PT well today and will benefit from skilled PT interventions to improve mobility, strength, and endurance. Pt plans to DC home/HHPT. (P)   -BB       Row Name 02/11/25 1026          Therapy Assessment/Plan (PT)    Rehab Potential (PT) good (P)   -BB     Criteria for Skilled Interventions Met (PT) yes (P)   -BB     Therapy Frequency (PT) daily (P)   -BB       Row Name 02/11/25 1026          Vital Signs    Pretreatment Heart Rate (beats/min) 85 (P)   -BB     Intratreatment Heart Rate (beats/min) 86 (P)   -BB     Posttreatment Heart Rate (beats/min) 84 (P)   -BB     Pre SpO2 (%) 96 (P)   -BB     O2 Delivery Pre Treatment nasal cannula (P)   2 L  -BB     Intra SpO2 (%) 90 (P)   -BB     O2 Delivery Intra Treatment nasal cannula (P)   2L  -BB     Post SpO2 (%) 95 (P)   -BB     O2 Delivery Post Treatment nasal cannula (P)   2L  -BB     Pre Patient Position Sitting (P)   -BB     Intra Patient Position Standing (P)   -BB     Post Patient Position Sitting (P)   -BB       Row Name 02/11/25 1026          Positioning and Restraints    Pre-Treatment Position sitting in chair/recliner (P)   -BB     Post Treatment Position chair (P)   -BB     In Chair reclined;call light within reach;encouraged to call for assist;with nsg (P)   -BB               User Key  (r) = Recorded By, (t) = Taken By, (c) = Cosigned By      Initials Name Provider Type    aRh Gorman, PT Student PT Student                   Outcome Measures       Row Name 02/11/25 1415 02/11/25 1042       How much help from another person do you currently need...    Turning from your back to your side while in flat bed without using bedrails? 3  -MB 3 (P)   -BB    Moving from lying on back to sitting on the side of a flat bed without bedrails? 3  -MB 3 (P)   -BB    Moving to and from a bed to a chair (including a wheelchair)? 3  -MB 2 (P)   -BB     Standing up from a chair using your arms (e.g., wheelchair, bedside chair)? 3  -MB 3 (P)   -BB    Climbing 3-5 steps with a railing? 2  -MB 2 (P)   -BB    To walk in hospital room? 3  -MB 3 (P)   -BB    AM-PAC 6 Clicks Score (PT) 17  -MB 16 (P)   -BB    Highest Level of Mobility Goal 5 --> Static standing  -MB 5 --> Static standing (P)   -BB      Row Name 02/11/25 1042          Functional Assessment    Outcome Measure Options AM-PAC 6 Clicks Basic Mobility (PT) (P)   -BB               User Key  (r) = Recorded By, (t) = Taken By, (c) = Cosigned By      Initials Name Provider Type    Anahi Gill, RN Registered Nurse    Rah Gorman, PT Student PT Student                                 Physical Therapy Education        No education to display                  PT Recommendation and Plan  Planned Therapy Interventions (PT): (P) balance training, gait training, home exercise program, bed mobility training, patient/family education, ROM (range of motion), stair training, strengthening, stretching, transfer training  Progress: (P) improving  Outcome Evaluation: (P) Pt seen for PT eval this AM. He is POD1 CABGx3, MVR, and SUDHIR clip. He has a past medical history of HTN, HL, severe MR, hypothyroidism, cancer in 2015, Raynaud disease, and aortic root aneurysm. Pt states that he has 3 steps entering the home and lives with his wife. At baseline, pt is independent with all mobility and ADLs. Upon entering, pt was upright in bed side chair on 2L NC. He is doing well and rating his pain a 1/10. After managing lines and tubes, Pt performed a STS with min A/HHA x2. He then ambulated 80ft with min A/HHA x2 and demos a slow and steady pace. Upon returning the the room, pt sat down in the bedside chair and rated his pain a 4/10. Nsg arrived and gave pt scheduled pain med as PT was leaving. He tolerated PT well today and will benefit from skilled PT interventions to improve mobility, strength, and endurance. Pt plans to DC  home/HHPT.     Time Calculation:         PT Charges       Row Name 02/11/25 1046             Time Calculation    Start Time 0945 (P)   -BB      Stop Time 1013 (P)   -BB      Time Calculation (min) 28 min (P)   -BB      PT Received On 02/11/25 (P)   -BB      PT - Next Appointment 02/12/25 (P)   -BB      PT Goal Re-Cert Due Date 02/25/25 (P)   -BB         Time Calculation- PT    Total Timed Code Minutes- PT 23 minute(s) (P)   -BB                User Key  (r) = Recorded By, (t) = Taken By, (c) = Cosigned By      Initials Name Provider Type    Rah Gorman, PT Student PT Student                      PT G-Codes  Outcome Measure Options: (P) AM-PAC 6 Clicks Basic Mobility (PT)  AM-PAC 6 Clicks Score (PT): 17  PT Discharge Summary  Anticipated Discharge Disposition (PT): (P) home with assist, home, home with home health    Rah Ugalde, PT Student  2/11/2025

## 2025-02-11 NOTE — PROGRESS NOTES
LOS: 1 day   Patient Care Team:  Mariah Delgado MD as PCP - General (Family Medicine)  Consuelo Adorno MD as Consulting Physician (Hematology and Oncology)  John Damon MD as Consulting Physician (Hematology and Oncology)  Mariah Delgado MD as Referring Physician (Family Medicine)    Chief Complaint: post op open heart    Subjective  Feeling good.    Vital Signs  Temp:  [95.9 °F (35.5 °C)-98.2 °F (36.8 °C)] 98.2 °F (36.8 °C)  Heart Rate:  [82-92] 92  Resp:  [18-20] 18  BP: ()/(55-87) 104/76  Arterial Line BP: ()/(50-67) 123/66  FiO2 (%):  [39 %-98 %] 39 %  Body mass index is 24.81 kg/m².    Intake/Output Summary (Last 24 hours) at 2/11/2025 0726  Last data filed at 2/11/2025 0700  Gross per 24 hour   Intake 6069.66 ml   Output 3745 ml   Net 2324.66 ml     No intake/output data recorded.    Chest tube drainage last 8 hours 0/100        02/11/25  0650   Weight: 74 kg (163 lb 2.3 oz)         Objective:  Vital signs: (most recent): Blood pressure 104/76, pulse 92, temperature 98.2 °F (36.8 °C), resp. rate 18, weight 74 kg (163 lb 2.3 oz), SpO2 95%.                   Results Review:        WBC WBC   Date Value Ref Range Status   02/11/2025 13.61 (H) 3.40 - 10.80 10*3/mm3 Final   02/10/2025 19.10 (H) 3.40 - 10.80 10*3/mm3 Final   02/10/2025 13.52 (H) 3.40 - 10.80 10*3/mm3 Final   02/10/2025 13.24 (H) 3.40 - 10.80 10*3/mm3 Final      HGB Hemoglobin   Date Value Ref Range Status   02/11/2025 10.0 (L) 13.0 - 17.7 g/dL Final   02/10/2025 10.5 (L) 13.0 - 17.7 g/dL Final   02/10/2025 10.3 (L) 13.0 - 17.7 g/dL Final   02/10/2025 9.8 (L) 13.0 - 17.7 g/dL Final   02/10/2025 9.5 (L) 12.0 - 17.0 g/dL Final   02/10/2025 10.5 (L) 12.0 - 17.0 g/dL Final   02/10/2025 11.2 (L) 12.0 - 17.0 g/dL Final   02/10/2025 11.2 (L) 12.0 - 17.0 g/dL Final   02/10/2025 11.6 (L) 12.0 - 17.0 g/dL Final   02/10/2025 11.2 (L) 12.0 - 17.0 g/dL Final   02/10/2025 13.9 12.0 - 17.0 g/dL Final      HCT Hematocrit   Date Value Ref Range Status    02/11/2025 32.5 (L) 37.5 - 51.0 % Final   02/10/2025 31.8 (L) 37.5 - 51.0 % Final   02/10/2025 31.4 (L) 37.5 - 51.0 % Final   02/10/2025 30.0 (L) 37.5 - 51.0 % Final   02/10/2025 28 (L) 38 - 51 % Final   02/10/2025 31 (L) 38 - 51 % Final   02/10/2025 33 (L) 38 - 51 % Final   02/10/2025 33 (L) 38 - 51 % Final   02/10/2025 34 (L) 38 - 51 % Final   02/10/2025 33 (L) 38 - 51 % Final   02/10/2025 41 38 - 51 % Final      Platelets Platelets   Date Value Ref Range Status   02/11/2025 108 (L) 140 - 450 10*3/mm3 Final   02/10/2025 113 (L) 140 - 450 10*3/mm3 Final   02/10/2025 144 140 - 450 10*3/mm3 Final   02/10/2025 104 (L) 140 - 450 10*3/mm3 Final   02/10/2025 104 (L) 140 - 450 10*3/mm3 Final   02/10/2025 145 140 - 450 10*3/mm3 Final        PT/INR:    Protime   Date Value Ref Range Status   02/11/2025 16.8 (H) 11.7 - 14.2 Seconds Final   02/10/2025 18.2 (H) 11.7 - 14.2 Seconds Final   02/10/2025 20.3 (H) 11.7 - 14.2 Seconds Final   /  INR   Date Value Ref Range Status   02/11/2025 1.36 (H) 0.90 - 1.10 Final   02/10/2025 1.51 (H) 0.90 - 1.10 Final   02/10/2025 1.72 (H) 0.90 - 1.10 Final       Sodium Sodium   Date Value Ref Range Status   02/11/2025 141 136 - 145 mmol/L Final   02/10/2025 144 136 - 145 mmol/L Final   02/10/2025 141 136 - 145 mmol/L Final      Potassium Potassium   Date Value Ref Range Status   02/11/2025 4.4 3.5 - 5.2 mmol/L Final   02/10/2025 4.3 3.5 - 5.2 mmol/L Final   02/10/2025 4.9 3.5 - 5.2 mmol/L Final      Chloride Chloride   Date Value Ref Range Status   02/11/2025 113 (H) 98 - 107 mmol/L Final   02/10/2025 109 (H) 98 - 107 mmol/L Final   02/10/2025 110 (H) 98 - 107 mmol/L Final      Bicarbonate CO2   Date Value Ref Range Status   02/11/2025 20.0 (L) 22.0 - 29.0 mmol/L Final   02/10/2025 19.3 (L) 22.0 - 29.0 mmol/L Final   02/10/2025 17.5 (L) 22.0 - 29.0 mmol/L Final      BUN BUN   Date Value Ref Range Status   02/11/2025 16 8 - 23 mg/dL Final   02/10/2025 18 8 - 23 mg/dL Final   02/10/2025 18 8 -  23 mg/dL Final      Creatinine Creatinine   Date Value Ref Range Status   02/11/2025 1.19 0.76 - 1.27 mg/dL Final   02/10/2025 1.37 (H) 0.76 - 1.27 mg/dL Final   02/10/2025 1.33 (H) 0.76 - 1.27 mg/dL Final      Calcium Calcium   Date Value Ref Range Status   02/11/2025 8.6 8.6 - 10.5 mg/dL Final   02/10/2025 8.9 8.6 - 10.5 mg/dL Final   02/10/2025 9.6 8.6 - 10.5 mg/dL Final      Magnesium Magnesium   Date Value Ref Range Status   02/11/2025 2.5 (H) 1.6 - 2.4 mg/dL Final   02/10/2025 3.0 (H) 1.6 - 2.4 mg/dL Final   02/10/2025 2.9 (H) 1.6 - 2.4 mg/dL Final          acetaminophen, 1,000 mg, Intravenous, Q8H  aspirin, 81 mg, Oral, Daily  atorvastatin, 40 mg, Oral, Nightly  chlorhexidine, 15 mL, Mouth/Throat, Q12H  enoxaparin, 40 mg, Subcutaneous, Daily  hydrocortisone sodium succinate, 50 mg, Intravenous, Q6H  levothyroxine, 100 mcg, Oral, Daily  magnesium sulfate, 2 g, Intravenous, Q8H  metoclopramide, 10 mg, Intravenous, Q6H  metoprolol tartrate, 12.5 mg, Oral, Q12H  montelukast, 10 mg, Oral, Nightly  mupirocin, 1 Application, Each Nare, BID  pantoprazole, 40 mg, Oral, QAM  senna-docusate sodium, 2 tablet, Oral, Nightly  thiamine (B-1) IV, 500 mg, Intravenous, Q8H  vancomycin, 1,000 mg, Intravenous, Q24H      clevidipine, 2-32 mg/hr  dexmedetomidine, 0.2-1.5 mcg/kg/hr, Last Rate: Stopped (02/10/25 1839)  dextrose, 30 mL/hr, Last Rate: Stopped (02/10/25 2310)  DOPamine, 2-20 mcg/kg/min  EPINEPHrine, 0.02-0.1 mcg/kg/min  insulin, 0-100 Units/hr, Last Rate: 1.5 Units/hr (02/11/25 5293)  milrinone, 0.25-0.375 mcg/kg/min, Last Rate: Stopped (02/10/25 1707)  niCARdipine, 5-15 mg/hr  nitroglycerin, 5-200 mcg/min  norepinephrine, 0.02-0.2 mcg/kg/min, Last Rate: Stopped (02/10/25 8233)  Pharmacy to dose vancomycin,   phenylephrine, 0.2-2 mcg/kg/min  propofol, 5-50 mcg/kg/min, Last Rate: Stopped (02/10/25 3119)  vasopressin, 0.03 Units/min, Last Rate: Stopped (02/10/25 6513)              Mitral valve insufficiency    Aortic  stenosis      Assessment & Plan    -Mitral valve regurgitation- s/p CABGx3 LIMA/RSVG, MV repair, SUDHIR closure 45mm atrial clip- Maricruz 2/10/2025  -Multivessel CAD  -ascending aortic ectasia 4.2cm unchanged per last CT 11/2024  -hyperlipidemia  -renal cell carcinoma s/p partial nephrectomy-- creatinine 1.3-1.5  -polymyalgia rheumatica  -myeloproliferative disorder/polycythemia vera-- follows with hematology  -hypertension  -raynaud's  -post op anemia- expected acute blood loss   -Leukocytosis- reactive/stress dose steroids    POD#1  Looks great this morning.  Up in the chair.  2L NC---wean as able.  No inotropic or pressor needs. Cardiac index 2.5-2.8  Acidosis improved  Looks junctional under pacemaker-- rate in the 50-60s-- holding beta blocker.  He does have a rub  Positive I&Os-- flat CVP 16- IV diurese  Encourage pulmonary toilet-- add mucinex and flutter valve.  Discussed with Dr. Alcantara.    Staci Hutchinson, APRN  02/11/25  07:26 EST

## 2025-02-11 NOTE — PLAN OF CARE
Goal Outcome Evaluation:  Plan of Care Reviewed With: patient           Outcome Evaluation: Pt is Aox4. AP on tele, HR 50-60s. BP 95/57. HR is 50-60s. POD 1 CABGx3, MVR and SUDHIR clip. Pt is on 2L NC. Pt has minimal c/o pain. Pacer wires, R IJ, funez and chest tubes remain in place per MD order.

## 2025-02-12 ENCOUNTER — APPOINTMENT (OUTPATIENT)
Dept: GENERAL RADIOLOGY | Facility: HOSPITAL | Age: 80
DRG: 219 | End: 2025-02-12
Payer: MEDICARE

## 2025-02-12 LAB
ANION GAP SERPL CALCULATED.3IONS-SCNC: 9.7 MMOL/L (ref 5–15)
BUN SERPL-MCNC: 19 MG/DL (ref 8–23)
BUN/CREAT SERPL: 17 (ref 7–25)
CALCIUM SPEC-SCNC: 8.8 MG/DL (ref 8.6–10.5)
CHLORIDE SERPL-SCNC: 102 MMOL/L (ref 98–107)
CO2 SERPL-SCNC: 22.3 MMOL/L (ref 22–29)
CREAT SERPL-MCNC: 1.12 MG/DL (ref 0.76–1.27)
DEPRECATED RDW RBC AUTO: 47.3 FL (ref 37–54)
EGFRCR SERPLBLD CKD-EPI 2021: 67.2 ML/MIN/1.73
ERYTHROCYTE [DISTWIDTH] IN BLOOD BY AUTOMATED COUNT: 15.7 % (ref 12.3–15.4)
GLUCOSE BLDC GLUCOMTR-MCNC: 121 MG/DL (ref 70–130)
GLUCOSE BLDC GLUCOMTR-MCNC: 130 MG/DL (ref 70–130)
GLUCOSE BLDC GLUCOMTR-MCNC: 154 MG/DL (ref 70–130)
GLUCOSE BLDC GLUCOMTR-MCNC: 164 MG/DL (ref 70–130)
GLUCOSE SERPL-MCNC: 123 MG/DL (ref 65–99)
HCT VFR BLD AUTO: 31.9 % (ref 37.5–51)
HGB BLD-MCNC: 10.6 G/DL (ref 13–17.7)
MCH RBC QN AUTO: 27.7 PG (ref 26.6–33)
MCHC RBC AUTO-ENTMCNC: 33.2 G/DL (ref 31.5–35.7)
MCV RBC AUTO: 83.3 FL (ref 79–97)
PLATELET # BLD AUTO: 119 10*3/MM3 (ref 140–450)
PMV BLD AUTO: 10.3 FL (ref 6–12)
POTASSIUM SERPL-SCNC: 4.5 MMOL/L (ref 3.5–5.2)
QT INTERVAL: 388 MS
QTC INTERVAL: 420 MS
RBC # BLD AUTO: 3.83 10*6/MM3 (ref 4.14–5.8)
SODIUM SERPL-SCNC: 134 MMOL/L (ref 136–145)
WBC NRBC COR # BLD AUTO: 19.83 10*3/MM3 (ref 3.4–10.8)

## 2025-02-12 PROCEDURE — 25010000002 ENOXAPARIN PER 10 MG: Performed by: NURSE PRACTITIONER

## 2025-02-12 PROCEDURE — 93005 ELECTROCARDIOGRAM TRACING: CPT | Performed by: NURSE PRACTITIONER

## 2025-02-12 PROCEDURE — 25810000003 SODIUM CHLORIDE 0.9 % SOLUTION 250 ML FLEX CONT: Performed by: NURSE PRACTITIONER

## 2025-02-12 PROCEDURE — 25010000002 VANCOMYCIN 1 G RECONSTITUTED SOLUTION 1 EACH VIAL: Performed by: NURSE PRACTITIONER

## 2025-02-12 PROCEDURE — 25010000002 HYDROCORTISONE SOD SUC (PF) 100 MG RECONSTITUTED SOLUTION: Performed by: ANESTHESIOLOGY

## 2025-02-12 PROCEDURE — 25010000002 FUROSEMIDE PER 20 MG: Performed by: NURSE PRACTITIONER

## 2025-02-12 PROCEDURE — 85027 COMPLETE CBC AUTOMATED: CPT | Performed by: NURSE PRACTITIONER

## 2025-02-12 PROCEDURE — 63710000001 INSULIN LISPRO (HUMAN) PER 5 UNITS: Performed by: NURSE PRACTITIONER

## 2025-02-12 PROCEDURE — 82948 REAGENT STRIP/BLOOD GLUCOSE: CPT

## 2025-02-12 PROCEDURE — 99232 SBSQ HOSP IP/OBS MODERATE 35: CPT | Performed by: NURSE PRACTITIONER

## 2025-02-12 PROCEDURE — 93010 ELECTROCARDIOGRAM REPORT: CPT | Performed by: STUDENT IN AN ORGANIZED HEALTH CARE EDUCATION/TRAINING PROGRAM

## 2025-02-12 PROCEDURE — 80048 BASIC METABOLIC PNL TOTAL CA: CPT | Performed by: NURSE PRACTITIONER

## 2025-02-12 PROCEDURE — 71045 X-RAY EXAM CHEST 1 VIEW: CPT

## 2025-02-12 PROCEDURE — 99024 POSTOP FOLLOW-UP VISIT: CPT | Performed by: NURSE PRACTITIONER

## 2025-02-12 RX ORDER — FUROSEMIDE 10 MG/ML
40 INJECTION INTRAMUSCULAR; INTRAVENOUS ONCE
Status: COMPLETED | OUTPATIENT
Start: 2025-02-12 | End: 2025-02-12

## 2025-02-12 RX ORDER — POTASSIUM CHLORIDE 750 MG/1
20 TABLET, EXTENDED RELEASE ORAL ONCE
Status: COMPLETED | OUTPATIENT
Start: 2025-02-12 | End: 2025-02-12

## 2025-02-12 RX ADMIN — 0.12% CHLORHEXIDINE GLUCONATE 15 ML: 1.2 RINSE ORAL at 00:22

## 2025-02-12 RX ADMIN — PANTOPRAZOLE SODIUM 40 MG: 40 TABLET, DELAYED RELEASE ORAL at 06:38

## 2025-02-12 RX ADMIN — DOCUSATE SODIUM 100 MG: 100 CAPSULE, LIQUID FILLED ORAL at 09:06

## 2025-02-12 RX ADMIN — ENOXAPARIN SODIUM 40 MG: 100 INJECTION SUBCUTANEOUS at 18:24

## 2025-02-12 RX ADMIN — MUPIROCIN 1 APPLICATION: 20 OINTMENT TOPICAL at 09:09

## 2025-02-12 RX ADMIN — INSULIN LISPRO 2 UNITS: 100 INJECTION, SOLUTION INTRAVENOUS; SUBCUTANEOUS at 12:51

## 2025-02-12 RX ADMIN — LEVOTHYROXINE SODIUM 100 MCG: 100 TABLET ORAL at 09:06

## 2025-02-12 RX ADMIN — 0.12% CHLORHEXIDINE GLUCONATE 15 ML: 1.2 RINSE ORAL at 23:43

## 2025-02-12 RX ADMIN — HYDROCODONE BITARTRATE AND ACETAMINOPHEN 2 TABLET: 5; 325 TABLET ORAL at 11:46

## 2025-02-12 RX ADMIN — POLYETHYLENE GLYCOL 3350 17 G: 17 POWDER, FOR SOLUTION ORAL at 09:06

## 2025-02-12 RX ADMIN — FUROSEMIDE 40 MG: 10 INJECTION, SOLUTION INTRAMUSCULAR; INTRAVENOUS at 09:06

## 2025-02-12 RX ADMIN — MUPIROCIN 1 APPLICATION: 20 OINTMENT TOPICAL at 22:41

## 2025-02-12 RX ADMIN — HYDROCODONE BITARTRATE AND ACETAMINOPHEN 2 TABLET: 5; 325 TABLET ORAL at 05:32

## 2025-02-12 RX ADMIN — MONTELUKAST 10 MG: 10 TABLET, FILM COATED ORAL at 22:40

## 2025-02-12 RX ADMIN — VANCOMYCIN HYDROCHLORIDE 1000 MG: 1 INJECTION, POWDER, LYOPHILIZED, FOR SOLUTION INTRAVENOUS at 09:16

## 2025-02-12 RX ADMIN — METOPROLOL TARTRATE 12.5 MG: 25 TABLET, FILM COATED ORAL at 09:06

## 2025-02-12 RX ADMIN — COLCHICINE 0.6 MG: 0.6 TABLET ORAL at 09:06

## 2025-02-12 RX ADMIN — HYDROCODONE BITARTRATE AND ACETAMINOPHEN 2 TABLET: 5; 325 TABLET ORAL at 18:24

## 2025-02-12 RX ADMIN — ATORVASTATIN CALCIUM 40 MG: 20 TABLET, FILM COATED ORAL at 22:39

## 2025-02-12 RX ADMIN — GUAIFENESIN 1200 MG: 600 TABLET, MULTILAYER, EXTENDED RELEASE ORAL at 22:40

## 2025-02-12 RX ADMIN — GABAPENTIN 100 MG: 100 CAPSULE ORAL at 22:39

## 2025-02-12 RX ADMIN — POTASSIUM CHLORIDE 20 MEQ: 750 TABLET, EXTENDED RELEASE ORAL at 09:06

## 2025-02-12 RX ADMIN — METOPROLOL TARTRATE 12.5 MG: 25 TABLET, FILM COATED ORAL at 23:41

## 2025-02-12 RX ADMIN — 0.12% CHLORHEXIDINE GLUCONATE 15 ML: 1.2 RINSE ORAL at 11:54

## 2025-02-12 RX ADMIN — ASPIRIN 81 MG: 81 TABLET, COATED ORAL at 09:06

## 2025-02-12 RX ADMIN — DOCUSATE SODIUM 100 MG: 100 CAPSULE, LIQUID FILLED ORAL at 22:40

## 2025-02-12 RX ADMIN — SENNOSIDES AND DOCUSATE SODIUM 2 TABLET: 50; 8.6 TABLET ORAL at 22:39

## 2025-02-12 RX ADMIN — HYDROCORTISONE SODIUM SUCCINATE 50 MG: 100 INJECTION, POWDER, FOR SOLUTION INTRAMUSCULAR; INTRAVENOUS at 04:29

## 2025-02-12 RX ADMIN — GUAIFENESIN 1200 MG: 600 TABLET, MULTILAYER, EXTENDED RELEASE ORAL at 09:06

## 2025-02-12 NOTE — DISCHARGE PLACEMENT REQUEST
"Fox Murphy \"MEGAN\" (79 y.o. Male)       Date of Birth   1945    Social Security Number       Address   [address] [place] [id number]    Home Phone   [phone]    MRN   [id number]       [facility]   Patient Refused    Marital Status                               Admission Date   2/10/25    Admission Type   Elective    Admitting Provider   Migel Alcantara MD    Attending Provider   Migel Alcantara MD    Department, Room/Bed   [facility] CARDIOVASC UNIT, 2212/1       Discharge Date       Discharge Disposition       Discharge Destination                                 Attending Provider: Migel Alcantara MD    Allergies: Diphenhydramine, Cephalexin, Latex, Shellfish-derived Products    Isolation: None   Infection: None   Code Status: CPR    Ht: 172.7 cm (68\")   Wt: 74.9 kg (165 lb 1.6 oz)    Admission Cmt: None   Principal Problem: Mitral valve insufficiency [I34.0]                   Active Insurance as of 2/10/2025       Primary Coverage       Payor Plan Insurance Group Employer/Plan Group    MEDICARE MEDICARE A & B        Payor Plan Address Payor Plan Phone Number Payor Plan Fax Number Effective Dates    [address] [phone]  3/1/2011 - None Entered    [facility] [address]         Subscriber Name Subscriber Birth Date Member ID       FOX MURPHY 1945 [id number]               Secondary Coverage       Payor Plan Insurance Group Employer/Plan Group    [employer] AAR HE[employer] CARE OPTIONS        Payor Plan Address Payor Plan Phone Number Payor Plan Fax Number Effective Dates    [facility] [phone]  1/1/2016 - None Entered    [address]       [facility] [address]         Subscriber Name Subscriber Birth Date Member ID       FOX MURPHY 1945 [id number]                     Emergency Contacts        (Rel.) Home Phone Work Phone Mobile Phone    DARON,BETTY (Spouse) -- -- [phone]                "

## 2025-02-12 NOTE — PLAN OF CARE
Goal Outcome Evaluation:  Plan of Care Reviewed With: patient        Progress: improving  Outcome Evaluation: pt is POD2  CABG x3, aox4, on 2L NC, some c/o pain treated per mar, interventions effective per patient. VSS. Sinus on monitor, wires and Pacemaker remain connected. Miller, drains, and RIJ remain. continue plan of care.

## 2025-02-12 NOTE — CONSULTS
Met with patient to discuss the benefits of cardiac rehab. Provided phase II information along with the contact information for cardiac rehab at [facility]. Requested for referral to be sent.Explained if receiving home health would not be able to attend cardiac rehab until finished with home health.

## 2025-02-12 NOTE — CASE MANAGEMENT/SOCIAL WORK
Discharge Planning Assessment  [facility]     Patient Name: Scott Murphy  MRN: [id number]  Today's Date: 2/12/2025    Admit Date: 2/10/2025    Plan: Home w/ sp & [facility] referral pending   Discharge Needs Assessment       Row Name 02/12/25 1506       Living Environment    People in Home spouse    Name(s) of People in Home ROYCE MURPHY/WIFE    Current Living Arrangements home    Potentially Unsafe Housing Conditions none    In the past 12 months has the electric, gas, oil, or water company threatened to shut off services in your home? No    Primary Care Provided by self    Provides Primary Care For pet(s)  1 DOG    Family Caregiver if Needed spouse    Family Caregiver Names ROYCE    Quality of Family Relationships helpful;involved;supportive    Able to Return to Prior Arrangements yes       Resource/Environmental Concerns    Resource/Environmental Concerns none    Transportation Concerns none       Transportation Needs    In the past 12 months, has lack of transportation kept you from medical appointments or from getting medications? no    In the past 12 months, has lack of transportation kept you from meetings, work, or from getting things needed for daily living? No       Food Insecurity    Within the past 12 months, you worried that your food would run out before you got the money to buy more. Never true       Transition Planning    Patient/Family Anticipates Transition to home with family    Patient/Family Anticipated Services at Transition home health care    Transportation Anticipated family or friend will provide       Discharge Needs Assessment    Readmission Within the Last 30 Days no previous admission in last 30 days    Equipment Currently Used at Home bp cuff;scales    Concerns to be Addressed discharge planning    Do you want help finding or keeping work or a job? I do not need or want help    Do you want help with school or training? For example, starting or completing job training or getting a  high school diploma, GED or equivalent No    Anticipated Changes Related to Illness none    Equipment Needed After Discharge none    Discharge Facility/Level of Care Needs home with home health    Provided Post Acute Provider Quality & Resource List? Yes    Post Acute Provider Quality and Resource List Home Health    Delivered To Patient    Method of Delivery In person    Offered/Gave Vendor List yes    Patient's Choice of Community Agency(s) [facility]                   Discharge Plan       Row Name 02/12/25 1508       Plan    Plan Home w/  & [facility] referral pending    Plan Comments Met with patient at bedside and introduced self and role.  Pt confirmed information on his face sheet.  Pt reports he lives with his spouse/Anika Murphy in a single level home with a basement and three steps to enter.  Pt is IADL's.  Pt uses no assistive devices.  PCP confirmed as Mariah Delgado and home [facility].  Pt has BP cuff and scale at home.  Patient plans to discharge home with assistance of spouse.  Pt chose [facility]  to follow.  Referral sent to [facility].   referral pending.  CCP following...........Radha BELTRAN/CHRISTOPHER                  Continued Care and Services - Admitted Since 2/10/2025       Home Medical Care       Service Provider Request Status Services Address Phone Fax Patient Preferred     [facility] Pending - Request Sent -- [facility] [address] [address] [phone] --                  Expected Discharge Date and Time       Expected Discharge Date Expected Discharge Time    Feb 17, 2025            Demographic Summary       Row Name 02/12/25 1505       General Information    Admission Type inpatient    Arrived From home    Required Notices Provided Important Message from Medicare    Referral Source admission list    Reason for Consult discharge planning    Preferred Language English       Contact Information    Permission Granted to Share Info With  family/designee                   Functional Status       Row Name 02/12/25 1505       Functional Status    Usual Activity Tolerance good    Current Activity Tolerance fair       Assessment of Health Literacy    How often do you have someone help you read hospital materials? Never    Health Literacy Good       Functional Status, IADL    Medications independent    Meal Preparation independent    Housekeeping independent    Laundry independent    Shopping independent    If for any reason you need help with day-to-day activities such as bathing, preparing meals, shopping, managing finances, etc., do you get the help you need? I don't need any help       Mental Status    General Appearance WDL WDL       Mental Status Summary    Recent Changes in Mental Status/Cognitive Functioning no changes       Employment/    Employment Status retired                   Psychosocial    No documentation.                  Abuse/Neglect    No documentation.                  Legal    No documentation.                  Substance Abuse    No documentation.                  Patient Forms    No documentation.                     Radha Duval RN

## 2025-02-12 NOTE — SIGNIFICANT NOTE
02/12/25 1604   OTHER   Discipline physical therapist   Rehab Time/Intention   Session Not Performed other (see comments)  (attempted to see this afternoon, however, pt just had chest tubes pulled and awaiting xray results prior to mobilizing; RN states she will ambulate w pt later today. will follow up tomorrow.)   Recommendation   PT - Next Appointment 02/13/25

## 2025-02-12 NOTE — PROGRESS NOTES
Hospital Follow Up    LOS:  LOS: 2 days   Patient Name: Scott Murphy  Age/Sex: 79 y.o. male  : 1945  MRN: [id number]    Day of Service: 25   Length of Stay: 2  Encounter Provider: ALETHA Mckee  Place of Service: [facility]  Patient Care Team:  Mariah Delgado MD as PCP - General (Family Medicine)  Consuelo Adorno MD as Consulting Physician (Hematology and Oncology)  John Damon MD as Consulting Physician (Hematology and Oncology)  Mariah Delgado MD as Referring Physician (Family Medicine)    Subjective:     Chief Complaint: follow up severe MR & CAD    Interval History: Doing well this morning, sitting up in chair. Reports some incisional discomfort but no overt chest pain or shortness of breath.     Objective:     Objective:  Temp:  [97.3 °F (36.3 °C)-98 °F (36.7 °C)] 97.9 °F (36.6 °C)  Heart Rate:  [52-88] 88  Resp:  [16-18] 18  BP: ()/(57-76) 121/69  Arterial Line BP: (120-135)/(55-70) 120/55     Intake/Output Summary (Last 24 hours) at 2025 0816  Last data filed at 2025 0428  Gross per 24 hour   Intake 1042.49 ml   Output 1765 ml   Net -722.51 ml     Body mass index is 24.81 kg/m².      25  0650   Weight: 74 kg (163 lb 2.3 oz)     Weight change:     Physical Exam:   General Appearance:    Awake alert and oriented in no acute distress.   Color:  Skin:  Neuro:  HEENT:    Lungs:     Pink  Warm and dry  No focal, motor or sensory deficits  Neck supple, pupils equal, round and reactive. No JVD, No Bruit  Clear to auscultation,respirations regular, even and                  unlabored    Heart:    Regular rate and rhythm, S1 and S2, no murmur, no gallop, no rub. No edema, DP/PT pulses are 2+   Chest Wall:    Midsternal dressing clean, dry, intact   Abdomen:     Normal bowel sounds, no masses, no organomegaly, soft        non-tender, non-distended, no guarding, no ascites noted   Extremities:   Moves all extremities well, no  edema, no cyanosis, no redness       Lab Review:   Results from last 7 days   Lab Units 02/12/25  0440 02/11/25  0215 02/10/25  1144 02/07/25  0758   SODIUM mmol/L 134* 141   < > 140   POTASSIUM mmol/L 4.5 4.4   < > 4.6   CHLORIDE mmol/L 102 113*   < > 104   CO2 mmol/L 22.3 20.0*   < > 26.0   BUN mg/dL 19 16   < > 20   CREATININE mg/dL 1.12 1.19   < > 1.34*   GLUCOSE mg/dL 123* 136*   < > 90   CALCIUM mg/dL 8.8 8.6   < > 9.4   AST (SGOT) U/L  --   --   --  28   ALT (SGPT) U/L  --   --   --  22    < > = values in this interval not displayed.         Results from last 7 days   Lab Units 02/12/25 0440 02/11/25  0215   WBC 10*3/mm3 19.83* 13.61*   HEMOGLOBIN g/dL 10.6* 10.0*   HEMATOCRIT % 31.9* 32.5*   PLATELETS 10*3/mm3 119* 108*     Results from last 7 days   Lab Units 02/11/25  0215 02/10/25  1144 02/10/25  1056   INR  1.36* 1.51* 1.72*   APTT seconds  --  32.3 30.4     Results from last 7 days   Lab Units 02/11/25  0215 02/10/25  1453   MAGNESIUM mg/dL 2.5* 3.0*     Results from last 7 days   Lab Units 02/07/25  0758   CHOLESTEROL mg/dL 142   TRIGLYCERIDES mg/dL 61   HDL CHOL mg/dL 57     Results from last 7 days   Lab Units 02/07/25  0758   PROBNP pg/mL 118.0         I reviewed the patient's new clinical results.  I personally viewed and interpreted the patient's EKG  Current Medications:   Scheduled Meds:aspirin, 81 mg, Oral, Daily  atorvastatin, 40 mg, Oral, Nightly  chlorhexidine, 15 mL, Mouth/Throat, Q12H  colchicine, 0.6 mg, Oral, Daily  docusate sodium, 100 mg, Oral, BID  enoxaparin, 40 mg, Subcutaneous, Daily  gabapentin, 100 mg, Oral, Q12H  guaiFENesin, 1,200 mg, Oral, Q12H  insulin lispro, 2-9 Units, Subcutaneous, 4x Daily AC & at Bedtime  levothyroxine, 100 mcg, Oral, Daily  [Held by provider] metoprolol tartrate, 12.5 mg, Oral, Q12H  montelukast, 10 mg, Oral, Nightly  mupirocin, 1 Application, Each Nare, BID  pantoprazole, 40 mg, Oral, QAM  polyethylene glycol, 17 g, Oral, Daily  senna-docusate sodium,  2 tablet, Oral, Nightly  vancomycin, 1,000 mg, Intravenous, Q24H      Continuous Infusions:dextrose, 30 mL/hr, Last Rate: Stopped (02/10/25 2310)  milrinone, 0.25-0.375 mcg/kg/min, Last Rate: Stopped (02/10/25 1707)  norepinephrine, 0.02-0.2 mcg/kg/min, Last Rate: Stopped (02/10/25 2135)  Pharmacy to dose vancomycin,         Allergies:  Allergies   Allergen Reactions    Diphenhydramine Rash     HEART PALPITATIONS, RASH    Cephalexin Other (See Comments)     Fatigue      Latex Rash    Shellfish-Derived Products Unknown - Low Severity     PT CANNOT REMEMBER       Assessment/Plan:      CAD/MR: s/p CABG x 3 (LIMA to mid LAD, SVG to PDA, SVG to OM1) mitral valve repair with a 34 mm Medtronic flexible band posterior annuloplasty and chordal repair of P2-P3/cleftoplasty of P2/P3, left atrial appendage closure on 2/10/2025 with Dr. Alcantara.  HTN:  BP stable. Metoprolol was on hold but has been resumed this morning. Will monitor response to this.  Post-op anemia: improving  Leukocytosis: WBC has gone up. Afebrile. Chest xray unremarkable this morning. Likely reactive and due to steroids  Raynaud's  Renal cell carcinoma: s/p partial nephrectomy. Creatinine stable  Hypothyroidism      -beta blocker resumed this morning. Will monitor response  -Continue with IV diuresis  -no other changes from a cardiac standpoint        ALETHA Mckee  02/12/25  08:16 EST  Electronically signed by ALETHA Mckee, 02/12/25, 8:16 AM EST.

## 2025-02-12 NOTE — CASE MANAGEMENT/SOCIAL WORK
Continued Stay Note  [facility]     Patient Name: Scott Murphy  MRN: [id number]  Today's Date: 2/12/2025    Admit Date: 2/10/2025    Plan: Home w/ [facility]    Discharge Plan       Row Name 02/12/25 1527       Plan    Plan Home w/ [facility]     Plan Comments Per [facility]- they have accepted and will follow Pt when he d/c home.  Pt will have assist of spouse/Anika at home..........Radha      Row Name 02/12/25 0758       Plan    Plan Home w/  & [facility] referral pending    Plan Comments Met with patient at bedside and introduced self and role.  Pt confirmed information on his face sheet.  Pt reports he lives with his spouse/Anika Murphy in a single level home with a basement and three steps to enter.  Pt is IADL's.  Pt uses no assistive devices.  PCP confirmed as Mariah Delgado and [facility].  Pt has BP cuff and scale at home.  Patient plans to discharge home with assistance of spouse.  Pt chose [facility]  to follow.  Referral sent to [facility].   referral pending.  CCP following...........Radha BELTRAN/CHRISTOPHER                   Discharge Codes    No documentation.                 Expected Discharge Date and Time       Expected Discharge Date Expected Discharge Time    Feb 17, 2025               Radha Duval RN

## 2025-02-12 NOTE — PROGRESS NOTES
LOS: 2 days   Patient Care Team:  Mariah Delgado MD as PCP - General (Family Medicine)  Consuelo Adorno MD as Consulting Physician (Hematology and Oncology)  John Damon MD as Consulting Physician (Hematology and Oncology)  Mariah Delgado MD as Referring Physician (Family Medicine)    Chief Complaint: post op open heart    Subjective  Feeling good.    Vital Signs  Temp:  [97.3 °F (36.3 °C)-98 °F (36.7 °C)] 97.9 °F (36.6 °C)  Heart Rate:  [52-88] 88  Resp:  [16-18] 18  BP: ()/(57-76) 121/69  Arterial Line BP: (120-135)/(55-70) 120/55  Body mass index is 24.81 kg/m².    Intake/Output Summary (Last 24 hours) at 2/12/2025 0842  Last data filed at 2/12/2025 0428  Gross per 24 hour   Intake 1025 ml   Output 1765 ml   Net -740 ml     No intake/output data recorded.    Chest tube drainage last 8 hours 0/100        02/11/25  0650   Weight: 74 kg (163 lb 2.3 oz)         Objective:  Vital signs: (most recent): Blood pressure 121/69, pulse 88, temperature 97.9 °F (36.6 °C), temperature source Oral, resp. rate 18, weight 74 kg (163 lb 2.3 oz), SpO2 92%.                   Results Review:        WBC WBC   Date Value Ref Range Status   02/12/2025 19.83 (H) 3.40 - 10.80 10*3/mm3 Final   02/11/2025 13.61 (H) 3.40 - 10.80 10*3/mm3 Final   02/10/2025 19.10 (H) 3.40 - 10.80 10*3/mm3 Final   02/10/2025 13.52 (H) 3.40 - 10.80 10*3/mm3 Final   02/10/2025 13.24 (H) 3.40 - 10.80 10*3/mm3 Final      HGB Hemoglobin   Date Value Ref Range Status   02/12/2025 10.6 (L) 13.0 - 17.7 g/dL Final   02/11/2025 10.0 (L) 13.0 - 17.7 g/dL Final   02/10/2025 10.5 (L) 13.0 - 17.7 g/dL Final   02/10/2025 10.3 (L) 13.0 - 17.7 g/dL Final   02/10/2025 9.8 (L) 13.0 - 17.7 g/dL Final   02/10/2025 9.5 (L) 12.0 - 17.0 g/dL Final   02/10/2025 10.5 (L) 12.0 - 17.0 g/dL Final   02/10/2025 11.2 (L) 12.0 - 17.0 g/dL Final   02/10/2025 11.2 (L) 12.0 - 17.0 g/dL Final   02/10/2025 11.6 (L) 12.0 - 17.0 g/dL Final   02/10/2025 11.2 (L) 12.0 - 17.0 g/dL Final    02/10/2025 13.9 12.0 - 17.0 g/dL Final      HCT Hematocrit   Date Value Ref Range Status   02/12/2025 31.9 (L) 37.5 - 51.0 % Final   02/11/2025 32.5 (L) 37.5 - 51.0 % Final   02/10/2025 31.8 (L) 37.5 - 51.0 % Final   02/10/2025 31.4 (L) 37.5 - 51.0 % Final   02/10/2025 30.0 (L) 37.5 - 51.0 % Final   02/10/2025 28 (L) 38 - 51 % Final   02/10/2025 31 (L) 38 - 51 % Final   02/10/2025 33 (L) 38 - 51 % Final   02/10/2025 33 (L) 38 - 51 % Final   02/10/2025 34 (L) 38 - 51 % Final   02/10/2025 33 (L) 38 - 51 % Final   02/10/2025 41 38 - 51 % Final      Platelets Platelets   Date Value Ref Range Status   02/12/2025 119 (L) 140 - 450 10*3/mm3 Final   02/11/2025 108 (L) 140 - 450 10*3/mm3 Final   02/10/2025 113 (L) 140 - 450 10*3/mm3 Final   02/10/2025 144 140 - 450 10*3/mm3 Final   02/10/2025 104 (L) 140 - 450 10*3/mm3 Final   02/10/2025 104 (L) 140 - 450 10*3/mm3 Final   02/10/2025 145 140 - 450 10*3/mm3 Final        PT/INR:    Protime   Date Value Ref Range Status   02/11/2025 16.8 (H) 11.7 - 14.2 Seconds Final   02/10/2025 18.2 (H) 11.7 - 14.2 Seconds Final   02/10/2025 20.3 (H) 11.7 - 14.2 Seconds Final   /  INR   Date Value Ref Range Status   02/11/2025 1.36 (H) 0.90 - 1.10 Final   02/10/2025 1.51 (H) 0.90 - 1.10 Final   02/10/2025 1.72 (H) 0.90 - 1.10 Final       Sodium Sodium   Date Value Ref Range Status   02/12/2025 134 (L) 136 - 145 mmol/L Final   02/11/2025 141 136 - 145 mmol/L Final   02/10/2025 144 136 - 145 mmol/L Final   02/10/2025 141 136 - 145 mmol/L Final      Potassium Potassium   Date Value Ref Range Status   02/12/2025 4.5 3.5 - 5.2 mmol/L Final   02/11/2025 4.4 3.5 - 5.2 mmol/L Final   02/10/2025 4.3 3.5 - 5.2 mmol/L Final   02/10/2025 4.9 3.5 - 5.2 mmol/L Final      Chloride Chloride   Date Value Ref Range Status   02/12/2025 102 98 - 107 mmol/L Final   02/11/2025 113 (H) 98 - 107 mmol/L Final   02/10/2025 109 (H) 98 - 107 mmol/L Final   02/10/2025 110 (H) 98 - 107 mmol/L Final      Bicarbonate CO2    Date Value Ref Range Status   02/12/2025 22.3 22.0 - 29.0 mmol/L Final   02/11/2025 20.0 (L) 22.0 - 29.0 mmol/L Final   02/10/2025 19.3 (L) 22.0 - 29.0 mmol/L Final   02/10/2025 17.5 (L) 22.0 - 29.0 mmol/L Final      BUN BUN   Date Value Ref Range Status   02/12/2025 19 8 - 23 mg/dL Final   02/11/2025 16 8 - 23 mg/dL Final   02/10/2025 18 8 - 23 mg/dL Final   02/10/2025 18 8 - 23 mg/dL Final      Creatinine Creatinine   Date Value Ref Range Status   02/12/2025 1.12 0.76 - 1.27 mg/dL Final   02/11/2025 1.19 0.76 - 1.27 mg/dL Final   02/10/2025 1.37 (H) 0.76 - 1.27 mg/dL Final   02/10/2025 1.33 (H) 0.76 - 1.27 mg/dL Final      Calcium Calcium   Date Value Ref Range Status   02/12/2025 8.8 8.6 - 10.5 mg/dL Final   02/11/2025 8.6 8.6 - 10.5 mg/dL Final   02/10/2025 8.9 8.6 - 10.5 mg/dL Final   02/10/2025 9.6 8.6 - 10.5 mg/dL Final      Magnesium Magnesium   Date Value Ref Range Status   02/11/2025 2.5 (H) 1.6 - 2.4 mg/dL Final   02/10/2025 3.0 (H) 1.6 - 2.4 mg/dL Final   02/10/2025 2.9 (H) 1.6 - 2.4 mg/dL Final          aspirin, 81 mg, Oral, Daily  atorvastatin, 40 mg, Oral, Nightly  chlorhexidine, 15 mL, Mouth/Throat, Q12H  colchicine, 0.6 mg, Oral, Daily  docusate sodium, 100 mg, Oral, BID  enoxaparin, 40 mg, Subcutaneous, Daily  furosemide, 40 mg, Intravenous, Once  gabapentin, 100 mg, Oral, Q12H  guaiFENesin, 1,200 mg, Oral, Q12H  insulin lispro, 2-9 Units, Subcutaneous, 4x Daily AC & at Bedtime  levothyroxine, 100 mcg, Oral, Daily  metoprolol tartrate, 12.5 mg, Oral, Q12H  montelukast, 10 mg, Oral, Nightly  mupirocin, 1 Application, Each Nare, BID  pantoprazole, 40 mg, Oral, QAM  polyethylene glycol, 17 g, Oral, Daily  potassium chloride, 20 mEq, Oral, Once  senna-docusate sodium, 2 tablet, Oral, Nightly  vancomycin, 1,000 mg, Intravenous, Q24H      dextrose, 30 mL/hr, Last Rate: Stopped (02/10/25 2310)  milrinone, 0.25-0.375 mcg/kg/min, Last Rate: Stopped (02/10/25 1707)  norepinephrine, 0.02-0.2 mcg/kg/min,  Last Rate: Stopped (02/10/25 2135)  Pharmacy to dose vancomycin,               Mitral valve insufficiency    Aortic stenosis      Assessment & Plan    -Mitral valve regurgitation- s/p CABGx3 LIMA/RSVG, MV repair, SUDHIR closure 45mm atrial clip- Pagni 2/10/2025  -Multivessel CAD  -ascending aortic ectasia 4.2cm unchanged per last CT 11/2024  -hyperlipidemia  -renal cell carcinoma s/p partial nephrectomy-- creatinine 1.3-1.5  -polymyalgia rheumatica  -myeloproliferative disorder/polycythemia vera-- follows with hematology  -hypertension  -raynaud's  -post op anemia- expected acute blood loss   -Leukocytosis- reactive/stress dose steroids    POD#2  Looks great this morning.  Up in the chair.  2L NC---wean as able.  Sinus rhythm rate in the 80s-- okay to start beta blocker today.  IV diurese.  Discontinue chest tubes, central line and funez  Continue routine care     Staci Hutchinson, ALETHA  02/12/25  08:42 EST

## 2025-02-13 ENCOUNTER — APPOINTMENT (OUTPATIENT)
Dept: GENERAL RADIOLOGY | Facility: HOSPITAL | Age: 80
DRG: 219 | End: 2025-02-13
Payer: MEDICARE

## 2025-02-13 LAB
ANION GAP SERPL CALCULATED.3IONS-SCNC: 8.5 MMOL/L (ref 5–15)
BH BB BLOOD EXPIRATION DATE: NORMAL
BH BB BLOOD EXPIRATION DATE: NORMAL
BH BB BLOOD TYPE BARCODE: 5100
BH BB BLOOD TYPE BARCODE: 5100
BH BB DISPENSE STATUS: NORMAL
BH BB DISPENSE STATUS: NORMAL
BH BB PRODUCT CODE: NORMAL
BH BB PRODUCT CODE: NORMAL
BH BB UNIT NUMBER: NORMAL
BH BB UNIT NUMBER: NORMAL
BUN SERPL-MCNC: 20 MG/DL (ref 8–23)
BUN/CREAT SERPL: 17.7 (ref 7–25)
CALCIUM SPEC-SCNC: 8.6 MG/DL (ref 8.6–10.5)
CHLORIDE SERPL-SCNC: 102 MMOL/L (ref 98–107)
CO2 SERPL-SCNC: 26.5 MMOL/L (ref 22–29)
CREAT SERPL-MCNC: 1.13 MG/DL (ref 0.76–1.27)
CROSSMATCH INTERPRETATION: NORMAL
CROSSMATCH INTERPRETATION: NORMAL
DEPRECATED RDW RBC AUTO: 46.8 FL (ref 37–54)
EGFRCR SERPLBLD CKD-EPI 2021: 66.5 ML/MIN/1.73
ERYTHROCYTE [DISTWIDTH] IN BLOOD BY AUTOMATED COUNT: 15.6 % (ref 12.3–15.4)
GLUCOSE BLDC GLUCOMTR-MCNC: 106 MG/DL (ref 70–130)
GLUCOSE BLDC GLUCOMTR-MCNC: 114 MG/DL (ref 70–130)
GLUCOSE BLDC GLUCOMTR-MCNC: 98 MG/DL (ref 70–130)
GLUCOSE SERPL-MCNC: 103 MG/DL (ref 65–99)
HCT VFR BLD AUTO: 31.5 % (ref 37.5–51)
HGB BLD-MCNC: 10.3 G/DL (ref 13–17.7)
MCH RBC QN AUTO: 27.3 PG (ref 26.6–33)
MCHC RBC AUTO-ENTMCNC: 32.7 G/DL (ref 31.5–35.7)
MCV RBC AUTO: 83.6 FL (ref 79–97)
PLATELET # BLD AUTO: 105 10*3/MM3 (ref 140–450)
PMV BLD AUTO: 10.6 FL (ref 6–12)
POTASSIUM SERPL-SCNC: 4 MMOL/L (ref 3.5–5.2)
RBC # BLD AUTO: 3.77 10*6/MM3 (ref 4.14–5.8)
SODIUM SERPL-SCNC: 137 MMOL/L (ref 136–145)
UNIT  ABO: NORMAL
UNIT  ABO: NORMAL
UNIT  RH: NORMAL
UNIT  RH: NORMAL
WBC NRBC COR # BLD AUTO: 15.24 10*3/MM3 (ref 3.4–10.8)

## 2025-02-13 PROCEDURE — 99232 SBSQ HOSP IP/OBS MODERATE 35: CPT | Performed by: NURSE PRACTITIONER

## 2025-02-13 PROCEDURE — 97530 THERAPEUTIC ACTIVITIES: CPT

## 2025-02-13 PROCEDURE — 85027 COMPLETE CBC AUTOMATED: CPT | Performed by: NURSE PRACTITIONER

## 2025-02-13 PROCEDURE — 82948 REAGENT STRIP/BLOOD GLUCOSE: CPT

## 2025-02-13 PROCEDURE — 94762 N-INVAS EAR/PLS OXIMTRY CONT: CPT

## 2025-02-13 PROCEDURE — 71046 X-RAY EXAM CHEST 2 VIEWS: CPT

## 2025-02-13 PROCEDURE — 99024 POSTOP FOLLOW-UP VISIT: CPT | Performed by: NURSE PRACTITIONER

## 2025-02-13 PROCEDURE — 80048 BASIC METABOLIC PNL TOTAL CA: CPT | Performed by: NURSE PRACTITIONER

## 2025-02-13 PROCEDURE — 25010000002 CALCIUM GLUCONATE 2-0.675 GM/100ML-% SOLUTION: Performed by: NURSE PRACTITIONER

## 2025-02-13 PROCEDURE — 94799 UNLISTED PULMONARY SVC/PX: CPT

## 2025-02-13 RX ORDER — CALCIUM GLUCONATE 20 MG/ML
2000 INJECTION, SOLUTION INTRAVENOUS ONCE
Status: COMPLETED | OUTPATIENT
Start: 2025-02-13 | End: 2025-02-13

## 2025-02-13 RX ORDER — POTASSIUM CHLORIDE 1500 MG/1
20 TABLET, EXTENDED RELEASE ORAL DAILY
Status: DISCONTINUED | OUTPATIENT
Start: 2025-02-13 | End: 2025-02-17 | Stop reason: HOSPADM

## 2025-02-13 RX ORDER — FUROSEMIDE 40 MG/1
40 TABLET ORAL DAILY
Status: DISCONTINUED | OUTPATIENT
Start: 2025-02-13 | End: 2025-02-17 | Stop reason: HOSPADM

## 2025-02-13 RX ADMIN — FUROSEMIDE 40 MG: 40 TABLET ORAL at 14:58

## 2025-02-13 RX ADMIN — ATORVASTATIN CALCIUM 40 MG: 20 TABLET, FILM COATED ORAL at 21:56

## 2025-02-13 RX ADMIN — GUAIFENESIN 1200 MG: 600 TABLET, MULTILAYER, EXTENDED RELEASE ORAL at 09:24

## 2025-02-13 RX ADMIN — POLYETHYLENE GLYCOL 3350 17 G: 17 POWDER, FOR SOLUTION ORAL at 09:24

## 2025-02-13 RX ADMIN — COLCHICINE 0.6 MG: 0.6 TABLET ORAL at 09:24

## 2025-02-13 RX ADMIN — METOPROLOL TARTRATE 12.5 MG: 25 TABLET, FILM COATED ORAL at 09:24

## 2025-02-13 RX ADMIN — POTASSIUM CHLORIDE 20 MEQ: 1500 TABLET, EXTENDED RELEASE ORAL at 14:58

## 2025-02-13 RX ADMIN — SENNOSIDES AND DOCUSATE SODIUM 2 TABLET: 50; 8.6 TABLET ORAL at 21:56

## 2025-02-13 RX ADMIN — MUPIROCIN 1 APPLICATION: 20 OINTMENT TOPICAL at 21:57

## 2025-02-13 RX ADMIN — GABAPENTIN 100 MG: 100 CAPSULE ORAL at 09:24

## 2025-02-13 RX ADMIN — DOCUSATE SODIUM 100 MG: 100 CAPSULE, LIQUID FILLED ORAL at 09:24

## 2025-02-13 RX ADMIN — HYDROCODONE BITARTRATE AND ACETAMINOPHEN 2 TABLET: 5; 325 TABLET ORAL at 06:56

## 2025-02-13 RX ADMIN — GABAPENTIN 100 MG: 100 CAPSULE ORAL at 21:56

## 2025-02-13 RX ADMIN — ACETAMINOPHEN 650 MG: 325 TABLET, FILM COATED ORAL at 00:26

## 2025-02-13 RX ADMIN — LEVOTHYROXINE SODIUM 100 MCG: 100 TABLET ORAL at 09:24

## 2025-02-13 RX ADMIN — HYDROCODONE BITARTRATE AND ACETAMINOPHEN 2 TABLET: 5; 325 TABLET ORAL at 14:58

## 2025-02-13 RX ADMIN — ASPIRIN 81 MG: 81 TABLET, COATED ORAL at 09:24

## 2025-02-13 RX ADMIN — CALCIUM GLUCONATE 2000 MG: 20 INJECTION, SOLUTION INTRAVENOUS at 14:57

## 2025-02-13 RX ADMIN — DOCUSATE SODIUM 100 MG: 100 CAPSULE, LIQUID FILLED ORAL at 21:56

## 2025-02-13 RX ADMIN — PANTOPRAZOLE SODIUM 40 MG: 40 TABLET, DELAYED RELEASE ORAL at 06:53

## 2025-02-13 RX ADMIN — MONTELUKAST 10 MG: 10 TABLET, FILM COATED ORAL at 21:56

## 2025-02-13 RX ADMIN — GUAIFENESIN 1200 MG: 600 TABLET, MULTILAYER, EXTENDED RELEASE ORAL at 21:56

## 2025-02-13 RX ADMIN — METOPROLOL TARTRATE 12.5 MG: 25 TABLET, FILM COATED ORAL at 21:56

## 2025-02-13 NOTE — PROGRESS NOTES
Hospital Follow Up    LOS:  LOS: 3 days   Patient Name: Scott Murphy  Age/Sex: 79 y.o. male  : 1945  MRN: [id number]    Day of Service: 25   Length of Stay: 3  Encounter Provider: ALETHA Mckee  Place of Service: [facility] CARDIOLOGY  Patient Care Team:  Mariah Delgado MD as PCP - General (Family Medicine)  Consuelo Adorno MD as Consulting Physician (Hematology and Oncology)  John Damon MD as Consulting Physician (Hematology and Oncology)  Mariah Delgado MD as Referring Physician (Family Medicine)    Subjective:     Chief Complaint: follow up severe MR & CAD    Interval History: Doing well this morning, sitting up in chair. Just returned from getting a chest xray. No chest pain but a little tightness at times. No shortness of breath.    Objective:     Objective:  Temp:  [97.7 °F (36.5 °C)-98.5 °F (36.9 °C)] 98.1 °F (36.7 °C)  Heart Rate:  [70-87] 78  Resp:  [18] 18  BP: (101-135)/(68-86) 108/68     Intake/Output Summary (Last 24 hours) at 2025 0816  Last data filed at 2025 2100  Gross per 24 hour   Intake --   Output 2550 ml   Net -2550 ml     Body mass index is 24.66 kg/m².      25  0650 25  1119 25  0625   Weight: 74 kg (163 lb 2.3 oz) 74.9 kg (165 lb 1.6 oz) 73.6 kg (162 lb 3.2 oz)     Weight change:     Physical Exam:   General Appearance:    Awake alert and oriented in no acute distress.   Color:  Skin:  Neuro:  HEENT:    Lungs:     Pink  Warm and dry  No focal, motor or sensory deficits  Neck supple, pupils equal, round and reactive. No JVD, No Bruit  Clear to auscultation,respirations regular, even and                  unlabored    Heart:    Regular rate and rhythm, S1 and S2, no murmur, no gallop, no rub. No edema, DP/PT pulses are 2+   Chest Wall:    Midsternal incision clean, dry, no purulent drainage   Abdomen:     Normal bowel sounds, no masses, no organomegaly, soft        non-tender, non-distended, no  guarding, no ascites noted   Extremities:   Moves all extremities well, no edema, no cyanosis, no redness       Lab Review:   Results from last 7 days   Lab Units 02/13/25 0326 02/12/25  0440 02/10/25  1144 02/07/25  0758   SODIUM mmol/L 137 134*   < > 140   POTASSIUM mmol/L 4.0 4.5   < > 4.6   CHLORIDE mmol/L 102 102   < > 104   CO2 mmol/L 26.5 22.3   < > 26.0   BUN mg/dL 20 19   < > 20   CREATININE mg/dL 1.13 1.12   < > 1.34*   GLUCOSE mg/dL 103* 123*   < > 90   CALCIUM mg/dL 8.6 8.8   < > 9.4   AST (SGOT) U/L  --   --   --  28   ALT (SGPT) U/L  --   --   --  22    < > = values in this interval not displayed.         Results from last 7 days   Lab Units 02/13/25 0326 02/12/25  0440   WBC 10*3/mm3 15.24* 19.83*   HEMOGLOBIN g/dL 10.3* 10.6*   HEMATOCRIT % 31.5* 31.9*   PLATELETS 10*3/mm3 105* 119*     Results from last 7 days   Lab Units 02/11/25  0215 02/10/25  1144 02/10/25  1056   INR  1.36* 1.51* 1.72*   APTT seconds  --  32.3 30.4     Results from last 7 days   Lab Units 02/11/25  0215 02/10/25  1453   MAGNESIUM mg/dL 2.5* 3.0*     Results from last 7 days   Lab Units 02/07/25  0758   CHOLESTEROL mg/dL 142   TRIGLYCERIDES mg/dL 61   HDL CHOL mg/dL 57     Results from last 7 days   Lab Units 02/07/25  0758   PROBNP pg/mL 118.0         I reviewed the patient's new clinical results.  I personally viewed and interpreted the patient's EKG  Current Medications:   Scheduled Meds:aspirin, 81 mg, Oral, Daily  atorvastatin, 40 mg, Oral, Nightly  chlorhexidine, 15 mL, Mouth/Throat, Q12H  colchicine, 0.6 mg, Oral, Daily  docusate sodium, 100 mg, Oral, BID  enoxaparin, 40 mg, Subcutaneous, Daily  gabapentin, 100 mg, Oral, Q12H  guaiFENesin, 1,200 mg, Oral, Q12H  insulin lispro, 2-9 Units, Subcutaneous, 4x Daily AC & at Bedtime  levothyroxine, 100 mcg, Oral, Daily  metoprolol tartrate, 12.5 mg, Oral, Q12H  montelukast, 10 mg, Oral, Nightly  mupirocin, 1 Application, Each Nare, BID  pantoprazole, 40 mg, Oral,  QAM  polyethylene glycol, 17 g, Oral, Daily  senna-docusate sodium, 2 tablet, Oral, Nightly      Continuous Infusions:       Allergies:  Allergies   Allergen Reactions    Diphenhydramine Rash     HEART PALPITATIONS, RASH    Cephalexin Other (See Comments)     Fatigue      Latex Rash    Shellfish-Derived Products Unknown - Low Severity     PT CANNOT REMEMBER       Assessment/Plan:      CAD/MR: s/p CABG x 3 (LIMA to mid LAD, SVG to PDA, SVG to OM1) mitral valve repair with a 34 mm [employer] flexible band posterior annuloplasty and chordal repair of P2-P3/cleftoplasty of P2/P3, left atrial appendage closure on 2/10/2025 with Dr. Alcantara.  HTN:  BP stable. Metoprolol resumed yesterday and tolerating well.  Post-op anemia: improving  Leukocytosis: WBC trending down.  Afebrile. Likely reactive and due to steroids  Raynaud's  Renal cell carcinoma: s/p partial nephrectomy. Creatinine stable  Hypothyroidism      -chest tubes removed yesterday.   -chest xray this morning showed minimal pneumo  -HR and BP stable  -continue current cardiac management.        ALETHA Mckee  02/13/25  08:16 EST  Electronically signed by ALETHA Mckee, 02/12/25, 8:16 AM EST.

## 2025-02-13 NOTE — THERAPY TREATMENT NOTE
Patient Name: Scott Murphy  : 1945    MRN: [id number]                              Today's Date: 2025       Admit Date: 2/10/2025    Visit Dx:     ICD-10-CM ICD-9-CM   1. S/P CABG (coronary artery bypass graft)  Z95.1 V45.81   2. Mitral valve insufficiency, unspecified etiology  I34.0 424.0   3. S/P MVR (mitral valve repair)  Z98.890 V45.89     Patient Active Problem List   Diagnosis    Primary hypertension    Erythrocytosis    Cancer of right kidney    Prostate cancer    RBBB    PMR (polymyalgia rheumatica)    Episodic lightheadedness    Raynaud's disease without gangrene    Myeloproliferative disorder    Thoracic aortic aneurysm without rupture    Colon cancer screening    Right shoulder pain    Right shoulder injury    Hyperlipidemia    Hyperbilirubinemia    Dilated aortic root    Cough    Asthma    Anxiety    Malignant neoplasm of kidney    Erythrocytosis    Acquired hypothyroidism    Neck pain    Hypertension    Acute URI    Hyperkalemia    Costochondritis    Insomnia    Localized osteoarthritis of left shoulder    Osteoarthritis of right knee    Left shoulder pain    Mitral valve insufficiency    Aortic stenosis     Past Medical History:   Diagnosis Date    Acquired hypothyroidism     Anxiety     Aortic aneurysm     Aortic valve regurgitation     Arthritis     Asthma     Hutton's cyst of knee, right     BBB (bundle branch block)     Coronary artery disease     Disease of thyroid gland     Gout     H/O Muscle pain     Right shoulder and neck area    H/O Radiation treatment     For tonsillar inflammation and infection when he was a child and this led him to develop thyroid cancer.    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 Ganesh Lopez    Left shoulder pain     Myeloproliferative  disorder     With polycythemia vera, JAK2 mutation positive requiring periodic phlebotomies (hematocrit above 47).    Polymyalgia rheumatica     Premature atrial contraction     Prostate cancer     Raynaud disease     Shoulder injury, left, sequela     SOB (shortness of breath) on exertion      Past Surgical History:   Procedure Laterality Date    CARDIAC CATHETERIZATION N/A 1/8/2025    Procedure: Coronary angiography;  Surgeon: Di Roberts MD;  Location: [facility] CATH INVASIVE LOCATION;  Service: Cardiovascular;  Laterality: N/A;    CARDIAC CATHETERIZATION N/A 1/8/2025    Procedure: Left heart cath;  Surgeon: Di Roberts MD;  Location: [facility] CATH INVASIVE LOCATION;  Service: Cardiovascular;  Laterality: N/A;    CARDIAC CATHETERIZATION N/A 1/8/2025    Procedure: Right Heart Cath;  Surgeon: Di Roberts MD;  Location: [facility] CATH INVASIVE LOCATION;  Service: Cardiovascular;  Laterality: N/A;    CIRCUMCISION N/A 12/17/2018    Procedure: CIRCUMCISION;  Surgeon: Gallo Lopez MD;  Location: [facility] OR;  Service: Urology    COLONOSCOPY  2010    Documented a tubulovillous adenom that was removed completely. Colonoscopy in 2014 was unremarkable.    COLONOSCOPY N/A 2/20/2020    Procedure: COLONOSCOPY;  Surgeon: Claire Galo MD;  Location: [facility];  Service: Gastroenterology;  Laterality: N/A;  diverticulosis    CORONARY ARTERY BYPASS GRAFT WITH AORTIC AND MITRAL VALVE REPAIR/REPLACEMENT N/A 2/10/2025    Procedure: STERNOTOMY,CORONARY ARTERY BYPASS  X 3,WITH INTERNAL MAMMARY ARTERY GRAFT AND ENDOSCOPICLY HARVESTED RIGHT SAPHENOUS VEIN MITRAL VALVE REPAIR, LEFT ATRIAL APPENDAGE EXCLUSION, PRP  TRANSESOPHAGEAL ECHOCARDIOGRAM WITH ANESTHESIA;  Surgeon: Migel Alcantara MD;  Location: [facility] CVOR;  Service: Cardiothoracic;  Laterality: N/A;    HERNIA REPAIR      H/O multiple hernia repairs including right inguinal hernia repair in 1981, 2003, and double hernia repair in 2013    KIDNEY SURGERY       PROSTATE BIOPSY      Showed features consistent with prostate cancer    SHOULDER SURGERY  1995    Shoulder repair    THYROIDECTOMY, PARTIAL  1983    For malignant tumor    TOOTH EXTRACTION        General Information       Row Name 02/13/25 1613          Physical Therapy Time and Intention    Document Type therapy note (daily note) (P)   -BB     Mode of Treatment physical therapy (P)   -BB       Row Name 02/13/25 1613          General Information    Existing Precautions/Restrictions cardiac;sternal;fall (P)   -BB               User Key  (r) = Recorded By, (t) = Taken By, (c) = Cosigned By      Initials Name Provider Type    Rah Gorman, PT Student PT Student                   Mobility       Row Name 02/13/25 1613          Bed Mobility    Bed Mobility sit-supine (P)   -BB     Sit-Supine [place] (Bed Mobility) verbal cues;contact guard (P)   -BB     Assistive Device (Bed Mobility) bed rails;head of bed elevated (P)   -BB     Comment, (Bed Mobility) up in bed upon arrival (P)   -BB       Row Name 02/13/25 1613          Sit-Stand Transfer    Sit-Stand [place] (Transfers) verbal cues;contact guard (P)   -BB     Comment, (Sit-Stand Transfer) No dizziness with STS (P)   -BB       Row Name 02/13/25 1613          Gait/Stairs (Locomotion)    [place] Level (Gait) verbal cues;contact guard (P)   -BB     Distance in Feet (Gait) 350 (P)   -BB     Deviations/Abnormal Patterns (Gait) nichole decreased;gait speed decreased;stride length decreased (P)   -BB     Bilateral Gait Deviations forward flexed posture;decreased arm swing;heel strike decreased (P)   -BB     Comment, (Gait/Stairs) Pt ambulated 350 with CGA. Pt demos slow steady pace. HHA at first but pt was stable. (P)   -BB               User Key  (r) = Recorded By, (t) = Taken By, (c) = Cosigned By      Initials Name Provider Type    Rah Gorman PT Student PT Student                   Obj/Interventions    No documentation.                  Goals/Plan     No documentation.                  Clinical Impression       Row Name 02/13/25 1616          Pain    Pretreatment Pain Rating 0/10 - no pain (P)   -BB     Posttreatment Pain Rating 0/10 - no pain (P)   -BB       Row Name 02/13/25 1616          Plan of Care Review    Plan of Care Reviewed With patient (P)   -BB     Outcome Evaluation Pt seen for PT this afternoon. Upon entering, pt was in bed on 3L NC and does not rate any pain today. Pt got out of bed, completed STS and ambulated 350ft with CGA. HHA for the first 15 ft but discontinued because pt was stable. Pt demos a slow steady pace. Upon returning to the room, pt sat in bed side chair with all needs in reach. Pt did well today and PT will continue to progress as tolerated. (P)   -BB       Row Name 02/13/25 1616          Therapy Assessment/Plan (PT)    Therapy Frequency (PT) 6 times/wk (P)   -BB       Row Name 02/13/25 1616          Vital Signs    Pre SpO2 (%) 99 (P)   -BB     O2 Delivery Pre Treatment nasal cannula (P)   3L  -BB     O2 Delivery Intra Treatment nasal cannula (P)   -BB     Post SpO2 (%) 97 (P)   -BB     O2 Delivery Post Treatment nasal cannula (P)   3L  -BB     Pre Patient Position Supine (P)   -BB     Intra Patient Position Standing (P)   -BB     Post Patient Position Sitting (P)   -BB       Row Name 02/13/25 1616          Positioning and Restraints    Pre-Treatment Position in bed (P)   -BB     Post Treatment Position chair (P)   -BB     In Chair notified nsg;reclined;call light within reach;encouraged to call for assist;exit alarm on (P)   -BB               User Key  (r) = Recorded By, (t) = Taken By, (c) = Cosigned By      Initials Name Provider Type    Rah Gorman, PT Student PT Student                   Outcome Measures       Row Name 02/13/25 1623          How much help from another person do you currently need...    Turning from your back to your side while in flat bed without using bedrails? 4 (P)   -BB     Moving from lying on  back to sitting on the side of a flat bed without bedrails? 3 (P)   -BB     Moving to and from a bed to a chair (including a wheelchair)? 3 (P)   -BB     Standing up from a chair using your arms (e.g., wheelchair, bedside chair)? 4 (P)   -BB     Climbing 3-5 steps with a railing? 3 (P)   -BB     To walk in hospital room? 3 (P)   -BB     AM-PAC 6 Clicks Score (PT) 20 (P)   -BB     Highest Level of Mobility Goal 6 --> Walk 10 steps or more (P)   -BB               User Key  (r) = Recorded By, (t) = Taken By, (c) = Cosigned By      Initials Name Provider Type    Rah Gorman, PT Student PT Student                                 Physical Therapy Education        No education to display                  PT Recommendation and Plan  Planned Therapy Interventions (PT): balance training, gait training, home exercise program, bed mobility training, patient/family education, ROM (range of motion), stair training, strengthening, stretching, transfer training  Progress: improving  Outcome Evaluation: (P) Pt seen for PT this afternoon. Upon entering, pt was in bed on 3L NC and does not rate any pain today. Pt got out of bed, completed STS and ambulated 350ft with CGA. HHA for the first 15 ft but discontinued because pt was stable. Pt demos a slow steady pace. Upon returning to the room, pt sat in bed side chair with all needs in reach. Pt did well today and PT will continue to progress as tolerated.     Time Calculation:         PT Charges       Row Name 02/13/25 1620             Time Calculation    Start Time 1544 (P)   -BB      Stop Time 1602 (P)   -BB      Time Calculation (min) 18 min (P)   -BB      PT Received On 02/13/25 (P)   -BB      PT - Next Appointment 02/14/25 (P)   -BB                User Key  (r) = Recorded By, (t) = Taken By, (c) = Cosigned By      Initials Name Provider Type    Rah Gorman PT Student PT Student                      PT G-Codes  Outcome Measure Options: AM-PAC 6 Clicks Basic Mobility  (PT)  AM-PAC 6 Clicks Score (PT): (P) 20  PT Discharge Summary  Anticipated Discharge Disposition (PT): home with assist, home, home with home health    Rah Ugalde, PT Student  2/13/2025

## 2025-02-13 NOTE — PLAN OF CARE
Goal Outcome Evaluation:  Plan of Care Reviewed With: patient        Progress: improving  Outcome Evaluation: pt aox4, POD3 CABG x3. on 2L NC, some c/o pain treated per mar, interventions effective per patient. VSS. Sinus on monitor, wires and pacemaker remain. Continue plan of care.

## 2025-02-13 NOTE — PLAN OF CARE
Goal Outcome Evaluation:  Plan of Care Reviewed With: (P) patient        Progress: improving  Outcome Evaluation: (P) Pt seen for PT this afternoon. Upon entering, pt was in bed on 3L NC and does not rate any pain today. Pt got out of bed, completed STS and ambulated 350ft with CGA. HHA for the first 15 ft but discontinued because pt was stable. Pt demos a slow steady pace. Upon returning to the room, pt sat in bed side chair with all needs in reach. Pt did well today and PT will continue to progress as tolerated.    Anticipated Discharge Disposition (PT): home with assist, home, home with home health

## 2025-02-13 NOTE — PROGRESS NOTES
LOS: 3 days   Patient Care Team:  Mariah Delgado MD as PCP - General (Family Medicine)  Consuelo Adorno MD as Consulting Physician (Hematology and Oncology)  John Damon MD as Consulting Physician (Hematology and Oncology)  Mariah Delgado MD as Referring Physician (Family Medicine)    Chief Complaint: post op open heart    Subjective  Feeling good.    Vital Signs  Temp:  [97.7 °F (36.5 °C)-98.5 °F (36.9 °C)] 98.1 °F (36.7 °C)  Heart Rate:  [70-87] 78  Resp:  [18] 18  BP: (101-135)/(68-86) 108/68  Body mass index is 24.66 kg/m².    Intake/Output Summary (Last 24 hours) at 2/13/2025 1117  Last data filed at 2/13/2025 0936  Gross per 24 hour   Intake 240 ml   Output 2865 ml   Net -2625 ml     I/O this shift:  In: 240 [P.O.:240]  Out: 400 [Urine:400]            02/11/25  0650 02/12/25  1119 02/13/25  0625   Weight: 74 kg (163 lb 2.3 oz) 74.9 kg (165 lb 1.6 oz) 73.6 kg (162 lb 3.2 oz)         Objective:  Vital signs: (most recent): Blood pressure 108/68, pulse 78, temperature 98.1 °F (36.7 °C), temperature source Oral, resp. rate 18, weight 73.6 kg (162 lb 3.2 oz), SpO2 94%.                   Results Review:        WBC WBC   Date Value Ref Range Status   02/13/2025 15.24 (H) 3.40 - 10.80 10*3/mm3 Final   02/12/2025 19.83 (H) 3.40 - 10.80 10*3/mm3 Final   02/11/2025 13.61 (H) 3.40 - 10.80 10*3/mm3 Final   02/10/2025 19.10 (H) 3.40 - 10.80 10*3/mm3 Final   02/10/2025 13.52 (H) 3.40 - 10.80 10*3/mm3 Final      HGB Hemoglobin   Date Value Ref Range Status   02/13/2025 10.3 (L) 13.0 - 17.7 g/dL Final   02/12/2025 10.6 (L) 13.0 - 17.7 g/dL Final   02/11/2025 10.0 (L) 13.0 - 17.7 g/dL Final   02/10/2025 10.5 (L) 13.0 - 17.7 g/dL Final   02/10/2025 10.3 (L) 13.0 - 17.7 g/dL Final      HCT Hematocrit   Date Value Ref Range Status   02/13/2025 31.5 (L) 37.5 - 51.0 % Final   02/12/2025 31.9 (L) 37.5 - 51.0 % Final   02/11/2025 32.5 (L) 37.5 - 51.0 % Final   02/10/2025 31.8 (L) 37.5 - 51.0 % Final   02/10/2025 31.4 (L) 37.5 -  51.0 % Final      Platelets Platelets   Date Value Ref Range Status   02/13/2025 105 (L) 140 - 450 10*3/mm3 Final   02/12/2025 119 (L) 140 - 450 10*3/mm3 Final   02/11/2025 108 (L) 140 - 450 10*3/mm3 Final   02/10/2025 113 (L) 140 - 450 10*3/mm3 Final   02/10/2025 144 140 - 450 10*3/mm3 Final        PT/INR:    Protime   Date Value Ref Range Status   02/11/2025 16.8 (H) 11.7 - 14.2 Seconds Final   02/10/2025 18.2 (H) 11.7 - 14.2 Seconds Final   /  INR   Date Value Ref Range Status   02/11/2025 1.36 (H) 0.90 - 1.10 Final   02/10/2025 1.51 (H) 0.90 - 1.10 Final       Sodium Sodium   Date Value Ref Range Status   02/13/2025 137 136 - 145 mmol/L Final   02/12/2025 134 (L) 136 - 145 mmol/L Final   02/11/2025 141 136 - 145 mmol/L Final   02/10/2025 144 136 - 145 mmol/L Final   02/10/2025 141 136 - 145 mmol/L Final      Potassium Potassium   Date Value Ref Range Status   02/13/2025 4.0 3.5 - 5.2 mmol/L Final   02/12/2025 4.5 3.5 - 5.2 mmol/L Final   02/11/2025 4.4 3.5 - 5.2 mmol/L Final   02/10/2025 4.3 3.5 - 5.2 mmol/L Final   02/10/2025 4.9 3.5 - 5.2 mmol/L Final      Chloride Chloride   Date Value Ref Range Status   02/13/2025 102 98 - 107 mmol/L Final   02/12/2025 102 98 - 107 mmol/L Final   02/11/2025 113 (H) 98 - 107 mmol/L Final   02/10/2025 109 (H) 98 - 107 mmol/L Final   02/10/2025 110 (H) 98 - 107 mmol/L Final      Bicarbonate CO2   Date Value Ref Range Status   02/13/2025 26.5 22.0 - 29.0 mmol/L Final   02/12/2025 22.3 22.0 - 29.0 mmol/L Final   02/11/2025 20.0 (L) 22.0 - 29.0 mmol/L Final   02/10/2025 19.3 (L) 22.0 - 29.0 mmol/L Final   02/10/2025 17.5 (L) 22.0 - 29.0 mmol/L Final      BUN BUN   Date Value Ref Range Status   02/13/2025 20 8 - 23 mg/dL Final   02/12/2025 19 8 - 23 mg/dL Final   02/11/2025 16 8 - 23 mg/dL Final   02/10/2025 18 8 - 23 mg/dL Final   02/10/2025 18 8 - 23 mg/dL Final      Creatinine Creatinine   Date Value Ref Range Status   02/13/2025 1.13 0.76 - 1.27 mg/dL Final   02/12/2025 1.12  0.76 - 1.27 mg/dL Final   02/11/2025 1.19 0.76 - 1.27 mg/dL Final   02/10/2025 1.37 (H) 0.76 - 1.27 mg/dL Final   02/10/2025 1.33 (H) 0.76 - 1.27 mg/dL Final      Calcium Calcium   Date Value Ref Range Status   02/13/2025 8.6 8.6 - 10.5 mg/dL Final   02/12/2025 8.8 8.6 - 10.5 mg/dL Final   02/11/2025 8.6 8.6 - 10.5 mg/dL Final   02/10/2025 8.9 8.6 - 10.5 mg/dL Final   02/10/2025 9.6 8.6 - 10.5 mg/dL Final      Magnesium Magnesium   Date Value Ref Range Status   02/11/2025 2.5 (H) 1.6 - 2.4 mg/dL Final   02/10/2025 3.0 (H) 1.6 - 2.4 mg/dL Final   02/10/2025 2.9 (H) 1.6 - 2.4 mg/dL Final          aspirin, 81 mg, Oral, Daily  atorvastatin, 40 mg, Oral, Nightly  chlorhexidine, 15 mL, Mouth/Throat, Q12H  colchicine, 0.6 mg, Oral, Daily  docusate sodium, 100 mg, Oral, BID  enoxaparin, 40 mg, Subcutaneous, Daily  gabapentin, 100 mg, Oral, Q12H  guaiFENesin, 1,200 mg, Oral, Q12H  insulin lispro, 2-9 Units, Subcutaneous, 4x Daily AC & at Bedtime  levothyroxine, 100 mcg, Oral, Daily  metoprolol tartrate, 12.5 mg, Oral, Q12H  montelukast, 10 mg, Oral, Nightly  mupirocin, 1 Application, Each Nare, BID  pantoprazole, 40 mg, Oral, QAM  polyethylene glycol, 17 g, Oral, Daily  senna-docusate sodium, 2 tablet, Oral, Nightly                   Mitral valve insufficiency    Aortic stenosis      Assessment & Plan    -Mitral valve regurgitation- s/p CABGx3 LIMA/RSVG, MV repair, SUDHIR closure 45[facility]  -Multivessel CAD  -ascending aortic ectasia 4.2cm unchanged per last CT 11/2024  -hyperlipidemia  -renal cell carcinoma s/p partial nephrectomy-- creatinine 1.3-1.5  -polymyalgia rheumatica  -myeloproliferative disorder/polycythemia vera-- follows with hematology  -hypertension  -raynaud's  -post op anemia- expected acute blood loss   -Leukocytosis- reactive/stress dose steroids    POD#3  Looks great this morning.  Up in the chair.  2L NC---wean as able.  Sinus rhythm rate in the 70s-- tolerating beta blocker  PO  brandon.  Discontinue AV wires  Continue routine care     Staci Hutchinson, ALETHA  02/13/25  11:17 EST

## 2025-02-14 LAB
ANION GAP SERPL CALCULATED.3IONS-SCNC: 8.2 MMOL/L (ref 5–15)
BUN SERPL-MCNC: 17 MG/DL (ref 8–23)
BUN/CREAT SERPL: 18.3 (ref 7–25)
CALCIUM SPEC-SCNC: 8.5 MG/DL (ref 8.6–10.5)
CHLORIDE SERPL-SCNC: 100 MMOL/L (ref 98–107)
CO2 SERPL-SCNC: 23.8 MMOL/L (ref 22–29)
CREAT SERPL-MCNC: 0.93 MG/DL (ref 0.76–1.27)
DEPRECATED RDW RBC AUTO: 45.6 FL (ref 37–54)
EGFRCR SERPLBLD CKD-EPI 2021: 84 ML/MIN/1.73
ERYTHROCYTE [DISTWIDTH] IN BLOOD BY AUTOMATED COUNT: 15.4 % (ref 12.3–15.4)
GLUCOSE BLDC GLUCOMTR-MCNC: 100 MG/DL (ref 70–130)
GLUCOSE BLDC GLUCOMTR-MCNC: 101 MG/DL (ref 70–130)
GLUCOSE BLDC GLUCOMTR-MCNC: 125 MG/DL (ref 70–130)
GLUCOSE BLDC GLUCOMTR-MCNC: 95 MG/DL (ref 70–130)
GLUCOSE SERPL-MCNC: 100 MG/DL (ref 65–99)
HCT VFR BLD AUTO: 30.3 % (ref 37.5–51)
HGB BLD-MCNC: 10.2 G/DL (ref 13–17.7)
MCH RBC QN AUTO: 27.7 PG (ref 26.6–33)
MCHC RBC AUTO-ENTMCNC: 33.7 G/DL (ref 31.5–35.7)
MCV RBC AUTO: 82.3 FL (ref 79–97)
PLATELET # BLD AUTO: 110 10*3/MM3 (ref 140–450)
PMV BLD AUTO: 10.3 FL (ref 6–12)
POTASSIUM SERPL-SCNC: 4.1 MMOL/L (ref 3.5–5.2)
QT INTERVAL: 401 MS
QTC INTERVAL: 448 MS
RBC # BLD AUTO: 3.68 10*6/MM3 (ref 4.14–5.8)
SODIUM SERPL-SCNC: 132 MMOL/L (ref 136–145)
WBC NRBC COR # BLD AUTO: 11.44 10*3/MM3 (ref 3.4–10.8)

## 2025-02-14 PROCEDURE — 85027 COMPLETE CBC AUTOMATED: CPT | Performed by: NURSE PRACTITIONER

## 2025-02-14 PROCEDURE — 97530 THERAPEUTIC ACTIVITIES: CPT

## 2025-02-14 PROCEDURE — 99232 SBSQ HOSP IP/OBS MODERATE 35: CPT | Performed by: STUDENT IN AN ORGANIZED HEALTH CARE EDUCATION/TRAINING PROGRAM

## 2025-02-14 PROCEDURE — 82948 REAGENT STRIP/BLOOD GLUCOSE: CPT

## 2025-02-14 PROCEDURE — 93010 ELECTROCARDIOGRAM REPORT: CPT | Performed by: INTERNAL MEDICINE

## 2025-02-14 PROCEDURE — 93005 ELECTROCARDIOGRAM TRACING: CPT | Performed by: NURSE PRACTITIONER

## 2025-02-14 PROCEDURE — 25010000002 MAGNESIUM SULFATE 2 GM/50ML SOLUTION: Performed by: NURSE PRACTITIONER

## 2025-02-14 PROCEDURE — 25010000002 ENOXAPARIN PER 10 MG: Performed by: NURSE PRACTITIONER

## 2025-02-14 PROCEDURE — 80048 BASIC METABOLIC PNL TOTAL CA: CPT | Performed by: NURSE PRACTITIONER

## 2025-02-14 PROCEDURE — 94799 UNLISTED PULMONARY SVC/PX: CPT

## 2025-02-14 PROCEDURE — 93005 ELECTROCARDIOGRAM TRACING: CPT | Performed by: THORACIC SURGERY (CARDIOTHORACIC VASCULAR SURGERY)

## 2025-02-14 RX ORDER — MAGNESIUM SULFATE HEPTAHYDRATE 40 MG/ML
2 INJECTION, SOLUTION INTRAVENOUS ONCE
Status: COMPLETED | OUTPATIENT
Start: 2025-02-14 | End: 2025-02-14

## 2025-02-14 RX ORDER — POTASSIUM CHLORIDE 1500 MG/1
20 TABLET, EXTENDED RELEASE ORAL ONCE
Status: COMPLETED | OUTPATIENT
Start: 2025-02-14 | End: 2025-02-14

## 2025-02-14 RX ORDER — METOPROLOL TARTRATE 25 MG/1
25 TABLET, FILM COATED ORAL EVERY 12 HOURS SCHEDULED
Status: DISCONTINUED | OUTPATIENT
Start: 2025-02-14 | End: 2025-02-15

## 2025-02-14 RX ORDER — FUROSEMIDE 40 MG/1
40 TABLET ORAL ONCE
Status: COMPLETED | OUTPATIENT
Start: 2025-02-14 | End: 2025-02-14

## 2025-02-14 RX ADMIN — POTASSIUM CHLORIDE 20 MEQ: 1500 TABLET, EXTENDED RELEASE ORAL at 08:33

## 2025-02-14 RX ADMIN — METOPROLOL TARTRATE 12.5 MG: 25 TABLET, FILM COATED ORAL at 08:33

## 2025-02-14 RX ADMIN — ALPRAZOLAM 0.25 MG: 0.25 TABLET ORAL at 22:13

## 2025-02-14 RX ADMIN — LEVOTHYROXINE SODIUM 100 MCG: 100 TABLET ORAL at 08:33

## 2025-02-14 RX ADMIN — SENNOSIDES AND DOCUSATE SODIUM 2 TABLET: 50; 8.6 TABLET ORAL at 20:10

## 2025-02-14 RX ADMIN — POLYETHYLENE GLYCOL 3350 17 G: 17 POWDER, FOR SOLUTION ORAL at 08:34

## 2025-02-14 RX ADMIN — GABAPENTIN 100 MG: 100 CAPSULE ORAL at 20:10

## 2025-02-14 RX ADMIN — GUAIFENESIN 1200 MG: 600 TABLET, MULTILAYER, EXTENDED RELEASE ORAL at 20:10

## 2025-02-14 RX ADMIN — GUAIFENESIN 1200 MG: 600 TABLET, MULTILAYER, EXTENDED RELEASE ORAL at 08:34

## 2025-02-14 RX ADMIN — MUPIROCIN 1 APPLICATION: 20 OINTMENT TOPICAL at 20:10

## 2025-02-14 RX ADMIN — DOCUSATE SODIUM 100 MG: 100 CAPSULE, LIQUID FILLED ORAL at 20:10

## 2025-02-14 RX ADMIN — POTASSIUM CHLORIDE 20 MEQ: 1500 TABLET, EXTENDED RELEASE ORAL at 18:15

## 2025-02-14 RX ADMIN — HYDROCODONE BITARTRATE AND ACETAMINOPHEN 2 TABLET: 5; 325 TABLET ORAL at 00:11

## 2025-02-14 RX ADMIN — HYDROCODONE BITARTRATE AND ACETAMINOPHEN 2 TABLET: 5; 325 TABLET ORAL at 06:19

## 2025-02-14 RX ADMIN — METOPROLOL TARTRATE 25 MG: 25 TABLET, FILM COATED ORAL at 20:10

## 2025-02-14 RX ADMIN — DOCUSATE SODIUM 100 MG: 100 CAPSULE, LIQUID FILLED ORAL at 08:34

## 2025-02-14 RX ADMIN — GABAPENTIN 100 MG: 100 CAPSULE ORAL at 08:33

## 2025-02-14 RX ADMIN — 0.12% CHLORHEXIDINE GLUCONATE 15 ML: 1.2 RINSE ORAL at 16:06

## 2025-02-14 RX ADMIN — 0.12% CHLORHEXIDINE GLUCONATE 15 ML: 1.2 RINSE ORAL at 22:14

## 2025-02-14 RX ADMIN — ENOXAPARIN SODIUM 40 MG: 100 INJECTION SUBCUTANEOUS at 18:15

## 2025-02-14 RX ADMIN — MONTELUKAST 10 MG: 10 TABLET, FILM COATED ORAL at 20:10

## 2025-02-14 RX ADMIN — MAGNESIUM SULFATE HEPTAHYDRATE 2 G: 40 INJECTION, SOLUTION INTRAVENOUS at 09:28

## 2025-02-14 RX ADMIN — FUROSEMIDE 40 MG: 40 TABLET ORAL at 08:33

## 2025-02-14 RX ADMIN — ASPIRIN 81 MG: 81 TABLET, COATED ORAL at 08:33

## 2025-02-14 RX ADMIN — HYDROCODONE BITARTRATE AND ACETAMINOPHEN 2 TABLET: 5; 325 TABLET ORAL at 20:09

## 2025-02-14 RX ADMIN — COLCHICINE 0.6 MG: 0.6 TABLET ORAL at 08:33

## 2025-02-14 RX ADMIN — ATORVASTATIN CALCIUM 40 MG: 20 TABLET, FILM COATED ORAL at 20:09

## 2025-02-14 RX ADMIN — MUPIROCIN 1 APPLICATION: 20 OINTMENT TOPICAL at 08:33

## 2025-02-14 RX ADMIN — PANTOPRAZOLE SODIUM 40 MG: 40 TABLET, DELAYED RELEASE ORAL at 06:19

## 2025-02-14 RX ADMIN — FUROSEMIDE 40 MG: 40 TABLET ORAL at 16:06

## 2025-02-14 NOTE — PLAN OF CARE
Goal Outcome Evaluation:  Plan of Care Reviewed With: patient        Progress: no change  Outcome Evaluation: Pt was seen for PT tx this AM. Pt was UIC and stood w/ CGA. Pt was on 2L of O2 w/ sats at 97% on NC. Trialed RA during amb. Pt amb 150' around unit w/ CGA and no AD. pt was slow and shuffling w/ no overt LOB. Pt stopped a few times for standing rest when sats decr as low as 88% (poor wavelength). Pt sats at 89% when pt returned to room to sit in chair although quickly incr to low 90's. Pt performed cardiac program x 10 reps and was UIC at end of session.    Anticipated Discharge Disposition (PT): home with home health, home with assist

## 2025-02-14 NOTE — THERAPY TREATMENT NOTE
Patient Name: Scott Murphy  : 1945    MRN: [id number]                              Today's Date: 2025       Admit Date: 2/10/2025    Visit Dx:     ICD-10-CM ICD-9-CM   1. S/P CABG (coronary artery bypass graft)  Z95.1 V45.81   2. Mitral valve insufficiency, unspecified etiology  I34.0 424.0   3. S/P MVR (mitral valve repair)  Z98.890 V45.89     Patient Active Problem List   Diagnosis    Primary hypertension    Erythrocytosis    Cancer of right kidney    Prostate cancer    RBBB    PMR (polymyalgia rheumatica)    Episodic lightheadedness    Raynaud's disease without gangrene    Myeloproliferative disorder    Thoracic aortic aneurysm without rupture    Colon cancer screening    Right shoulder pain    Right shoulder injury    Hyperlipidemia    Hyperbilirubinemia    Dilated aortic root    Cough    Asthma    Anxiety    Malignant neoplasm of kidney    Erythrocytosis    Acquired hypothyroidism    Neck pain    Hypertension    Acute URI    Hyperkalemia    Costochondritis    Insomnia    Localized osteoarthritis of left shoulder    Osteoarthritis of right knee    Left shoulder pain    Mitral valve insufficiency    Aortic stenosis     Past Medical History:   Diagnosis Date    Acquired hypothyroidism     Anxiety     Aortic aneurysm     Aortic valve regurgitation     Arthritis     Asthma     Hutton's cyst of knee, right     BBB (bundle branch block)     Coronary artery disease     Disease of thyroid gland     Gout     H/O Muscle pain     Right shoulder and neck area    H/O Radiation treatment     For tonsillar inflammation and infection when he was a child and this led him to develop thyroid cancer.    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 Ganesh Lopez    Left shoulder pain     Myeloproliferative  disorder     With polycythemia vera, JAK2 mutation positive requiring periodic phlebotomies (hematocrit above 47).    Polymyalgia rheumatica     Premature atrial contraction     Prostate cancer     Raynaud disease     Shoulder injury, left, sequela     SOB (shortness of breath) on exertion      Past Surgical History:   Procedure Laterality Date    CARDIAC CATHETERIZATION N/A 1/8/2025    Procedure: Coronary angiography;  Surgeon: Di Roberts MD;  Location: [facility] CATH INVASIVE LOCATION;  Service: Cardiovascular;  Laterality: N/A;    CARDIAC CATHETERIZATION N/A 1/8/2025    Procedure: Left heart cath;  Surgeon: Di Roberts MD;  Location: [facility] CATH INVASIVE LOCATION;  Service: Cardiovascular;  Laterality: N/A;    CARDIAC CATHETERIZATION N/A 1/8/2025    Procedure: Right Heart Cath;  Surgeon: Di Roberts MD;  Location: [facility] CATH INVASIVE LOCATION;  Service: Cardiovascular;  Laterality: N/A;    CIRCUMCISION N/A 12/17/2018    Procedure: CIRCUMCISION;  Surgeon: Gallo Lopez MD;  Location: [facility] OR;  Service: Urology    COLONOSCOPY  2010    Documented a tubulovillous adenom that was removed completely. Colonoscopy in 2014 was unremarkable.    COLONOSCOPY N/A 2/20/2020    Procedure: COLONOSCOPY;  Surgeon: Claire Galo MD;  [facility];  Service: Gastroenterology;  Laterality: N/A;  diverticulosis    CORONARY ARTERY BYPASS GRAFT WITH AORTIC AND MITRAL VALVE REPAIR/REPLACEMENT N/A 2/10/2025    Procedure: STERNOTOMY,CORONARY ARTERY BYPASS  X 3,WITH INTERNAL MAMMARY ARTERY GRAFT AND ENDOSCOPICLY HARVESTED RIGHT SAPHENOUS VEIN MITRAL VALVE REPAIR, LEFT ATRIAL APPENDAGE EXCLUSION, PRP  TRANSESOPHAGEAL ECHOCARDIOGRAM WITH ANESTHESIA;  Surgeon: Migel Alcantara MD;  Location: [facility] CVOR;  Service: Cardiothoracic;  Laterality: N/A;    HERNIA REPAIR      H/O multiple hernia repairs including right inguinal hernia repair in 1981, 2003, and double hernia repair in 2013    KIDNEY SURGERY       PROSTATE BIOPSY      Showed features consistent with prostate cancer    SHOULDER SURGERY  1995    Shoulder repair    THYROIDECTOMY, PARTIAL  1983    For malignant tumor    TOOTH EXTRACTION        General Information       Row Name 02/14/25 1056          Physical Therapy Time and Intention    Document Type therapy note (daily note)  -PH     Mode of Treatment physical therapy  -PH       Row Name 02/14/25 1056          General Information    Existing Precautions/Restrictions cardiac;sternal;fall  -PH       Row Name 02/14/25 1056          Cognition    Orientation Status (Cognition) oriented x 4  -PH       Row Name 02/14/25 1056          Safety Issues/Impairments Affecting Functional Mobility    Impairments Affecting Function (Mobility) balance;endurance/activity tolerance;strength;coordination;shortness of breath  -PH               User Key  (r) = Recorded By, (t) = Taken By, (c) = Cosigned By      Initials Name Provider Type    PH Claire Michael PTA Physical Therapist Assistant                   Mobility       Row Name 02/14/25 1056          Bed Mobility    Comment, (Bed Mobility) NT - UIC  -PH       Row Name 02/14/25 1056          Sit-Stand Transfer    Sit-Stand [place] (Transfers) contact guard  -PH     Assistive Device (Sit-Stand Transfers) other (see comments)  No AD  -PH     Comment, (Sit-Stand Transfer) from chair  -PH       Row Name 02/14/25 1056          Gait/Stairs (Locomotion)    [place] Level (Gait) contact guard  -PH     Assistive Device (Gait) other (see comments)  no AD  -PH     Distance in Feet (Gait) 150  -PH     Deviations/Abnormal Patterns (Gait) nichole decreased;gait speed decreased;stride length decreased  -PH     Bilateral Gait Deviations forward flexed posture;heel strike decreased  -PH     [place] Level (Stairs) not tested  -PH     Comment, (Gait/Stairs) slow w/ no overt LOB; pt trialed amb on RA and was stopped a few times to incr from lowest of 88% to low 90's. cues for  upright posture and for incr step length  -PH               User Key  (r) = Recorded By, (t) = Taken By, (c) = Cosigned By      Initials Name Provider Type    Claire Bermeo PTA Physical Therapist Assistant                   Obj/Interventions       Row Name 02/14/25 1058          Motor Skills    Therapeutic Exercise other (see comments)  cardiac program x 10 reps  -PH       Row Name 02/14/25 1058          Balance    Balance Assessment sitting static balance;sitting dynamic balance;standing static balance  -PH     Static Sitting Balance standby assist  -PH     Dynamic Sitting Balance standby assist  -PH     Static Standing Balance contact guard  -PH     Position/Device Used, Standing Balance other (see comments)  no AD  -PH               User Key  (r) = Recorded By, (t) = Taken By, (c) = Cosigned By      Initials Name Provider Type    Claire Bermeo PTA Physical Therapist Assistant                   Goals/Plan    No documentation.                  Clinical Impression       Row Name 02/14/25 1058          Pain    Pretreatment Pain Rating 0/10 - no pain  -PH     Posttreatment Pain Rating 0/10 - no pain  -PH       Row Name 02/14/25 1058          Plan of Care Review    Plan of Care Reviewed With patient  -PH     Progress no change  -PH     Outcome Evaluation Pt was seen for PT tx this AM. Pt was UIC and stood w/ CGA. Pt was on 2L of O2 w/ sats at 97% on NC. Trialed RA during amb. Pt amb 150' around unit w/ CGA and no AD. pt was slow and shuffling w/ no overt LOB. Pt stopped a few times for standing rest when sats decr as low as 88% (poor wavelength). Pt sats at 89% when pt returned to room to sit in chair although quickly incr to low 90's. Pt performed cardiac program x 10 reps and was UIC at end of session.  -PH       Row Name 02/14/25 1058          Vital Signs    Pre SpO2 (%) 97  -PH     O2 Delivery Pre Treatment nasal cannula  -PH     Intra SpO2 (%) 88  -PH     O2 Delivery Intra Treatment room  air  -PH     Post SpO2 (%) 93  -PH     O2 Delivery Post Treatment nasal cannula  -PH       Row Name 02/14/25 1058          Positioning and Restraints    Pre-Treatment Position sitting in chair/recliner  -PH     Post Treatment Position chair  -PH     In Chair notified nsg;reclined;call light within reach;encouraged to call for assist;exit alarm on  -PH               User Key  (r) = Recorded By, (t) = Taken By, (c) = Cosigned By      Initials Name Provider Type     Claire Michael PTA Physical Therapist Assistant                   Outcome Measures       Row Name 02/14/25 1101          How much help from another person do you currently need...    Turning from your back to your side while in flat bed without using bedrails? 4  -PH     Moving from lying on back to sitting on the side of a flat bed without bedrails? 3  -PH     Moving to and from a bed to a chair (including a wheelchair)? 3  -PH     Standing up from a chair using your arms (e.g., wheelchair, bedside chair)? 3  -PH     Climbing 3-5 steps with a railing? 2  -PH     To walk in hospital room? 3  -PH     AM-PAC 6 Clicks Score (PT) 18  -PH     Highest Level of Mobility Goal 6 --> Walk 10 steps or more  -PH       Row Name 02/14/25 1101          Functional Assessment    Outcome Measure Options AM-PAC 6 Clicks Basic Mobility (PT)  -PH               User Key  (r) = Recorded By, (t) = Taken By, (c) = Cosigned By      Initials Name Provider Type     Claire Michael PTA Physical Therapist Assistant                                 Physical Therapy Education       Title: PT OT SLP Therapies (Done)       Topic: Physical Therapy (Done)       Point: Mobility training (Done)       Learning Progress Summary            Patient Acceptance, E,TB,D, VU by  at 2/14/2025 1102                      Point: Home exercise program (Done)       Learning Progress Summary            Patient Acceptance, E,TB,D, VU by  at 2/14/2025 1102                      Point: Body  mechanics (Done)       Learning Progress Summary            Patient Acceptance, E,TB,D, VU by  at 2/14/2025 1102                      Point: Precautions (Done)       Learning Progress Summary            Patient Acceptance, E,TB,D, VU by  at 2/14/2025 1102                                      User Key       Initials Effective Dates Name Provider Type [facility] 06/16/21 -  Claire Michael PTA Physical Therapist Assistant PT                  PT Recommendation and Plan     Progress: no change  Outcome Evaluation: Pt was seen for PT tx this AM. Pt was UIC and stood w/ CGA. Pt was on 2L of O2 w/ sats at 97% on NC. Trialed RA during amb. Pt amb 150' around unit w/ CGA and no AD. pt was slow and shuffling w/ no overt LOB. Pt stopped a few times for standing rest when sats decr as low as 88% (poor wavelength). Pt sats at 89% when pt returned to room to sit in chair although quickly incr to low 90's. Pt performed cardiac program x 10 reps and was UIC at end of session.     Time Calculation:         PT Charges       Row Name 02/14/25 1102             Time Calculation    Start Time 1029  -PH      Stop Time 1043  -PH      Time Calculation (min) 14 min  -PH      PT Received On 02/14/25  -PH      PT - Next Appointment 02/15/25  -PH         Timed Charges    71321 - PT Therapeutic Exercise Minutes 5  -PH      41272 - PT Therapeutic Activity Minutes 9  -PH         Total Minutes    Timed Charges Total Minutes 14  -PH       Total Minutes 14  -PH                User Key  (r) = Recorded By, (t) = Taken By, (c) = Cosigned By      Initials Name Provider Type     Claire Michael PTA Physical Therapist Assistant                  Therapy Charges for Today       Code Description Service Date Service Provider Modifiers Qty    05969238233 HC PT THERAPEUTIC ACT EA 15 MIN 2/14/2025 Claire Michael PTA GP 1            PT G-Codes  Outcome Measure Options: AM-PAC 6 Clicks Basic Mobility (PT)  AM-PAC 6 Clicks Score  (PT): 18  PT Discharge Summary  Anticipated Discharge Disposition (PT): home with home health, home with assist    Claire Michael, PTA  2/14/2025

## 2025-02-14 NOTE — PROGRESS NOTES
" LOS: 4 days   Patient Care Team:  Mariah Delgado MD as PCP - General (Family Medicine)  Consuelo Adorno MD as Consulting Physician (Hematology and Oncology)  John aDmon MD as Consulting Physician (Hematology and Oncology)  Mariah Delgado MD as Referring Physician (Family Medicine)    Chief Complaint: post op open heart    Subjective  Feeling good.    Vital Signs  Temp:  [97.6 °F (36.4 °C)-98.4 °F (36.9 °C)] 97.7 °F (36.5 °C)  Heart Rate:  [] 110  Resp:  [16-18] 16  BP: (105-132)/(66-84) 114/66  Body mass index is 24.72 kg/m².    Intake/Output Summary (Last 24 hours) at 2/14/2025 0805  Last data filed at 2/14/2025 0754  Gross per 24 hour   Intake 240 ml   Output 1725 ml   Net -1485 ml     I/O this shift:  In: -   Out: 200 [Urine:200]            02/12/25  1119 02/13/25  0625 02/14/25  0525   Weight: 74.9 kg (165 lb 1.6 oz) 73.6 kg (162 lb 3.2 oz) 73.8 kg (162 lb 9.6 oz)         Objective:  Vital signs: (most recent): Blood pressure 114/66, pulse 110, temperature 97.7 °F (36.5 °C), temperature source Oral, resp. rate 16, weight 73.8 kg (162 lb 9.6 oz), SpO2 95%.                   Results Review:        WBC WBC   Date Value Ref Range Status   02/14/2025 11.44 (H) 3.40 - 10.80 10*3/mm3 Final   02/13/2025 15.24 (H) 3.40 - 10.80 10*3/mm3 Final   02/12/2025 19.83 (H) 3.40 - 10.80 10*3/mm3 Final      HGB Hemoglobin   Date Value Ref Range Status   02/14/2025 10.2 (L) 13.0 - 17.7 g/dL Final   02/13/2025 10.3 (L) 13.0 - 17.7 g/dL Final   02/12/2025 10.6 (L) 13.0 - 17.7 g/dL Final      HCT Hematocrit   Date Value Ref Range Status   02/14/2025 30.3 (L) 37.5 - 51.0 % Final   02/13/2025 31.5 (L) 37.5 - 51.0 % Final   02/12/2025 31.9 (L) 37.5 - 51.0 % Final      Platelets Platelets   Date Value Ref Range Status   02/14/2025 110 (L) 140 - 450 10*3/mm3 Final   02/13/2025 105 (L) 140 - 450 10*3/mm3 Final   02/12/2025 119 (L) 140 - 450 10*3/mm3 Final        PT/INR:    No results found for: \"PROTIME\"  /  No results found for: " "\"INR\"      Sodium Sodium   Date Value Ref Range Status   02/14/2025 132 (L) 136 - 145 mmol/L Final   02/13/2025 137 136 - 145 mmol/L Final   02/12/2025 134 (L) 136 - 145 mmol/L Final      Potassium Potassium   Date Value Ref Range Status   02/14/2025 4.1 3.5 - 5.2 mmol/L Final   02/13/2025 4.0 3.5 - 5.2 mmol/L Final   02/12/2025 4.5 3.5 - 5.2 mmol/L Final      Chloride Chloride   Date Value Ref Range Status   02/14/2025 100 98 - 107 mmol/L Final   02/13/2025 102 98 - 107 mmol/L Final   02/12/2025 102 98 - 107 mmol/L Final      Bicarbonate CO2   Date Value Ref Range Status   02/14/2025 23.8 22.0 - 29.0 mmol/L Final   02/13/2025 26.5 22.0 - 29.0 mmol/L Final   02/12/2025 22.3 22.0 - 29.0 mmol/L Final      BUN BUN   Date Value Ref Range Status   02/14/2025 17 8 - 23 mg/dL Final   02/13/2025 20 8 - 23 mg/dL Final   02/12/2025 19 8 - 23 mg/dL Final      Creatinine Creatinine   Date Value Ref Range Status   02/14/2025 0.93 0.76 - 1.27 mg/dL Final   02/13/2025 1.13 0.76 - 1.27 mg/dL Final   02/12/2025 1.12 0.76 - 1.27 mg/dL Final      Calcium Calcium   Date Value Ref Range Status   02/14/2025 8.5 (L) 8.6 - 10.5 mg/dL Final   02/13/2025 8.6 8.6 - 10.5 mg/dL Final   02/12/2025 8.8 8.6 - 10.5 mg/dL Final      Magnesium No results found for: \"MG\"         aspirin, 81 mg, Oral, Daily  atorvastatin, 40 mg, Oral, Nightly  chlorhexidine, 15 mL, Mouth/Throat, Q12H  colchicine, 0.6 mg, Oral, Daily  docusate sodium, 100 mg, Oral, BID  enoxaparin, 40 mg, Subcutaneous, Daily  furosemide, 40 mg, Oral, Daily  gabapentin, 100 mg, Oral, Q12H  guaiFENesin, 1,200 mg, Oral, Q12H  insulin lispro, 2-9 Units, Subcutaneous, 4x Daily AC & at Bedtime  levothyroxine, 100 mcg, Oral, Daily  metoprolol tartrate, 12.5 mg, Oral, Q12H  montelukast, 10 mg, Oral, Nightly  mupirocin, 1 Application, Each Nare, BID  pantoprazole, 40 mg, Oral, QAM  polyethylene glycol, 17 g, Oral, Daily  potassium chloride, 20 mEq, Oral, Daily  senna-docusate sodium, 2 tablet, " Oral, Nightly                   Mitral valve insufficiency    Aortic stenosis      Assessment & Plan    -Mitral valve regurgitation- s/p CABGx3 LIMA/RSVG, MV repair, SUDHIR closure 45mm atrial clip- Pagni 2/10/2025  -Multivessel CAD  -ascending aortic ectasia 4.2cm unchanged per last CT 11/2024  -hyperlipidemia  -renal cell carcinoma s/p partial nephrectomy-- creatinine 1.3-1.5  -polymyalgia rheumatica  -myeloproliferative disorder/polycythemia vera-- follows with hematology  -hypertension  -raynaud's  -post op anemia- expected acute blood loss   -Leukocytosis- reactive/stress dose steroids    POD#4  Looks great this morning.  Up in the chair.  2L NC---wean as able.  Sinus rhythm rate in the 80s-- he had a brief episode of atrial fibrillation this morning rate in the 120s-- he converted to sinus quickly-- continue beta blocker.  Will replete magnesium.  Still requiring oxygen-- weight is up-- will give an additional dose of PO lasix this afternoon  Continue routine care     ALETHA Cavazos  02/14/25  08:05 EST

## 2025-02-14 NOTE — DISCHARGE SUMMARY
Date of Admission: 2/10/2025  Date of Discharge:  2/17/2025    Discharge Diagnosis:   -Mitral valve regurgitation- s/p CABGx3 LIMA/RSVG, MV repair, SUDHIR closure 45mm atrial clip- [facility] 2/10/2025  -Multivessel CAD  -Ascending aortic ectasia 4.2cm unchanged per last CT 11/2024  -Hyperlipidemia  -Renal cell carcinoma s/p partial nephrectomy-- creatinine 1.3-1.5  -Polymyalgia rheumatica  -Myeloproliferative disorder/polycythemia vera-- follows with hematology  -Hypertension  -Raynaud's  -Post op anemia- expected acute blood loss   -Leukocytosis- reactive/stress dose steroids  -Post-op intermittent a.fib    Presenting Problem/History of Present Illness  Mitral valve insufficiency, unspecified etiology [I34.0]  Aortic stenosis [I35.0]     Hospital Course  Patient is a 79 y.o. male who was admitted on 2/10/25 and underwent CABGx3 LIMA/RSVG, MV repair, SUDHIR closure 45mm atrial clip with Dr. Alcantara (see op-note for more detail). Operation went well and after, patient was transferred to CVR in stable condition where he was later extubated. POD1, patient on 2LNC, patient junctional under pacer with rate 50-60's. Patient was IV diuresed. Patient transferred to stepdown unit, swan and arterial line removed. POD2, patient in SR with rate in the 80's so beta blocker started. Patient IV diuresed. Chest tubes, funez, and central line removed. POD3, patient tolerating beta blocker with rate 70's in SR. AV wires removed. On POD4, patient had brief episode of a.fib with rate 120's but converted to SR quickly. POD5, overnight oximetry completed and patient will need nocturnal oxygen at discharge. POD6, EP saw patient for a.fib, recommended continuing with beta blocker. Patient also placed on oral amiodarone. POD7, patient deemed ready for discharge home with HH. I discussed anticoagulation with Dr. Alcantara and he does not want patient on DOAC at discharge. Patient educated on sternal precautions and signs of infection. Patient to follow up  in office in around 4 weeks with appointment listed below. Patient discharged with aspirin, statin, and beta blocker.     Procedures Performed  Procedure(s):  STERNOTOMY,CORONARY ARTERY BYPASS  X 3,WITH INTERNAL MAMMARY ARTERY GRAFT AND ENDOSCOPICLY HARVESTED RIGHT SAPHENOUS VEIN MITRAL VALVE REPAIR, LEFT ATRIAL APPENDAGE EXCLUSION, PRP  TRANSESOPHAGEAL ECHOCARDIOGRAM WITH ANESTHESIA       Consults:   Consults       Date and Time Order Name Status Description    2/10/2025 11:32 AM Inpatient Cardiology Consult Completed             Pertinent Test Results:    Lab Results   Component Value Date    WBC 11.44 (H) 02/14/2025    HGB 10.2 (L) 02/14/2025    HCT 30.3 (L) 02/14/2025    MCV 82.3 02/14/2025     (L) 02/14/2025      Lab Results   Component Value Date    GLUCOSE 94 02/17/2025    CALCIUM 8.5 (L) 02/17/2025     02/17/2025    K 4.3 02/17/2025    CO2 26.1 02/17/2025    CL 99 02/17/2025    BUN 19 02/17/2025    CREATININE 1.16 02/17/2025    EGFRIFAFRI >60 11/01/2022    EGFRIFNONA 54 (L) 08/24/2021    BCR 16.4 02/17/2025    ANIONGAP 10.9 02/17/2025     Lab Results   Component Value Date    INR 1.36 (H) 02/11/2025    PROTIME 16.8 (H) 02/11/2025         Condition on Discharge: Stable     Vital Signs  Temp:  [97.8 °F (36.6 °C)-98.5 °F (36.9 °C)] 97.8 °F (36.6 °C)  Heart Rate:  [60-78] 60  Resp:  [16] 16  BP: ()/(62-90) 93/65      Discharge Disposition  Home or Self Care    Discharge Medications     Discharge Medications        New Medications        Instructions Start Date   amiodarone 100 MG tablet  Commonly known as: PACERONE   Take 2 tablets by mouth Every 12 (Twelve) Hours for 7 days, THEN 2 tablets Daily for 14 days.   Start Date: February 17, 2025     atenolol 25 MG tablet  Commonly known as: TENORMIN   25 mg, Oral, Every 12 Hours Scheduled      calcium 500 mg vitamin D 5 mcg (200 UT) 500-5 MG-MCG tablet per tablet   2 tablets, Oral, Daily      furosemide 40 MG tablet  Commonly known as: LASIX   40  mg, Oral, Daily   Start Date: February 18, 2025     HYDROcodone-acetaminophen 5-325 MG per tablet  Commonly known as: NORCO   1 tablet, Oral, Every 6 Hours PRN      potassium chloride 20 MEQ CR tablet  Commonly known as: KLOR-CON M20   20 mEq, Oral, Daily   Start Date: February 18, 2025            Changes to Medications        Instructions Start Date   Aspirin Low Dose 81 MG EC tablet  Generic drug: aspirin  What changed: additional instructions   81 mg, Oral, Daily   Start Date: February 18, 2025     atorvastatin 40 MG tablet  Commonly known as: LIPITOR  What changed:   medication strength  how much to take  when to take this   40 mg, Oral, Nightly             Continue These Medications        Instructions Start Date   ALPRAZolam 0.5 MG tablet  Commonly known as: XANAX   0.5 tablets, Nightly PRN      levocetirizine 5 MG tablet  Commonly known as: XYZAL   1 tablet, Every Evening      levothyroxine 100 MCG tablet  Commonly known as: SYNTHROID, LEVOTHROID   100 mcg, Oral, Daily      montelukast 10 MG tablet  Commonly known as: SINGULAIR   10 mg, Nightly      VITAMIN B-COMPLEX PO   1 tablet, Daily      zolpidem 10 MG tablet  Commonly known as: AMBIEN   1 tablet, Nightly PRN             Stop These Medications      amLODIPine 10 MG tablet  Commonly known as: NORVASC     fluticasone 50 MCG/ACT nasal spray  Commonly known as: FLONASE     losartan 25 MG tablet  Commonly known as: COZAAR              Discharge Diet: Heart healthy     Activity at Discharge:   1. No driving until seen in office and off narcotic pain medications.  2. Shower daily. Clean incisions with warm water and antibacterial soap only. Do not put any lotion or ointments on incisions.  3. Ambulate for 10 minutes at least 3 times a day.  4. No heavy lifting > 10lbs until seen in office.   5. Take all medications as prescribed.      Follow-up Appointments  Future Appointments   Date Time Provider Department Center   3/13/2025  1:15 PM Bronwyn Rowe APRN [facility]  [facility]   4/16/2025 11:40 AM LAB CHAIR 1 [facility]  [facility]   4/16/2025 12:00 PM Natan Walter MD [facility]   4/16/2025 12:30 PM CHAIR 15 [facility]   12/11/2025 10:00 AM Di Roberts MD [id number] None     Additional Instructions for the Follow-ups that You Need to Schedule       Ambulatory Referral to Cardiac Rehab   As directed      Ambulatory Referral to Home Health (Hospital)   As directed      Face to Face Visit Date: 2/17/2025   Follow-up provider for Plan of Care?: I treated the patient in an acute care facility and will not continue treatment after discharge.   Follow-up provider: JAYCOB LAND [[id number]]   Reason/Clinical Findings: Post op open heart   Describe mobility limitations that make leaving home difficult: Weakness   Nursing/Therapeutic Services Requested: Skilled Nursing Physical Therapy   Skilled nursing orders: Post CABG care   Frequency: 1 Week 1        Call MD With Problems / Concerns   As directed      Instructions:  Call office at [phone] for any drainage, increased redness, or fever over 100.5    Order Comments: Instructions:  Call office at [phone] for any drainage, increased redness, or fever over 100.5         Discharge Follow-up with PCP   As directed       Currently Documented PCP:    Mariah Delgado MD    PCP Phone Number:    [phone]     Follow Up Details: in 1 week        Discharge Follow-up with Specialty: Cardiologist APRN/PA; 1 Week   As directed      Specialty: Cardiologist APRN/PA   Follow Up: 1 Week   Follow Up Details: bring all prescription bottles to appointment, call for appointment        Discharge Follow-up with Specified Provider: Cardiac surgery   As directed      To: Cardiac surgery   Follow Up Details: 3/13/25 1:15pm        Discharge Follow-up with Specified Provider: Cardiologist; 1 Month   As directed      To: Cardiologist   Follow Up: 1 Month   Follow Up Details: call for appointment, bring all  medication bottles to appointment                Test Results Pending at Discharge       Rakan Pink PA-C  02/17/25  14:54 EST

## 2025-02-14 NOTE — CASE MANAGEMENT/SOCIAL WORK
Continued Stay Note  [facility]     Patient Name: Scott Murphy  MRN: [id number]  Today's Date: 2/14/2025    Admit Date: 2/10/2025    Plan: Home w/ [facility];  [facility] notified to follow for oxygen needs at d/c   Discharge Plan       Row Name 02/14/25 1329       Plan    Plan Home w/ [facility];  [facility] notified to follow for oxygen needs at d/c    Plan Comments Spoke with patient at bedside to get his DME choice.  Pt failed his overnight oximetry last night.  Pt is agreeable to using [facility].  Referral sent to Richard.  Plan is d/c home tomorrow...........Radha BELTRAN/CHRISTOPHER                   Discharge Codes    No documentation.                 Expected Discharge Date and Time       Expected Discharge Date Expected Discharge Time    Feb 15, 2025               Radha Duval RN

## 2025-02-14 NOTE — PROGRESS NOTES
Patient Name: Scott Murphy  :1945  79 y.o.      Patient Care Team:  Mariah Delgado MD as PCP - General (Family Medicine)  Consuelo Adorno MD as Consulting Physician (Hematology and Oncology)  John Damon MD as Consulting Physician (Hematology and Oncology)  Mariah Delgado MD as Referring Physician (Family Medicine)    Chief Complaint:   S/p CABG, MVR    Interval History:   Doing well.  No complaints. Still on mild O2.    Objective   Vital Signs  Temp:  [97.6 °F (36.4 °C)-98.4 °F (36.9 °C)] 97.7 °F (36.5 °C)  Heart Rate:  [] 110  Resp:  [16-18] 16  BP: (105-132)/(66-84) 114/66    Intake/Output Summary (Last 24 hours) at 2025 0842  Last data filed at 2025 0754  Gross per 24 hour   Intake 240 ml   Output 1725 ml   Net -1485 ml     Flowsheet Rows      Flowsheet Row First Filed Value   Admission Height --   Admission Weight 74 kg (163 lb 2.3 oz) Documented at 2025 0650            GEN: no distress, alert and oriented  HEENT: NACT, EOMI, moist mucous membranes  Lungs: CTAB, no wheezes, rales or rhonchi, nasal cannula in place  CV: normal rate, regular rhythm, normal S1, S2, no murmurs, +2 radial pulses b/l, no carotid bruit  Abdomen: soft, nontender, nondistended, NABS  Extremities: no edema  Skin: no rash, warm, dry  Heme/Lymph: no bruising  Psych: organized thought, normal behavior and affect    Results Review:    Results from last 7 days   Lab Units 25  0325   SODIUM mmol/L 132*   POTASSIUM mmol/L 4.1   CHLORIDE mmol/L 100   CO2 mmol/L 23.8   BUN mg/dL 17   CREATININE mg/dL 0.93   GLUCOSE mg/dL 100*   CALCIUM mg/dL 8.5*         Results from last 7 days   Lab Units 25  0325   WBC 10*3/mm3 11.44*   HEMOGLOBIN g/dL 10.2*   HEMATOCRIT % 30.3*   PLATELETS 10*3/mm3 110*     Results from last 7 days   Lab Units 25  0215 02/10/25  1144 02/10/25  1056   INR  1.36* 1.51* 1.72*   APTT seconds  --  32.3 30.4     Results from last 7 days   Lab Units 25    MAGNESIUM mg/dL 2.5*                   Medication Review:   aspirin, 81 mg, Oral, Daily  atorvastatin, 40 mg, Oral, Nightly  chlorhexidine, 15 mL, Mouth/Throat, Q12H  colchicine, 0.6 mg, Oral, Daily  docusate sodium, 100 mg, Oral, BID  enoxaparin, 40 mg, Subcutaneous, Daily  furosemide, 40 mg, Oral, Daily  gabapentin, 100 mg, Oral, Q12H  guaiFENesin, 1,200 mg, Oral, Q12H  insulin lispro, 2-9 Units, Subcutaneous, 4x Daily AC & at Bedtime  levothyroxine, 100 mcg, Oral, Daily  metoprolol tartrate, 12.5 mg, Oral, Q12H  montelukast, 10 mg, Oral, Nightly  mupirocin, 1 Application, Each Nare, BID  pantoprazole, 40 mg, Oral, QAM  polyethylene glycol, 17 g, Oral, Daily  potassium chloride, 20 mEq, Oral, Daily  senna-docusate sodium, 2 tablet, Oral, Nightly              Assessment & Plan   #Severe MR s/p repair  #Multivessel CAD s/p CABG  #HTN  #hypothyroidism  #Raynaud phenomenon  #RCC s/p partial nephrectomy     79-year-old man, followed by Dr. Roberts, with polymyalgia rheumatica, polycythemia vera, renal cell carcinoma status post partial nephrectomy, hypertension, hypothyroidism, Raynaud's disease, thoracic aortic aneurysm, recently diagnosed with severe mitral regurgitation secondary to mitral valve prolapse as well as multivessel CAD who presented for and underwent elective CABG x 3 (LIMA to mid LAD, SVG to PDA, SVG to OM1) mitral valve repair with a 34 mm Med[employer]onic flexible band posterior annuloplasty and chordal repair of P2-P3/cleftoplasty of P2/P3, left atrial appendage closure on 2/10/2025 with Dr. Alcantara.    - Continue metoprolol tartrate 12.5 mg twice daily, Lasix 40 mg daily, aspirin 81 mg daily, atorvastatin 40 mg daily    Should hopefully be able to go soon, O2 requirements are steadily decreasing.    Thee Harper MD, FAC, [facility]  02/14/25  08:42 EST

## 2025-02-15 LAB
ANION GAP SERPL CALCULATED.3IONS-SCNC: 9.7 MMOL/L (ref 5–15)
BUN SERPL-MCNC: 19 MG/DL (ref 8–23)
BUN/CREAT SERPL: 19 (ref 7–25)
CALCIUM SPEC-SCNC: 8.3 MG/DL (ref 8.6–10.5)
CHLORIDE SERPL-SCNC: 96 MMOL/L (ref 98–107)
CO2 SERPL-SCNC: 26.3 MMOL/L (ref 22–29)
CREAT SERPL-MCNC: 1 MG/DL (ref 0.76–1.27)
EGFRCR SERPLBLD CKD-EPI 2021: 77 ML/MIN/1.73
GLUCOSE BLDC GLUCOMTR-MCNC: 117 MG/DL (ref 70–130)
GLUCOSE BLDC GLUCOMTR-MCNC: 78 MG/DL (ref 70–130)
GLUCOSE BLDC GLUCOMTR-MCNC: 83 MG/DL (ref 70–130)
GLUCOSE SERPL-MCNC: 96 MG/DL (ref 65–99)
POTASSIUM SERPL-SCNC: 3.8 MMOL/L (ref 3.5–5.2)
POTASSIUM SERPL-SCNC: 4.4 MMOL/L (ref 3.5–5.2)
QT INTERVAL: 339 MS
QT INTERVAL: 367 MS
QTC INTERVAL: 436 MS
QTC INTERVAL: 523 MS
SODIUM SERPL-SCNC: 132 MMOL/L (ref 136–145)

## 2025-02-15 PROCEDURE — 99232 SBSQ HOSP IP/OBS MODERATE 35: CPT

## 2025-02-15 PROCEDURE — 80048 BASIC METABOLIC PNL TOTAL CA: CPT | Performed by: NURSE PRACTITIONER

## 2025-02-15 PROCEDURE — 93010 ELECTROCARDIOGRAM REPORT: CPT | Performed by: INTERNAL MEDICINE

## 2025-02-15 PROCEDURE — 94799 UNLISTED PULMONARY SVC/PX: CPT

## 2025-02-15 PROCEDURE — 94762 N-INVAS EAR/PLS OXIMTRY CONT: CPT

## 2025-02-15 PROCEDURE — 25010000002 ENOXAPARIN PER 10 MG: Performed by: NURSE PRACTITIONER

## 2025-02-15 PROCEDURE — 93005 ELECTROCARDIOGRAM TRACING: CPT | Performed by: THORACIC SURGERY (CARDIOTHORACIC VASCULAR SURGERY)

## 2025-02-15 PROCEDURE — 84132 ASSAY OF SERUM POTASSIUM: CPT | Performed by: THORACIC SURGERY (CARDIOTHORACIC VASCULAR SURGERY)

## 2025-02-15 PROCEDURE — 82948 REAGENT STRIP/BLOOD GLUCOSE: CPT

## 2025-02-15 PROCEDURE — 97530 THERAPEUTIC ACTIVITIES: CPT

## 2025-02-15 RX ORDER — METOPROLOL TARTRATE 25 MG/1
25 TABLET, FILM COATED ORAL EVERY 12 HOURS SCHEDULED
Status: CANCELLED | OUTPATIENT
Start: 2025-02-15

## 2025-02-15 RX ORDER — POTASSIUM CHLORIDE 1500 MG/1
20 TABLET, EXTENDED RELEASE ORAL ONCE
Status: COMPLETED | OUTPATIENT
Start: 2025-02-15 | End: 2025-02-15

## 2025-02-15 RX ORDER — ATENOLOL 25 MG/1
25 TABLET ORAL EVERY 12 HOURS SCHEDULED
Status: DISCONTINUED | OUTPATIENT
Start: 2025-02-15 | End: 2025-02-17 | Stop reason: HOSPADM

## 2025-02-15 RX ADMIN — ENOXAPARIN SODIUM 40 MG: 100 INJECTION SUBCUTANEOUS at 20:17

## 2025-02-15 RX ADMIN — MONTELUKAST 10 MG: 10 TABLET, FILM COATED ORAL at 20:11

## 2025-02-15 RX ADMIN — ATORVASTATIN CALCIUM 40 MG: 20 TABLET, FILM COATED ORAL at 20:10

## 2025-02-15 RX ADMIN — ASPIRIN 81 MG: 81 TABLET, COATED ORAL at 09:30

## 2025-02-15 RX ADMIN — GABAPENTIN 100 MG: 100 CAPSULE ORAL at 09:30

## 2025-02-15 RX ADMIN — METOPROLOL TARTRATE 25 MG: 25 TABLET, FILM COATED ORAL at 09:30

## 2025-02-15 RX ADMIN — ACETAMINOPHEN 650 MG: 325 TABLET, FILM COATED ORAL at 20:17

## 2025-02-15 RX ADMIN — 0.12% CHLORHEXIDINE GLUCONATE 15 ML: 1.2 RINSE ORAL at 20:09

## 2025-02-15 RX ADMIN — POTASSIUM CHLORIDE 20 MEQ: 1500 TABLET, EXTENDED RELEASE ORAL at 09:29

## 2025-02-15 RX ADMIN — GABAPENTIN 100 MG: 100 CAPSULE ORAL at 20:11

## 2025-02-15 RX ADMIN — LEVOTHYROXINE SODIUM 100 MCG: 100 TABLET ORAL at 09:30

## 2025-02-15 RX ADMIN — PANTOPRAZOLE SODIUM 40 MG: 40 TABLET, DELAYED RELEASE ORAL at 06:10

## 2025-02-15 RX ADMIN — POTASSIUM CHLORIDE 20 MEQ: 1500 TABLET, EXTENDED RELEASE ORAL at 14:37

## 2025-02-15 RX ADMIN — ALPRAZOLAM 0.25 MG: 0.25 TABLET ORAL at 20:17

## 2025-02-15 RX ADMIN — FUROSEMIDE 40 MG: 40 TABLET ORAL at 09:30

## 2025-02-15 RX ADMIN — ATENOLOL 25 MG: 25 TABLET ORAL at 20:10

## 2025-02-15 RX ADMIN — ACETAMINOPHEN 650 MG: 325 TABLET, FILM COATED ORAL at 06:16

## 2025-02-15 RX ADMIN — GUAIFENESIN 1200 MG: 600 TABLET, MULTILAYER, EXTENDED RELEASE ORAL at 09:30

## 2025-02-15 RX ADMIN — COLCHICINE 0.6 MG: 0.6 TABLET ORAL at 09:31

## 2025-02-15 RX ADMIN — GUAIFENESIN 1200 MG: 600 TABLET, MULTILAYER, EXTENDED RELEASE ORAL at 20:11

## 2025-02-15 RX ADMIN — BISACODYL 10 MG: 10 SUPPOSITORY RECTAL at 12:43

## 2025-02-15 NOTE — PROGRESS NOTES
" LOS: 5 days   Patient Care Team:  Mariah Delgado MD as PCP - General (Family Medicine)  Consuelo Adorno MD as Consulting Physician (Hematology and Oncology)  John Damon MD as Consulting Physician (Hematology and Oncology)  Mariah Delgado MD as Referring Physician (Family Medicine)    Chief Complaint: post op open heart    Subjective  Feeling good.    Vital Signs  Temp:  [98 °F (36.7 °C)-98.4 °F (36.9 °C)] 98.4 °F (36.9 °C)  Heart Rate:  [75-99] 82  Resp:  [16-18] 18  BP: ()/(60-81) 93/60  Body mass index is 23.9 kg/m².    Intake/Output Summary (Last 24 hours) at 2/15/2025 1021  Last data filed at 2/15/2025 0527  Gross per 24 hour   Intake --   Output 3475 ml   Net -3475 ml     No intake/output data recorded.            02/13/25  0625 02/14/25  0525 02/15/25  0527   Weight: 73.6 kg (162 lb 3.2 oz) 73.8 kg (162 lb 9.6 oz) 71.3 kg (157 lb 3.2 oz)         Objective:  Vital signs: (most recent): Blood pressure 93/60, pulse 82, temperature 98.4 °F (36.9 °C), temperature source Oral, resp. rate 18, weight 71.3 kg (157 lb 3.2 oz), SpO2 98%.                   Results Review:        WBC WBC   Date Value Ref Range Status   02/14/2025 11.44 (H) 3.40 - 10.80 10*3/mm3 Final   02/13/2025 15.24 (H) 3.40 - 10.80 10*3/mm3 Final      HGB Hemoglobin   Date Value Ref Range Status   02/14/2025 10.2 (L) 13.0 - 17.7 g/dL Final   02/13/2025 10.3 (L) 13.0 - 17.7 g/dL Final      HCT Hematocrit   Date Value Ref Range Status   02/14/2025 30.3 (L) 37.5 - 51.0 % Final   02/13/2025 31.5 (L) 37.5 - 51.0 % Final      Platelets Platelets   Date Value Ref Range Status   02/14/2025 110 (L) 140 - 450 10*3/mm3 Final   02/13/2025 105 (L) 140 - 450 10*3/mm3 Final        PT/INR:    No results found for: \"PROTIME\"  /  No results found for: \"INR\"      Sodium Sodium   Date Value Ref Range Status   02/15/2025 132 (L) 136 - 145 mmol/L Final   02/14/2025 132 (L) 136 - 145 mmol/L Final   02/13/2025 137 136 - 145 mmol/L Final      Potassium Potassium " "  Date Value Ref Range Status   02/15/2025 3.8 3.5 - 5.2 mmol/L Final   02/14/2025 4.1 3.5 - 5.2 mmol/L Final   02/13/2025 4.0 3.5 - 5.2 mmol/L Final      Chloride Chloride   Date Value Ref Range Status   02/15/2025 96 (L) 98 - 107 mmol/L Final   02/14/2025 100 98 - 107 mmol/L Final   02/13/2025 102 98 - 107 mmol/L Final      Bicarbonate CO2   Date Value Ref Range Status   02/15/2025 26.3 22.0 - 29.0 mmol/L Final   02/14/2025 23.8 22.0 - 29.0 mmol/L Final   02/13/2025 26.5 22.0 - 29.0 mmol/L Final      BUN BUN   Date Value Ref Range Status   02/15/2025 19 8 - 23 mg/dL Final   02/14/2025 17 8 - 23 mg/dL Final   02/13/2025 20 8 - 23 mg/dL Final      Creatinine Creatinine   Date Value Ref Range Status   02/15/2025 1.00 0.76 - 1.27 mg/dL Final   02/14/2025 0.93 0.76 - 1.27 mg/dL Final   02/13/2025 1.13 0.76 - 1.27 mg/dL Final      Calcium Calcium   Date Value Ref Range Status   02/15/2025 8.3 (L) 8.6 - 10.5 mg/dL Final   02/14/2025 8.5 (L) 8.6 - 10.5 mg/dL Final   02/13/2025 8.6 8.6 - 10.5 mg/dL Final      Magnesium No results found for: \"MG\"         aspirin, 81 mg, Oral, Daily  atorvastatin, 40 mg, Oral, Nightly  chlorhexidine, 15 mL, Mouth/Throat, Q12H  colchicine, 0.6 mg, Oral, Daily  docusate sodium, 100 mg, Oral, BID  enoxaparin, 40 mg, Subcutaneous, Daily  furosemide, 40 mg, Oral, Daily  gabapentin, 100 mg, Oral, Q12H  guaiFENesin, 1,200 mg, Oral, Q12H  insulin lispro, 2-9 Units, Subcutaneous, 4x Daily AC & at Bedtime  levothyroxine, 100 mcg, Oral, Daily  metoprolol tartrate, 25 mg, Oral, Q12H  montelukast, 10 mg, Oral, Nightly  pantoprazole, 40 mg, Oral, QAM  polyethylene glycol, 17 g, Oral, Daily  potassium chloride, 20 mEq, Oral, Daily  potassium chloride ER, 20 mEq, Oral, Once  senna-docusate sodium, 2 tablet, Oral, Nightly                   Mitral valve insufficiency    Aortic stenosis      Assessment & Plan    -Mitral valve regurgitation- s/p CABGx3 LIMA/RSVG, MV repair, SUDHIR closure 45mm atrial clip- [facility] " 2/10/2025  -Multivessel CAD  -ascending aortic ectasia 4.2cm unchanged per last CT 11/2024  -hyperlipidemia  -renal cell carcinoma s/p partial nephrectomy-- creatinine 1.3-1.5  -polymyalgia rheumatica  -myeloproliferative disorder/polycythemia vera-- follows with hematology  -hypertension  -raynaud's  -post op anemia- expected acute blood loss   -Leukocytosis- reactive/stress dose steroids    POD#5  Feeling a little puny today  Up in the chair.  2L NC---wean as able.  Sinus rhythm rate in the 80s-- had some atrial fibrillation overnight  Will keep tonight  Continue routine care     Staci Hutchinson, ALETHA  02/15/25  10:21 EST

## 2025-02-15 NOTE — PROGRESS NOTES
Electrophysiology Follow-Up Note      Patient Name: Scott Murphy  Age/Sex: 79 y.o. male  : 1945  MRN: [id number]      Day of Service: 02/15/25       Chief Complaint/Follow-up: Severe MR, MR repair, CABG, atrial fibrillation    S: He says he had a rough night.  He did not rest very much.  He has intermittent episodes of atrial fibrillation but feels that they are getting better, he is moderate awareness of these.      Temp:  [98 °F (36.7 °C)-98.4 °F (36.9 °C)] 98.4 °F (36.9 °C)  Heart Rate:  [75-99] 82  Resp:  [16-18] 18  BP: ()/(60-81) 93/60     PHYSICAL EXAM:    General Appearance: No acute distress, well developed and well nourished.   Eyes: Conjunctiva and lids: No erythema, swelling, or discharge. Sclera non-icteric.   HENT: Atraumatic, normocephalic. External eyes, ears, and nose normal.   Respiratory: No signs of respiratory distress. Respiration rhythm and depth normal.   Clear to auscultation. No rales, crackles, rhonchi, or wheezing auscultated.   Cardiovascular:  Heart Rate and Rhythm: Normal, Heart Sounds: Normal S1 and S2. No S3 or S4 noted.  Murmurs: No murmurs noted. No rubs, thrills, or gallops.   Lower Extremities: No edema noted.  Gastrointestinal:  Abdomen soft, non-distended, non-tender.  Musculoskeletal: Normal movement of extremities  Skin: Warm and dry.   Psychiatric: Patient alert and oriented to person, place, and time. Speech and behavior appropriate. Normal mood and affect.       Results from last 7 days   Lab Units 02/15/25  0342 25  0325 25  0326   SODIUM mmol/L 132* 132* 137   POTASSIUM mmol/L 3.8 4.1 4.0   CHLORIDE mmol/L 96* 100 102   CO2 mmol/L 26.3 23.8 26.5   BUN mg/dL 19 17 20   CREATININE mg/dL 1.00 0.93 1.13   GLUCOSE mg/dL 96 100* 103*   CALCIUM mg/dL 8.3* 8.5* 8.6     Results from last 7 days   Lab Units 25  0325 25  0326 25  0440   WBC 10*3/mm3 11.44* 15.24* 19.83*   HEMOGLOBIN g/dL 10.2* 10.3* 10.6*   HEMATOCRIT %  30.3* 31.5* 31.9*   PLATELETS 10*3/mm3 110* 105* 119*     Results from last 7 days   Lab Units 02/11/25  0215 02/10/25  1144 02/10/25  1056   INR  1.36* 1.51* 1.72*                   Current Medications:   Scheduled Meds:aspirin, 81 mg, Oral, Daily  atorvastatin, 40 mg, Oral, Nightly  chlorhexidine, 15 mL, Mouth/Throat, Q12H  colchicine, 0.6 mg, Oral, Daily  docusate sodium, 100 mg, Oral, BID  enoxaparin, 40 mg, Subcutaneous, Daily  furosemide, 40 mg, Oral, Daily  gabapentin, 100 mg, Oral, Q12H  guaiFENesin, 1,200 mg, Oral, Q12H  insulin lispro, 2-9 Units, Subcutaneous, 4x Daily AC & at Bedtime  levothyroxine, 100 mcg, Oral, Daily  metoprolol tartrate, 25 mg, Oral, Q12H  montelukast, 10 mg, Oral, Nightly  pantoprazole, 40 mg, Oral, QAM  polyethylene glycol, 17 g, Oral, Daily  potassium chloride, 20 mEq, Oral, Daily  potassium chloride ER, 20 mEq, Oral, Once  senna-docusate sodium, 2 tablet, Oral, Nightly            Mitral valve insufficiency    Aortic stenosis       Plan:   Severe MR--- status post MR repair--- he is doing well from a surgical standpoint.    Atrial fibrillation--- he continues to have intermittent episodes of atrial fibrillation appear to last about an hour.  He is aware of these episodes.  Given his recent surgery.  I would focus on rate control, he is on metoprolol 25 mg twice daily.    Not opposed to discharge from cardiac standpoint.  I discussed that he may have occasional episodes of A-fib which will likely improve as he heals.      We will continue to follow along while he is here.    Jovanny Zuñiga, ALETHA  02/15/25  09:39 EST

## 2025-02-15 NOTE — THERAPY TREATMENT NOTE
Patient Name: Scott Murphy  : 1945    MRN: [id number]                              Today's Date: 2/15/2025       Admit Date: 2/10/2025    Visit Dx:     ICD-10-CM ICD-9-CM   1. S/P CABG (coronary artery bypass graft)  Z95.1 V45.81   2. Mitral valve insufficiency, unspecified etiology  I34.0 424.0   3. S/P MVR (mitral valve repair)  Z98.890 V45.89     Patient Active Problem List   Diagnosis    Primary hypertension    Erythrocytosis    Cancer of right kidney    Prostate cancer    RBBB    PMR (polymyalgia rheumatica)    Episodic lightheadedness    Raynaud's disease without gangrene    Myeloproliferative disorder    Thoracic aortic aneurysm without rupture    Colon cancer screening    Right shoulder pain    Right shoulder injury    Hyperlipidemia    Hyperbilirubinemia    Dilated aortic root    Cough    Asthma    Anxiety    Malignant neoplasm of kidney    Erythrocytosis    Acquired hypothyroidism    Neck pain    Hypertension    Acute URI    Hyperkalemia    Costochondritis    Insomnia    Localized osteoarthritis of left shoulder    Osteoarthritis of right knee    Left shoulder pain    Mitral valve insufficiency    Aortic stenosis     Past Medical History:   Diagnosis Date    Acquired hypothyroidism     Anxiety     Aortic aneurysm     Aortic valve regurgitation     Arthritis     Asthma     Hutton's cyst of knee, right     BBB (bundle branch block)     Coronary artery disease     Disease of thyroid gland     Gout     H/O Muscle pain     Right shoulder and neck area    H/O Radiation treatment     For tonsillar inflammation and infection when he was a child and this led him to develop thyroid cancer.    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 Ganesh Lopez    Left shoulder pain     Myeloproliferative  disorder     With polycythemia vera, JAK2 mutation positive requiring periodic phlebotomies (hematocrit above 47).    Polymyalgia rheumatica     Premature atrial contraction     Prostate cancer     Raynaud disease     Shoulder injury, left, sequela     SOB (shortness of breath) on exertion      Past Surgical History:   Procedure Laterality Date    CARDIAC CATHETERIZATION N/A 1/8/2025    Procedure: Coronary angiography;  Surgeon: Di oRberts MD;  Location: [facility] CATH INVASIVE LOCATION;  Service: Cardiovascular;  Laterality: N/A;    CARDIAC CATHETERIZATION N/A 1/8/2025    Procedure: Left heart cath;  Surgeon: Di Roberts MD;  Location: [facility] CATH INVASIVE LOCATION;  Service: Cardiovascular;  Laterality: N/A;    CARDIAC CATHETERIZATION N/A 1/8/2025    Procedure: Right Heart Cath;  Surgeon: Di Roberts MD;  Location: [facility] CATH INVASIVE LOCATION;  Service: Cardiovascular;  Laterality: N/A;    CIRCUMCISION N/A 12/17/2018    Procedure: CIRCUMCISION;  Surgeon: Gallo Lopez MD;  Location: [facility] OR;  Service: Urology    COLONOSCOPY  2010    Documented a tubulovillous adenom that was removed completely. Colonoscopy in 2014 was unremarkable.    COLONOSCOPY N/A 2/20/2020    Procedure: COLONOSCOPY;  Surgeon: Claire Galo MD;  Location: [facility];  Service: Gastroenterology;  Laterality: N/A;  diverticulosis    CORONARY ARTERY BYPASS GRAFT WITH AORTIC AND MITRAL VALVE REPAIR/REPLACEMENT N/A 2/10/2025    Procedure: STERNOTOMY,CORONARY ARTERY BYPASS  X 3,WITH INTERNAL MAMMARY ARTERY GRAFT AND ENDOSCOPICLY HARVESTED RIGHT SAPHENOUS VEIN MITRAL VALVE REPAIR, LEFT ATRIAL APPENDAGE EXCLUSION, PRP  TRANSESOPHAGEAL ECHOCARDIOGRAM WITH ANESTHESIA;  Surgeon: iMgel Alcantara MD;  Location: [facility] CVOR;  Service: Cardiothoracic;  Laterality: N/A;    HERNIA REPAIR      H/O multiple hernia repairs including right inguinal hernia repair in 1981, 2003, and double hernia repair in 2013    KIDNEY SURGERY       PROSTATE BIOPSY      Showed features consistent with prostate cancer    SHOULDER SURGERY  1995    Shoulder repair    THYROIDECTOMY, PARTIAL  1983    For malignant tumor    TOOTH EXTRACTION        General Information       Row Name 02/15/25 0918          Physical Therapy Time and Intention    Document Type therapy note (daily note)  -EF     Mode of Treatment individual therapy;physical therapy  -EF       Row Name 02/15/25 0918          General Information    Existing Precautions/Restrictions cardiac;sternal;fall  -EF     Barriers to Rehab none identified  -EF       Row Name 02/15/25 0918          Cognition    Orientation Status (Cognition) oriented x 4  -EF       Row Name 02/15/25 0918          Safety Issues/Impairments Affecting Functional Mobility    Impairments Affecting Function (Mobility) endurance/activity tolerance;strength;shortness of breath  -EF               User Key  (r) = Recorded By, (t) = Taken By, (c) = Cosigned By      Initials Name Provider Type    EF Antonina Atwood PT Physical Therapist                   Mobility       Row Name 02/15/25 0918          Bed Mobility    Comment, (Bed Mobility) not tested; up in chair  -EF       Row Name 02/15/25 0918          Sit-Stand Transfer    Sit-Stand [place] (Transfers) standby assist  -EF       Row Name 02/15/25 0918          Gait/Stairs (Locomotion)    [place] Level (Gait) standby assist;contact guard  -EF     Distance in Feet (Gait) 150  -EF     Deviations/Abnormal Patterns (Gait) nichole decreased;stride length decreased  -EF     Bilateral Gait Deviations forward flexed posture;heel strike decreased  -EF     Comment, (Gait/Stairs) no overt LOB; amb on room air with no rest breaks required  -EF               User Key  (r) = Recorded By, (t) = Taken By, (c) = Cosigned By      Initials Name Provider Type    Antonina Weinberg PT Physical Therapist                   Obj/Interventions    No documentation.                  Goals/Plan    No  documentation.                  Clinical Impression       Row Name 02/15/25 0919          Pain    Pretreatment Pain Rating 0/10 - no pain  -EF     Posttreatment Pain Rating 0/10 - no pain  -EF       Row Name 02/15/25 0919          Plan of Care Review    Plan of Care Reviewed With patient  -EF     Progress improving  -EF     Outcome Evaluation Pt continues to demo steady progress with strength and overall endurance, as evidenced by increased independence with mobility today. Pt up in chair upon PT arrival, performed STS transfer with SBA. Pt ambulated 150' with SBA/CGA on room air; SpO2 95% upon return to room. Encouraged pt to continue mobilizing with nsg staff in addition to PT. No new acue PT concerns.  -EF       Row Name 02/15/25 0919          Vital Signs    Pre SpO2 (%) 99  -EF     O2 Delivery Pre Treatment supplemental O2  -EF     Intra SpO2 (%) 95  -EF     O2 Delivery Intra Treatment room air  -EF     Post SpO2 (%) 100  -EF     O2 Delivery Post Treatment supplemental O2  -EF     Pre Patient Position Sitting  -EF     Intra Patient Position Standing  -EF     Post Patient Position Sitting  -EF       Row Name 02/15/25 0919          Positioning and Restraints    Pre-Treatment Position sitting in chair/recliner  -EF     Post Treatment Position chair  -EF     In Chair reclined;call light within reach;encouraged to call for assist;exit alarm on;notified nsg;legs elevated  -EF               User Key  (r) = Recorded By, (t) = Taken By, (c) = Cosigned By      Initials Name Provider Type    Antonina Weinberg, PT Physical Therapist                   Outcome Measures       Row Name 02/15/25 0921 02/15/25 0021       How much help from another person do you currently need...    Turning from your back to your side while in flat bed without using bedrails? 4  -EF 4  -EI    Moving from lying on back to sitting on the side of a flat bed without bedrails? 3  -EF 3  -EI    Moving to and from a bed to a chair (including a  wheelchair)? 3  -EF 3  -EI    Standing up from a chair using your arms (e.g., wheelchair, bedside chair)? 3  -EF 3  -EI    Climbing 3-5 steps with a railing? 3  -EF 2  -EI    To walk in hospital room? 3  -EF 3  -EI    AM-PAC 6 Clicks Score (PT) 19  -EF 18  -EI    Highest Level of Mobility Goal 6 --> Walk 10 steps or more  -EF 6 --> Walk 10 steps or more  -EI              User Key  (r) = Recorded By, (t) = Taken By, (c) = Cosigned By      Initials Name Provider Type    EF Antonina Atwood, PT Physical Therapist    EI Natalia Bruce RN Registered Nurse                                 Physical Therapy Education       Title: PT OT SLP Therapies (Done)       Topic: Physical Therapy (Done)       Point: Mobility training (Done)       Learning Progress Summary            Patient Acceptance, E, VU,NR by  at 2/15/2025 0921    Acceptance, E,TB,D, VU by  at 2/14/2025 1102                      Point: Home exercise program (Done)       Learning Progress Summary            Patient Acceptance, E,TB,D, VU by  at 2/14/2025 1102                      Point: Body mechanics (Done)       Learning Progress Summary            Patient Acceptance, E,TB,D, VU by  at 2/14/2025 1102                      Point: Precautions (Done)       Learning Progress Summary            Patient Acceptance, E,TB,D, VU by  at 2/14/2025 1102                                      User Key       Initials Effective Dates Name Provider Type Discipline     05/31/24 -  Antonina Atwood, AI Physical Therapist PT     06/16/21 -  Claire Michael PTA Physical Therapist Assistant PT                  PT Recommendation and Plan     Progress: improving  Outcome Evaluation: Pt continues to demo steady progress with strength and overall endurance, as evidenced by increased independence with mobility today. Pt up in chair upon PT arrival, performed STS transfer with SBA. Pt ambulated 150' with SBA/CGA on room air; SpO2 95% upon return to room. Encouraged  pt to continue mobilizing with nsg staff in addition to PT. No new acue PT concerns.     Time Calculation:         PT Charges       Row Name 02/15/25 0921             Time Calculation    Start Time 0856  -EF      Stop Time 0911  -EF      Time Calculation (min) 15 min  -EF      PT Received On 02/15/25  -EF      PT - Next Appointment 02/17/25  -EF         Time Calculation- PT    Total Timed Code Minutes- PT 15 minute(s)  -EF         Timed Charges    09194 - PT Therapeutic Activity Minutes 15  -EF         Total Minutes    Timed Charges Total Minutes 15  -EF       Total Minutes 15  -EF                User Key  (r) = Recorded By, (t) = Taken By, (c) = Cosigned By      Initials Name Provider Type    EF Antonina Atwood, PT Physical Therapist                  Therapy Charges for Today       Code Description Service Date Service Provider Modifiers Qty    [id number]  PT THERAPEUTIC ACT EA 15 MIN 2/15/2025 Antonina Atwood PT GP 1            PT G-Codes  Outcome Measure Options: AM-PAC 6 Clicks Basic Mobility (PT)  AM-PAC 6 Clicks Score (PT): 19       Antonina Atwood PT  2/15/2025

## 2025-02-15 NOTE — PLAN OF CARE
Goal Outcome Evaluation:  Plan of Care Reviewed With: patient        Progress: improving  Outcome Evaluation: Pt continues to demo steady progress with strength and overall endurance, as evidenced by increased independence with mobility today. Pt up in chair upon PT arrival, performed STS transfer with SBA. Pt ambulated 150' with SBA/CGA on room air; SpO2 95% upon return to room. Encouraged pt to continue mobilizing with nsg staff in addition to PT. No new acute PT concerns.

## 2025-02-15 NOTE — PLAN OF CARE
Goal Outcome Evaluation:      Patient resting at this time, post up CABG X 3 day 5, pain controlled by pain med,  vital signs stable, oxygen at 2 liter ,  SR and AFIB on the monitor, educate to call for assist, call light intact, cont to monitor.

## 2025-02-16 LAB
ANION GAP SERPL CALCULATED.3IONS-SCNC: 9.9 MMOL/L (ref 5–15)
BUN SERPL-MCNC: 17 MG/DL (ref 8–23)
BUN/CREAT SERPL: 17 (ref 7–25)
CALCIUM SPEC-SCNC: 8.3 MG/DL (ref 8.6–10.5)
CHLORIDE SERPL-SCNC: 101 MMOL/L (ref 98–107)
CO2 SERPL-SCNC: 26.1 MMOL/L (ref 22–29)
CREAT SERPL-MCNC: 1 MG/DL (ref 0.76–1.27)
EGFRCR SERPLBLD CKD-EPI 2021: 77 ML/MIN/1.73
GLUCOSE BLDC GLUCOMTR-MCNC: 81 MG/DL (ref 70–130)
GLUCOSE SERPL-MCNC: 91 MG/DL (ref 65–99)
POTASSIUM SERPL-SCNC: 4.4 MMOL/L (ref 3.5–5.2)
SODIUM SERPL-SCNC: 137 MMOL/L (ref 136–145)

## 2025-02-16 PROCEDURE — 25010000002 AMIODARONE IN DEXTROSE 5% 150-4.21 MG/100ML-% SOLUTION: Performed by: NURSE PRACTITIONER

## 2025-02-16 PROCEDURE — 94799 UNLISTED PULMONARY SVC/PX: CPT

## 2025-02-16 PROCEDURE — 25010000002 ENOXAPARIN PER 10 MG: Performed by: NURSE PRACTITIONER

## 2025-02-16 PROCEDURE — 99232 SBSQ HOSP IP/OBS MODERATE 35: CPT

## 2025-02-16 PROCEDURE — 82948 REAGENT STRIP/BLOOD GLUCOSE: CPT

## 2025-02-16 PROCEDURE — 80048 BASIC METABOLIC PNL TOTAL CA: CPT | Performed by: NURSE PRACTITIONER

## 2025-02-16 RX ORDER — ATENOLOL 25 MG/1
25 TABLET ORAL EVERY 12 HOURS SCHEDULED
Qty: 180 TABLET | Refills: 0 | OUTPATIENT
Start: 2025-02-16 | End: 2025-05-17

## 2025-02-16 RX ORDER — FUROSEMIDE 40 MG/1
40 TABLET ORAL DAILY
Qty: 30 TABLET | Refills: 0 | OUTPATIENT
Start: 2025-02-16 | End: 2025-03-18

## 2025-02-16 RX ORDER — POTASSIUM CHLORIDE 1500 MG/1
20 TABLET, EXTENDED RELEASE ORAL DAILY
Qty: 30 TABLET | Refills: 0 | OUTPATIENT
Start: 2025-02-16 | End: 2025-03-18

## 2025-02-16 RX ORDER — AMIODARONE HYDROCHLORIDE 200 MG/1
300 TABLET ORAL EVERY 12 HOURS SCHEDULED
Status: DISCONTINUED | OUTPATIENT
Start: 2025-02-16 | End: 2025-02-17 | Stop reason: HOSPADM

## 2025-02-16 RX ORDER — GABAPENTIN 100 MG/1
100 CAPSULE ORAL EVERY 12 HOURS SCHEDULED
Qty: 14 CAPSULE | Refills: 0 | OUTPATIENT
Start: 2025-02-16 | End: 2025-02-23

## 2025-02-16 RX ORDER — ATORVASTATIN CALCIUM 40 MG/1
40 TABLET, FILM COATED ORAL NIGHTLY
Qty: 90 TABLET | Refills: 0 | OUTPATIENT
Start: 2025-02-16 | End: 2025-05-17

## 2025-02-16 RX ORDER — AMIODARONE HYDROCHLORIDE 100 MG/1
TABLET ORAL
Qty: 116 TABLET | Refills: 0 | OUTPATIENT
Start: 2025-02-16 | End: 2025-04-01

## 2025-02-16 RX ORDER — ASPIRIN 81 MG/1
81 TABLET ORAL DAILY
Qty: 90 TABLET | Refills: 0 | OUTPATIENT
Start: 2025-02-16 | End: 2025-05-17

## 2025-02-16 RX ORDER — HYDROCODONE BITARTRATE AND ACETAMINOPHEN 5; 325 MG/1; MG/1
1 TABLET ORAL EVERY 4 HOURS PRN
Qty: 42 TABLET | Refills: 0 | OUTPATIENT
Start: 2025-02-16 | End: 2025-02-23

## 2025-02-16 RX ADMIN — ATENOLOL 25 MG: 25 TABLET ORAL at 20:55

## 2025-02-16 RX ADMIN — LEVOTHYROXINE SODIUM 100 MCG: 100 TABLET ORAL at 08:28

## 2025-02-16 RX ADMIN — ATORVASTATIN CALCIUM 40 MG: 20 TABLET, FILM COATED ORAL at 20:55

## 2025-02-16 RX ADMIN — ALPRAZOLAM 0.25 MG: 0.25 TABLET ORAL at 20:55

## 2025-02-16 RX ADMIN — DOCUSATE SODIUM 100 MG: 100 CAPSULE, LIQUID FILLED ORAL at 20:55

## 2025-02-16 RX ADMIN — POTASSIUM CHLORIDE 20 MEQ: 1500 TABLET, EXTENDED RELEASE ORAL at 08:28

## 2025-02-16 RX ADMIN — HYDROCODONE BITARTRATE AND ACETAMINOPHEN 2 TABLET: 5; 325 TABLET ORAL at 05:57

## 2025-02-16 RX ADMIN — GUAIFENESIN 1200 MG: 600 TABLET, MULTILAYER, EXTENDED RELEASE ORAL at 08:29

## 2025-02-16 RX ADMIN — AMIODARONE HYDROCHLORIDE 300 MG: 200 TABLET ORAL at 08:45

## 2025-02-16 RX ADMIN — AMIODARONE HYDROCHLORIDE 300 MG: 200 TABLET ORAL at 20:55

## 2025-02-16 RX ADMIN — GABAPENTIN 100 MG: 100 CAPSULE ORAL at 08:28

## 2025-02-16 RX ADMIN — AMIODARONE HYDROCHLORIDE 150 MG: 1.5 INJECTION, SOLUTION INTRAVENOUS at 08:48

## 2025-02-16 RX ADMIN — COLCHICINE 0.6 MG: 0.6 TABLET ORAL at 08:28

## 2025-02-16 RX ADMIN — GUAIFENESIN 1200 MG: 600 TABLET, MULTILAYER, EXTENDED RELEASE ORAL at 20:55

## 2025-02-16 RX ADMIN — PANTOPRAZOLE SODIUM 40 MG: 40 TABLET, DELAYED RELEASE ORAL at 05:52

## 2025-02-16 RX ADMIN — GABAPENTIN 100 MG: 100 CAPSULE ORAL at 20:55

## 2025-02-16 RX ADMIN — ALPRAZOLAM 0.25 MG: 0.25 TABLET ORAL at 08:37

## 2025-02-16 RX ADMIN — ATENOLOL 25 MG: 25 TABLET ORAL at 08:28

## 2025-02-16 RX ADMIN — FUROSEMIDE 40 MG: 40 TABLET ORAL at 08:27

## 2025-02-16 RX ADMIN — ASPIRIN 81 MG: 81 TABLET, COATED ORAL at 08:28

## 2025-02-16 RX ADMIN — ENOXAPARIN SODIUM 40 MG: 100 INJECTION SUBCUTANEOUS at 20:55

## 2025-02-16 RX ADMIN — MONTELUKAST 10 MG: 10 TABLET, FILM COATED ORAL at 20:56

## 2025-02-16 RX ADMIN — DOCUSATE SODIUM 100 MG: 100 CAPSULE, LIQUID FILLED ORAL at 08:27

## 2025-02-16 NOTE — PROGRESS NOTES
" LOS: 6 days   Patient Care Team:  Mariah Delgado MD as PCP - General (Family Medicine)  Consuelo Adorno MD as Consulting Physician (Hematology and Oncology)  John Damon MD as Consulting Physician (Hematology and Oncology)  Mariah Delgado MD as Referring Physician (Family Medicine)    Chief Complaint: post op open heart    Subjective  A little nervous about the snow and the overnight oximetry, he thinks that's why he went into atrial fibrillation    Vital Signs  Temp:  [98.1 °F (36.7 °C)-98.4 °F (36.9 °C)] 98.1 °F (36.7 °C)  Heart Rate:  [82-95] 88  Resp:  [16-17] 16  BP: (100-114)/(58-77) 109/72  Body mass index is 23.57 kg/m².    Intake/Output Summary (Last 24 hours) at 2/16/2025 0835  Last data filed at 2/16/2025 0500  Gross per 24 hour   Intake 240 ml   Output 600 ml   Net -360 ml     No intake/output data recorded.            02/14/25  0525 02/15/25  0527 02/16/25  0605   Weight: 73.8 kg (162 lb 9.6 oz) 71.3 kg (157 lb 3.2 oz) 70.3 kg (155 lb)         Objective:  Vital signs: (most recent): Blood pressure 91/65, pulse (!) 148, temperature 98 °F (36.7 °C), temperature source Oral, resp. rate 16, weight 70.3 kg (155 lb), SpO2 91%.                   Results Review:        WBC WBC   Date Value Ref Range Status   02/14/2025 11.44 (H) 3.40 - 10.80 10*3/mm3 Final      HGB Hemoglobin   Date Value Ref Range Status   02/14/2025 10.2 (L) 13.0 - 17.7 g/dL Final      HCT Hematocrit   Date Value Ref Range Status   02/14/2025 30.3 (L) 37.5 - 51.0 % Final      Platelets Platelets   Date Value Ref Range Status   02/14/2025 110 (L) 140 - 450 10*3/mm3 Final        PT/INR:    No results found for: \"PROTIME\"  /  No results found for: \"INR\"      Sodium Sodium   Date Value Ref Range Status   02/16/2025 137 136 - 145 mmol/L Final   02/15/2025 132 (L) 136 - 145 mmol/L Final   02/14/2025 132 (L) 136 - 145 mmol/L Final      Potassium Potassium   Date Value Ref Range Status   02/16/2025 4.4 3.5 - 5.2 mmol/L Final   02/15/2025 4.4 3.5 " "- 5.2 mmol/L Final   02/15/2025 3.8 3.5 - 5.2 mmol/L Final   02/14/2025 4.1 3.5 - 5.2 mmol/L Final      Chloride Chloride   Date Value Ref Range Status   02/16/2025 101 98 - 107 mmol/L Final   02/15/2025 96 (L) 98 - 107 mmol/L Final   02/14/2025 100 98 - 107 mmol/L Final      Bicarbonate CO2   Date Value Ref Range Status   02/16/2025 26.1 22.0 - 29.0 mmol/L Final   02/15/2025 26.3 22.0 - 29.0 mmol/L Final   02/14/2025 23.8 22.0 - 29.0 mmol/L Final      BUN BUN   Date Value Ref Range Status   02/16/2025 17 8 - 23 mg/dL Final   02/15/2025 19 8 - 23 mg/dL Final   02/14/2025 17 8 - 23 mg/dL Final      Creatinine Creatinine   Date Value Ref Range Status   02/16/2025 1.00 0.76 - 1.27 mg/dL Final   02/15/2025 1.00 0.76 - 1.27 mg/dL Final   02/14/2025 0.93 0.76 - 1.27 mg/dL Final      Calcium Calcium   Date Value Ref Range Status   02/16/2025 8.3 (L) 8.6 - 10.5 mg/dL Final   02/15/2025 8.3 (L) 8.6 - 10.5 mg/dL Final   02/14/2025 8.5 (L) 8.6 - 10.5 mg/dL Final      Magnesium No results found for: \"MG\"         amiodarone, 300 mg, Oral, Q12H  amiodarone, 150 mg, Intravenous, Once  aspirin, 81 mg, Oral, Daily  atenolol, 25 mg, Oral, Q12H  atorvastatin, 40 mg, Oral, Nightly  chlorhexidine, 15 mL, Mouth/Throat, Q12H  colchicine, 0.6 mg, Oral, Daily  docusate sodium, 100 mg, Oral, BID  enoxaparin, 40 mg, Subcutaneous, Daily  furosemide, 40 mg, Oral, Daily  gabapentin, 100 mg, Oral, Q12H  guaiFENesin, 1,200 mg, Oral, Q12H  insulin lispro, 2-9 Units, Subcutaneous, 4x Daily AC & at Bedtime  levothyroxine, 100 mcg, Oral, Daily  montelukast, 10 mg, Oral, Nightly  pantoprazole, 40 mg, Oral, QAM  polyethylene glycol, 17 g, Oral, Daily  potassium chloride, 20 mEq, Oral, Daily  senna-docusate sodium, 2 tablet, Oral, Nightly                   Mitral valve insufficiency    Aortic stenosis      Assessment & Plan    -Mitral valve regurgitation- s/p CABGx3 LIMA/RSVG, MV repair, SUDHIR closure 45mm atrial clip- [place] 2/10/2025  -Multivessel " CAD  -ascending aortic ectasia 4.2cm unchanged per last CT 11/2024  -hyperlipidemia  -renal cell carcinoma s/p partial nephrectomy-- creatinine 1.3-1.5  -polymyalgia rheumatica  -myeloproliferative disorder/polycythemia vera-- follows with hematology  -hypertension  -raynaud's  -post op anemia- expected acute blood loss   -Leukocytosis- reactive/stress dose steroids    POD#7  Feeling better this morning but nervous about the atrial fibrillation  Up in the chair  Intermittent atrial fibrillation-- will give a bolus and start him on PO amiodarone  Will keep him another night  Will need nocturnal oxygen at discharge.  Continue routine care     Staci Hutchinson, APRN  02/16/25  08:35 EST

## 2025-02-16 NOTE — PLAN OF CARE
Goal Outcome Evaluation:     POD 5; A/Ox4; Room Air 94% throughout dayshift; Overnight O2 study to be repeated this evening; stool softners, prune juice & rectal suppository admin today; pt had BM this afternoon; Up w/ SBA; encouraged pt to increase pulm toileting multiple times;      Anticipate d/c tomorrow, Sunday 2/16/25

## 2025-02-16 NOTE — PLAN OF CARE
Goal Outcome Evaluation:         Patient denies chest pain , vital signs stable, pt on overnight sleep study at this time, has been on room air sat 94%,  SR with PAC, uses urinal , educate to call for assist , call light intact, cont to monitor.

## 2025-02-16 NOTE — PROGRESS NOTES
Electrophysiology Follow-Up Note      Patient Name: Scott Murphy  Age/Sex: 79 y.o. male  : 1945  MRN: [id number]      Day of Service: 25       Chief Complaint/Follow-up: Severe MR, MR repair, CABG, atrial fibrillation     S: had AF starting around 0600. Given IV bolus of amiodarone and converted. He has some awareness of AF but seems mild.       Temp:  [98 °F (36.7 °C)-98.4 °F (36.9 °C)] 98 °F (36.7 °C)  Heart Rate:  [] 148  Resp:  [16-17] 16  BP: ()/(58-77) 91/65     PHYSICAL EXAM:    General Appearance: No acute distress, well developed and well nourished.   Eyes: Conjunctiva and lids: No erythema, swelling, or discharge. Sclera non-icteric.   HENT: Atraumatic, normocephalic. External eyes, ears, and nose normal.   Respiratory: No signs of respiratory distress. Respiration rhythm and depth normal.   Clear to auscultation. No rales, crackles, rhonchi, or wheezing auscultated.   Cardiovascular:  Heart Rate and Rhythm: Normal, Heart Sounds: Normal S1 and S2. No S3 or S4 noted.  Murmurs: No murmurs noted. No rubs, thrills, or gallops.   Lower Extremities: No edema noted.  Gastrointestinal:  Abdomen soft, non-distended, non-tender.  Musculoskeletal: Normal movement of extremities  Skin: Warm and dry.   Psychiatric: Patient alert and oriented to person, place, and time. Speech and behavior appropriate. Normal mood and affect.       ECG/TELE:       Results from last 7 days   Lab Units 25  0309 02/15/25  1521 02/15/25  0342 25  0325   SODIUM mmol/L 137  --  132* 132*   POTASSIUM mmol/L 4.4 4.4 3.8 4.1   CHLORIDE mmol/L 101  --  96* 100   CO2 mmol/L 26.1  --  26.3 23.8   BUN mg/dL 17  --  19 17   CREATININE mg/dL 1.00  --  1.00 0.93   GLUCOSE mg/dL 91  --  96 100*   CALCIUM mg/dL 8.3*  --  8.3* 8.5*     Results from last 7 days   Lab Units 25  0325 25  0326 25  0440   WBC 10*3/mm3 11.44* 15.24* 19.83*   HEMOGLOBIN g/dL 10.2* 10.3* 10.6*   HEMATOCRIT %  30.3* 31.5* 31.9*   PLATELETS 10*3/mm3 110* 105* 119*     Results from last 7 days   Lab Units 02/11/25  0215 02/10/25  1144 02/10/25  1056   INR  1.36* 1.51* 1.72*                   Current Medications:   Scheduled Meds:amiodarone, 300 mg, Oral, Q12H  aspirin, 81 mg, Oral, Daily  atenolol, 25 mg, Oral, Q12H  atorvastatin, 40 mg, Oral, Nightly  chlorhexidine, 15 mL, Mouth/Throat, Q12H  colchicine, 0.6 mg, Oral, Daily  docusate sodium, 100 mg, Oral, BID  enoxaparin, 40 mg, Subcutaneous, Daily  furosemide, 40 mg, Oral, Daily  gabapentin, 100 mg, Oral, Q12H  guaiFENesin, 1,200 mg, Oral, Q12H  levothyroxine, 100 mcg, Oral, Daily  montelukast, 10 mg, Oral, Nightly  pantoprazole, 40 mg, Oral, QAM  polyethylene glycol, 17 g, Oral, Daily  potassium chloride, 20 mEq, Oral, Daily  senna-docusate sodium, 2 tablet, Oral, Nightly            Mitral valve insufficiency    Aortic stenosis       Plan:   Severe MR--- status post MR repair--- he is doing well from a surgical standpoint.     Atrial fibrillation--- he continues to have intermittent episodes of atrial fibrillation appear to last about an hour.  He is aware of these episodes. Bolused with amiodarone and converted this morning, discussed with Staci, not opposed to low dose oral amiodarone. Given his SUDHIR was clipped, jacinto defer AC for now given his brief episodes.      Not opposed to discharge, possibly today or tomorrow.       Jovanny Zuñiga, APRLATRICE  02/16/25  09:53 EST

## 2025-02-17 ENCOUNTER — DOCUMENTATION (OUTPATIENT)
Dept: HOME HEALTH SERVICES | Facility: HOME HEALTHCARE | Age: 80
End: 2025-02-17
Payer: MEDICARE

## 2025-02-17 ENCOUNTER — READMISSION MANAGEMENT (OUTPATIENT)
Dept: CALL CENTER | Facility: HOSPITAL | Age: 80
End: 2025-02-17
Payer: MEDICARE

## 2025-02-17 ENCOUNTER — HOME HEALTH ADMISSION (OUTPATIENT)
Dept: HOME HEALTH SERVICES | Facility: HOME HEALTHCARE | Age: 80
End: 2025-02-17
Payer: MEDICARE

## 2025-02-17 VITALS
OXYGEN SATURATION: 98 % | WEIGHT: 154.9 LBS | HEART RATE: 60 BPM | SYSTOLIC BLOOD PRESSURE: 93 MMHG | TEMPERATURE: 97.8 F | RESPIRATION RATE: 16 BRPM | DIASTOLIC BLOOD PRESSURE: 65 MMHG | BODY MASS INDEX: 23.55 KG/M2

## 2025-02-17 LAB
25(OH)D3 SERPL-MCNC: 23.9 NG/ML (ref 30–100)
ANION GAP SERPL CALCULATED.3IONS-SCNC: 10.9 MMOL/L (ref 5–15)
BUN SERPL-MCNC: 19 MG/DL (ref 8–23)
BUN/CREAT SERPL: 16.4 (ref 7–25)
CALCIUM SPEC-SCNC: 8.5 MG/DL (ref 8.6–10.5)
CHLORIDE SERPL-SCNC: 99 MMOL/L (ref 98–107)
CO2 SERPL-SCNC: 26.1 MMOL/L (ref 22–29)
CREAT SERPL-MCNC: 1.16 MG/DL (ref 0.76–1.27)
EGFRCR SERPLBLD CKD-EPI 2021: 64.5 ML/MIN/1.73
GLUCOSE SERPL-MCNC: 94 MG/DL (ref 65–99)
MAGNESIUM SERPL-MCNC: 2.1 MG/DL (ref 1.6–2.4)
POTASSIUM SERPL-SCNC: 4.3 MMOL/L (ref 3.5–5.2)
SODIUM SERPL-SCNC: 136 MMOL/L (ref 136–145)

## 2025-02-17 PROCEDURE — 80048 BASIC METABOLIC PNL TOTAL CA: CPT | Performed by: NURSE PRACTITIONER

## 2025-02-17 PROCEDURE — 99232 SBSQ HOSP IP/OBS MODERATE 35: CPT | Performed by: STUDENT IN AN ORGANIZED HEALTH CARE EDUCATION/TRAINING PROGRAM

## 2025-02-17 PROCEDURE — 97530 THERAPEUTIC ACTIVITIES: CPT

## 2025-02-17 PROCEDURE — 82306 VITAMIN D 25 HYDROXY: CPT

## 2025-02-17 PROCEDURE — 83735 ASSAY OF MAGNESIUM: CPT

## 2025-02-17 RX ORDER — ATENOLOL 25 MG/1
25 TABLET ORAL EVERY 12 HOURS SCHEDULED
Qty: 180 TABLET | Refills: 0 | Status: SHIPPED | OUTPATIENT
Start: 2025-02-17 | End: 2025-05-18

## 2025-02-17 RX ORDER — POTASSIUM CHLORIDE 1500 MG/1
20 TABLET, EXTENDED RELEASE ORAL DAILY
Qty: 14 TABLET | Refills: 0 | Status: SHIPPED | OUTPATIENT
Start: 2025-02-18 | End: 2025-03-04

## 2025-02-17 RX ORDER — ASPIRIN 81 MG/1
81 TABLET ORAL DAILY
Qty: 90 TABLET | Refills: 0 | Status: SHIPPED | OUTPATIENT
Start: 2025-02-18 | End: 2025-05-19

## 2025-02-17 RX ORDER — HYDROCODONE BITARTRATE AND ACETAMINOPHEN 5; 325 MG/1; MG/1
1 TABLET ORAL EVERY 6 HOURS PRN
Qty: 24 TABLET | Refills: 0 | Status: SHIPPED | OUTPATIENT
Start: 2025-02-17 | End: 2025-02-24

## 2025-02-17 RX ORDER — ACETAMINOPHEN 325 MG/1
650 TABLET ORAL EVERY 4 HOURS PRN
Start: 2025-02-17 | End: 2025-02-17

## 2025-02-17 RX ORDER — ATORVASTATIN CALCIUM 40 MG/1
40 TABLET, FILM COATED ORAL NIGHTLY
Qty: 90 TABLET | Refills: 0 | Status: SHIPPED | OUTPATIENT
Start: 2025-02-17 | End: 2025-05-18

## 2025-02-17 RX ORDER — AMIODARONE HYDROCHLORIDE 100 MG/1
TABLET ORAL
Qty: 56 TABLET | Refills: 0 | Status: SHIPPED | OUTPATIENT
Start: 2025-02-17 | End: 2025-03-10

## 2025-02-17 RX ORDER — FUROSEMIDE 40 MG/1
40 TABLET ORAL DAILY
Qty: 14 TABLET | Refills: 0 | Status: SHIPPED | OUTPATIENT
Start: 2025-02-18 | End: 2025-03-04

## 2025-02-17 RX ADMIN — ASPIRIN 81 MG: 81 TABLET, COATED ORAL at 08:09

## 2025-02-17 RX ADMIN — FUROSEMIDE 40 MG: 40 TABLET ORAL at 08:07

## 2025-02-17 RX ADMIN — AMIODARONE HYDROCHLORIDE 300 MG: 200 TABLET ORAL at 08:07

## 2025-02-17 RX ADMIN — Medication 2 TABLET: at 14:54

## 2025-02-17 RX ADMIN — BISACODYL 10 MG: 10 SUPPOSITORY RECTAL at 11:40

## 2025-02-17 RX ADMIN — LEVOTHYROXINE SODIUM 100 MCG: 100 TABLET ORAL at 08:06

## 2025-02-17 RX ADMIN — POLYETHYLENE GLYCOL 3350 17 G: 17 POWDER, FOR SOLUTION ORAL at 08:07

## 2025-02-17 RX ADMIN — GABAPENTIN 100 MG: 100 CAPSULE ORAL at 08:07

## 2025-02-17 RX ADMIN — POTASSIUM CHLORIDE 20 MEQ: 1500 TABLET, EXTENDED RELEASE ORAL at 08:07

## 2025-02-17 RX ADMIN — ATENOLOL 25 MG: 25 TABLET ORAL at 08:06

## 2025-02-17 RX ADMIN — COLCHICINE 0.6 MG: 0.6 TABLET ORAL at 08:07

## 2025-02-17 RX ADMIN — DOCUSATE SODIUM 100 MG: 100 CAPSULE, LIQUID FILLED ORAL at 08:07

## 2025-02-17 RX ADMIN — PANTOPRAZOLE SODIUM 40 MG: 40 TABLET, DELAYED RELEASE ORAL at 06:00

## 2025-02-17 NOTE — PROGRESS NOTES
[facility] Clinical Pharmacy Services: National Cardiology Data Registry (NCDR) Medication Review    Scott Murphy is s/p elective CABG x3 . Pharmacy to review discharge medications to make sure appropriate medications have been prescribed.    Patient has been discharged on the following:  Aspirin EC 81 mg daily  High Intensity Statin: Atorvastatin 40 mg nightly  Beta-blocker: Atenolol 25 mg twice daily  LVEF=45% (no requirement for ACE/ARB)    Provider stopped home ACE/ARB at discharge due to soft blood pressures in the hospital. Additional blood pressure needs will be reassessed in the outpatient setting.     These medications meet the requirements for NCDR discharge medication for CABG    Chichi Boles, Pharm.D., [facility]   Clinical Pharmacist   Phone Extension #[phone]

## 2025-02-17 NOTE — PROGRESS NOTES
Patient is discharging today. Patient agreeable to home health and has no current home health. Face sheet information is correct and orders for home health SN and PT in Epic and noted new nocturnal O2. Thank you!

## 2025-02-17 NOTE — PROGRESS NOTES
[facility] Clinical Pharmacy Services: Patient Counseling Consult    Scott SALVADOR Katherine has been counseled on the following medications: amiodarone, atenolol, calcium/vit D, furosemide, potassium, Norco, and an increased dose of atorvastatin. Counseling points included the following:    Explained indication of each medication, and patient's need for these medications.  Went over dosing and frequency of each medication.  Discussed any special administration, storage, or monitoring instructions with medications.  Discussed all important drug interactions, including over-the-counter medications and supplements.  Explained possible side effects for each medication.  Instructed the patient not to begin or discontinue any medications without informing his physician.  Talked about keeping a week-long blood pressure journal to bring to his next appointment with the nurse practitioner. Spoke to the importance of taking blood pressure the same time every day 2 hours after taking atenolol.    Patient expressed understanding and had no further questions.      Chichi Boles, Pharm.D., [facility]   Clinical Pharmacist  Phone Extension #[phone]

## 2025-02-17 NOTE — THERAPY TREATMENT NOTE
Patient Name: Scott Murphy  : 1945    MRN: [id number]                              Today's Date: 2025       Admit Date: 2/10/2025    Visit Dx:     ICD-10-CM ICD-9-CM   1. S/P CABG (coronary artery bypass graft)  Z95.1 V45.81   2. Mitral valve insufficiency, unspecified etiology  I34.0 424.0   3. S/P MVR (mitral valve repair)  Z98.890 V45.89   4. Postoperative hypoxia  R09.02 799.02    Z98.890      Patient Active Problem List   Diagnosis    Primary hypertension    Erythrocytosis    Cancer of right kidney    Prostate cancer    RBBB    PMR (polymyalgia rheumatica)    Episodic lightheadedness    Raynaud's disease without gangrene    Myeloproliferative disorder    Thoracic aortic aneurysm without rupture    Colon cancer screening    Right shoulder pain    Right shoulder injury    Hyperlipidemia    Hyperbilirubinemia    Dilated aortic root    Cough    Asthma    Anxiety    Malignant neoplasm of kidney    Erythrocytosis    Acquired hypothyroidism    Neck pain    Hypertension    Acute URI    Hyperkalemia    Costochondritis    Insomnia    Localized osteoarthritis of left shoulder    Osteoarthritis of right knee    Left shoulder pain    Mitral valve insufficiency    Aortic stenosis     Past Medical History:   Diagnosis Date    Acquired hypothyroidism     Anxiety     Aortic aneurysm     Aortic valve regurgitation     Arthritis     Asthma     Hutton's cyst of knee, right     BBB (bundle branch block)     Coronary artery disease     Disease of thyroid gland     Gout     H/O Muscle pain     Right shoulder and neck area    H/O Radiation treatment     For tonsillar inflammation and infection when he was a child and this led him to develop thyroid cancer.    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    Polymyalgia rheumatica     Premature atrial contraction     Prostate cancer     Raynaud disease     Shoulder injury, left, sequela     SOB (shortness of breath) on exertion      Past Surgical History:   Procedure Laterality Date    CARDIAC CATHETERIZATION N/A 1/8/2025    Procedure: Coronary angiography;  Surgeon: Di Roberts MD;  Location: [facility] CATH INVASIVE LOCATION;  Service: Cardiovascular;  Laterality: N/A;    CARDIAC CATHETERIZATION N/A 1/8/2025    Procedure: Left heart cath;  Surgeon: Di Roberts MD;  Location: [facility] CATH INVASIVE LOCATION;  Service: Cardiovascular;  Laterality: N/A;    CARDIAC CATHETERIZATION N/A 1/8/2025    Procedure: Right Heart Cath;  Surgeon: Di Rboerts MD;  Location: [facility] CATH INVASIVE LOCATION;  Service: Cardiovascular;  Laterality: N/A;    CIRCUMCISION N/A 12/17/2018    Procedure: CIRCUMCISION;  Surgeon: Gallo Lopez MD;  Location: [facility] OR;  Service: Urology    COLONOSCOPY  2010    Documented a tubulovillous adenom that was removed completely. Colonoscopy in 2014 was unremarkable.    COLONOSCOPY N/A 2/20/2020    Procedure: COLONOSCOPY;  Surgeon: Claire Galo MD;  Location: [facility] OR;  Service: Gastroenterology;  Laterality: N/A;  diverticulosis    CORONARY ARTERY BYPASS GRAFT WITH AORTIC AND MITRAL VALVE REPAIR/REPLACEMENT N/A 2/10/2025    Procedure: STERNOTOMY,CORONARY ARTERY BYPASS  X 3,WITH INTERNAL MAMMARY ARTERY GRAFT AND ENDOSCOPICLY HARVESTED RIGHT SAPHENOUS VEIN MITRAL VALVE REPAIR, LEFT ATRIAL APPENDAGE EXCLUSION, PRP  TRANSESOPHAGEAL ECHOCARDIOGRAM WITH ANESTHESIA;  Surgeon: Migel Alcantara MD;  Location: [facility] CVOR;  Service: Cardiothoracic;  Laterality: N/A;    HERNIA REPAIR      H/O multiple hernia repairs including right inguinal hernia repair in 1981,  2003, and double hernia repair in 2013    KIDNEY SURGERY      PROSTATE BIOPSY      Showed features consistent with prostate cancer    SHOULDER SURGERY  1995    Shoulder repair    THYROIDECTOMY, PARTIAL  1983    For malignant tumor    TOOTH EXTRACTION        General Information       Row Name 02/17/25 0934          Physical Therapy Time and Intention    Document Type therapy note (daily note)  -PH     Mode of Treatment physical therapy  -PH       Row Name 02/17/25 0934          General Information    Existing Precautions/Restrictions cardiac;fall;sternal  -PH       Row Name 02/17/25 0934          Cognition    Orientation Status (Cognition) oriented x 4  -PH       Row Name 02/17/25 0934          Safety Issues/Impairments Affecting Functional Mobility    Impairments Affecting Function (Mobility) endurance/activity tolerance;strength;pain  -PH     Comment, Safety Issues/Impairments (Mobility) gt belt and non skid socks donned; Pain when wt bearing from recent TKR (9/24)  -PH               User Key  (r) = Recorded By, (t) = Taken By, (c) = Cosigned By      Initials Name Provider Type    PH Huckendubler, Claire, PTA Physical Therapist Assistant                   Mobility       Row Name 02/17/25 0935          Bed Mobility    Bed Mobility sit-supine  -PH     Sit-Supine [place] (Bed Mobility) standby assist  -PH     Comment, (Bed Mobility) UIC then returned to bed at end of session  -PH       Row Name 02/17/25 0935          Sit-Stand Transfer    Sit-Stand [place] (Transfers) standby assist  -PH     Assistive Device (Sit-Stand Transfers) other (see comments)  no AD  -PH     Comment, (Sit-Stand Transfer) from chair  -PH       Row Name 02/17/25 0935          Gait/Stairs (Locomotion)    [place]ain Level (Gait) standby assist  -PH     Assistive Device (Gait) walker, front-wheeled  -PH     Distance in Feet (Gait) 300  -PH     Deviations/Abnormal Patterns (Gait) nichole decreased;gait speed decreased;stride length  "decreased  -PH     Bilateral Gait Deviations foot drop/toe drag  -PH     T[place] Level (Stairs) not tested  -PH     Comment, (Gait/Stairs) slow although fairly steady w/ no overt LOB  -PH               User Key  (r) = Recorded By, (t) = Taken By, (c) = Cosigned By      Initials Name Provider Type     Claire Michael PTA Physical Therapist Assistant                   Obj/Interventions       Row Name 02/17/25 0936          Motor Skills    Therapeutic Exercise other (see comments)  Cardiac program x 15 reps each; standing march, hip abd/add, side steps, retro walk, mini squats; x 10 reps  -PH       Row Name 02/17/25 0936          Balance    Balance Assessment sitting static balance;sitting dynamic balance;standing static balance;standing dynamic balance  -PH     Static Sitting Balance modified independence  -PH     Dynamic Sitting Balance modified independence  -PH     Static Standing Balance standby assist  -PH     Dynamic Standing Balance contact guard;standby assist  -PH     Position/Device Used, Standing Balance walker, front-wheeled  -PH               User Key  (r) = Recorded By, (t) = Taken By, (c) = Cosigned By      Initials Name Provider Type     Claire Michael PTA Physical Therapist Assistant                   Goals/Plan    No documentation.                  Clinical Impression       Row Name 02/17/25 0937          Pain    Pre/Posttreatment Pain Comment pt denies having pain and states he is having \"discomfort\" only  -PH       Row Name 02/17/25 0937          Plan of Care Review    Plan of Care Reviewed With patient  -PH     Progress improving  -PH     Outcome Evaluation Pt was seen for PT tx this Am. Pt was UIC and stood w/ SBA. Pt requested use of fww 2' pain in L hip when wt bearing. Pt stated he has L DHEERAJ in 2024. Pt amb around unit 2x w/ SBA and use of fww. Pt was steady w/ no overt LOB. Pt performed standing ther ex for hip then cardiac program x 15 reps when seated. Pt returned " to bed w/ SBA.  -PH       Row Name 02/17/25 0937          Vital Signs    O2 Delivery Pre Treatment room air  -PH     O2 Delivery Intra Treatment room air  -PH     O2 Delivery Post Treatment room air  -PH       Row Name 02/17/25 0937          Positioning and Restraints    Pre-Treatment Position sitting in chair/recliner  -PH     Post Treatment Position bed  -PH     In Bed notified nsg;fowlers;call light within reach;encouraged to call for assist;exit alarm on  -PH               User Key  (r) = Recorded By, (t) = Taken By, (c) = Cosigned By      Initials Name Provider Type     Claire Michael PTA Physical Therapist Assistant                   Outcome Measures       Row Name 02/17/25 0939          How much help from another person do you currently need...    Turning from your back to your side while in flat bed without using bedrails? 4  -PH     Moving from lying on back to sitting on the side of a flat bed without bedrails? 4  -PH     Moving to and from a bed to a chair (including a wheelchair)? 3  -PH     Standing up from a chair using your arms (e.g., wheelchair, bedside chair)? 3  -PH     Climbing 3-5 steps with a railing? 3  -PH     To walk in hospital room? 3  -PH     AM-PAC 6 Clicks Score (PT) 20  -PH     Highest Level of Mobility Goal 6 --> Walk 10 steps or more  -PH       Row Name 02/17/25 0939          Functional Assessment    Outcome Measure Options AM-PAC 6 Clicks Basic Mobility (PT)  -PH               User Key  (r) = Recorded By, (t) = Taken By, (c) = Cosigned By      Initials Name Provider Type     Claire Michael PTA Physical Therapist Assistant                                 Physical Therapy Education       Title: PT OT SLP Therapies (Done)       Topic: Physical Therapy (Done)       Point: Mobility training (Done)       Learning Progress Summary            Patient Acceptance, E,TB,D, RASHIDA,DU,NR by  at 2/17/2025 0940    Acceptance, E, RASHIDA,NR by  at 2/15/2025 0921    Acceptance,  E,TB,D, VU by PH at 2/14/2025 1102                      Point: Home exercise program (Done)       Learning Progress Summary            Patient Acceptance, E,TB,D, VU,DU,NR by PH at 2/17/2025 0940    Acceptance, E,TB,D, VU by PH at 2/14/2025 1102                      Point: Body mechanics (Done)       Learning Progress Summary            Patient Acceptance, E,TB,D, VU,DU,NR by PH at 2/17/2025 0940    Acceptance, E,TB,D, VU by PH at 2/14/2025 1102                      Point: Precautions (Done)       Learning Progress Summary            Patient Acceptance, E,TB,D, VU,DU,NR by PH at 2/17/2025 0940    Acceptance, E,TB,D, VU by  at 2/14/2025 1102                                      User Key       Initials Effective Dates Name Provider Type Discipline    EF 05/31/24 -  Antonina Atwood, PT Physical Therapist PT     06/16/21 -  Claire Michael PTA Physical Therapist Assistant PT                  PT Recommendation and Plan     Progress: improving  Outcome Evaluation: Pt was seen for PT tx this Am. Pt was UIC and stood w/ SBA. Pt requested use of fww 2' pain in L hip when wt bearing. Pt stated he has L DHEERAJ in 2024. Pt amb around unit 2x w/ SBA and use of fww. Pt was steady w/ no overt LOB. Pt performed standing ther ex for hip then cardiac program x 15 reps when seated. Pt returned to bed w/ SBA.     Time Calculation:         PT Charges       Row Name 02/17/25 0940             Time Calculation    Start Time 0905  -PH      Stop Time 0923  -PH      Time Calculation (min) 18 min  -PH      PT Received On 02/17/25  -PH      PT - Next Appointment 02/18/25  -PH         Timed Charges    31249 - PT Therapeutic Exercise Minutes 6  -PH      57782 - PT Therapeutic Activity Minutes 12  -PH         Total Minutes    Timed Charges Total Minutes 18  -PH       Total Minutes 18  -PH                User Key  (r) = Recorded By, (t) = Taken By, (c) = Cosigned By      Initials Name Provider Type    PH Claire Michael PTA  Physical Therapist Assistant                  Therapy Charges for Today       Code Description Service Date Service Provider Modifiers Qty    [id number]  PT THERAPEUTIC ACT EA 15 MIN 2/17/2025 Claire Michael, LARISA GP 1            PT G-Codes  Outcome Measure Options: AM-PAC 6 Clicks Basic Mobility (PT)  AM-PAC 6 Clicks Score (PT): 20  PT Discharge Summary  Anticipated Discharge Disposition (PT): home with home health, home with assist    Claire Michael PTA  2/17/2025

## 2025-02-17 NOTE — PROGRESS NOTES
Patient Name: Scott Murphy  :1945  79 y.o.      Patient Care Team:  Mariah Delgado MD as PCP - General (Family Medicine)  Consuelo Adorno MD as Consulting Physician (Hematology and Oncology)  John Damon MD as Consulting Physician (Hematology and Oncology)  Mariah Delgado MD as Referring Physician (Family Medicine)    Chief Complaint:   S/p CABG, MVR    Interval History:   Doing well.  Eager to go home.    Objective   Vital Signs  Temp:  [97.9 °F (36.6 °C)-98.5 °F (36.9 °C)] 98.3 °F (36.8 °C)  Heart Rate:  [62-78] 77  Resp:  [16] 16  BP: ()/(62-90) 101/90    Intake/Output Summary (Last 24 hours) at 2025 1019  Last data filed at 2025 0636  Gross per 24 hour   Intake 360 ml   Output 950 ml   Net -590 ml     Flowsheet Rows      Flowsheet Row First Filed Value   Admission Height --   Admission Weight 74 kg (163 lb 2.3 oz) Documented at 2025 0650            GEN: no distress, alert and oriented  HEENT: NACT, EOMI, moist mucous membranes  Lungs: CTAB, no wheezes, rales or rhonchi, nasal cannula in place  CV: normal rate, regular rhythm, normal S1, S2, no murmurs, +2 radial pulses b/l, no carotid bruit  Abdomen: soft, nontender, nondistended, NABS  Extremities: no edema  Skin: no rash, warm, dry  Heme/Lymph: no bruising  Psych: organized thought, normal behavior and affect    Results Review:    Results from last 7 days   Lab Units 25  0404   SODIUM mmol/L 136   POTASSIUM mmol/L 4.3   CHLORIDE mmol/L 99   CO2 mmol/L 26.1   BUN mg/dL 19   CREATININE mg/dL 1.16   GLUCOSE mg/dL 94   CALCIUM mg/dL 8.5*         Results from last 7 days   Lab Units 25  0325   WBC 10*3/mm3 11.44*   HEMOGLOBIN g/dL 10.2*   HEMATOCRIT % 30.3*   PLATELETS 10*3/mm3 110*     Results from last 7 days   Lab Units 25  0215 02/10/25  1144 02/10/25  1056   INR  1.36* 1.51* 1.72*   APTT seconds  --  32.3 30.4     Results from last 7 days   Lab Units 25  0215   MAGNESIUM mg/dL 2.5*                    Medication Review:   amiodarone, 300 mg, Oral, Q12H  aspirin, 81 mg, Oral, Daily  atenolol, 25 mg, Oral, Q12H  atorvastatin, 40 mg, Oral, Nightly  chlorhexidine, 15 mL, Mouth/Throat, Q12H  colchicine, 0.6 mg, Oral, Daily  docusate sodium, 100 mg, Oral, BID  enoxaparin, 40 mg, Subcutaneous, Daily  furosemide, 40 mg, Oral, Daily  gabapentin, 100 mg, Oral, Q12H  guaiFENesin, 1,200 mg, Oral, Q12H  levothyroxine, 100 mcg, Oral, Daily  montelukast, 10 mg, Oral, Nightly  pantoprazole, 40 mg, Oral, QAM  polyethylene glycol, 17 g, Oral, Daily  potassium chloride, 20 mEq, Oral, Daily  senna-docusate sodium, 2 tablet, Oral, Nightly              Assessment & Plan   #Severe MR s/p repair  #Multivessel CAD s/p CABG  #post op AF  #HTN  #hypothyroidism  #Raynaud phenomenon  #RCC s/p partial nephrectomy     79-year-old man, followed by Dr. Roberts, with polymyalgia rheumatica, polycythemia vera, renal cell carcinoma status post partial nephrectomy, hypertension, hypothyroidism, Raynaud's disease, thoracic aortic aneurysm, recently diagnosed with severe mitral regurgitation secondary to mitral valve prolapse as well as multivessel CAD who presented for and underwent elective CABG x 3 (LIMA to mid LAD, SVG to PDA, SVG to OM1) mitral valve repair with a 34 mm [employer]tronic flexible band posterior annuloplasty and chordal repair of P2-P3/cleftoplasty of P2/P3, left atrial appendage closure on 2/10/2025 with Dr. Alcantara.    Started on amiodarone due to bursts of AF.    - start DOAC  - Continue atenolol 25 mg BID, Lasix 40 mg daily, aspirin 81 mg daily, atorvastatin 40 mg daily    Stable for d/c from cardiac standpoint.    Thee Harper MD, FACC, [facility]  02/17/25  10:19 EST

## 2025-02-17 NOTE — PLAN OF CARE
Goal Outcome Evaluation:  Plan of Care Reviewed With: patient        Progress: improving  Outcome Evaluation: Pt A+Ox4, VSS, no acute events overnight, slightly unbalanced walking stby/CGA to chair this AM. Pt hopefully getting d/c today. Will continue with current POC

## 2025-02-17 NOTE — CASE MANAGEMENT/SOCIAL WORK
Case Management Discharge Note      Final Note: Pt discharged home with [facility]  and [facility] approved nocturnal oxygen…....Radha LUNA/PAVEL CM    Provided Post Acute Provider Quality & Resource List?: Yes  Post Acute Provider Quality and Resource List: Home Health  Delivered To: Patient  Method of Delivery: In person    Selected Continued Care - Discharged on 2/17/2025 Admission date: 2/10/2025 - Discharge disposition: Home or Self Care      Destination    No services have been selected for the patient.                Durable Medical Equipment Coordination complete.      Service Provider Services Address Phone Fax Patient Preferred    [facility] [address], [place] [address] [phone] --       Internal Comment last updated by Radha Duval, RN 2/14/2025 1329    Failed his overnight oximetry                           Dialysis/Infusion    No services have been selected for the patient.                Home Medical Care Coordination complete.      Service Provider Services Address Phone Fax Patient Preferred     [facility] [address] [place] [address] [address] [phone] --              Therapy    No services have been selected for the patient.                Community Resources    No services have been selected for the patient.                Community & DME    No services have been selected for the patient.                         Final Discharge Disposition Code: 06 - home with home health care

## 2025-02-17 NOTE — CASE MANAGEMENT/SOCIAL WORK
Continued Stay Note  [facility]     Patient Name: Scott Murphy  MRN: [id number]  Today's Date: 2/17/2025    Admit Date: 2/10/2025    Plan: Home w/ [facility] and [facility] will provide nocturnal oxygen.   Discharge Plan       Row Name 02/17/25 1551       Plan    Plan Home w/ [facility] and [facility] will provide nocturnal oxygen.    Plan Comments Richard/Elle reports the nocturnal o2 has been approved.  Vivienne/[facility] is aware Pt has d/c orders home today..................SRS/RNCM                   Discharge Codes    No documentation.                 Expected Discharge Date and Time       Expected Discharge Date Expected Discharge Time    Feb 17, 2025               Radha Duval RN

## 2025-02-17 NOTE — PLAN OF CARE
Goal Outcome Evaluation:  Plan of Care Reviewed With: patient        Progress: improving  Outcome Evaluation: Pt was seen for PT tx this Am. Pt was UIC and stood w/ SBA. Pt requested use of fww 2' pain in L hip when wt bearing. Pt stated he has L DHEERAJ in 2024. Pt amb around unit 2x w/ SBA and use of fww. Pt was steady w/ no overt LOB. Pt performed standing ther ex for hip then cardiac program x 15 reps when seated. Pt returned to bed w/ SBA.    Anticipated Discharge Disposition (PT): home with home health, home with assist

## 2025-02-18 ENCOUNTER — HOME CARE VISIT (OUTPATIENT)
Dept: HOME HEALTH SERVICES | Facility: HOME HEALTHCARE | Age: 80
End: 2025-02-18
Payer: MEDICARE

## 2025-02-18 VITALS
BODY MASS INDEX: 23.16 KG/M2 | DIASTOLIC BLOOD PRESSURE: 62 MMHG | WEIGHT: 152.8 LBS | HEIGHT: 68 IN | HEART RATE: 54 BPM | TEMPERATURE: 97.7 F | OXYGEN SATURATION: 90 % | SYSTOLIC BLOOD PRESSURE: 98 MMHG | RESPIRATION RATE: 18 BRPM

## 2025-02-18 PROCEDURE — G0299 HHS/HOSPICE OF RN EA 15 MIN: HCPCS

## 2025-02-18 NOTE — OUTREACH NOTE
Prep Survey      Flowsheet Row Responses   [facility] facility patient discharged from? [facility]   Is LACE score < 7 ? No   Eligibility Readm Mgmt   Discharge diagnosis Mitral valve insufficiency, CABG x 3 using right leg vein harvest, mitral valve repair, left atrial appendage exclusion   Does the patient have one of the following disease processes/diagnoses(primary or secondary)? Cardiothoracic surgery   Does the patient have Home health ordered? Yes   What is the Home health agency?  [facility]   Is there a DME ordered? Yes   What DME was ordered? O2 2L NC during sleep from [place]   Prep survey completed? Yes            Kimmie LUNA - Registered Nurse

## 2025-02-19 ENCOUNTER — NURSE TRIAGE (OUTPATIENT)
Dept: CALL CENTER | Facility: HOSPITAL | Age: 80
End: 2025-02-19
Payer: MEDICARE

## 2025-02-19 NOTE — TELEPHONE ENCOUNTER
Swollen and bruised on leg where vein was stripped during surgery.  Would like to use a cold compress on area.      Encouraged cold compress for 15-20 minutes every 3 hours to help with soreness. Patient states understanding.       Reason for Disposition   [1] Caller has NON-URGENT question AND [2] triager unable to answer question    Additional Information   Negative: [1] Major abdominal surgical incision AND [2] wound gaping open AND [3] visible internal organs   Negative: Sounds like a life-threatening emergency to the triager   Negative: [1] Bleeding from incision AND [2] won't stop after 10 minutes of direct pressure   Negative: [1] Widespread rash AND [2] bright red, sunburn-like   Negative: Severe pain in the incision   Negative: [1] Incision gaping open AND [2] < 48 hours since wound re-opened   Negative: [1] Incision gaping open AND [2] length of opening > 2 inches (5 cm)   Negative: Patient sounds very sick or weak to the triager   Negative: Sounds like a serious complication to the triager   Negative: Fever > 100.4 F (38.0 C)   Negative: [1] Incision looks infected (spreading redness, pain) AND [2] fever > 99.5 F (37.5 C)   Negative: [1] Incision looks infected (spreading redness, pain) AND [2] large red area (> 2 in. or 5 cm)   Negative: [1] Incision looks infected (spreading redness, pain) AND [2] face wound   Negative: [1] Red streak runs from the incision AND [2] longer than 1 inch (2.5 cm)   Negative: [1] Pus or bad-smelling fluid draining from incision AND [2] no fever   Negative: [1] Post-op pain AND [2] not controlled with pain medications   Negative: Dressing soaked with blood or body fluid (e.g., drainage)   Negative: [1] Raised bruise and [2] size > 2 inches (5 cm) and expanding   Negative: [1] Caller has URGENT question AND [2] triager unable to answer question   Negative: [1] INCREASING pain in incision AND [2] > 2 days (48 hours) since surgery   Negative: [1] Small red area or streak AND [2]  "no fever   Negative: [1] Clear or blood-tinged fluid draining from wound AND [2] no fever   Negative: [1] Incision gaping open AND [2] > 48 hours since wound re-opened AND [3] length of opening > 1/2 inch (12 mm)   Negative: [1] Incision on face gaping open after skin glue AND [2] > 48 hours since wound re-opened 3] length of opening > 1/4 inch (6 mm)   Negative: Suture or staple removal is overdue   Negative: [1] Suture or staple came out early AND [2] caller wants wound checked    Answer Assessment - Initial Assessment Questions  1. SYMPTOM: \"What's the main symptom you're concerned about?\" (e.g., redness, pain, drainage)      soreness  2. ONSET: \"When did soreness  start?\"      Right after surgery  3. SURGERY: \"What surgery was performed?\"      STERNOTOMY,CORONARY ARTERY BYPASS  X 3,WITH INTERNAL MAMMARY ARTERY GRAFT AND ENDOSCOPICLY HARVESTED RIGHT SAPHENOUS VEIN MITRAL VALVE REPAIR, LEFT ATRIAL APPENDAGE EXCLUSION, PRP  TRANSESOPHAGEAL ECHOCARDIOGRAM WITH ANESTHESIA   4. DATE of SURGERY: \"When was surgery performed?\"       2/10/2025  5. INCISION SITE: \"Where is the incision located?\"       leg  6. REDNESS: \"Is there any redness at the incision site?\" If yes, ask: \"How wide across is the redness?\" (Inches, centimeters)       no  7. PAIN: \"Is there any pain?\" If so, ask: \"How bad is it?\"  (Scale 1-10; or mild, moderate, severe)      Soreness but not really painful  8. BLEEDING: \"Is there any bleeding?\" If so, ask: \"How much?\" and \"Where?\"      no  9. DRAINAGE: \"Is there any drainage from the incision site?\" If yes, ask: \"What color and how much?\" (e.g., red, cloudy, pus; drops, teaspoon)      no  10. FEVER: \"Do you have a fever?\" If so, ask: \"What is your temperature, how was it measured, and when did it start?\"        no  11. OTHER SYMPTOMS: \"Do you have any other symptoms?\" (e.g., shaking chills, weakness, rash elsewhere on body)        Hard to walk due to soreness    Protocols used: Post-Op Incision " Symptoms-ADULT-AH

## 2025-02-20 ENCOUNTER — HOME CARE VISIT (OUTPATIENT)
Dept: HOME HEALTH SERVICES | Facility: HOME HEALTHCARE | Age: 80
End: 2025-02-20
Payer: MEDICARE

## 2025-02-20 VITALS
SYSTOLIC BLOOD PRESSURE: 100 MMHG | OXYGEN SATURATION: 93 % | DIASTOLIC BLOOD PRESSURE: 60 MMHG | TEMPERATURE: 97.8 F | HEART RATE: 57 BPM

## 2025-02-20 PROCEDURE — G0151 HHCP-SERV OF PT,EA 15 MIN: HCPCS

## 2025-02-20 NOTE — HOME HEALTH
"Eval Note:  Patient is 78 yo he is s/p CABG x 3, MITRAL VALVE REPAIR with Dr. Alcantara on 2/10/25. Patient with post op leukocytosis, anemia, and intermittent afib. Patient with history of RENAL CELL CA, s/p partial nephrectomy, HTN, RAYNAUDS, thyroid cancer, and recent right hip replacement 9/24. Patient is new to O2 only using at night. Patient lives with his spouse in large home with few steps leading into home. Currently patient using fww and reports some pain to right hip.  Patients sternal and right leg incisions clean, dry and intact, no ss of infection noted. Patient has return appointment on 3/13 with cardiac surgeon. Patient is planning to go to cardiac rehab.     Patient's goal(s): \" I would like to get moving without as much pain.\"     PLOF: Patient was walking independent without device, driving, walking the dog. He was using the stationary bike for 30 mins at a time.     Services required to achieve goals: skilled physical therapy and nursing.     Potential Issues for goal attainment: pain in right lower extremity from vein grafts,    Problems identified: Patient has decreased ambulation endurance only able to ambulate for 3 mins and 17 seconds with rolling walker. He has decreased strength bilateral LE right greater than left and decreased knee extension right LE as well as edema throughout LE.     Describe the Functional status and safety: Patient was able to transfer supine <>sit independently. Sit to stand with SBA. Patient able to ambulate with rolling walker to access rooms in home with SBA for safety and verbal cues for posture.     Describe any environmental issues: savitatent lives with spouse in a ranch home with 3 steps to enter in garage with rail.     Any equipment needs: None. Patient currently has rolling walker, walk in shower, hand held shower and reacher.     POC confirmed with Patient and spouse, MD for physical therapy to continue 1w1, 2w1, 1w2."

## 2025-02-20 NOTE — Clinical Note
Physical therapy evaluation complete and will continue 1w1 2w1 1w2 for progression in cardiac walking program, instruction in warm up and cool down exercises, conditions for exercise, ss of over exertion, gait training for progression of ambulation with rolling walker to cane or no assistive device. Home safety.

## 2025-02-21 ENCOUNTER — TELEPHONE (OUTPATIENT)
Age: 80
End: 2025-02-21

## 2025-02-21 ENCOUNTER — HOME CARE VISIT (OUTPATIENT)
Dept: HOME HEALTH SERVICES | Facility: HOME HEALTHCARE | Age: 80
End: 2025-02-21
Payer: MEDICARE

## 2025-02-21 PROCEDURE — G0299 HHS/HOSPICE OF RN EA 15 MIN: HCPCS

## 2025-02-21 NOTE — TELEPHONE ENCOUNTER
"Caller: Scott Murphy \"MEGAN\"    Relationship to patient: Self    Best call back number: [phone]     Additional notes: PT CALLING TO SCHEDULE A HFU WITH MD - HE STATES THE NURSES IN DISCHARGE ADVISED HIM TO FU IN A WEEK - HE IS ASKING TO SCHEDULE AFTER WEDNESDAY - NO TIMEFRAME SHOWING IN NOTE - PLEASE ADVISE -     "

## 2025-02-21 NOTE — TELEPHONE ENCOUNTER
Spoke with . He states swelling is going down and brusing improving. Advised if conditions do not continue to improve or if they worsen to notify office.  verbalized understanding and this was agreeable.

## 2025-02-22 VITALS
OXYGEN SATURATION: 95 % | WEIGHT: 153 LBS | SYSTOLIC BLOOD PRESSURE: 116 MMHG | RESPIRATION RATE: 18 BRPM | HEART RATE: 55 BPM | TEMPERATURE: 97.3 F | BODY MASS INDEX: 23.26 KG/M2 | DIASTOLIC BLOOD PRESSURE: 64 MMHG

## 2025-02-24 NOTE — TELEPHONE ENCOUNTER
"     Caller: Scott Murphy \"MEGAN\"    Relationship to patient: Self    Best call back number: [phone]    Patient is needing: PATIENT SCHEDULED 2.28.25 IS THIS OK TO KEEP?         "

## 2025-02-25 ENCOUNTER — HOME CARE VISIT (OUTPATIENT)
Dept: HOME HEALTH SERVICES | Facility: HOME HEALTHCARE | Age: 80
End: 2025-02-25
Payer: MEDICARE

## 2025-02-25 ENCOUNTER — READMISSION MANAGEMENT (OUTPATIENT)
Dept: CALL CENTER | Facility: HOSPITAL | Age: 80
End: 2025-02-25
Payer: MEDICARE

## 2025-02-25 ENCOUNTER — TELEPHONE (OUTPATIENT)
Age: 80
End: 2025-02-25
Payer: MEDICARE

## 2025-02-25 VITALS
TEMPERATURE: 97.7 F | RESPIRATION RATE: 18 BRPM | SYSTOLIC BLOOD PRESSURE: 115 MMHG | DIASTOLIC BLOOD PRESSURE: 72 MMHG | HEART RATE: 53 BPM | OXYGEN SATURATION: 96 %

## 2025-02-25 VITALS
WEIGHT: 145 LBS | DIASTOLIC BLOOD PRESSURE: 61 MMHG | OXYGEN SATURATION: 96 % | RESPIRATION RATE: 18 BRPM | HEART RATE: 56 BPM | TEMPERATURE: 97.6 F | BODY MASS INDEX: 22.05 KG/M2 | SYSTOLIC BLOOD PRESSURE: 96 MMHG

## 2025-02-25 PROCEDURE — G0151 HHCP-SERV OF PT,EA 15 MIN: HCPCS

## 2025-02-25 PROCEDURE — G0299 HHS/HOSPICE OF RN EA 15 MIN: HCPCS

## 2025-02-25 NOTE — OUTREACH NOTE
CT Surgery Week 1 Survey      Flowsheet Row Responses   [facility] patient discharged from? [place]   Does the patient have one of the following disease processes/diagnoses(primary or secondary)? Cardiothoracic surgery   Week 1 attempt successful? Yes   Call start time 0808   Call end time 0811   Discharge diagnosis Mitral valve insufficiency, CABG x 3 using right leg vein harvest, mitral valve repair, left atrial appendage exclusion   Person spoke with today (if not patient) and relationship Wife   Meds reviewed with patient/caregiver? Yes   Is the patient having any side effects they believe may be caused by any medication additions or changes? No   Does the patient have all medications related to this admission filled (includes all antibiotics, pain medications, cardiac medications, etc.) Yes   Is the patient taking all medications as directed (includes completed medication regime)? Yes   Does the patient have a primary care provider?  Yes   Does the patient have an appointment scheduled with their C/T surgeon? Yes   Has the patient kept scheduled appointments due by today? N/A   What is the Home health agency?  Located within [facility]   Has home health visited the patient within 72 hours of discharge? Yes   Psychosocial issues? No   Did the patient receive a copy of their discharge instructions? Yes   Nursing interventions Reviewed instructions with patient   What is the patient's perception of their health status since discharge? Improving  [Inner is tight and painful]   Is the patient/caregiver able to teach back normal signs of recovery? Nausea and lack of appetite, Depression or irritability, Constipation, Pain or discomfort at incisional site   Is the patient/caregiver able to teach back signs and symptoms of incisional infection? Increased redness, swelling or pain at the incisonal site, Increased drainage or bleeding, Incisional warmth, Pus or odor from incision, Fever   Is the patient/caregiver able to teach back  steps to recovery at home? Set small, achievable goals for return to baseline health, Rest and rebuild strength, gradually increase activity, Eat a well-balance diet   If the patient is a current smoker, are they able to teach back resources for cessation? Not a smoker   Is the patient/caregiver able to teach back the hierarchy of who to call/visit for symptoms/problems? PCP, Specialist, Home health nurse, Urgent Care, ED, 911 Yes   Week 1 call completed? Yes   Wrap up additional comments Pt doing well except leg pain in incision site. Sees Surgeon this week.   Call end time 0811              Genevieve LUNA - Registered Nurse

## 2025-02-25 NOTE — TELEPHONE ENCOUNTER
Received Vm from patient home health nurse Tiffanie Benton , RN patient had a visit today calling to update you with pt HR and BP , says patient has been in the low 90's/ 50's , the highest his HR has been is 57 , 53 the lowest , wants to know if you think his atenolol 25 mg need to be reduced ? And says he's currently taking Lasix 40 mg until March 4th .    Please advise tiffanie can be reached back at [phone].

## 2025-02-25 NOTE — TELEPHONE ENCOUNTER
I would recommend he cut his atenolol to 25 mg once a day.  Will see how he does with that at his appointment with me later this week.

## 2025-02-27 ENCOUNTER — HOME CARE VISIT (OUTPATIENT)
Dept: HOME HEALTH SERVICES | Facility: HOME HEALTHCARE | Age: 80
End: 2025-02-27
Payer: MEDICARE

## 2025-02-27 VITALS
SYSTOLIC BLOOD PRESSURE: 110 MMHG | DIASTOLIC BLOOD PRESSURE: 79 MMHG | HEART RATE: 66 BPM | RESPIRATION RATE: 18 BRPM | OXYGEN SATURATION: 95 % | TEMPERATURE: 95.7 F

## 2025-02-27 PROCEDURE — G0151 HHCP-SERV OF PT,EA 15 MIN: HCPCS

## 2025-02-28 ENCOUNTER — OFFICE VISIT (OUTPATIENT)
Age: 80
End: 2025-02-28
Payer: MEDICARE

## 2025-02-28 ENCOUNTER — HOME CARE VISIT (OUTPATIENT)
Dept: HOME HEALTH SERVICES | Facility: HOME HEALTHCARE | Age: 80
End: 2025-02-28
Payer: MEDICARE

## 2025-02-28 VITALS
SYSTOLIC BLOOD PRESSURE: 118 MMHG | WEIGHT: 148 LBS | OXYGEN SATURATION: 96 % | DIASTOLIC BLOOD PRESSURE: 80 MMHG | HEART RATE: 58 BPM | HEIGHT: 68 IN | BODY MASS INDEX: 22.43 KG/M2

## 2025-02-28 DIAGNOSIS — I34.0 MITRAL VALVE INSUFFICIENCY, UNSPECIFIED ETIOLOGY: ICD-10-CM

## 2025-02-28 DIAGNOSIS — E03.9 ACQUIRED HYPOTHYROIDISM: ICD-10-CM

## 2025-02-28 DIAGNOSIS — I73.00 RAYNAUD'S DISEASE WITHOUT GANGRENE: ICD-10-CM

## 2025-02-28 DIAGNOSIS — I25.10 CORONARY ARTERY DISEASE INVOLVING NATIVE CORONARY ARTERY OF NATIVE HEART WITHOUT ANGINA PECTORIS: ICD-10-CM

## 2025-02-28 DIAGNOSIS — Z95.1 S/P CABG (CORONARY ARTERY BYPASS GRAFT): ICD-10-CM

## 2025-02-28 DIAGNOSIS — I71.20 THORACIC AORTIC ANEURYSM WITHOUT RUPTURE, UNSPECIFIED PART: ICD-10-CM

## 2025-02-28 DIAGNOSIS — I10 PRIMARY HYPERTENSION: ICD-10-CM

## 2025-02-28 DIAGNOSIS — Z98.890 STATUS POST MITRAL VALVE REPAIR: Primary | ICD-10-CM

## 2025-02-28 PROBLEM — I35.0 AORTIC STENOSIS: Status: RESOLVED | Noted: 2025-02-10 | Resolved: 2025-02-28

## 2025-02-28 RX ORDER — METHYLPREDNISOLONE 4 MG/1
TABLET ORAL
Qty: 21 TABLET | Refills: 0 | Status: SHIPPED | OUTPATIENT
Start: 2025-02-28

## 2025-02-28 RX ORDER — ATENOLOL 25 MG/1
25 TABLET ORAL DAILY
Start: 2025-02-28 | End: 2025-05-29

## 2025-02-28 NOTE — PROGRESS NOTES
Subjective:     Encounter Date:02/28/2025      Patient ID: Scott Murphy is a 79 y.o. male.    Chief Complaint:  History of Present Illness    This is a 79-year-old with polycythemia vera, polymyalgia rheumatica, history of renal cell carcinoma status post partial nephrectomy, hypertension, hypothyroidism, Raynaud's disease, asthma, thoracic aortic aneurysm, mitral valve regurgitation, who presents for follow up.        I the patient last in the office in 12/2023.  Overall he was stable from a cardiac standpoint.  He was still following up annually with the aorta clinic.  He return for annual follow-up in 12/2024 and was seen by ALETHA Lerma.  He had undergone hip surgery without any significant issues.  He was noted to have a murmur on exam at that office visit so an echocardiogram was ordered and performed on 12/26/2024.  This showed normal left ventricular function and wall motion with an EF of 56% and evidence of posterior mitral valve prolapse with severe mitral valve regurgitation, normal right ventricular systolic pressure, mild tricuspid valve regurgitation, and stable dilation of the aortic root and ascending aorta.  A transesophageal echocardiogram and cardiac catheterization were recommended.      Transesophageal echocardiogram performed on 1/8/2025.  This confirmed the findings of posterior flail leaflet and prolapse due to torn chordae resulting in severe eccentric mitral valve regurgitation.  There were no other new findings noted.  Right and left cardiac catheterization showed severe stenosis of the proximal LAD, proximal obtuse marginal branch, and distal right coronary artery but otherwise normal pulmonary pressures.  He was referred to Dr. Alcantara for further evaluation who recommended proceeding with surgery.  He underwent CABG x 3 with a LIMA to LAD, saphenous vein graft to the posterior descending branch, and saphenous vein graft to the first obtuse marginal branch, mitral valve  repair, and epicardial closure of the left atrial appendage on 2/10/2025.  He recovered fairly well from a cardiac standpoint although he did have bursts of atrial fibrillation was started on amiodarone.  He underwent overnight oximetry which qualified him for oxygen at home at night.  Patient was discharged eventually on 2/17/2025.    Patient called earlier this week reporting low blood pressures.  I recommended he decrease his atenolol to once a day.    Presents today for follow-up.  Denies any significant shortness of breath or discomfort related to his incision.  He reports significant discomfort and swelling at the site of his saphenous vein graft harvest and his right medial thigh.  He reports some improvement in this but continues to be limiting to the point that he has been using a cane or a walker to ambulate because of the discomfort.    He denies any change in his heart rates with a decrease in the atenolol.  Blood pressures have been running on the low end of normal but he denies any lightheadedness with this.  He denies any palpitations, orthopnea, near-syncope or syncope.     Prior History:  I saw the patient initially in 1/2018 when he presented for evaluation of an abnormal EKG.  Prior to that office visit the patient underwent his yearly Medicare physical with Dr. Delgado at which time he had a routine EKG performed that showed a right bundle branch block that was felt to be new.  Based on this finding he was sent here for further evaluation.  Ten years prior he was evaluated by Dr. Jarrett complaints of chest pain and underwent an echocardiogram, stress test, and a cardiac catheterization that were all reportedly unremarkable (records are not available from that workup).  At his initial office visit with me he denied any symptoms and was active playing tennis about 2 or 3 times a week.    At that office visit I noted that he had a similar appearing right bundle branch block on EKG from 1/2014.   Recommended proceeding with an echocardiogram to look for any structural heart disease with his right bundle branch block.  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  I was out of the office at the time that the echocardiogram was performed and resulted and these results were communicated to the patient at that time however it does not appear that he was told about the aortic dilatation.     More recently the patient has been having issues with shortness of breath and cough.  He ended up undergoing a chest x-ray was ordered by Dr. Delgado that showed no acute pulmonary disease but did show evidence of aortic arterial sclerosis.  He saw Dr. Damon in routine follow-up and mention these findings to him and with his recent dyspnea symptoms and these findings he ordered a CT of the chest.  This was performed on 8/23/2019 and it showed mild aneurysmal dilatation of the ascending aorta measuring 4.2 x 4 cm compared to a measurement of 4 x 3.9 cm in 2/2018.       He was seen by ALETHA Hu in 3/2020.  He presented with complaints of atypical sounding chest pain.  Blood pressures were noted to be elevated during that office visit.  His amlodipine was resumed which is tolerated well.     An echocardiogram in 2020 showed mild to moderate dilatation of his aortic root and ascending aorta measuring about 4.3 cm.  Remainder of his echocardiogram showed normal left ventricular systolic function wall motion with an EF of 65% grade 1 diastolic dysfunction and no significant valvular disease.     Repeat in 9/2021 showed stable appearing size of his aortic root and ascending aorta measuring about 4.2 cm.  Remainder  of his echocardiogram is unremarkable and stable.     Patient had some issues with left-sided sharp chest pain.  Dr. Damon recommended proceeding with a CT angiogram of the chest which was performed on 2/10/2021.  This showed no evidence of pulmonary embolism and no significant change in the approximately 4.4 cm dilatation of the aortic root or 4 cm dilatation of the ascending aorta.       He was seen in urgent follow up by ALETHA Lerma in 4/2023.  Prior to that office visit he developed swelling so his amlodipine 10 mg was discontinued and he was advised to increase his losartan to 50 mg.  However he developed issues with low blood pressures and he decreased the losartan to 25 mg.  He then developed some issues with mildly elevated blood pressures and as result was taking an additional dosage of losartan as needed.  At that office visit is recommended that he resume the amlodipine at 5 mg and continue losartan at 25 mg.  The patient also reported an isolated episode of bradycardia noted by his watch in the middle the night.  It was recommended just monitor for further episodes at the time.  Eventually increase the amlodipine to 7.5 mg.    Review of Systems   Constitutional: Positive for malaise/fatigue.   HENT:  Negative for hearing loss, hoarse voice, nosebleeds and sore throat.    Eyes:  Negative for pain.   Cardiovascular:  Positive for leg swelling. Negative for chest pain, claudication, cyanosis, dyspnea on exertion, irregular heartbeat, near-syncope, orthopnea, palpitations, paroxysmal nocturnal dyspnea and syncope.   Respiratory:  Negative for shortness of breath and snoring.    Endocrine: Negative for cold intolerance, heat intolerance, polydipsia, polyphagia and polyuria.   Skin:  Negative for itching and rash.   Musculoskeletal:  Negative for arthritis, falls, joint pain, joint swelling, muscle cramps, muscle weakness and myalgias.   Gastrointestinal:  Negative for constipation, diarrhea, dysphagia,  heartburn, hematemesis, hematochezia, melena, nausea and vomiting.   Genitourinary:  Negative for frequency, hematuria and hesitancy.   Neurological:  Negative for excessive daytime sleepiness, dizziness, headaches, light-headedness, numbness and weakness.   Psychiatric/Behavioral:  Negative for depression. The patient is not nervous/anxious.          Current Outpatient Medications:     ALPRAZolam (XANAX) 0.5 MG tablet, Take 0.5 tablets by mouth At Night As Needed. Indications: Feeling Anxious, Disp: , Rfl:     amiodarone (PACERONE) 100 MG tablet, Take 2 tablets by mouth Every 12 (Twelve) Hours for 7 days, THEN 2 tablets Daily for 14 days., Disp: 56 tablet, Rfl: 0    aspirin 81 MG EC tablet, Take 1 tablet by mouth Daily for 90 days., Disp: 90 tablet, Rfl: 0    atenolol (TENORMIN) 25 MG tablet, Take 1 tablet by mouth Every 12 (Twelve) Hours for 90 days. (Patient taking differently: Take 1 tablet by mouth Daily. notified patient of change in medication per Dr. Roberts on 2/25/25  Indications: High Blood Pressure), Disp: 180 tablet, Rfl: 0    atorvastatin (LIPITOR) 40 MG tablet, Take 1 tablet by mouth Every Night for 90 days., Disp: 90 tablet, Rfl: 0    B Complex Vitamins (VITAMIN B-COMPLEX PO), Take 1 tablet by mouth Daily. HOLD PRIOR TO SURG  Indications: supplement, Disp: , Rfl:     Calcium Carbonate-Vitamin D (calcium 500 mg vitamin D 5 mcg, 200 UT,) 500-5 MG-MCG tablet per tablet, Take 2 tablets by mouth Daily for 30 days., Disp: 60 tablet, Rfl: 0    furosemide (LASIX) 40 MG tablet, Take 1 tablet by mouth Daily for 14 days., Disp: 14 tablet, Rfl: 0    levocetirizine (XYZAL) 5 MG tablet, Take 1 tablet by mouth Every Evening. Indications: Perennial Allergic Rhinitis, Disp: , Rfl:     levothyroxine (SYNTHROID, LEVOTHROID) 100 MCG tablet, Take 1 tablet by mouth daily., Disp: 30 tablet, Rfl: 6    montelukast (SINGULAIR) 10 MG tablet, Take 1 tablet by mouth Every Night. Indications: Asthma, Disp: , Rfl:     potassium  chloride (KLOR-CON M20) 20 MEQ CR tablet, Take 1 tablet by mouth Daily for 14 days., Disp: 14 tablet, Rfl: 0    zolpidem (AMBIEN) 10 MG tablet, Take 1 tablet by mouth At Night As Needed. Indications: Trouble Sleeping, Disp: , Rfl:     Past Medical History:   Diagnosis Date    Acquired hypothyroidism     Anxiety     Aortic aneurysm     Aortic valve regurgitation     Arthritis     Asthma     Hutton's cyst of knee, right     BBB (bundle branch block)     Coronary artery disease     Disease of thyroid gland     Gout     H/O Muscle pain     Right shoulder and neck area    H/O Radiation treatment     For tonsillar inflammation and infection when he was a child and this led him to develop thyroid cancer.    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 Ganesh Lopez    Left shoulder pain     Myeloproliferative disorder     With polycythemia vera, JAK2 mutation positive requiring periodic phlebotomies (hematocrit above 47).    Polymyalgia rheumatica     Premature atrial contraction     Prostate cancer     Raynaud disease     Shoulder injury, left, sequela     SOB (shortness of breath) on exertion        Past Surgical History:   Procedure Laterality Date    CARDIAC CATHETERIZATION N/A 1/8/2025    Procedure: Coronary angiography;  Surgeon: Di Roberts MD;  Location: [facility] CATH INVASIVE LOCATION;  Service: Cardiovascular;  Laterality: N/A;    CARDIAC CATHETERIZATION N/A 1/8/2025    Procedure: Left heart cath;  Surgeon: Di Roberts MD;  Location: [facility] CATH INVASIVE LOCATION;  Service: Cardiovascular;  Laterality: N/A;    CARDIAC CATHETERIZATION N/A 1/8/2025    Procedure: Right Heart Cath;  Surgeon: Di Roberts MD;  Location: [facility] CATH INVASIVE LOCATION;  Service: Cardiovascular;  Laterality: N/A;     CIRCUMCISION N/A 12/17/2018    Procedure: CIRCUMCISION;  Surgeon: Gallo Lopez MD;  Location: [facility] OR;  Service: Urology    COLONOSCOPY  2010    Documented a tubulovillous adenom that was removed completely. Colonoscopy in 2014 was unremarkable.    COLONOSCOPY N/A 2/20/2020    Procedure: COLONOSCOPY;  Surgeon: Claire Galo MD;  Location: [facility] OR;  Service: Gastroenterology;  Laterality: N/A;  diverticulosis    CORONARY ARTERY BYPASS GRAFT WITH AORTIC AND MITRAL VALVE REPAIR/REPLACEMENT N/A 2/10/2025    Procedure: STERNOTOMY,CORONARY ARTERY BYPASS  X 3,WITH INTERNAL MAMMARY ARTERY GRAFT AND ENDOSCOPICLY HARVESTED RIGHT SAPHENOUS VEIN MITRAL VALVE REPAIR, LEFT ATRIAL APPENDAGE EXCLUSION, PRP  TRANSESOPHAGEAL ECHOCARDIOGRAM WITH ANESTHESIA;  Surgeon: Migel Alcantara MD;  Location: [facility];  Service: Cardiothoracic;  Laterality: N/A;    HERNIA REPAIR      H/O multiple hernia repairs including right inguinal hernia repair in 1981, 2003, and double hernia repair in 2013    KIDNEY SURGERY      PROSTATE BIOPSY      Showed features consistent with prostate cancer    SHOULDER SURGERY  1995    Shoulder repair    THYROIDECTOMY, PARTIAL  1983    For malignant tumor    TOOTH EXTRACTION         Family History   Problem Relation Age of Onset    No Known Problems Mother     Heart attack Father     Heart disease Father     Hypertension Father     Diabetes Father     Tuberculosis Maternal Grandmother     No Known Problems Son     Drug abuse Son     Malig Hyperthermia Neg Hx        Social History     Tobacco Use    Smoking status: Never    Smokeless tobacco: Never    Tobacco comments:     caffeine use    Vaping Use    Vaping status: Never Used   Substance Use Topics    Alcohol use: Yes     Alcohol/week: 1.0 - 3.0 standard drink of alcohol     Types: 1 - 3 Glasses of wine per week     Comment: 1 MONTHLY    Drug use: No         ECG 12 Lead    Date/Time: 2/28/2025 4:32 PM  Performed by: Di Roberts,  "MD    Authorized by: Di Roberts MD  Comparison: compared with previous ECG   Comparison to previous ECG: Lateral T wave inversions are new  Rhythm: sinus rhythm  Conduction: right bundle branch block  T inversion: I and aVL             Objective:     Visit Vitals  /80 (BP Location: Left arm, Patient Position: Sitting)   Pulse 58   Ht 172.7 cm (68\")   Wt 67.1 kg (148 lb)   SpO2 96%   BMI 22.50 kg/m²         Constitutional:       Appearance: Normal appearance. Well-developed.   HENT:      Head: Normocephalic and atraumatic.   Neck:      Vascular: No carotid bruit or JVD.   Pulmonary:      Effort: Pulmonary effort is normal.      Breath sounds: Normal breath sounds.   Cardiovascular:      Normal rate. Regular rhythm.      No gallop.       Comments: Right medial thigh with area of firm swelling but no evidence of erythema or increased warmth  Pulses:     Radial: 2+ on the right side.  Abdominal:      Palpations: Abdomen is soft.   Skin:     General: Skin is warm and dry.   Neurological:      Mental Status: Alert and oriented to person, place, and time.             Assessment:          Diagnosis Plan   1. Status post mitral valve repair        2. Mitral valve insufficiency, unspecified etiology        3. Coronary artery disease involving native coronary artery of native heart without angina pectoris        4. S/P CABG (coronary artery bypass graft)        5. Thoracic aortic aneurysm without rupture, unspecified part        6. Raynaud's disease without gangrene        7. Primary hypertension        8. Acquired hypothyroidism               Plan:       1.  Right medial thigh swelling.  Does not appear to be infected.  I believe it is unlikely to be thrombosis.  Discussed with Dr. Alcantara's APRN's.  Will send the patient to their office so they can perform an examination and make further recommendations.  2.  Mitral valve regurgitation status post repair.  Will need to perform a baseline echocardiogram at some " point following his surgery.  3.  Coronary artery disease.  Status post CABG x 3.  No symptoms of angina.  EKG with lateral T wave inversions but without any symptoms the significance is unclear.  Will monitor for now.  Continue current management.  4.  Postop atrial fibrillation.  Remains in sinus rhythm on amiodarone and atenolol.  Will have the patient stop the amiodarone after he finishes his current supply.  5.  Hypertension.  Blood pressures on the low end of normal.  He is already on a low-dose of atenolol.  Will also have him stop his furosemide and potassium chloride.  6.  Raynaud's disease  7.  Hypothyroidism  8.  Thoracic aortic aneurysm.  Moderate but stable in size on last assessment.  9.  Nocturnal hypoxia.  Will keep the O2 for now.  May need to consider repeat overnight oximetry or possibly a sleep study in the future determine if we need to continue with oxygen.    Will have the patient return for follow-up in 1 month.

## 2025-03-04 ENCOUNTER — HOME CARE VISIT (OUTPATIENT)
Dept: HOME HEALTH SERVICES | Facility: HOME HEALTHCARE | Age: 80
End: 2025-03-04
Payer: MEDICARE

## 2025-03-07 ENCOUNTER — READMISSION MANAGEMENT (OUTPATIENT)
Dept: CALL CENTER | Facility: HOSPITAL | Age: 80
End: 2025-03-07
Payer: MEDICARE

## 2025-03-07 ENCOUNTER — HOME CARE VISIT (OUTPATIENT)
Dept: HOME HEALTH SERVICES | Facility: HOME HEALTHCARE | Age: 80
End: 2025-03-07
Payer: MEDICARE

## 2025-03-07 DIAGNOSIS — Z95.1 S/P CABG X 3: Primary | ICD-10-CM

## 2025-03-07 DIAGNOSIS — R60.0 LOCALIZED EDEMA: ICD-10-CM

## 2025-03-07 NOTE — OUTREACH NOTE
CT Surgery Week 2 Survey      Flowsheet Row Responses   [facility] patient discharged from? [place]   Does the patient have one of the following disease processes/diagnoses(primary or secondary)? Cardiothoracic surgery   Week 2 attempt successful? Yes   Call start time 1122   Call end time 1124   Discharge diagnosis Mitral valve insufficiency, CABG x 3 using right leg vein harvest, mitral valve repair, left atrial appendage exclusion   Person spoke with today (if not patient) and relationship Patient   Meds reviewed with patient/caregiver? Yes   Does the patient have all medications related to this admission filled (includes all antibiotics, pain medications, cardiac medications, etc.) Yes   Prescription comments Steriod, and ICE related to vein removed.   Is the patient taking all medications as directed (includes completed medication regime)? Yes   Does the patient have a primary care provider?  Yes   Comments regarding PCP Seen surgeon last week, sees surgeon again next week.   What is the Home health agency?  [facility]   Has home health visited the patient within 72 hours of discharge? Yes   Home health comments HH no longer coming to the house- because they told patient to apply heat to surgical site and made the surgery recovery worse.   Psychosocial issues? No   Did the patient receive a copy of their discharge instructions? Yes   Nursing interventions Reviewed instructions with patient   What is the patient's perception of their health status since discharge? Improving   Is the patient/caregiver able to teach back signs and symptoms of incisional infection? Increased redness, swelling or pain at the incisonal site, Increased drainage or bleeding, Incisional warmth, Pus or odor from incision, Fever   Is the patient/caregiver able to teach back steps to recovery at home? Set small, achievable goals for return to baseline health, Rest and rebuild strength, gradually increase activity, Eat a well-balance diet    Is the patient/caregiver able to teach back the hierarchy of who to call/visit for symptoms/problems? PCP, Specialist, Home health nurse, Urgent Care, ED, 911 Yes   Week 2 call completed? Yes   Graduated Yes   Wrap up additional comments Patient reports doing better now, with steriod and ice applied to surgical site. No s/s of infection noted or concerns or questions   Call end time 1124            Maite BURCIAGA - Registered Nurse

## 2025-03-10 ENCOUNTER — HOSPITAL ENCOUNTER (OUTPATIENT)
Dept: CARDIOLOGY | Facility: HOSPITAL | Age: 80
Discharge: HOME | End: 2025-03-10
Payer: MEDICARE

## 2025-03-10 DIAGNOSIS — Z95.1 S/P CABG X 3: ICD-10-CM

## 2025-03-10 DIAGNOSIS — R60.0 LOCALIZED EDEMA: ICD-10-CM

## 2025-03-10 LAB
BH CV LOWER VASCULAR LEFT COMMON FEMORAL AUGMENT: NORMAL
BH CV LOWER VASCULAR LEFT COMMON FEMORAL COMPETENT: NORMAL
BH CV LOWER VASCULAR LEFT COMMON FEMORAL COMPRESS: NORMAL
BH CV LOWER VASCULAR LEFT COMMON FEMORAL PHASIC: NORMAL
BH CV LOWER VASCULAR LEFT COMMON FEMORAL SPONT: NORMAL
BH CV LOWER VASCULAR RIGHT COMMON FEMORAL AUGMENT: NORMAL
BH CV LOWER VASCULAR RIGHT COMMON FEMORAL COMPETENT: NORMAL
BH CV LOWER VASCULAR RIGHT COMMON FEMORAL COMPRESS: NORMAL
BH CV LOWER VASCULAR RIGHT COMMON FEMORAL PHASIC: NORMAL
BH CV LOWER VASCULAR RIGHT COMMON FEMORAL SPONT: NORMAL
BH CV LOWER VASCULAR RIGHT DISTAL FEMORAL COMPRESS: NORMAL
BH CV LOWER VASCULAR RIGHT GASTRONEMIUS COMPRESS: NORMAL
BH CV LOWER VASCULAR RIGHT LESSER SAPH COMPRESS: NORMAL
BH CV LOWER VASCULAR RIGHT MID FEMORAL AUGMENT: NORMAL
BH CV LOWER VASCULAR RIGHT MID FEMORAL COMPETENT: NORMAL
BH CV LOWER VASCULAR RIGHT MID FEMORAL COMPRESS: NORMAL
BH CV LOWER VASCULAR RIGHT MID FEMORAL PHASIC: NORMAL
BH CV LOWER VASCULAR RIGHT MID FEMORAL SPONT: NORMAL
BH CV LOWER VASCULAR RIGHT PERONEAL COMPRESS: NORMAL
BH CV LOWER VASCULAR RIGHT POPLITEAL AUGMENT: NORMAL
BH CV LOWER VASCULAR RIGHT POPLITEAL COMPETENT: NORMAL
BH CV LOWER VASCULAR RIGHT POPLITEAL COMPRESS: NORMAL
BH CV LOWER VASCULAR RIGHT POPLITEAL PHASIC: NORMAL
BH CV LOWER VASCULAR RIGHT POPLITEAL SPONT: NORMAL
BH CV LOWER VASCULAR RIGHT POSTERIOR TIBIAL COMPRESS: NORMAL
BH CV LOWER VASCULAR RIGHT PROFUNDA FEMORAL COMPRESS: NORMAL
BH CV LOWER VASCULAR RIGHT PROXIMAL FEMORAL COMPRESS: NORMAL
BH CV LOWER VASCULAR RIGHT SAPHENOFEMORAL JUNCTION COMPRESS: NORMAL

## 2025-03-10 PROCEDURE — 93971 EXTREMITY STUDY: CPT | Performed by: SURGERY

## 2025-03-10 PROCEDURE — 93971 EXTREMITY STUDY: CPT

## 2025-03-13 ENCOUNTER — OFFICE VISIT (OUTPATIENT)
Dept: CARDIAC SURGERY | Facility: CLINIC | Age: 80
End: 2025-03-13
Payer: MEDICARE

## 2025-03-13 VITALS
DIASTOLIC BLOOD PRESSURE: 81 MMHG | BODY MASS INDEX: 22.08 KG/M2 | SYSTOLIC BLOOD PRESSURE: 133 MMHG | RESPIRATION RATE: 18 BRPM | WEIGHT: 145.7 LBS | HEIGHT: 68 IN | HEART RATE: 62 BPM | OXYGEN SATURATION: 99 %

## 2025-03-13 DIAGNOSIS — Z95.1 S/P CABG X 3: Primary | ICD-10-CM

## 2025-03-13 DIAGNOSIS — Z98.890 STATUS POST MITRAL VALVE REPAIR: ICD-10-CM

## 2025-03-13 PROCEDURE — 3075F SYST BP GE 130 - 139MM HG: CPT

## 2025-03-13 PROCEDURE — 99024 POSTOP FOLLOW-UP VISIT: CPT

## 2025-03-13 PROCEDURE — 3079F DIAST BP 80-89 MM HG: CPT

## 2025-03-13 NOTE — PROGRESS NOTES
CARDIOVASCULAR SURGERY FOLLOW-UP PROGRESS NOTE  Chief Complaint: Post-op follow-up appointment     HPI:   Dear Dr. Delgado and Colleagues:    It was my pleasure to see your patient Scott Murphy in the office today.  As you know, he is a very pleasant 79 y.o. male with a past medical history of CAD, mitral regurgitation, polymyalgia rheumatica, hypertension, hyperlipidemia, hypothyroidism, prostate cancer, and thoracic aortic aneurysm who underwent CABG x 3 with LIMA/RSVG/MV repair/SUDHIR clip by Dr. Alcantara on 2/10/2025. He did well postoperatively and was discharged shortly after surgery. 1 week after discharge he reached out to our office with complaints of pain and swelling at his EVH site in his upper right leg. We obtained a lower extremity doppler that was negative for DVT and showed no signs of infection. He comes in today for routine post-operative follow-up.  His activity level has been good.  His pain level has been mild-moderate, primarily in his upper right leg. He has had follow-up with his PCP and cardiologist.       Medications:    Current Outpatient Medications:     ALPRAZolam (XANAX) 0.5 MG tablet, Take 0.5 tablets by mouth At Night As Needed. Indications: Feeling Anxious, Disp: , Rfl:     aspirin 81 MG EC tablet, Take 1 tablet by mouth Daily for 90 days., Disp: 90 tablet, Rfl: 0    atenolol (TENORMIN) 25 MG tablet, Take 1 tablet by mouth Daily for 90 days. notified patient of change in medication per Dr. Roberts on 2/25/25  Indications: High Blood Pressure, Disp: , Rfl:     atorvastatin (LIPITOR) 40 MG tablet, Take 1 tablet by mouth Every Night for 90 days., Disp: 90 tablet, Rfl: 0    B Complex Vitamins (VITAMIN B-COMPLEX PO), Take 1 tablet by mouth Daily. HOLD PRIOR TO SURG  Indications: supplement, Disp: , Rfl:     Calcium Carbonate-Vitamin D (calcium 500 mg vitamin D 5 mcg, 200 UT,) 500-5 MG-MCG tablet per tablet, Take 2 tablets by mouth Daily for 30 days., Disp: 60 tablet, Rfl: 0     "levocetirizine (XYZAL) 5 MG tablet, Take 1 tablet by mouth Every Evening. Indications: Perennial Allergic Rhinitis, Disp: , Rfl:     levothyroxine (SYNTHROID, LEVOTHROID) 100 MCG tablet, Take 1 tablet by mouth daily., Disp: 30 tablet, Rfl: 6    methylPREDNISolone (MEDROL) 4 MG dose pack, Take as directed on package instructions.  Indications: Polymyalgia Rheumatica, post- open heart, Disp: 21 tablet, Rfl: 0    montelukast (SINGULAIR) 10 MG tablet, Take 1 tablet by mouth Every Night. Indications: Asthma, Disp: , Rfl:     zolpidem (AMBIEN) 10 MG tablet, Take 1 tablet by mouth At Night As Needed. Indications: Trouble Sleeping, Disp: , Rfl:      Physical Exam:         /81 (BP Location: Left arm, Patient Position: Sitting, Cuff Size: Large Adult)   Pulse 62   Resp 18   Ht 172.7 cm (68\")   Wt 66.1 kg (145 lb 11.2 oz)   SpO2 99%   BMI 22.15 kg/m²   Heart:  regular rate and rhythm, S1, S2 normal, no murmur, click, rub or gallop  Lungs:  clear to auscultation bilaterally  Extremities:  no edema, peripheral pulses 2+ and symmetric  Incision(s):  mid chest healing well; Sternum stable to palpation.    Assessment/Plan:     S/P CABG and mitral valve repair.     No heavy lifting > 10 pounds for 2 more weeks  Keep incisions clean and dry  OK to drive if not taking narcotic pain medicine  OK to begin cardiac rehab  Follow-up as scheduled with cardiology  Follow-up as scheduled with PCP    Continue sternal precautions including no lifting more than 10 lbs until 6 weeks post-operatively and then no more than 50 lbs until 12 weeks post-operatively.   Avoid excessive jarring or twisting motions until 12 weeks from the date of surgery.     At this time, I do not believe we need to see him for any further post-operative follow-up. He will continue to follow-up in our aneurysm clinic. I have encouraged the patient to reach out to our office with any questions or concerns.     Thank you for allowing me to participate in the " care of your patient. If you have any questions or concerns feel free to contact myself or Dr. Alcantara.    Regards,  ALETHA Patel

## 2025-03-14 ENCOUNTER — HOME CARE VISIT (OUTPATIENT)
Dept: HOME HEALTH SERVICES | Facility: HOME HEALTHCARE | Age: 80
End: 2025-03-14
Payer: MEDICARE

## 2025-03-14 NOTE — Clinical Note
Patient was discharged from [facility] physical therapy and agency without a visit due to his request as he felt he did not want to participate in therapy following diagnosis of hematoma in right upper thigh. Patient was instructed in cardiac walking program and has progressed to being able to ambulate 3 times a day for 5 mins. Patient reports he feels he can increase to 10 mins 2 times a day. He is wanting to go to cardiac rehab when he is physically ready.

## 2025-03-15 NOTE — HOME HEALTH
Discharge Summary/Summary of Care Provided: Patient was seen following CABG x3 and Mitral valve repair on 2-10-25.   Patient received home health for diagnosis: aftercare following cardiac surgery.  Current level of functional ability: Upon last homecare physical therapy visit patient was ambulating with rolling walker but stating he had been able to ambulate with cane or no device for short distances. He was following cardiac walking program and was able to ambulate for 5 mins 3 times a day. Patient was scheduled to be seen to day for progression of ambulation to 10 mins 2 times a day on walking program and to ambulate outdoors with cane. He declined visit as he does have a hematoma in his right thigh and will be following up with his cardiologist next week to see how they are going to proceed. Patient states that despite this issue he has been able to conitnue with his walking program and has been doing well.   Living arrangements: Lives with spouse in a ranch home with minimal steps at front and garage entrances.   Progress towards goals and/or Were all goals met? No, as patient had to cancel last 2 appointments.   If not all goals met, barriers that prevented patient from meeting goals: Patient had hematoma right thigh.  SDOH concerns (i.e. Caregiver availability, social isolation, environment, income, transportation access, food insecurity etc.)na  Follow-up appointment plans and community resources provided: Patient has MD follow up appts. scheduled. He is also planning to attend Cardiac Rehab when MD feel he is ready.

## 2025-03-15 NOTE — HOME HEALTH
Patient missed a physical therapy  visit from [facility] on 3-7-25     Reason: patient having pain in his right thigh and he is going to have test performed on Monday. Patient would like to wait until he finds out results of the test prior to having any further therapy.     For your records only.   Per CMS Guidance, MD must be notified of missed/cancelled visits; therefore the prescribed frequency was not met.

## 2025-03-17 ENCOUNTER — PATIENT MESSAGE (OUTPATIENT)
Dept: CARDIAC SURGERY | Facility: CLINIC | Age: 80
End: 2025-03-17
Payer: MEDICARE

## 2025-03-18 ENCOUNTER — TELEPHONE (OUTPATIENT)
Dept: CARDIAC SURGERY | Facility: CLINIC | Age: 80
End: 2025-03-18
Payer: MEDICARE

## 2025-03-18 DIAGNOSIS — I77.810 AORTIC ECTASIA, THORACIC: ICD-10-CM

## 2025-03-18 DIAGNOSIS — I71.20 THORACIC AORTIC ANEURYSM WITHOUT RUPTURE, UNSPECIFIED PART: Primary | ICD-10-CM

## 2025-03-18 DIAGNOSIS — I77.810 DILATED AORTIC ROOT: ICD-10-CM

## 2025-03-18 NOTE — TELEPHONE ENCOUNTER
Pt returned my call from this morning. Advised pt that referral was sent to [facility] this morning and Amy in Cardiac Rehab said he is good to go.

## 2025-04-03 ENCOUNTER — OFFICE VISIT (OUTPATIENT)
Dept: CARDIOLOGY | Age: 80
End: 2025-04-03
Payer: MEDICARE

## 2025-04-03 VITALS
DIASTOLIC BLOOD PRESSURE: 74 MMHG | WEIGHT: 148 LBS | HEART RATE: 74 BPM | BODY MASS INDEX: 22.43 KG/M2 | HEIGHT: 68 IN | SYSTOLIC BLOOD PRESSURE: 132 MMHG

## 2025-04-03 DIAGNOSIS — Z95.1 S/P CABG X 3: ICD-10-CM

## 2025-04-03 DIAGNOSIS — Z98.890 STATUS POST MITRAL VALVE REPAIR: ICD-10-CM

## 2025-04-03 DIAGNOSIS — Z98.890 STATUS POST MITRAL VALVE REPAIR: Primary | ICD-10-CM

## 2025-04-03 DIAGNOSIS — I77.810 DILATED AORTIC ROOT: ICD-10-CM

## 2025-04-03 DIAGNOSIS — I10 PRIMARY HYPERTENSION: Primary | ICD-10-CM

## 2025-04-03 NOTE — PROGRESS NOTES
Subjective:     Encounter Date:04/03/2025      Patient ID: Scott Murphy is a 80 y.o. male.    Chief Complaint:follow up HTN  History of Present Illness  This is a 81 y/o man who follows with Dr. Roberts and is known to me. He has a pmhx of polycythemia vera, polymyalgia rheumatica, renal cell carcinoma s/p partial nephrectomy, hypertension, hypothyroidism, Raynaud's disease, asthma and thoracic aortic aneurysm.     Is here today for follow-up visit.  He says that his pulse as well as his body temperature has improved since stopping the atenolol.  He still is feeling quite fatigued and weak.  He is sleeping about 4 to 5 hours during the day and 4 to 6 hours at night.  His right leg is still causing him quite a bit of pain as well.  He does feel that the hematoma is slowly improving.  He is trying to walk the dog every day.  He is scheduled to start cardiac rehab next week and has orientation tomorrow.  He will be doing this at [facility].  He has started going back to [employer] practice on Mondays which is about 2 hours.  Blood pressures have been in the 110s-130/70s.  Denies any chest pain, shortness of breath, swelling, dizziness or syncope.    Prior history:  Dr. Roberts began following the patient in 2018 when he was referred to her for an abnormal EKG which showed a right bundle branch block. He underwent an echocardiogram that showed normal left ventricular systolic function and wall motion with an EF of 66%, grade 1 diastolic dysfunction, mild mitral and tricuspid regurgitation, right ventricular systolic pressure of 30 mmHg, and moderate dilatation of the sinus of Valsalva and mild dilatation of the ascending aorta and aortic arch. He then underwent a CT of th chest in August 2019 for complaints of dyspnea and it showed mild aneurysmal dilatation of the ascending aorta measuring 4.2 x 4 cm compared to a measurement of 4 x 3.9 cm in 2/2018. Follow up imaging of his aneurysmal dilatation has showed  stable measurements.     He was last seen in the office by Dr. Roberts in September 2022 and was doing well. No changes were made and he was to follow up in one year.  I saw him in urgent follow-up in April 2023 when he was having issues with labile blood pressures.  Prior to that visit he had developed some swelling with his amlodipine and it was discontinued and he was advised to increase his losartan to 50 mg.  However he developed issues with low blood pressures and this was decreased to 25 mg.  He then developed some issues with mildly elevated blood pressures and as a result was taking an additional dosage of losartan as needed.  At that visit I recommended that he resume amlodipine at 5 mg and continue losartan 25 mg.  Eventually we increase amlodipine to 7.5 mg.  Follow-up with Dr. Roberts December 2023 he was doing well and no changes were made.    I saw him in December 2020 for and he had undergone hip surgery without any significant issues.  He was noted to have a murmur on exam that was new so I ordered an echocardiogram.  This showed severe mitral regurgitation and evidence of posterior mitral valve prolapse.  A REJI and cardiac catheterization were recommended.  The REJI showed findings of posterior flail leaflet prolapse due to torn chordae resulting in severe eccentric mitral valve regurgitation.  There were no other new findings noted.  Right and left cardiac catheterization showed severe stenosis of the proximal LAD, proximal obtuse marginal branch, and distal right coronary artery but otherwise normal pulmonary pressures.  He was referred to Dr. Alcantara for further evaluation who recommended proceeding with surgery.  He underwent CABG x 3 with a LIMA to LAD, saphenous vein graft to the posterior descending branch, and saphenous vein graft to the first obtuse marginal branch, mitral valve repair, and epicardial closure of the left atrial appendage on 2/10/2025.  He recovered fairly well from a cardiac  standpoint although he did have bursts of atrial fibrillation was started on amiodarone.  He underwent overnight oximetry which qualified him for oxygen at home at night.  Patient was discharged eventually on 2/17/2025.     He contacted Dr. Roberts shortly after discharge reporting low blood pressures.  She recommended that he decrease his atenolol to once a day.  He is tolerating follow-up and reported significant discomfort and swelling at the site of his saphenous vein graft harvest in his right medial thigh.  Please refer to Dr. Alcantara's office so that they can perform an exam to make further recommendations regarding his right thigh.  He ultimately ended up stopping the atenolol.    I have reviewed and updated as appropriate allergies, current medications, past family history, past medical history, past surgical history and problem list.    Review of Systems   Constitutional: Positive for malaise/fatigue. Negative for fever, weight gain and weight loss.   HENT:  Negative for congestion, hoarse voice and sore throat.    Eyes:  Negative for blurred vision and double vision.   Cardiovascular:  Negative for chest pain, dyspnea on exertion, leg swelling, orthopnea, palpitations and syncope.   Respiratory:  Negative for cough, shortness of breath and wheezing.    Gastrointestinal:  Negative for abdominal pain, hematemesis, hematochezia and melena.   Genitourinary:  Negative for dysuria and hematuria.   Neurological:  Positive for weakness. Negative for dizziness, headaches, light-headedness and numbness.   Psychiatric/Behavioral:  Negative for depression. The patient is not nervous/anxious.          Current Outpatient Medications:     ALPRAZolam (XANAX) 0.5 MG tablet, Take 0.5 tablets by mouth At Night As Needed. Indications: Feeling Anxious, Disp: , Rfl:     aspirin 81 MG EC tablet, Take 1 tablet by mouth Daily for 90 days., Disp: 90 tablet, Rfl: 0    atorvastatin (LIPITOR) 40 MG tablet, Take 1 tablet by mouth Every  Night for 90 days., Disp: 90 tablet, Rfl: 0    B Complex Vitamins (VITAMIN B-COMPLEX PO), Take 1 tablet by mouth Daily. HOLD PRIOR TO SURG  Indications: supplement, Disp: , Rfl:     levocetirizine (XYZAL) 5 MG tablet, Take 1 tablet by mouth Every Evening. Indications: Perennial Allergic Rhinitis, Disp: , Rfl:     levothyroxine (SYNTHROID, LEVOTHROID) 100 MCG tablet, Take 1 tablet by mouth daily., Disp: 30 tablet, Rfl: 6    montelukast (SINGULAIR) 10 MG tablet, Take 1 tablet by mouth Every Night. Indications: Asthma, Disp: , Rfl:     zolpidem (AMBIEN) 10 MG tablet, Take 1 tablet by mouth At Night As Needed. Indications: Trouble Sleeping, Disp: , Rfl:     Past Medical History:   Diagnosis Date    Acquired hypothyroidism     Anxiety     Aortic aneurysm     Aortic valve regurgitation     Arthritis     Asthma     Hutton's cyst of knee, right     BBB (bundle branch block)     Coronary artery disease     Disease of thyroid gland     Gout     H/O Muscle pain     Right shoulder and neck area    H/O Radiation treatment     For tonsillar inflammation and infection when he was a child and this led him to develop thyroid cancer.    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 Ganesh Lopez    Left shoulder pain     Myeloproliferative disorder     With polycythemia vera, JAK2 mutation positive requiring periodic phlebotomies (hematocrit above 47).    Polymyalgia rheumatica     Premature atrial contraction     Prostate cancer     Raynaud disease     Shoulder injury, left, sequela     SOB (shortness of breath) on exertion        Past Surgical History:   Procedure Laterality Date    CARDIAC CATHETERIZATION N/A 1/8/2025    Procedure: Coronary angiography;  Surgeon: Di Roberts MD;  Location:  [facility] INVASIVE [facility];   Service: Cardiovascular;  Laterality: N/A;    CARDIAC CATHETERIZATION N/A 1/8/2025    Procedure: Left heart cath;  Surgeon: Di Roberts MD;  Location: [facility] CATH INVASIVE LOCATION;  Service: Cardiovascular;  Laterality: N/A;    CARDIAC CATHETERIZATION N/A 1/8/2025    Procedure: Right Heart Cath;  Surgeon: Di Roberts MD;  Location: [facility] CATH INVASIVE LOCATION;  Service: Cardiovascular;  Laterality: N/A;    CIRCUMCISION N/A 12/17/2018    Procedure: CIRCUMCISION;  Surgeon: Gallo Lopez MD;  Location: [facility] OR;  Service: Urology    COLONOSCOPY  2010    Documented a tubulovillous adenom that was removed completely. Colonoscopy in 2014 was unremarkable.    COLONOSCOPY N/A 2/20/2020    Procedure: COLONOSCOPY;  Surgeon: Claire Galo MD;  Location: [facility] OR;  Service: Gastroenterology;  Laterality: N/A;  diverticulosis    CORONARY ARTERY BYPASS GRAFT WITH AORTIC AND MITRAL VALVE REPAIR/REPLACEMENT N/A 2/10/2025    Procedure: STERNOTOMY,CORONARY ARTERY BYPASS  X 3,WITH INTERNAL MAMMARY ARTERY GRAFT AND ENDOSCOPICLY HARVESTED RIGHT SAPHENOUS VEIN MITRAL VALVE REPAIR, LEFT ATRIAL APPENDAGE EXCLUSION, PRP  TRANSESOPHAGEAL ECHOCARDIOGRAM WITH ANESTHESIA;  Surgeon: Migel Alcantara MD;  Location: [facility];  Service: Cardiothoracic;  Laterality: N/A;    HERNIA REPAIR      H/O multiple hernia repairs including right inguinal hernia repair in 1981, 2003, and double hernia repair in 2013    KIDNEY SURGERY      PROSTATE BIOPSY      Showed features consistent with prostate cancer    SHOULDER SURGERY  1995    Shoulder repair    THYROIDECTOMY, PARTIAL  1983    For malignant tumor    TOOTH EXTRACTION         Family History   Problem Relation Age of Onset    No Known Problems Mother     Heart attack Father     Heart disease Father     Hypertension Father     Diabetes Father     Tuberculosis Maternal Grandmother     No Known Problems Son     Drug abuse Son     Malig Hyperthermia Neg Hx        Social  "History     Tobacco Use    Smoking status: Never    Smokeless tobacco: Never    Tobacco comments:     caffeine use    Vaping Use    Vaping status: Never Used   Substance Use Topics    Alcohol use: Yes     Alcohol/week: 1.0 - 3.0 standard drink of alcohol     Types: 1 - 3 Glasses of wine per week     Comment: 1 MONTHLY    Drug use: No         ECG 12 Lead    Date/Time: 4/3/2025 1:02 PM  Performed by: Janey Martino APRN    Authorized by: Janey Martino APRN  Comparison: compared with previous ECG from 2/28/2025  Similar to previous ECG  Rhythm: sinus rhythm  Ectopy: atrial premature contractions  Conduction: right bundle branch block             Objective:     Visit Vitals  /74 (BP Location: Right arm, Patient Position: Sitting, Cuff Size: Adult)   Pulse 74   Ht 172.7 cm (68\")   Wt 67.1 kg (148 lb)   BMI 22.50 kg/m²             Physical Exam  Constitutional:       Appearance: Normal appearance. He is normal weight.   HENT:      Head: Normocephalic.   Neck:      Vascular: No carotid bruit.   Cardiovascular:      Rate and Rhythm: Normal rate and regular rhythm.      Chest Wall: PMI is not displaced.      Pulses: Normal pulses.           Radial pulses are 2+ on the right side and 2+ on the left side.        Posterior tibial pulses are 2+ on the right side and 2+ on the left side.      Heart sounds: Normal heart sounds. No murmur heard.     No friction rub. No gallop.   Pulmonary:      Effort: Pulmonary effort is normal.      Breath sounds: Normal breath sounds.   Abdominal:      General: Bowel sounds are normal. There is no distension.      Palpations: Abdomen is soft.   Musculoskeletal:      Right lower leg: No edema.      Left lower leg: No edema.   Skin:     General: Skin is warm and dry.      Capillary Refill: Capillary refill takes less than 2 seconds.   Neurological:      Mental Status: He is alert and oriented to person, place, and time.   Psychiatric:         Mood and Affect: Mood normal.         " Behavior: Behavior normal.         Thought Content: Thought content normal.          Lab Review:   Lipid Panel          2/7/2025    07:58   Lipid Panel   Total Cholesterol 142    Triglycerides 61    HDL Cholesterol 57    VLDL Cholesterol 13    LDL Cholesterol  72    LDL/HDL Ratio 1.28          Cardiac Procedures:       Assessment:         There are no diagnoses linked to this encounter.          Plan:       Mitral valve regurgitation: s/p repair with Dr. Alcantara in February 2025. Slowly recovering. He is planning to start cardiac rehab next week and has orientation tomorrow. Will plan for an echocardiogram at next week to get a baseline reading post valve repair.  Fatigue/lack of energy: I think we will see this improve with starting cardiac rehab. Unfortunately the right thigh hematoma has kept him down a little longer than normal and its just taking longer to recover. Will see how he does with rehab.  Hypertension: Blood pressure stable off all antihypertensives. If his BP should start to increase once he is back to his baseline functional status, I would recommend resuming amlodipine first as he has a lot of problems with his Raynaud's.  Chronic RBBB  Thoracic aortic aneurysm: Follows with the aorta clinic  Raynaud's disease: currently off amlodipine due to low BP    Thank you for allowing me to participate in this patient's care. Please call with any questions or concerns. Mr. Murphy will follow up with Dr. Roberts in 2 months with repeat echo.          Your medication list            Accurate as of April 3, 2025  1:18 PM. If you have any questions, ask your nurse or doctor.                CONTINUE taking these medications        Instructions Last Dose Given Next Dose Due   ALPRAZolam 0.5 MG tablet  Commonly known as: XANAX      Take 0.5 tablets by mouth At Night As Needed. Indications: Feeling Anxious       Aspirin Low Dose 81 MG EC tablet  Generic drug: aspirin      Take 1 tablet by mouth Daily for 90 days.        atorvastatin 40 MG tablet  Commonly known as: LIPITOR      Take 1 tablet by mouth Every Night for 90 days.       levocetirizine 5 MG tablet  Commonly known as: XYZAL      Take 1 tablet by mouth Every Evening. Indications: Perennial Allergic Rhinitis       levothyroxine 100 MCG tablet  Commonly known as: SYNTHROID, LEVOTHROID      Take 1 tablet by mouth daily.       montelukast 10 MG tablet  Commonly known as: SINGULAIR      Take 1 tablet by mouth Every Night. Indications: Asthma       VITAMIN B-COMPLEX PO      Take 1 tablet by mouth Daily. HOLD PRIOR TO SURG  Indications: supplement       zolpidem 10 MG tablet  Commonly known as: AMBIEN      Take 1 tablet by mouth At Night As Needed. Indications: Trouble Sleeping                  Janey Martino, ALETHA  04/03/25  8:38 AM EDT

## 2025-04-10 ENCOUNTER — HOSPITAL ENCOUNTER (OUTPATIENT)
Dept: CARDIOLOGY | Facility: HOSPITAL | Age: 80
Discharge: HOME | End: 2025-04-10
Admitting: NURSE PRACTITIONER
Payer: MEDICARE

## 2025-04-10 VITALS
BODY MASS INDEX: 22.43 KG/M2 | DIASTOLIC BLOOD PRESSURE: 70 MMHG | HEART RATE: 88 BPM | WEIGHT: 148 LBS | SYSTOLIC BLOOD PRESSURE: 120 MMHG | HEIGHT: 68 IN | OXYGEN SATURATION: 99 %

## 2025-04-10 DIAGNOSIS — Z98.890 STATUS POST MITRAL VALVE REPAIR: ICD-10-CM

## 2025-04-10 LAB
AORTIC ARCH: 3.1 CM
AORTIC DIMENSIONLESS INDEX: 0.67 (DI)
ASCENDING AORTA: 4.6 CM
AV MEAN PRESS GRAD SYS DOP V1V2: 3 MMHG
AV VMAX SYS DOP: 124 CM/SEC
BH CV ECHO MEAS - ACS: 2.33 CM
BH CV ECHO MEAS - AO MAX PG: 6.2 MMHG
BH CV ECHO MEAS - AO ROOT DIAM: 4.8 CM
BH CV ECHO MEAS - AO V2 VTI: 24.8 CM
BH CV ECHO MEAS - AVA(I,D): 2.6 CM2
BH CV ECHO MEAS - EDV(CUBED): 64 ML
BH CV ECHO MEAS - EDV(MOD-SP2): 106 ML
BH CV ECHO MEAS - EDV(MOD-SP4): 108 ML
BH CV ECHO MEAS - EF(MOD-SP2): 53.8 %
BH CV ECHO MEAS - EF(MOD-SP4): 55.6 %
BH CV ECHO MEAS - ESV(CUBED): 26.6 ML
BH CV ECHO MEAS - ESV(MOD-SP2): 49 ML
BH CV ECHO MEAS - ESV(MOD-SP4): 48 ML
BH CV ECHO MEAS - FS: 25.4 %
BH CV ECHO MEAS - IVS/LVPW: 1 CM
BH CV ECHO MEAS - IVSD: 1.1 CM
BH CV ECHO MEAS - LAT PEAK E' VEL: 11.4 CM/SEC
BH CV ECHO MEAS - LV DIASTOLIC VOL/BSA (35-75): 60.1 CM2
BH CV ECHO MEAS - LV MASS(C)D: 145.6 GRAMS
BH CV ECHO MEAS - LV MAX PG: 3.1 MMHG
BH CV ECHO MEAS - LV MEAN PG: 2 MMHG
BH CV ECHO MEAS - LV SYSTOLIC VOL/BSA (12-30): 26.7 CM2
BH CV ECHO MEAS - LV V1 MAX: 88.1 CM/SEC
BH CV ECHO MEAS - LV V1 VTI: 16.6 CM
BH CV ECHO MEAS - LVIDD: 4 CM
BH CV ECHO MEAS - LVIDS: 3 CM
BH CV ECHO MEAS - LVOT AREA: 3.9 CM2
BH CV ECHO MEAS - LVOT DIAM: 2.23 CM
BH CV ECHO MEAS - LVPWD: 1.1 CM
BH CV ECHO MEAS - MED PEAK E' VEL: 4.8 CM/SEC
BH CV ECHO MEAS - MV A DUR: 0.13 SEC
BH CV ECHO MEAS - MV A MAX VEL: 112 CM/SEC
BH CV ECHO MEAS - MV DEC SLOPE: 402.3 CM/SEC2
BH CV ECHO MEAS - MV DEC TIME: 0.22 SEC
BH CV ECHO MEAS - MV E MAX VEL: 94.7 CM/SEC
BH CV ECHO MEAS - MV E/A: 0.85
BH CV ECHO MEAS - MV MAX PG: 4.6 MMHG
BH CV ECHO MEAS - MV MEAN PG: 2.6 MMHG
BH CV ECHO MEAS - MV P1/2T: 76.2 MSEC
BH CV ECHO MEAS - MV V2 VTI: 29 CM
BH CV ECHO MEAS - MVA(P1/2T): 2.9 CM2
BH CV ECHO MEAS - MVA(VTI): 2.24 CM2
BH CV ECHO MEAS - PA ACC TIME: 0.09 SEC
BH CV ECHO MEAS - PA V2 MAX: 84.7 CM/SEC
BH CV ECHO MEAS - PULM A REVS DUR: 0.15 SEC
BH CV ECHO MEAS - PULM A REVS VEL: 135.1 CM/SEC
BH CV ECHO MEAS - PULM DIAS VEL: 46.5 CM/SEC
BH CV ECHO MEAS - PULM S/D: 1.55
BH CV ECHO MEAS - PULM SYS VEL: 72.2 CM/SEC
BH CV ECHO MEAS - QP/QS: 0.41
BH CV ECHO MEAS - RAP SYSTOLE: 3 MMHG
BH CV ECHO MEAS - RV MAX PG: 1.36 MMHG
BH CV ECHO MEAS - RV V1 MAX: 58.3 CM/SEC
BH CV ECHO MEAS - RV V1 VTI: 8.8 CM
BH CV ECHO MEAS - RVOT DIAM: 1.95 CM
BH CV ECHO MEAS - RVSP: 27 MMHG
BH CV ECHO MEAS - SUP REN AO DIAM: 2 CM
BH CV ECHO MEAS - SV(LVOT): 64.9 ML
BH CV ECHO MEAS - SV(MOD-SP2): 57 ML
BH CV ECHO MEAS - SV(MOD-SP4): 60 ML
BH CV ECHO MEAS - SV(RVOT): 26.5 ML
BH CV ECHO MEAS - SVI(LVOT): 36.1 ML/M2
BH CV ECHO MEAS - SVI(MOD-SP2): 31.7 ML/M2
BH CV ECHO MEAS - SVI(MOD-SP4): 33.4 ML/M2
BH CV ECHO MEAS - TAPSE (>1.6): 1.13 CM
BH CV ECHO MEAS - TR MAX PG: 23.9 MMHG
BH CV ECHO MEAS - TR MAX VEL: 244.3 CM/SEC
BH CV ECHO MEASUREMENTS AVERAGE E/E' RATIO: 11.69
BH CV XLRA - RV BASE: 3.4 CM
BH CV XLRA - RV LENGTH: 6.4 CM
BH CV XLRA - RV MID: 2.9 CM
BH CV XLRA - TDI S': 6.3 CM/SEC
LEFT ATRIUM VOLUME INDEX: 19.1 ML/M2
LV EF BIPLANE MOD: 55.4 %
SINUS: 4.5 CM
STJ: 4 CM

## 2025-04-10 PROCEDURE — 93306 TTE W/DOPPLER COMPLETE: CPT

## 2025-04-10 PROCEDURE — 25510000001 PERFLUTREN 6.52 MG/ML SUSPENSION 2 ML VIAL: Performed by: NURSE PRACTITIONER

## 2025-04-10 RX ADMIN — PERFLUTREN 1.5 ML: 6.52 INJECTION, SUSPENSION INTRAVENOUS at 15:34

## 2025-04-11 ENCOUNTER — RESULTS FOLLOW-UP (OUTPATIENT)
Dept: CARDIOLOGY | Age: 80
End: 2025-04-11
Payer: MEDICARE

## 2025-04-11 NOTE — TELEPHONE ENCOUNTER
Notified patient of results. Patient verbalized understanding.    Adry Garber RN  Triage [facility]

## 2025-04-16 ENCOUNTER — LAB (OUTPATIENT)
Dept: LAB | Facility: HOSPITAL | Age: 80
End: 2025-04-16
Payer: MEDICARE

## 2025-04-16 ENCOUNTER — APPOINTMENT (OUTPATIENT)
Dept: ONCOLOGY | Facility: HOSPITAL | Age: 80
End: 2025-04-16
Payer: MEDICARE

## 2025-04-16 ENCOUNTER — OFFICE VISIT (OUTPATIENT)
Dept: ONCOLOGY | Facility: CLINIC | Age: 80
End: 2025-04-16
Payer: MEDICARE

## 2025-04-16 VITALS
SYSTOLIC BLOOD PRESSURE: 135 MMHG | BODY MASS INDEX: 23.01 KG/M2 | HEART RATE: 71 BPM | TEMPERATURE: 98.4 F | OXYGEN SATURATION: 98 % | WEIGHT: 151.8 LBS | HEIGHT: 68 IN | DIASTOLIC BLOOD PRESSURE: 75 MMHG

## 2025-04-16 DIAGNOSIS — D75.1 ERYTHROCYTOSIS: ICD-10-CM

## 2025-04-16 DIAGNOSIS — C61 PROSTATE CANCER: ICD-10-CM

## 2025-04-16 DIAGNOSIS — C64.1 CANCER OF RIGHT KIDNEY: ICD-10-CM

## 2025-04-16 LAB
ALBUMIN SERPL-MCNC: 3.5 G/DL (ref 3.5–5.2)
ALBUMIN/GLOB SERPL: 1.5 G/DL
ALP SERPL-CCNC: 91 U/L (ref 39–117)
ALT SERPL W P-5'-P-CCNC: 19 U/L (ref 1–41)
ANION GAP SERPL CALCULATED.3IONS-SCNC: 10.3 MMOL/L (ref 5–15)
AST SERPL-CCNC: 27 U/L (ref 1–40)
BASOPHILS # BLD AUTO: 0.1 10*3/MM3 (ref 0–0.2)
BASOPHILS NFR BLD AUTO: 1 % (ref 0–1.5)
BILIRUB SERPL-MCNC: 1.1 MG/DL (ref 0–1.2)
BUN SERPL-MCNC: 18 MG/DL (ref 8–23)
BUN/CREAT SERPL: 14.2 (ref 7–25)
CALCIUM SPEC-SCNC: 9 MG/DL (ref 8.6–10.5)
CHLORIDE SERPL-SCNC: 108 MMOL/L (ref 98–107)
CO2 SERPL-SCNC: 23.7 MMOL/L (ref 22–29)
CREAT SERPL-MCNC: 1.27 MG/DL (ref 0.76–1.27)
DEPRECATED RDW RBC AUTO: 51.7 FL (ref 37–54)
EGFRCR SERPLBLD CKD-EPI 2021: 57.5 ML/MIN/1.73
EOSINOPHIL # BLD AUTO: 0.55 10*3/MM3 (ref 0–0.4)
EOSINOPHIL NFR BLD AUTO: 5.5 % (ref 0.3–6.2)
ERYTHROCYTE [DISTWIDTH] IN BLOOD BY AUTOMATED COUNT: 15.6 % (ref 12.3–15.4)
GLOBULIN UR ELPH-MCNC: 2.3 GM/DL
GLUCOSE SERPL-MCNC: 101 MG/DL (ref 65–99)
HCT VFR BLD AUTO: 44.2 % (ref 37.5–51)
HGB BLD-MCNC: 13.1 G/DL (ref 13–17.7)
IMM GRANULOCYTES # BLD AUTO: 0.04 10*3/MM3 (ref 0–0.05)
IMM GRANULOCYTES NFR BLD AUTO: 0.4 % (ref 0–0.5)
LYMPHOCYTES # BLD AUTO: 1.77 10*3/MM3 (ref 0.7–3.1)
LYMPHOCYTES NFR BLD AUTO: 17.6 % (ref 19.6–45.3)
MCH RBC QN AUTO: 26.6 PG (ref 26.6–33)
MCHC RBC AUTO-ENTMCNC: 29.6 G/DL (ref 31.5–35.7)
MCV RBC AUTO: 89.8 FL (ref 79–97)
MONOCYTES # BLD AUTO: 1.02 10*3/MM3 (ref 0.1–0.9)
MONOCYTES NFR BLD AUTO: 10.2 % (ref 5–12)
NEUTROPHILS NFR BLD AUTO: 6.55 10*3/MM3 (ref 1.7–7)
NEUTROPHILS NFR BLD AUTO: 65.3 % (ref 42.7–76)
NRBC BLD AUTO-RTO: 0 /100 WBC (ref 0–0.2)
PLATELET # BLD AUTO: 220 10*3/MM3 (ref 140–450)
PMV BLD AUTO: 8.9 FL (ref 6–12)
POTASSIUM SERPL-SCNC: 5.1 MMOL/L (ref 3.5–5.2)
PROT SERPL-MCNC: 5.8 G/DL (ref 6–8.5)
RBC # BLD AUTO: 4.92 10*6/MM3 (ref 4.14–5.8)
SODIUM SERPL-SCNC: 142 MMOL/L (ref 136–145)
WBC NRBC COR # BLD AUTO: 10.03 10*3/MM3 (ref 3.4–10.8)

## 2025-04-16 PROCEDURE — 80053 COMPREHEN METABOLIC PANEL: CPT

## 2025-04-16 PROCEDURE — 36415 COLL VENOUS BLD VENIPUNCTURE: CPT

## 2025-04-16 PROCEDURE — 1125F AMNT PAIN NOTED PAIN PRSNT: CPT | Performed by: INTERNAL MEDICINE

## 2025-04-16 PROCEDURE — 99214 OFFICE O/P EST MOD 30 MIN: CPT | Performed by: INTERNAL MEDICINE

## 2025-04-16 PROCEDURE — 3078F DIAST BP <80 MM HG: CPT | Performed by: INTERNAL MEDICINE

## 2025-04-16 PROCEDURE — 3075F SYST BP GE 130 - 139MM HG: CPT | Performed by: INTERNAL MEDICINE

## 2025-04-16 PROCEDURE — G2211 COMPLEX E/M VISIT ADD ON: HCPCS | Performed by: INTERNAL MEDICINE

## 2025-04-16 PROCEDURE — 85025 COMPLETE CBC W/AUTO DIFF WBC: CPT

## 2025-04-16 RX ORDER — ATENOLOL 25 MG/1
25 TABLET ORAL DAILY
COMMUNITY

## 2025-04-16 RX ORDER — SODIUM CHLORIDE 9 MG/ML
250 INJECTION, SOLUTION INTRAVENOUS ONCE
OUTPATIENT
Start: 2025-04-16

## 2025-04-16 NOTE — PROGRESS NOTES
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 male who returns today for follow up of the above issue.      Patient is recovering well after undergoing CABG x 3 and MV repair on 2/10/25 (). Uneventful post-op period. He is undergoing cardiac rehab. Still feel tired.     He continues to undergo therapeutic phlebotomy to maintain hematocrit less than 50% for his myeloproliferative disorder. He has no abdominal pain.  He has no fevers or chills, signs or symptoms of infection.  He does follow-up with urology including Dr. Lopez regularly regarding his previous clear-cell carcinoma.  He undergoes annual surveillance imaging through [facility].    REVIEW OF SYSTEMS:  ROS as per HPI    PHYSICAL EXAMINATION:    Vitals:    04/16/25 1143   BP: 135/75   Pulse: 71   Temp: 98.4 °F (36.9 °C)   TempSrc: Oral   SpO2: 98%   Weight: 68.9 kg (151 lb 12.8 oz)   Height: 172.7 cm (67.99\")   PainSc: 3    PainLoc: Leg       PHYSICAL EXAM  General:  No acute distress, awake, alert and oriented  Skin:  Warm and dry, no visible rash  HEENT:  Normocephalic/atraumatic.  Wearing a face mask.  Chest:  Normal respiratory effort  Extremities:  No visible clubbing, cyanosis, or edema  Neuro/psych:  Grossly nonfocal.  Normal mood and affect.        DIAGNOSTIC DATA:  Results from last 7 days   Lab Units 04/16/25  1135   WBC 10*3/mm3 10.03   NEUTROS ABS 10*3/mm3 6.55   HEMOGLOBIN g/dL 13.1   HEMATOCRIT % 44.2   PLATELETS 10*3/mm3 220                   IMAGING:    None reviewed.     ASSESSMENT:  This is a 80 y.o. male with:    *Polycythemia vera, JAK2 mutation positive  He does have associated leukocytosis which is minimal.  BCR/ABL negative  Per Dr. Damon's note, the patient " has previously trialed Hydrea with intolerance and declines taking this  Continues to undergo therapeutic phlebotomy to maintain hematocrit less than 50%  Today, 4/16/25, minimal leukocytosis at 10.03, hemoglobin 13.1, hematocrit 44.2%, platelets 220,000.  The patient will not require phlebotomy.  Will continue to monitor every 3 months.    *Elevated PSA  [facility], Dr. Ganesh Lopez.  Of the prostate performed and disclosed no abnormalities, no biopsy was thought to be necessary  Most recent imaging with MRI 5/17/2024 no suspicious lesions per PI-RADS criteria.  Marked BPH has increased from prior exam  He continues to follow with urology every 6 months who is monitoring his PSA    *History of stage I cell carcinoma of the kidney  That is post partial nephron sparing nephrectomy.  He required no adjuvant therapy  He continues to follow with Dr. Lopez with annual CT scans for surveillance    *CAD s/p CABG and MV repair (210/2025, ): Doing well. Continue Cardiac rehab      PLAN:  The patient not require phlebotomy today with hematocrit of 44%  Return in 3 months for CBC and possible phlebotomy if hematocrit is over 50%  Continue to follow-up with urology with surveillance scans for previous clear-cell carcinoma and PSA monitoring  In 6 months, CBC, CMP, MD follow-up for further management of his care   Orders Placed This Encounter   Procedures    Comprehensive Metabolic Panel     Standing Status:   Future     Number of Occurrences:   1     Expected Date:   4/16/2025     Expiration Date:   4/15/2026     Release to patient:   Routine Release [[id number]]    Comprehensive Metabolic Panel     Standing Status:   Standing     Number of Occurrences:   2     Next Expected Occurrence:   7/11/2025     Expiration Date:   7/16/2026     Release to patient:   Routine Release [[id number]]    CBC & Differential     Standing Status:   Future     Number of Occurrences:   1     Expected Date:   4/16/2025      Expiration Date:   4/15/2026     Manual Differential:   No     Release to patient:   Routine Release [[id number]]

## 2025-04-28 DIAGNOSIS — I34.0 MITRAL VALVE INSUFFICIENCY, UNSPECIFIED ETIOLOGY: Primary | ICD-10-CM

## 2025-04-28 DIAGNOSIS — Z98.890 STATUS POST MITRAL VALVE REPAIR: ICD-10-CM

## 2025-06-05 NOTE — PROGRESS NOTES
Subjective:     Encounter Date:06/06/2025      Patient ID: Scott Murphy is a 80 y.o. male.    Chief Complaint:  History of Present Illness    This is a 80-year-old with polycythemia vera, polymyalgia rheumatica, history of renal cell carcinoma status post partial nephrectomy, hypertension, hypothyroidism, Raynaud's disease, asthma, thoracic aortic aneurysm, mitral valve regurgitation status post mitral valve repair in 1/2025, coronary artery disease status post CABG x 3, who presents for follow up.        He was noted to have a murmur on exam in 12/2024 by ALETHA Lerma.  An echocardiogram was performed and showed normal left ventricular function and wall motion with an EF of 56% and evidence of posterior mitral valve prolapse with severe mitral valve regurgitation, normal right ventricular systolic pressure, mild tricuspid valve regurgitation, and stable dilation of the aortic root and ascending aorta.  A transesophageal echocardiogram and cardiac catheterization were recommended.       Transesophageal echocardiogram performed on 1/8/2025.  This confirmed the findings of posterior flail leaflet and prolapse due to torn chordae resulting in severe eccentric mitral valve regurgitation.  There were no other new findings noted.  Right and left cardiac catheterization showed severe stenosis of the proximal LAD, proximal obtuse marginal branch, and distal right coronary artery but otherwise normal pulmonary pressures.  He was referred to Dr. Alcantara for further evaluation who recommended proceeding with surgery.  He underwent mitral valve repair, CABG x 3 with a LIMA to LAD, saphenous vein graft to the posterior descending branch, and saphenous vein graft to the first obtuse marginal branch, and epicardial closure of the left atrial appendage on 2/10/2025.  He recovered fairly well from a cardiac standpoint although he did have bursts of atrial fibrillation was started on amiodarone.  He underwent overnight  oximetry which qualified him for oxygen at home at night.  Patient was discharged eventually on 2/17/2025.     Follow-ups he had issues with low blood pressure so his atenolol dosage was decreased to once a day.  His lightheadedness improved with this.  When I saw him in 2/2025 I recommended that he stop the furosemide and potassium and continue low-dose atenolol.  Presented to have some swelling of his right medial thigh.  I recommended he undergo evaluation with surgery.  Lower extremity venous Doppler ultrasound was performed by their office and was negative for DVT.  It was felt to be due to the hematoma.    He returned in 4/2025 and was seen by ALTEHA Lerma.  Prior to that office that he had stopped the atenolol and was doing better.  He was still struggling with fatigue but the hope was that this would improve once he was able to start cardiac rehab.  He did undergo an echocardiogram which showed normal left ventricular function with an EF of 56 to 60%, normal diastolic function, mildly reduced right ventricular function with normal size, normal right ventricular systolic pressure, normal function of his mitral valve, moderate dilatation aortic root measuring 4.8 cm with moderate dilatation of the ascending aorta measuring 4.6 cm.    He presents back today for follow-up.  Overall he is feeling better.  Fatigue is improved although still somewhat present.  He is back to his usual activity including working in the yard without any significant issues.  The swelling of his right thigh is improving.  He denies any significant chest pain.  He denies any significant shortness of breath.  Denies any palpitations, orthopnea, near-syncope or syncope or lower extremity swelling.    He discontinued the atorvastatin on his own because of this.  He is also concerned that the atenolol is causing issues with his bowels.     Prior History:  I saw the patient initially in 1/2018 when he presented for evaluation of an  abnormal EKG.  Prior to that office visit the patient underwent his yearly Medicare physical with Dr. Delgado at which time he had a routine EKG performed that showed a right bundle branch block that was felt to be new.  Based on this finding he was sent here for further evaluation.  Ten years prior he was evaluated by Dr. Jarrett complaints of chest pain and underwent an echocardiogram, stress test, and a cardiac catheterization that were all reportedly unremarkable (records are not available from that workup).  At his initial office visit with me he denied any symptoms and was active playing tennis about 2 or 3 times a week.    At that office visit I noted that he had a similar appearing right bundle branch block on EKG from 1/2014.  Recommended proceeding with an echocardiogram to look for any structural heart disease with his right bundle branch block.  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  I was out of the office at the time that the echocardiogram was performed and resulted and these results were communicated to the patient at that time however it does not appear that he was told about the aortic dilatation.     A CT performed on 8/23/2019 and it showed mild aneurysmal dilatation of the ascending aorta measuring 4.2 x 4 cm compared to a measurement of 4 x 3.9 cm in 2/2018.       An echocardiogram in 2020 showed mild to moderate dilatation of his aortic root and ascending aorta measuring about 4.3 cm.  Remainder of his echocardiogram showed normal left ventricular systolic function wall motion with an EF of 65% grade 1 diastolic dysfunction and no significant valvular disease.     Repeat in  9/2021 showed stable appearing size of his aortic root and ascending aorta measuring about 4.2 cm.  Remainder of his echocardiogram is unremarkable and stable.     Patient had some issues with left-sided sharp chest pain.  Dr. Damon recommended proceeding with a CT angiogram of the chest which was performed on 2/10/2021.  This showed no evidence of pulmonary embolism and no significant change in the approximately 4.4 cm dilatation of the aortic root or 4 cm dilatation of the ascending aorta.       He was seen in urgent follow up by ALETHA Lerma in 4/2023.  Prior to that office visit he developed swelling so his amlodipine 10 mg was discontinued and he was advised to increase his losartan to 50 mg.  However he developed issues with low blood pressures and he decreased the losartan to 25 mg.  He then developed some issues with mildly elevated blood pressures and as result was taking an additional dosage of losartan as needed.  At that office visit is recommended that he resume the amlodipine at 5 mg and continue losartan at 25 mg.  The patient also reported an isolated episode of bradycardia noted by his watch in the middle the night.  It was recommended just monitor for further episodes at the time.  Eventually increase the amlodipine to 7.5 mg.       Review of Systems   Constitutional: Positive for malaise/fatigue.   HENT:  Negative for hearing loss, hoarse voice, nosebleeds and sore throat.    Eyes:  Negative for pain.   Cardiovascular:  Negative for chest pain, claudication, cyanosis, dyspnea on exertion, irregular heartbeat, leg swelling, near-syncope, orthopnea, palpitations, paroxysmal nocturnal dyspnea and syncope.   Respiratory:  Negative for shortness of breath and snoring.    Endocrine: Negative for cold intolerance, heat intolerance, polydipsia, polyphagia and polyuria.   Skin:  Negative for itching and rash.   Musculoskeletal:  Negative for arthritis, falls, joint pain, joint swelling, muscle  cramps, muscle weakness and myalgias.   Gastrointestinal:  Positive for constipation. Negative for diarrhea, dysphagia, heartburn, hematemesis, hematochezia, melena, nausea and vomiting.   Genitourinary:  Negative for frequency, hematuria and hesitancy.   Neurological:  Negative for excessive daytime sleepiness, dizziness, headaches, light-headedness, numbness and weakness.   Psychiatric/Behavioral:  Negative for depression. The patient is not nervous/anxious.          Current Outpatient Medications:     ALPRAZolam (XANAX) 0.5 MG tablet, Take 0.5 tablets by mouth At Night As Needed. Indications: Feeling Anxious, Disp: , Rfl:     atenolol (TENORMIN) 25 MG tablet, Take 1 tablet by mouth Daily., Disp: , Rfl:     B Complex Vitamins (VITAMIN B-COMPLEX PO), Take 1 tablet by mouth Daily. HOLD PRIOR TO SURG  Indications: supplement, Disp: , Rfl:     levocetirizine (XYZAL) 5 MG tablet, Take 1 tablet by mouth Every Evening. Indications: Perennial Allergic Rhinitis, Disp: , Rfl:     levothyroxine (SYNTHROID, LEVOTHROID) 100 MCG tablet, Take 1 tablet by mouth daily., Disp: 30 tablet, Rfl: 6    montelukast (SINGULAIR) 10 MG tablet, Take 1 tablet by mouth Every Night. Indications: Asthma, Disp: , Rfl:     predniSONE (DELTASONE) 5 MG tablet, Take 1 tablet by mouth Daily., Disp: , Rfl:     zolpidem (AMBIEN) 10 MG tablet, Take 1 tablet by mouth At Night As Needed. Indications: Trouble Sleeping, Disp: , Rfl:     Past Medical History:   Diagnosis Date    Acquired hypothyroidism     Anxiety     Aortic aneurysm     Aortic valve regurgitation     Arthritis     Asthma     Hutton's cyst of knee, right     BBB (bundle branch block)     Coronary artery disease     Disease of thyroid gland     Gout     H/O Muscle pain     Right shoulder and neck area    H/O Radiation treatment     For tonsillar inflammation and infection when he was a child and this led him to develop thyroid cancer.    History of basal cell cancer     History of colon polyps      Hypertension     Hyperthyroidism     Hypothyroidism     Kidney carcinoma     H/O stage I clear cell carcinoma of the right kidney, status post partial nephrectomy, nephron-sparing surgery by Dr. Ganesh Lopez    Left shoulder pain     Myeloproliferative disorder     With polycythemia vera, JAK2 mutation positive requiring periodic phlebotomies (hematocrit above 47).    Polymyalgia rheumatica     Premature atrial contraction     Prostate cancer     Raynaud disease     Shoulder injury, left, sequela     SOB (shortness of breath) on exertion        Past Surgical History:   Procedure Laterality Date    CARDIAC CATHETERIZATION N/A 1/8/2025    Procedure: Coronary angiography;  Surgeon: Di Roberts MD;  [facility];  Service: Cardiovascular;  Laterality: N/A;    CARDIAC CATHETERIZATION N/A 1/8/2025    Procedure: Left heart cath;  Surgeon: Di Roberts MD;  [facility];  Service: Cardiovascular;  Laterality: N/A;    CARDIAC CATHETERIZATION N/A 1/8/2025    Procedure: Right Heart Cath;  Surgeon: Di Roberts MD;  Location: [facility] CATH INVASIVE LOCATION;  Service: Cardiovascular;  Laterality: N/A;    CIRCUMCISION N/A 12/17/2018    Procedure: CIRCUMCISION;  Surgeon: Gallo Lopez MD;  Location: [facility] OR;  Service: Urology    COLONOSCOPY  2010    Documented a tubulovillous adenom that was removed completely. Colonoscopy in 2014 was unremarkable.    COLONOSCOPY N/A 2/20/2020    Procedure: COLONOSCOPY;  Surgeon: Claire Galo MD;  Location: [facility] OR;  Service: Gastroenterology;  Laterality: N/A;  diverticulosis    CORONARY ARTERY BYPASS GRAFT WITH AORTIC AND MITRAL VALVE REPAIR/REPLACEMENT N/A 2/10/2025    Procedure: STERNOTOMY,CORONARY ARTERY BYPASS  X 3,WITH INTERNAL MAMMARY ARTERY GRAFT AND ENDOSCOPICLY HARVESTED RIGHT SAPHENOUS VEIN MITRAL VALVE REPAIR, LEFT ATRIAL APPENDAGE EXCLUSION, PRP  TRANSESOPHAGEAL ECHOCARDIOGRAM WITH ANESTHESIA;  " Surgeon: Migel Alcantara MD;  Location: [facility];  Service: Cardiothoracic;  Laterality: N/A;    HERNIA REPAIR      H/O multiple hernia repairs including right inguinal hernia repair in 1981, 2003, and double hernia repair in 2013    KIDNEY SURGERY      PROSTATE BIOPSY      Showed features consistent with prostate cancer    SHOULDER SURGERY  1995    Shoulder repair    THYROIDECTOMY, PARTIAL  1983    For malignant tumor    TOOTH EXTRACTION         Family History   Problem Relation Age of Onset    No Known Problems Mother     Heart attack Father     Heart disease Father     Hypertension Father     Diabetes Father     Tuberculosis Maternal Grandmother     No Known Problems Son     Drug abuse Son     Malig Hyperthermia Neg Hx        Social History     Tobacco Use    Smoking status: Never    Smokeless tobacco: Never    Tobacco comments:     caffeine use    Vaping Use    Vaping status: Never Used   Substance Use Topics    Alcohol use: Yes     Alcohol/week: 1.0 - 3.0 standard drink of alcohol     Types: 1 - 3 Glasses of wine per week     Comment: 1 MONTHLY    Drug use: No         ECG 12 Lead    Date/Time: 6/6/2025 11:49 AM  Performed by: Di Roberts MD    Authorized by: Di Roberts MD  Comparison: compared with previous ECG   Similar to previous ECG  Rhythm: sinus rhythm  Conduction: right bundle branch block             Objective:     Visit Vitals  /70   Pulse 60   Ht 172.7 cm (67.99\")   Wt 69.4 kg (153 lb)   SpO2 98%   BMI 23.27 kg/m²         Constitutional:       Appearance: Normal appearance. Well-developed.   HENT:      Head: Normocephalic and atraumatic.   Neck:      Vascular: No carotid bruit or JVD.   Pulmonary:      Effort: Pulmonary effort is normal.      Breath sounds: Normal breath sounds.   Cardiovascular:      Normal rate. Regular rhythm.      No gallop.    Pulses:     Radial: 2+ bilaterally.  Edema:     Peripheral edema absent.   Abdominal:      Palpations: Abdomen is soft.   Skin:     " General: Skin is warm and dry.   Neurological:      Mental Status: Alert and oriented to person, place, and time.             Assessment:          Diagnosis Plan   1. Coronary artery disease involving native coronary artery of native heart without angina pectoris        2. S/P CABG (coronary artery bypass graft)        3. Status post mitral valve repair        4. Thoracic aortic aneurysm without rupture, unspecified part        5. Primary hypertension        6. Hypertension, unspecified type        7. Hyperlipidemia, unspecified hyperlipidemia type        8. Mitral valve insufficiency, unspecified etiology        9. PMR (polymyalgia rheumatica)               Plan:         1.  Coronary artery disease.  Appears to be stable and asymptomatic.  EKG without any changes.  Continue current medical management.  2.  Mitral valve repair.  Normal function on recent echocardiogram.  3.  Ascending aortic aneurysm.  Has follow-up with the aorta clinic coming up later this month.  4.  Hypertension.  Blood pressures are well-controlled on the atenolol.  The patient feels that the atenolol may be causing GI issues.  Explained that I would like to keep him on a beta-blocker ideally for his aneurysm.  He was on losartan previously which would rather not resume since his most recent lab work shows mildly elevated potassium.  I recommended the patient try holding the atenolol to see if this makes a difference with his GI symptoms before we abandon this medication altogether.  He will notify me if he feels better off the atenolol.  5.  Hyperlipidemia.  Last LDL was above goal at 101.  He is currently off the atorvastatin because of myalgias.  Recommend that we try alternative statins.  Explained that we will need to try another 1-2 statins before we can consider statin alternative such as a PCSK9 inhibitor.  Will start rosuvastatin 10 mg.  Recommended that he take this with over-the-counter co-Q10 to see if this will prevent the  myalgias.    Will plan on seeing the patient back again in 3 months.

## 2025-06-06 ENCOUNTER — OFFICE VISIT (OUTPATIENT)
Age: 80
End: 2025-06-06
Payer: MEDICARE

## 2025-06-06 VITALS
DIASTOLIC BLOOD PRESSURE: 70 MMHG | OXYGEN SATURATION: 98 % | WEIGHT: 153 LBS | BODY MASS INDEX: 23.19 KG/M2 | HEART RATE: 60 BPM | HEIGHT: 68 IN | SYSTOLIC BLOOD PRESSURE: 130 MMHG

## 2025-06-06 DIAGNOSIS — E78.5 HYPERLIPIDEMIA, UNSPECIFIED HYPERLIPIDEMIA TYPE: ICD-10-CM

## 2025-06-06 DIAGNOSIS — Z95.1 S/P CABG (CORONARY ARTERY BYPASS GRAFT): ICD-10-CM

## 2025-06-06 DIAGNOSIS — I71.20 THORACIC AORTIC ANEURYSM WITHOUT RUPTURE, UNSPECIFIED PART: ICD-10-CM

## 2025-06-06 DIAGNOSIS — Z98.890 STATUS POST MITRAL VALVE REPAIR: ICD-10-CM

## 2025-06-06 DIAGNOSIS — I25.10 CORONARY ARTERY DISEASE INVOLVING NATIVE CORONARY ARTERY OF NATIVE HEART WITHOUT ANGINA PECTORIS: Primary | ICD-10-CM

## 2025-06-06 DIAGNOSIS — M35.3 PMR (POLYMYALGIA RHEUMATICA): ICD-10-CM

## 2025-06-06 DIAGNOSIS — I10 HYPERTENSION, UNSPECIFIED TYPE: ICD-10-CM

## 2025-06-06 DIAGNOSIS — I34.0 MITRAL VALVE INSUFFICIENCY, UNSPECIFIED ETIOLOGY: ICD-10-CM

## 2025-06-06 DIAGNOSIS — I10 PRIMARY HYPERTENSION: ICD-10-CM

## 2025-06-06 PROCEDURE — 3078F DIAST BP <80 MM HG: CPT | Performed by: INTERNAL MEDICINE

## 2025-06-06 PROCEDURE — 1160F RVW MEDS BY RX/DR IN RCRD: CPT | Performed by: INTERNAL MEDICINE

## 2025-06-06 PROCEDURE — 99214 OFFICE O/P EST MOD 30 MIN: CPT | Performed by: INTERNAL MEDICINE

## 2025-06-06 PROCEDURE — 1159F MED LIST DOCD IN RCRD: CPT | Performed by: INTERNAL MEDICINE

## 2025-06-06 PROCEDURE — 3075F SYST BP GE 130 - 139MM HG: CPT | Performed by: INTERNAL MEDICINE

## 2025-06-06 PROCEDURE — 93000 ELECTROCARDIOGRAM COMPLETE: CPT | Performed by: INTERNAL MEDICINE

## 2025-06-06 RX ORDER — ROSUVASTATIN CALCIUM 10 MG/1
10 TABLET, COATED ORAL DAILY
Qty: 30 TABLET | Refills: 5 | Status: SHIPPED | OUTPATIENT
Start: 2025-06-06

## 2025-06-06 RX ORDER — PREDNISONE 5 MG/1
5 TABLET ORAL DAILY
COMMUNITY

## 2025-07-16 ENCOUNTER — OFFICE VISIT (OUTPATIENT)
Dept: ONCOLOGY | Facility: CLINIC | Age: 80
End: 2025-07-16
Payer: MEDICARE

## 2025-07-16 ENCOUNTER — INFUSION (OUTPATIENT)
Dept: ONCOLOGY | Facility: HOSPITAL | Age: 80
End: 2025-07-16
Payer: MEDICARE

## 2025-07-16 ENCOUNTER — LAB (OUTPATIENT)
Dept: LAB | Facility: HOSPITAL | Age: 80
End: 2025-07-16
Payer: MEDICARE

## 2025-07-16 VITALS
DIASTOLIC BLOOD PRESSURE: 83 MMHG | WEIGHT: 152.4 LBS | HEIGHT: 68 IN | TEMPERATURE: 97.6 F | SYSTOLIC BLOOD PRESSURE: 145 MMHG | BODY MASS INDEX: 23.1 KG/M2 | RESPIRATION RATE: 16 BRPM | HEART RATE: 64 BPM | OXYGEN SATURATION: 96 %

## 2025-07-16 DIAGNOSIS — D75.1 ERYTHROCYTOSIS: ICD-10-CM

## 2025-07-16 DIAGNOSIS — C61 PROSTATE CANCER: ICD-10-CM

## 2025-07-16 DIAGNOSIS — C64.1 CANCER OF RIGHT KIDNEY: ICD-10-CM

## 2025-07-16 DIAGNOSIS — D45 JAK2 POSITIVE POLYCYTHEMIA VERA: Primary | ICD-10-CM

## 2025-07-16 DIAGNOSIS — D45 JAK2 POSITIVE POLYCYTHEMIA VERA: ICD-10-CM

## 2025-07-16 LAB
ALBUMIN SERPL-MCNC: 4.2 G/DL (ref 3.5–5.2)
ALBUMIN/GLOB SERPL: 2 G/DL
ALP SERPL-CCNC: 75 U/L (ref 39–117)
ALT SERPL W P-5'-P-CCNC: 26 U/L (ref 1–41)
ANION GAP SERPL CALCULATED.3IONS-SCNC: 11.4 MMOL/L (ref 5–15)
AST SERPL-CCNC: 28 U/L (ref 1–40)
BASOPHILS # BLD AUTO: 0.07 10*3/MM3 (ref 0–0.2)
BASOPHILS NFR BLD AUTO: 0.7 % (ref 0–1.5)
BILIRUB SERPL-MCNC: 1.5 MG/DL (ref 0–1.2)
BUN SERPL-MCNC: 29.5 MG/DL (ref 8–23)
BUN/CREAT SERPL: 24.6 (ref 7–25)
CALCIUM SPEC-SCNC: 9.3 MG/DL (ref 8.6–10.5)
CHLORIDE SERPL-SCNC: 103 MMOL/L (ref 98–107)
CO2 SERPL-SCNC: 23.6 MMOL/L (ref 22–29)
CREAT SERPL-MCNC: 1.2 MG/DL (ref 0.76–1.27)
DEPRECATED RDW RBC AUTO: 49.4 FL (ref 37–54)
EGFRCR SERPLBLD CKD-EPI 2021: 61.5 ML/MIN/1.73
EOSINOPHIL # BLD AUTO: 0.23 10*3/MM3 (ref 0–0.4)
EOSINOPHIL NFR BLD AUTO: 2.4 % (ref 0.3–6.2)
ERYTHROCYTE [DISTWIDTH] IN BLOOD BY AUTOMATED COUNT: 15.9 % (ref 12.3–15.4)
GLOBULIN UR ELPH-MCNC: 2.1 GM/DL
GLUCOSE SERPL-MCNC: 87 MG/DL (ref 65–99)
HCT VFR BLD AUTO: 49.6 % (ref 37.5–51)
HGB BLD-MCNC: 15.4 G/DL (ref 13–17.7)
IMM GRANULOCYTES # BLD AUTO: 0.05 10*3/MM3 (ref 0–0.05)
IMM GRANULOCYTES NFR BLD AUTO: 0.5 % (ref 0–0.5)
LYMPHOCYTES # BLD AUTO: 1.76 10*3/MM3 (ref 0.7–3.1)
LYMPHOCYTES NFR BLD AUTO: 18.6 % (ref 19.6–45.3)
MCH RBC QN AUTO: 26.8 PG (ref 26.6–33)
MCHC RBC AUTO-ENTMCNC: 31 G/DL (ref 31.5–35.7)
MCV RBC AUTO: 86.4 FL (ref 79–97)
MONOCYTES # BLD AUTO: 1.05 10*3/MM3 (ref 0.1–0.9)
MONOCYTES NFR BLD AUTO: 11.1 % (ref 5–12)
NEUTROPHILS NFR BLD AUTO: 6.28 10*3/MM3 (ref 1.7–7)
NEUTROPHILS NFR BLD AUTO: 66.7 % (ref 42.7–76)
NRBC BLD AUTO-RTO: 0 /100 WBC (ref 0–0.2)
PLATELET # BLD AUTO: 233 10*3/MM3 (ref 140–450)
PMV BLD AUTO: 9.2 FL (ref 6–12)
POTASSIUM SERPL-SCNC: 4.7 MMOL/L (ref 3.5–5.2)
PROT SERPL-MCNC: 6.3 G/DL (ref 6–8.5)
RBC # BLD AUTO: 5.74 10*6/MM3 (ref 4.14–5.8)
SODIUM SERPL-SCNC: 138 MMOL/L (ref 136–145)
WBC NRBC COR # BLD AUTO: 9.44 10*3/MM3 (ref 3.4–10.8)

## 2025-07-16 PROCEDURE — 36415 COLL VENOUS BLD VENIPUNCTURE: CPT

## 2025-07-16 PROCEDURE — 85025 COMPLETE CBC W/AUTO DIFF WBC: CPT

## 2025-07-16 PROCEDURE — G0463 HOSPITAL OUTPT CLINIC VISIT: HCPCS

## 2025-07-16 PROCEDURE — 80053 COMPREHEN METABOLIC PANEL: CPT

## 2025-07-16 NOTE — PROGRESS NOTES
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 male who returns today for follow up of the above issue.      The patient returns to the office today, 7/16/2025 for 3-month follow-up and review.  He remains in observation regarding his previous malignancy.  He reports since undergoing CABG times 3 February 2025 he has made lifestyle adjustments and dietary changes.  He is attempting to walk 8000 daily.  He is eating and drinking adequately with normal urine output.  He continues to follow with Dr. Lopez at urology for his previous renal cell carcinoma.  He undergoes [facility]      REVIEW OF SYSTEMS:  ROS as per HPI    PHYSICAL EXAMINATION:    Vitals:    07/16/25 1404   BP: 145/83   Pulse: 64   Resp: 16   Temp: 97.6 °F (36.4 °C)   TempSrc: Oral   SpO2: 96%   Weight: 69.1 kg (152 lb 6.4 oz)   Height: 172.7 cm (67.99\")   PainSc: 0-No pain         PHYSICAL EXAM  General:  No acute distress, awake, alert and oriented  Skin:  Warm and dry, no visible rash  HEENT:  Normocephalic/atraumatic.  Wearing a face mask.  Chest:  Normal respiratory effort  Extremities:  No visible clubbing, cyanosis, or edema  Neuro/psych:  Grossly nonfocal.  Normal mood and affect.        DIAGNOSTIC DATA:  Results from last 7 days   Lab Units 07/16/25  1345   WBC 10*3/mm3 9.44   NEUTROS ABS 10*3/mm3 6.28   HEMOGLOBIN g/dL 15.4   HEMATOCRIT % 49.6   PLATELETS 10*3/mm3 233       Results from last 7 days   Lab Units 07/16/25  1333   SODIUM mmol/L 138   POTASSIUM mmol/L 4.7   CHLORIDE mmol/L 103   CO2 mmol/L 23.6   BUN mg/dL 29.5*   CREATININE mg/dL 1.20   CALCIUM mg/dL 9.3   ALBUMIN g/dL 4.2   BILIRUBIN mg/dL 1.5*   ALK PHOS U/L 75   ALT (SGPT) U/L 26 "   AST (SGOT) U/L 28   GLUCOSE mg/dL 87           IMAGING:    None reviewed.     ASSESSMENT:  This is a 80 y.o. male with:    *Polycythemia vera, JAK2 mutation positive  He does have associated leukocytosis which is minimal.  BCR/ABL negative  Per Dr. Damon's note, the patient has previously trialed Hydrea with intolerance and declines taking this  Continues to undergo therapeutic phlebotomy to maintain hematocrit less than 50%  4/16/25, minimal leukocytosis at 10.03, hemoglobin 13.1, hematocrit 44.2%, platelets 220,000.  The patient will not require phlebotomy.    7/16/2025 WBC 9.44, hemoglobin 15.4, hematocrit 49.7%, platelets 233,000    *Elevated PSA  [facility], Dr. Ganesh Lopez.  Of the prostate performed and disclosed no abnormalities, no biopsy was thought to be necessary  Most recent imaging with MRI 5/17/2024 no suspicious lesions per PI-RADS criteria.  Marked BPH has increased from prior exam  He continues to follow with urology every 6 months who is monitoring his PSA    *History of stage I cell carcinoma of the kidney  That is post partial nephron sparing nephrectomy.  He required no adjuvant therapy  He continues to follow with Dr. Lopez with annual CT scans for surveillance    *CAD s/p CABG and MV repair (210/2025, ): Doing well. Continue Cardiac rehab      PLAN:  We discussed the possibility of phlebotomy today though the patient declines with hematocrit less than 50%  Return in 4 weeks for CBC and possible phlebotomy if hematocrit greater than 50%.  Patient will prefer an additional appointment rather than waiting 3 months  Continue to follow-up with urology with surveillance scans for previous clear-cell carcinoma and PSA monitoring  MD follow-up with in 3 months with repeat labs and possible phlebotomy    Orders Placed This Encounter   Procedures    CBC & Differential     Standing Status:   Future     Number of Occurrences:   1     Expected Date:   7/21/2025      Expiration Date:   10/16/2026     Manual Differential:   No     Release to patient:   Routine Release [[id number]]     Scarlet Diallo, APRN  07/16/2025

## 2025-08-01 RX ORDER — DOXYCYCLINE HYCLATE 100 MG
100 TABLET ORAL ONCE
Qty: 1 TABLET | Refills: 0 | Status: SHIPPED | OUTPATIENT
Start: 2025-08-01 | End: 2025-08-01

## 2025-08-13 ENCOUNTER — LAB (OUTPATIENT)
Dept: LAB | Facility: HOSPITAL | Age: 80
End: 2025-08-13
Payer: MEDICARE

## 2025-08-13 ENCOUNTER — INFUSION (OUTPATIENT)
Dept: ONCOLOGY | Facility: HOSPITAL | Age: 80
End: 2025-08-13
Payer: MEDICARE

## 2025-08-13 VITALS
TEMPERATURE: 98.2 F | HEART RATE: 66 BPM | WEIGHT: 151.8 LBS | DIASTOLIC BLOOD PRESSURE: 87 MMHG | SYSTOLIC BLOOD PRESSURE: 132 MMHG | RESPIRATION RATE: 16 BRPM | OXYGEN SATURATION: 98 % | BODY MASS INDEX: 23.09 KG/M2

## 2025-08-13 DIAGNOSIS — C64.1 CANCER OF RIGHT KIDNEY: ICD-10-CM

## 2025-08-13 DIAGNOSIS — D75.1 ERYTHROCYTOSIS: Primary | ICD-10-CM

## 2025-08-13 DIAGNOSIS — D75.1 ERYTHROCYTOSIS: ICD-10-CM

## 2025-08-13 DIAGNOSIS — D45 JAK2 POSITIVE POLYCYTHEMIA VERA: ICD-10-CM

## 2025-08-13 LAB
BASOPHILS # BLD AUTO: 0.06 10*3/MM3 (ref 0–0.2)
BASOPHILS NFR BLD AUTO: 0.6 % (ref 0–1.5)
DEPRECATED RDW RBC AUTO: 52.7 FL (ref 37–54)
EOSINOPHIL # BLD AUTO: 0.07 10*3/MM3 (ref 0–0.4)
EOSINOPHIL NFR BLD AUTO: 0.7 % (ref 0.3–6.2)
ERYTHROCYTE [DISTWIDTH] IN BLOOD BY AUTOMATED COUNT: 17 % (ref 12.3–15.4)
HCT VFR BLD AUTO: 48.8 % (ref 37.5–51)
HGB BLD-MCNC: 15.4 G/DL (ref 13–17.7)
IMM GRANULOCYTES # BLD AUTO: 0.04 10*3/MM3 (ref 0–0.05)
IMM GRANULOCYTES NFR BLD AUTO: 0.4 % (ref 0–0.5)
LYMPHOCYTES # BLD AUTO: 1.29 10*3/MM3 (ref 0.7–3.1)
LYMPHOCYTES NFR BLD AUTO: 13.1 % (ref 19.6–45.3)
MCH RBC QN AUTO: 27.6 PG (ref 26.6–33)
MCHC RBC AUTO-ENTMCNC: 31.6 G/DL (ref 31.5–35.7)
MCV RBC AUTO: 87.5 FL (ref 79–97)
MONOCYTES # BLD AUTO: 0.64 10*3/MM3 (ref 0.1–0.9)
MONOCYTES NFR BLD AUTO: 6.5 % (ref 5–12)
NEUTROPHILS NFR BLD AUTO: 7.72 10*3/MM3 (ref 1.7–7)
NEUTROPHILS NFR BLD AUTO: 78.7 % (ref 42.7–76)
NRBC BLD AUTO-RTO: 0 /100 WBC (ref 0–0.2)
PLATELET # BLD AUTO: 240 10*3/MM3 (ref 140–450)
PMV BLD AUTO: 9.5 FL (ref 6–12)
RBC # BLD AUTO: 5.58 10*6/MM3 (ref 4.14–5.8)
WBC NRBC COR # BLD AUTO: 9.82 10*3/MM3 (ref 3.4–10.8)

## 2025-08-13 PROCEDURE — 85025 COMPLETE CBC W/AUTO DIFF WBC: CPT

## 2025-08-13 PROCEDURE — 36415 COLL VENOUS BLD VENIPUNCTURE: CPT

## 2025-08-13 PROCEDURE — 99195 PHLEBOTOMY: CPT

## 2025-08-13 RX ORDER — SODIUM CHLORIDE 9 MG/ML
250 INJECTION, SOLUTION INTRAVENOUS ONCE
OUTPATIENT
Start: 2025-08-13

## 2025-08-18 ENCOUNTER — TELEPHONE (OUTPATIENT)
Dept: CARDIOLOGY | Facility: CLINIC | Age: 80
End: 2025-08-18
Payer: MEDICARE

## 2025-08-18 RX ORDER — ATENOLOL 25 MG/1
25 TABLET ORAL DAILY
Qty: 90 TABLET | Refills: 1 | Status: SHIPPED | OUTPATIENT
Start: 2025-08-18

## (undated) DEVICE — CANN AORT ROOT DLP VNT 14G 7F

## (undated) DEVICE — CANN VESL FREE FLO 2MM

## (undated) DEVICE — LOU MINOR PROCEDURE: Brand: MEDLINE INDUSTRIES, INC.

## (undated) DEVICE — 8 FOOT DISPOSABLE EXTENSION CABLE WITH SAFE CONNECT / ALLIGATOR CLIP

## (undated) DEVICE — DRSNG WND BORDR/ADHS NONADHR/GZ LF 4X14IN STRL

## (undated) DEVICE — ORGANIZER SUT SHELIGH 3T 213013

## (undated) DEVICE — OCCLUSIVE GAUZE STRIP,3% BISMUTH TRIBROMOPHENATE IN PETROLATUM BLEND: Brand: XEROFORM

## (undated) DEVICE — KT MANIFLD CARDIAC

## (undated) DEVICE — DECANTER BAG 9": Brand: MEDLINE INDUSTRIES, INC.

## (undated) DEVICE — GLV SURG TRIUMPH CLASSIC PF LTX 8 STRL

## (undated) DEVICE — HEMOCONCENTRATOR PERFUS LPS06

## (undated) DEVICE — MARKER,SKIN,WI/RULER AND LABELS: Brand: MEDLINE

## (undated) DEVICE — SUCTION CANISTER, 3000CC,SAFELINER: Brand: DEROYAL

## (undated) DEVICE — OPTIFOAM GENTLE SA, POSTOP, 4X12: Brand: MEDLINE

## (undated) DEVICE — BNDG ELAS CO-FLEX SLF ADHR 4IN5YD LF STRL

## (undated) DEVICE — SYR LUERLOK 5CC

## (undated) DEVICE — Device

## (undated) DEVICE — CANN RETRGR STYLET RSCP 15F

## (undated) DEVICE — CONN TBG Y 6 IN 1 LF STRL

## (undated) DEVICE — DRSNG WND GZ PAD BORDERED 4X8IN STRL

## (undated) DEVICE — TBG ART PRESS 60 IN

## (undated) DEVICE — CORONARY ARTERY BYPASS GRAFT MARKERS, STAINLESS STEEL, DISTAL, WITHOUT HOLDER: Brand: ANASTOMARK CORONARY ARTERY BYPASS GRAFT MARKERS, STAINLESS STEEL, DISTAL

## (undated) DEVICE — CATHETER,URETHRAL,REDRUBBER,STERILE,20FR: Brand: MEDLINE

## (undated) DEVICE — SUT GUT CHRM 3/0 SH 27IN G122H

## (undated) DEVICE — SYR LL TP 10ML STRL

## (undated) DEVICE — IRRIGATOR BULB ASEPTO 60CC STRL

## (undated) DEVICE — GLV SURG BIOGEL LTX PF 7 1/2

## (undated) DEVICE — BW-412T DISP COMBO CLEANING BRUSH: Brand: SINGLE USE COMBINATION CLEANING BRUSH

## (undated) DEVICE — ST TOURNI COMPL A/ 7IN

## (undated) DEVICE — PK HEART OPN 40

## (undated) DEVICE — ELECTRD NDL EZ CLN MOD 2.75IN

## (undated) DEVICE — SUT GUT CHRM 4/0 PS2 18IN 1637G

## (undated) DEVICE — GW HITORQUE/BAL MID/WT J W/HCOAT .014 3X190CM

## (undated) DEVICE — BG TRANSF W/COUPLER SPK 600ML

## (undated) DEVICE — 1LYRTR 16FR10ML100%SILTMPS SNP: Brand: MEDLINE INDUSTRIES, INC.

## (undated) DEVICE — SYS PERFUS SEP PLATLT W TIPS CUST

## (undated) DEVICE — GLIDESHEATH BASIC HYDROPHILIC COATED INTRODUCER SHEATH: Brand: GLIDESHEATH

## (undated) DEVICE — 28 FR STRAIGHT – SOFT PVC CATHETER: Brand: PVC THORACIC CATHETERS

## (undated) DEVICE — BNDG,ELSTC,MATRIX,STRL,6"X5YD,LF,HOOK&LP: Brand: MEDLINE

## (undated) DEVICE — MASK,FACE,FLUID RESIST,SHLD,EARLOOP: Brand: MEDLINE

## (undated) DEVICE — GOWN ISOL W/THUMB UNIV BLU BX/15

## (undated) DEVICE — Device: Brand: DEFENDO AIR/WATER/SUCTION AND BIOPSY VALVE

## (undated) DEVICE — CANN VENI DLP SGL/STAGE RT/ANGL MTL/TP 28F

## (undated) DEVICE — DRSNG GZ PETROLTM CURAD 3X9IN STRL

## (undated) DEVICE — SPNG GZ WOVN 4X4IN 12PLY 10/BX STRL

## (undated) DEVICE — BANDAGE,GAUZE,CONFORMING,1"X75",STRL,LF: Brand: MEDLINE

## (undated) DEVICE — CVR PROB 96IN LF STRL

## (undated) DEVICE — CATH DIAG IMPULSE FR4 5F 100CM

## (undated) DEVICE — BLOWER/MISTER AXIOUS OPCAB W/TBG

## (undated) DEVICE — PK PERFUS CUST W/CARDIOPLEGIA

## (undated) DEVICE — SPK PIN NSR FLUID NONVNT 2WY MINI LL LF

## (undated) DEVICE — TBG INSUFFLATION LUER LOCK: Brand: MEDLINE INDUSTRIES, INC.

## (undated) DEVICE — PK CATH CARD 40

## (undated) DEVICE — SYR LL 3CC

## (undated) DEVICE — SENSR CERBRL O2 PK/2

## (undated) DEVICE — STANDARD HYPODERMIC NEEDLE,POLYPROPYLENE HUB: Brand: MONOJECT

## (undated) DEVICE — BNDG,ELSTC,MATRIX,STRL,4"X5YD,LF,HOOK&LP: Brand: MEDLINE

## (undated) DEVICE — BALN PRESS WEDGE 5F 110CM

## (undated) DEVICE — SUT GUT CHRM 5/0 FS2 27IN 634G

## (undated) DEVICE — CATH DIAG IMPULSE FL3.5 5F 100CM

## (undated) DEVICE — ELECTRD NDL EDGE/STD 2.84IN

## (undated) DEVICE — OASIS DRAIN, SINGLE, INLINE & ATS COMPATIBLE: Brand: OASIS

## (undated) DEVICE — SYR LUERLOK 30CC

## (undated) DEVICE — GLIDESHEATH SLENDER STAINLESS STEEL KIT: Brand: GLIDESHEATH SLENDER

## (undated) DEVICE — PK ATS CUST W CARDIOTOMY RESEVOIR

## (undated) DEVICE — SOL ISO/ALC 70PCT 4OZ

## (undated) DEVICE — DGW .035 FC J3MM 260CM TEF: Brand: EMERALD

## (undated) DEVICE — ROTATING SURGICAL PUNCHES, 1 PER POUCH: Brand: A&E MEDICAL / ROTATING SURGICAL PUNCHES

## (undated) DEVICE — DRP SLUSH WARMR MACH RECTG 66X44IN

## (undated) DEVICE — CLAMP INSERT: Brand: STEALTH® CLAMP INSERT

## (undated) DEVICE — 3M™ TEGADERM™ CHG DRESSING 25/CARTON 4 CARTONS/CASE 1658: Brand: TEGADERM™